# Patient Record
Sex: MALE | Race: BLACK OR AFRICAN AMERICAN | Employment: OTHER | URBAN - METROPOLITAN AREA
[De-identification: names, ages, dates, MRNs, and addresses within clinical notes are randomized per-mention and may not be internally consistent; named-entity substitution may affect disease eponyms.]

---

## 2018-04-26 ENCOUNTER — HOSPITAL ENCOUNTER (EMERGENCY)
Facility: CLINIC | Age: 63
Discharge: HOME | End: 2018-04-27
Attending: EMERGENCY MEDICINE | Admitting: EMERGENCY MEDICINE

## 2018-04-26 VITALS
DIASTOLIC BLOOD PRESSURE: 87 MMHG | RESPIRATION RATE: 18 BRPM | TEMPERATURE: 98.9 F | OXYGEN SATURATION: 96 % | SYSTOLIC BLOOD PRESSURE: 126 MMHG

## 2018-04-26 DIAGNOSIS — S61.009A: ICD-10-CM

## 2018-04-26 PROCEDURE — 25000128 H RX IP 250 OP 636: Performed by: EMERGENCY MEDICINE

## 2018-04-26 PROCEDURE — 99283 EMERGENCY DEPT VISIT LOW MDM: CPT

## 2018-04-26 PROCEDURE — 90471 IMMUNIZATION ADMIN: CPT

## 2018-04-26 PROCEDURE — 90715 TDAP VACCINE 7 YRS/> IM: CPT | Performed by: EMERGENCY MEDICINE

## 2018-04-26 RX ADMIN — CLOSTRIDIUM TETANI TOXOID ANTIGEN (FORMALDEHYDE INACTIVATED), CORYNEBACTERIUM DIPHTHERIAE TOXOID ANTIGEN (FORMALDEHYDE INACTIVATED), BORDETELLA PERTUSSIS TOXOID ANTIGEN (GLUTARALDEHYDE INACTIVATED), BORDETELLA PERTUSSIS FILAMENTOUS HEMAGGLUTININ ANTIGEN (FORMALDEHYDE INACTIVATED), BORDETELLA PERTUSSIS PERTACTIN ANTIGEN, AND BORDETELLA PERTUSSIS FIMBRIAE 2/3 ANTIGEN 0.5 ML: 5; 2; 2.5; 5; 3; 5 INJECTION, SUSPENSION INTRAMUSCULAR at 23:55

## 2018-04-26 NOTE — ED AVS SNAPSHOT
[facility] Emergency Department    [address]    [facility] [address]    Phone:  [phone]    Fax:  [phone]                                       Alexandra Alfaro   MRN: [id number]    Department:  [facility] Emergency Department   Date of Visit:  4/26/2018           After Visit Summary Signature Page     I have received my discharge instructions, and my questions have been answered. I have discussed any challenges I see with this plan with the nurse or doctor.    ..........................................................................................................................................  Patient/Patient Representative Signature      ..........................................................................................................................................  Patient Representative Print Name and Relationship to Patient    ..................................................               ................................................  Date                                            Time    ..........................................................................................................................................  Reviewed by Signature/Title    ...................................................              ..............................................  Date                                                            Time

## 2018-04-26 NOTE — ED AVS SNAPSHOT
[facility] Emergency Department    [address]    [facility] [address]    Phone:  [phone]    Fax:  [phone]                                       Alexandra Alfaro   MRN: [id number]    Department:  [facility] Emergency Department   Date of Visit:  4/26/2018           Patient Information     Date Of Birth          1955        Your diagnoses for this visit were:     Avulsion of skin of thumb, initial encounter        You were seen by Erica Lopez MD.      Follow-up Information     Follow up with [facility] Emergency Department.    Specialty:  EMERGENCY MEDICINE    Why:  If symptoms worsen    Contact information:    [address]  [facility] [address] [phone]        Discharge Instructions         Skin Tear (Skin Avulsion)  A skin avulsion is a tearing of the top layer of skin. This commonly happens after a fall or other injury. It also tends to be more common in older people, or those taking blood thinners or steroids for long periods of time.  Home care  These guidelines will help you care for your wound at home:    Keep the wound clean and dry for the first 24 to 48 hours, or as your healthcare provider advises.    If there is a dressing or bandage, change it when it gets wet or dirty. Otherwise, leave it on for the first 24 hours, then change it once a day or as often as the doctor says.    If stitches or staples were used, check the wound every day.    After taking off the dressing, wash the area gently with soap and water. Clean as close to the stitches as you can. Avoid washing or rubbing the stitches directly.    After 3 days you can keep the bandages off the wound, unless told otherwise, or there is continued drainage.  Allow the wound to be open to the air.    Keep a thin layer of antibiotic ointment on the cut. This will keep the wound clean, make it easier to remove the stitches, and reduce scarring.    If your wound  is oozing, you can put a nonstick dressing over it. Then, reapply the bandage or dressing as you were told.    You can shower as usual after the first 24 hours, but don't soak the area in water (no baths or swimming) until the stitches or staples are taken out.    If surgical tape was used, keep the area clean and dry. If it becomes wet, blot it dry with a clean towel.    If skin glue was used, don't put any creams, lotions, or antibiotic ointments on it. These can dissolve the glue. Usually the glue will flake off in about 5 to 10 days by itself. Try to resist picking it off before that so the wound doesn't open up. When it gets wet, pat it dry.  Here is some information about medicine:    You may use over-the-counter medicine such as acetaminophen or ibuprofen to control pain, unless another pain medicine was given. If you have chronic liver or kidney disease or ever had a stomach ulcer or gastrointestinal bleeding, talk with your doctor before using these medicines.    If you were given antibiotics, take them until they are all used up. It is important to finish the antibiotics even if the wound looks better. This will ensure that the infection has cleared.  Follow-up care  Follow up with your healthcare provider, or as advised.    Watch for any signs of infection, such as increasing redness, swelling, or pus coming out. If this happens, don't wait for your scheduled visit. Instead, see a doctor sooner.    Stitches or staples are usually taken out within 5 to 14 days. This varies depending on what part of your body they are on, and the type of wound. The doctor will tell you how long stitches should be left in.     If surgical tape was used, it is usually left on for 7 to 10 days. You can remove surgical tape after that unless you were told otherwise. If you try to remove it, and it is too hard, soaking can help. Surgical tape strips will eventually fall off on their own. If the edges of the cut pull apart, stop  removing the tape or strips and follow up with your doctor    As mentioned above, skin glue will flake off by itself in 5 to 10 days, so you don't need to pull it off.  If any X-rays were done, you will be notified of any changes that may affect your care.  When to seek medical advice  Call your healthcare provider right away if any of these occur:    Increasing pain in the wound    Redness, swelling, or pus coming from the wound    Fever of 100.4 F (38 C) or higher, or as directed by your healthcare provider    Sutures or staples come apart or fall out before your next appointment and the wound edges look as if they will re-open    Surgical tape closures fall off before 7 days, and the wound edges look as if they will re-open    Bleeding not controlled by direct pressure  Date Last Reviewed: 9/1/2016 2000-2017 [employer]. [address] [place] [address]. All rights reserved. This information is not intended as a substitute for professional medical care. Always follow your healthcare professional's instructions.          24 Hour Appointment Hotline       To make an appointment at any [facility], call [phone] ([phone]). If you don't have a family doctor or clinic, we will help you find one. [facility] clinics are conveniently located to serve the needs of you and your family.             Review of your medicines      Notice     You have not been prescribed any medications.            Orders Needing Specimen Collection     None      Pending Results     No orders found for last 3 day(s).            Pending Culture Results     No orders found for last 3 day(s).            Pending Results Instructions     If you had any lab results that were not finalized at the time of your Discharge, you can call the ED Lab Result RN at [phone]. You will be contacted by this team for any positive Lab results or changes in treatment. The nurses are available 7 days a week from 10A to  6:30P.  You can leave a message 24 hours per day and they will return your call.        Test Results From Your Hospital Stay               Clinical Quality Measure: Blood Pressure Screening     Your blood pressure was checked while you were in the emergency department today. The last reading we obtained was  BP: 126/87 . Please read the guidelines below about what these numbers mean and what you should do about them.  If your systolic blood pressure (the top number) is less than 120 and your diastolic blood pressure (the bottom number) is less than 80, then your blood pressure is normal. There is nothing more that you need to do about it.  If your systolic blood pressure (the top number) is 120-139 or your diastolic blood pressure (the bottom number) is 80-89, your blood pressure may be higher than it should be. You should have your blood pressure rechecked within a year by a primary care provider.  If your systolic blood pressure (the top number) is 140 or greater or your diastolic blood pressure (the bottom number) is 90 or greater, you may have high blood pressure. High blood pressure is treatable, but if left untreated over time it can put you at risk for heart attack, stroke, or kidney failure. You should have your blood pressure rechecked by a primary care provider within the next 4 weeks.  If your provider in the emergency department today gave you specific instructions to follow-up with your doctor or provider even sooner than that, you should follow that instruction and not wait for up to 4 weeks for your follow-up visit.        Thank you for choosing [facility]       Thank you for choosing [facility] for your care. Our goal is always to provide you with excellent care. Hearing back from our patients is one way we can continue to improve our services. Please take a few minutes to complete the written survey that you may receive in the mail after you visit with us. Thank you!        MyChart Information      "[employer] lets you send messages to your doctor, view your test results, renew your prescriptions, schedule appointments and more. To sign up, go to [email or web address] . Click on \"Log in\" on the left side of the screen, which will take you to the Welcome page. Then click on \"Sign up Now\" on the right side of the page.     You will be asked to enter the access code listed below, as well as some personal information. Please follow the directions to create your username and password.     Your access code is: [email or web address]  Expires: 2018 12:16 AM     Your access code will  in 90 days. If you need help or a new code, please call your [facility] clinic or [phone].        Care EveryWhere ID     This is your Care EveryWhere ID. This could be used by other organizations to access your [facility] medical records  [id number]        Equal Access to Services     DOROTHEA BAUER AH: Hadii josephine Watkins, wazack rogers, qadulce kaalmamitch chatman, kindra jeter . So [facility] [phone].    ATENCIÓN: Si habla español, tiene a parry disposición servicios gratuitos de asistencia lingüística. Llame al [phone].    We comply with applicable federal civil rights laws and [place] laws. We do not discriminate on the basis of race, color, national origin, age, disability, sex, sexual orientation, or gender identity.            After Visit Summary       This is your record. Keep this with you and show to your community pharmacist(s) and doctor(s) at your next visit.                  "

## 2018-04-27 NOTE — DISCHARGE INSTRUCTIONS
Skin Tear (Skin Avulsion)  A skin avulsion is a tearing of the top layer of skin. This commonly happens after a fall or other injury. It also tends to be more common in older people, or those taking blood thinners or steroids for long periods of time.  Home care  These guidelines will help you care for your wound at home:    Keep the wound clean and dry for the first 24 to 48 hours, or as your healthcare provider advises.    If there is a dressing or bandage, change it when it gets wet or dirty. Otherwise, leave it on for the first 24 hours, then change it once a day or as often as the doctor says.    If stitches or staples were used, check the wound every day.    After taking off the dressing, wash the area gently with soap and water. Clean as close to the stitches as you can. Avoid washing or rubbing the stitches directly.    After 3 days you can keep the bandages off the wound, unless told otherwise, or there is continued drainage.  Allow the wound to be open to the air.    Keep a thin layer of antibiotic ointment on the cut. This will keep the wound clean, make it easier to remove the stitches, and reduce scarring.    If your wound is oozing, you can put a nonstick dressing over it. Then, reapply the bandage or dressing as you were told.    You can shower as usual after the first 24 hours, but don't soak the area in water (no baths or swimming) until the stitches or staples are taken out.    If surgical tape was used, keep the area clean and dry. If it becomes wet, blot it dry with a clean towel.    If skin glue was used, don't put any creams, lotions, or antibiotic ointments on it. These can dissolve the glue. Usually the glue will flake off in about 5 to 10 days by itself. Try to resist picking it off before that so the wound doesn't open up. When it gets wet, pat it dry.  Here is some information about medicine:    You may use over-the-counter medicine such as acetaminophen or ibuprofen to control pain,  unless another pain medicine was given. If you have chronic liver or kidney disease or ever had a stomach ulcer or gastrointestinal bleeding, talk with your doctor before using these medicines.    If you were given antibiotics, take them until they are all used up. It is important to finish the antibiotics even if the wound looks better. This will ensure that the infection has cleared.  Follow-up care  Follow up with your healthcare provider, or as advised.    Watch for any signs of infection, such as increasing redness, swelling, or pus coming out. If this happens, don't wait for your scheduled visit. Instead, see a doctor sooner.    Stitches or staples are usually taken out within 5 to 14 days. This varies depending on what part of your body they are on, and the type of wound. The doctor will tell you how long stitches should be left in.     If surgical tape was used, it is usually left on for 7 to 10 days. You can remove surgical tape after that unless you were told otherwise. If you try to remove it, and it is too hard, soaking can help. Surgical tape strips will eventually fall off on their own. If the edges of the cut pull apart, stop removing the tape or strips and follow up with your doctor    As mentioned above, skin glue will flake off by itself in 5 to 10 days, so you don't need to pull it off.  If any X-rays were done, you will be notified of any changes that may affect your care.  When to seek medical advice  Call your healthcare provider right away if any of these occur:    Increasing pain in the wound    Redness, swelling, or pus coming from the wound    Fever of 100.4 F (38 C) or higher, or as directed by your healthcare provider    Sutures or staples come apart or fall out before your next appointment and the wound edges look as if they will re-open    Surgical tape closures fall off before 7 days, and the wound edges look as if they will re-open    Bleeding not controlled by direct pressure  Date  Last Reviewed: 9/1/2016 2000-2017 [employer]. [address] [place] [address], [place] [address]. All rights reserved. This information is not intended as a substitute for professional medical care. Always follow your healthcare professional's instructions.

## 2018-04-27 NOTE — ED PROVIDER NOTES
History     Chief Complaint:  Thumb avulsion    HPI   Alexandra Alfaro is a 63 year old male who presents to the emergency department today for evaluation of a thumb avulsion.  Vision was cooking and cut his thumb today.  He cut his right thumb and it continued to bleed.  He knows he needs a tetanus shot.  He reports numbness around the cut.  He has no issue with moving the thumb.  He is otherwise healthy and not on blood thinners.  No other complaints and no other injuries occurred.    Allergies:  No Known Drug Allergies     Medications:    The patient is currently on no regular medications.     Past Medical History:    History reviewed. No pertinent past medical history.    Past Surgical History:    History reviewed. No pertinent surgical history.    Family History:    History reviewed. No pertinent family history.     Social History:  The patient was accompanied to the ED by family   Smoking Status: Never  Smokeless Tobacco: Never  Alcohol Use: No    Review of Systems   Skin: Positive for wound.   Neurological: Positive for numbness.       Physical Exam   First Vitals:  BP: 126/87  Heart Rate: 104  Temp: 98.9  F (37.2  C)  Resp: 18  SpO2: 96 %    Physical Exam  General: Patient is alert and interactive when I enter the room  Head:  The scalp, face, and head appear normal  Eyes:  Conjunctivae are normal  ENT:    The nose is normal    Pinnae are normal    External acoustic canals are normal  Neck:  Trachea midline  CV:  Pulses are normal    Resp:  No respiratory distress   Abdomen:      Soft, non-tender, non-distended  Musc:  Normal muscular tone    No major joint effusions    Good ROM of right thumb, flexion and extension intact, cap refill < 2 sec distal to injury, sensation intact distal to injury    Skin:  Skin avulsion to right thumb on radial aspect, mild active bleeding, no pulsatile bleeding  Neuro:  Speech is normal and fluent. Face is symmetric.     Moving all extremities well.   Psych: Awake. Alert.   Normal affect.  Appropriate interactions.    Emergency Department Course     Interventions:  2355 Tdap 0.5mL IM     Emergency Department Course:  Nursing notes and vitals reviewed.  I performed an exam of the patient as documented above.   Findings and plan explained to the Patient. Patient discharged home with instructions regarding supportive care, medications, and reasons to return. The importance of close follow-up was reviewed.     Impression & Plan      Medical Decision Making:  Alexandra Alfaro is a 62 yo who presents with thumb laceration. Earlier today, patient experienced trauma to his right thumb by a night. He sustained a thumb avulsion. There was mild venous oozing from avulsion. No signs of arterial bleeding and no signs of tendon injury. No nail involvement. Given that it is an avulsion, no indication for repair. A finger guaze dressing with surgifoam over wound applied. Bleeding was controlled. Instructed to keep dressing on for 24 hours and then daily wound cares. Patient's tetanus updated. Patient discharged.     Diagnosis:    ICD-10-CM    1. Avulsion of skin of thumb, initial encounter S61.009A        Disposition:  Discharged to home      4/26/2018   [facility] EMERGENCY DEPARTMENT       Erica Lopez MD  04/28/18 6196

## 2018-04-27 NOTE — ED TRIAGE NOTES
Patient was cutting food and has avulsion to right thumb which continues to ooze blood. Needs tetanus immunization.

## 2018-04-28 ASSESSMENT — ENCOUNTER SYMPTOMS
WOUND: 1
NUMBNESS: 1

## 2018-08-02 PROCEDURE — 99282 EMERGENCY DEPT VISIT SF MDM: CPT

## 2018-08-03 ENCOUNTER — HOSPITAL ENCOUNTER (EMERGENCY)
Facility: CLINIC | Age: 63
Discharge: HOME | End: 2018-08-03
Attending: EMERGENCY MEDICINE | Admitting: EMERGENCY MEDICINE
Payer: COMMERCIAL

## 2018-08-03 VITALS
TEMPERATURE: 98.4 F | OXYGEN SATURATION: 100 % | RESPIRATION RATE: 16 BRPM | DIASTOLIC BLOOD PRESSURE: 91 MMHG | SYSTOLIC BLOOD PRESSURE: 123 MMHG | HEART RATE: 75 BPM

## 2018-08-03 DIAGNOSIS — K40.90 RIGHT INGUINAL HERNIA: ICD-10-CM

## 2018-08-03 RX ORDER — NORTRIPTYLINE HCL 25 MG
25 CAPSULE ORAL AT BEDTIME
Qty: 10 CAPSULE | Refills: 0 | Status: SHIPPED | OUTPATIENT
Start: 2018-08-03 | End: 2020-01-01

## 2018-08-03 ASSESSMENT — ENCOUNTER SYMPTOMS
ABDOMINAL PAIN: 1
VOMITING: 0
FEVER: 0
NAUSEA: 0
CONSTIPATION: 0
DIZZINESS: 0
DIARRHEA: 0

## 2018-08-03 NOTE — ED AVS SNAPSHOT
[facility] Emergency Department    [address]    [facility] [address]    Phone:  [phone]    Fax:  [phone]                                       Alexandra Alfaro   MRN: [id number]    Department:  [facility] Emergency Department   Date of Visit:  8/2/2018           After Visit Summary Signature Page     I have received my discharge instructions, and my questions have been answered. I have discussed any challenges I see with this plan with the nurse or doctor.    ..........................................................................................................................................  Patient/Patient Representative Signature      ..........................................................................................................................................  Patient Representative Print Name and Relationship to Patient    ..................................................               ................................................  Date                                            Time    ..........................................................................................................................................  Reviewed by Signature/Title    ...................................................              ..............................................  Date                                                            Time

## 2018-08-03 NOTE — ED TRIAGE NOTES
Patient states he has a bulge in his right groin that has been there for about four months.  He is able to reduce it himself but it keeps popping back out and it is slightly painful when he walks or lifts things.  PMD wouldn't make an appt for him as they wanted to rule out an emergency.    ABCs intact.  Alert and oriented x 3.

## 2018-08-03 NOTE — ED PROVIDER NOTES
History     Chief Complaint:  Abdominal Pain    The history is provided by the patient. The history is limited by a language barrier. A  was used (son acting as ).      Alexandra Alfaro is a 63 year old male who presents with abdominal pain. The patient has a known right sided inguinal hernia that was diagnosed over 4 months ago. He states that this area is reducible, but patient states that once he walks it pops out again. His pain has increased over the past 4 months and is here for evaluation and treatment. He denies any fevers, testicular pain, trauma, or any other symptoms.    Allergies:  No known drug allergies.    Medications:    The patient is not currently taking any prescribed medications.    Past Medical History:    No significant past medical history.     Past Surgical History:    No pertinent past surgical history.    Family History:    History reviewed. No pertinent family history.    Social History:  Smoking status: Never smoker  Alcohol use: No  Marital Status:        Review of Systems   Constitutional: Negative for fever.   Gastrointestinal: Positive for abdominal pain. Negative for constipation, diarrhea, nausea and vomiting.   Genitourinary: Negative for penile pain, penile swelling and testicular pain.   Neurological: Negative for dizziness.   All other systems reviewed and are negative.    Physical Exam     Patient Vitals for the past 24 hrs:   BP Temp Temp src Pulse Resp SpO2   08/02/18 2324 126/78 98.4  F (36.9  C) Temporal 75 20 98 %       Physical Exam  Nursing note and vitals reviewed.  Constitutional: Cooperative.   Cardiovascular: Normal rate, regular rhythm and normal heart sounds.  No murmur.  Pulmonary/Chest: Effort normal and breath sounds normal. No respiratory distress. No wheezes. No rales.   Abdominal: Soft. Normal appearance and bowel sounds are normal. No distension. There is no tenderness. There is no rigidity and no guarding. Reducible right  inguinal hernia.  Neurological: Alert.   Skin: Skin is warm and dry.  Psychiatric: Normal mood and affect.     Emergency Department Course   Emergency Department Course:  Nursing notes and vitals reviewed.  (0145) I performed an exam of the patient as documented above.    Findings and plan explained to the patient. Patient discharged home with instructions regarding supportive care, medications, and reasons to return. The importance of close follow-up was reviewed.   Impression & Plan    Medical Decision Making:  Alexandra Alfaro is a 63 year old male who presents with a bulge in his right groin. Clinical exam consistent with reducible inguinal hernia. No concern for incarceration or strangulation or other intraabdominal pathology. He has been counseled appropriately and will be referred to general surgery as an outpatient.    Diagnosis:    ICD-10-CM   1. Right inguinal hernia K40.90       Disposition:  Patient is discharged to home.    Discharge Medications:   Details   nortriptyline (PAMELOR) 25 MG capsule Take 1 capsule (25 mg) by mouth At Bedtime, Disp-10 capsule, R-0, Local Print          IErasto, am serving as a scribe on 8/3/2018 at 1:49 AM to personally document services performed by Dr. Roper based on my observations and the provider's statements to me.        Erasto Beard  8/2/2018   [facility] EMERGENCY DEPARTMENT       Dariusz Roper MD  08/03/18 0248

## 2018-08-03 NOTE — ED AVS SNAPSHOT
[facility] Emergency Department    [address] [place] [address]    Phone:  [phone]    Fax:  [phone]                                       Alexandra Alfaro   MRN: [id number]    Department:  [facility] Emergency Department   Date of Visit:  8/2/2018           Patient Information     Date Of Birth          1955        Your diagnoses for this visit were:     Right inguinal hernia        You were seen by Dariusz Roper MD.      Follow-up Information     Follow up with General surgery this next week.      Discharge References/Attachments     HERNIA, WHAT IS A (ENGLISH)      24 Hour Appointment Hotline       To make an appointment at any [facility] clinic, call [phone] ([phone]). If you don't have a family doctor or clinic, we will help you find one. [place] clinics are conveniently located to serve the needs of you and your family.             Review of your medicines      START taking        Dose / Directions Last dose taken    nortriptyline 25 MG capsule   Commonly known as:  PAMELOR   Dose:  25 mg   Quantity:  10 capsule        Take 1 capsule (25 mg) by mouth At Bedtime   Refills:  0                Prescriptions were sent or printed at these locations (1 Prescription)                   Other Prescriptions                Printed at Department/Unit printer (1 of 1)         nortriptyline (PAMELOR) 25 MG capsule                Orders Needing Specimen Collection     None      Pending Results     No orders found from 8/1/2018 to 8/4/2018.            Pending Culture Results     No orders found from 8/1/2018 to 8/4/2018.            Pending Results Instructions     If you had any lab results that were not finalized at the time of your Discharge, you can call the ED Lab Result RN at [phone]. You will be contacted by this team for any positive Lab results or changes in treatment. The nurses are available 7 days a week from 10A to 6:30P.  You can leave a  message 24 hours per day and they will return your call.        Test Results From Your Hospital Stay               Clinical Quality Measure: Blood Pressure Screening     Your blood pressure was checked while you were in the emergency department today. The last reading we obtained was  BP: 126/78 . Please read the guidelines below about what these numbers mean and what you should do about them.  If your systolic blood pressure (the top number) is less than 120 and your diastolic blood pressure (the bottom number) is less than 80, then your blood pressure is normal. There is nothing more that you need to do about it.  If your systolic blood pressure (the top number) is 120-139 or your diastolic blood pressure (the bottom number) is 80-89, your blood pressure may be higher than it should be. You should have your blood pressure rechecked within a year by a primary care provider.  If your systolic blood pressure (the top number) is 140 or greater or your diastolic blood pressure (the bottom number) is 90 or greater, you may have high blood pressure. High blood pressure is treatable, but if left untreated over time it can put you at risk for heart attack, stroke, or kidney failure. You should have your blood pressure rechecked by a primary care provider within the next 4 weeks.  If your provider in the emergency department today gave you specific instructions to follow-up with your doctor or provider even sooner than that, you should follow that instruction and not wait for up to 4 weeks for your follow-up visit.        Thank you for choosing [facility]       Thank you for choosing [facility] for your care. Our goal is always to provide you with excellent care. Hearing back from our patients is one way we can continue to improve our services. Please take a few minutes to complete the written survey that you may receive in the mail after you visit with us. Thank you!        [employer] Information     [employer] lets you send  "messages to your doctor, view your test results, renew your prescriptions, schedule appointments and more. To sign up, go to [email or web address] . Click on \"Log in\" on the left side of the screen, which will take you to the Welcome page. Then click on \"Sign up Now\" on the right side of the page.     You will be asked to enter the access code listed below, as well as some personal information. Please follow the directions to create your username and password.     Your access code is: [email or web address]  Expires: 2018  2:09 AM     Your access code will  in 90 days. If you need help or a new code, please call your [facility] clinic or [phone].        Care EveryWhere ID     This is your Care EveryWhere ID. This could be used by other organizations to access your [facility] medical records  [id number]        Equal Access to Services     DOROTHEA BAUER : Hadii josephine Watkins, waaxda luclaudia, qaybta kaalmada lurdes, kindra jeter . So [facility] [place] [phone].    ATENCIÓN: Si habla español, tiene a parry disposición servicios gratuitos de asistencia lingüística. Llreymundo al [phone].    We comply with applicable federal civil rights laws and [place] laws. We do not discriminate on the basis of race, color, national origin, age, disability, sex, sexual orientation, or gender identity.            After Visit Summary       This is your record. Keep this with you and show to your community pharmacist(s) and doctor(s) at your next visit.                  "

## 2018-08-06 ENCOUNTER — OFFICE VISIT (OUTPATIENT)
Dept: SURGERY | Facility: CLINIC | Age: 63
End: 2018-08-06
Payer: COMMERCIAL

## 2018-08-06 ENCOUNTER — TELEPHONE (OUTPATIENT)
Dept: SURGERY | Facility: CLINIC | Age: 63
End: 2018-08-06

## 2018-08-06 VITALS
WEIGHT: 189.6 LBS | RESPIRATION RATE: 16 BRPM | SYSTOLIC BLOOD PRESSURE: 120 MMHG | HEIGHT: 69 IN | OXYGEN SATURATION: 97 % | BODY MASS INDEX: 28.08 KG/M2 | DIASTOLIC BLOOD PRESSURE: 82 MMHG | HEART RATE: 75 BPM

## 2018-08-06 DIAGNOSIS — K40.90 RIGHT INGUINAL HERNIA: Primary | ICD-10-CM

## 2018-08-06 PROCEDURE — 99204 OFFICE O/P NEW MOD 45 MIN: CPT | Mod: 57 | Performed by: SURGERY

## 2018-08-06 RX ORDER — TAMSULOSIN HYDROCHLORIDE 0.4 MG/1
0.4 CAPSULE ORAL DAILY
Qty: 7 CAPSULE | Refills: 0 | Status: SHIPPED | OUTPATIENT
Start: 2018-08-06 | End: 2020-01-01

## 2018-08-06 ASSESSMENT — ENCOUNTER SYMPTOMS: WEIGHT LOSS: 1

## 2018-08-06 NOTE — PROGRESS NOTES
HPI      ROS (Review of Systems):      Positive for weight loss.   MUSCULOSKELETAL: Positive for back pain.          Physical Exam

## 2018-08-06 NOTE — PROGRESS NOTES
HPI:  Alexandra is a 63 year old male who presents for evaluation of right groin bulging and discomfort.  This has been present for some months, but has been increasing in symptoms.  The patient was recently seen in the emergency room, where he was found to have a reducible right inguinal hernia.  He states that the lump has never gotten stuck out.  He never has any associated nausea and vomiting.  He has not noticed any symptoms on the left side.  The patient is seen today with a family member acting as .    Past Medical History:  No significant past medical history    Past Surgical History:  History reviewed. No pertinent surgical history.     Social History:  Social History     Social History     Marital status:      Spouse name: N/A     Number of children: N/A     Years of education: N/A     Occupational History     Not on file.     Social History Main Topics     Smoking status: Former Smoker     Smokeless tobacco: Never Used     Alcohol use No     Drug use: No     Sexual activity: Not on file     Other Topics Concern     Not on file     Social History Narrative        Family History:  Family History   Problem Relation Age of Onset     Thyroid Disease Brother          ROS:  The 10 point review of systems is negative other than noted in the HPI and above.    PE:    General- Well-developed, well-nourished, patient able to get up on table without difficulty.  HEENT- Normocephalic and atraumatic. Pupils equal and round.  Mucous membranes moist.  Sclera are nonicteric.  Neck- No lymphadenopathy or masses   Respirations- are regular and non labored  Abdomen is abdomen is soft without significant tenderness, masses, organomegaly or guarding  Hernia-there is a question of some slight bulging high in the left inguinal canal.  This could represent an early hernia.              Right inguinal hernia {is obviously present.  This is fairly large in size.  It is easily reducible.   Umbilical hernia is not  present.              External genitalia are normal               Assessment: right inguinal hernia with possible very early left inguinal hernia    Plan: I have recommended robotic assisted repair of the patient's right inguinal hernia and assessment of the left side with possible repair.  We have discussed observation, reduction techniques and importance, incarceration and strangulation signs, symptoms and importance as well as need to seek emergency treatment.    We have discussed surgery in detail, including risk, benefits, complications, mesh, infection, nerve and cord damage and their sequelae including chronic pain and testicular loss, lifting and activity limits after surgery. He has been given literature to review. We will schedule surgery at patient's convenience.  We will plan on a general anesthetic.  It appears we will be able to get the patient on tomorrow's operating room schedule.  I have prescribed Flomax for the patient, and he should begin that this afternoon.      Prabhu Allred MD    Please route or send letter to:  *None*

## 2018-08-06 NOTE — TELEPHONE ENCOUNTER
Type of surgery: ROBOTIC ASSISTED RIGHT INGUINAL HERNIA REPAIR, POSSIBLE LEFT INGUINAL HENRIA REPAIR WITH MESH     Location of surgery: [facility]  Date and time of surgery: 8/7/2018 @ 12:45 PM   Surgeon: NAT JOLLY MD   Pre-Op Appt Date: DONE 8/3/2018 ED   Post-Op Appt Date: PATIENT TO SCHEDULE     Packet sent out: PACKET AND SOAP GIVEN TO PATIENT   Pre-cert/Authorization completed:  Not Applicable  Date: 8/6/2018       ROBOTIC ASSISTED RIGHT INGUINAL HERNIA REPAIR, POSSIBLE LEFT INGUINAL HENRIA REPAIR WITH MESH    GENERAL H&P DONE ER 8/3/2018  120 MIN REQ PA ASSIST DFB NMS

## 2018-08-06 NOTE — MR AVS SNAPSHOT
"              After Visit Summary   8/6/2018    Alexandra Alfaro    MRN: [id number]           Patient Information     Date Of Birth          1955        Visit Information        Provider Department      8/6/2018 11:00 AM Prabhu Allred MD; PHONE,  [facility]      Today's Diagnoses     Right inguinal hernia    -  1       Follow-ups after your visit        Your next 10 appointments already scheduled     Aug 07, 2018   Procedure with Prabhu Allred MD   [facility] (--)    [address]  [facility] [address]   [phone]            Aug 07, 2018 12:45 PM CDT   [facility] Same Day Surgery with Prabhu Allred MD, Sarika Henderson PA-C   [facility] ([facility])    [facility])   [phone]              Who to contact     If you have questions or need follow up information about today's clinic visit or your schedule please contact [facility] directly at [phone].  Normal or non-critical lab and imaging results will be communicated to you by [employer], letter or phone within 4 business days after the clinic has received the results. If you do not hear from us within 7 days, please contact the clinic through [employer] or phone. If you have a critical or abnormal lab result, we will notify you by phone as soon as possible.  Submit refill requests through [employer] or call your pharmacy and they will forward the refill request to us. Please allow 3 business days for your refill to be completed.          Additional Information About Your Visit        [employer] Information     [employer] lets you send messages to your doctor, view your test results, renew your prescriptions, schedule appointments and more. To sign up, go to [email or web address] . Click on \"Log in\" on the left side of the " "screen, which will take you to the Welcome page. Then click on \"Sign up Now\" on the right side of the page.     You will be asked to enter the access code listed below, as well as some personal information. Please follow the directions to create your username and password.     Your access code is: [email or web address]  Expires: 2018  2:09 AM     Your access code will  in 90 days. If you need help or a new code, please call your [facility] or [phone].        Care EveryWhere ID     This is your Care EveryWhere ID. This could be used by other organizations to access your [facility] medical records  [id number]        Your Vitals Were     Pulse Respirations Height Pulse Oximetry BMI (Body Mass Index)       75 16 5' 8.9\" (1.75 m) 97% 28.08 kg/m2        Blood Pressure from Last 3 Encounters:   18 120/82   18 (!) 123/91   18 126/87    Weight from Last 3 Encounters:   18 189 lb 9.5 oz (86 kg)              Today, you had the following     No orders found for display         Today's Medication Changes          These changes are accurate as of 18  2:45 PM.  If you have any questions, ask your nurse or doctor.               Start taking these medicines.        Dose/Directions    tamsulosin 0.4 MG capsule   Commonly known as:  FLOMAX   Used for:  Right inguinal hernia   Started by:  Prabhu Allred MD        Dose:  0.4 mg   Take 1 capsule (0.4 mg) by mouth daily   Quantity:  7 capsule   Refills:  0            Where to get your medicines      These medications were sent to [facility] [address] [facility] [address] AT [facility] [address], [facility] [address]    Hours:  24-hours Phone:  [phone]     tamsulosin 0.4 MG capsule                Primary Care Provider Fax #    Physician No Ref-Primary [phone]       No address on file        Equal Access to Services     DOROTHEA BAUER AH: ajnene Womack, " angie newclaudiamontserrat flanaganyannick shannonin hayaan adeeg kharash la'aan ah. So [facility] [phone].    ATENCIÓN: Si habla meli, tiene a parry disposición servicios gratuitos de asistencia lingüística. Francisco al [phone].    We comply with applicable federal civil rights laws and [place] laws. We do not discriminate on the basis of race, color, national origin, age, disability, sex, sexual orientation, or gender identity.            Thank you!     Thank you for choosing [facility]  for your care. Our goal is always to provide you with excellent care. Hearing back from our patients is one way we can continue to improve our services. Please take a few minutes to complete the written survey that you may receive in the mail after your visit with us. Thank you!             Your Updated Medication List - Protect others around you: Learn how to safely use, store and throw away your medicines at www.disposemymeds.org.          This list is accurate as of 8/6/18  2:45 PM.  Always use your most recent med list.                   Brand Name Dispense Instructions for use Diagnosis    nortriptyline 25 MG capsule    PAMELOR    10 capsule    Take 1 capsule (25 mg) by mouth At Bedtime        tamsulosin 0.4 MG capsule    FLOMAX    7 capsule    Take 1 capsule (0.4 mg) by mouth daily    Right inguinal hernia

## 2018-08-07 ENCOUNTER — APPOINTMENT (OUTPATIENT)
Dept: SURGERY | Facility: PHYSICIAN GROUP | Age: 63
End: 2018-08-07
Payer: COMMERCIAL

## 2018-08-07 ENCOUNTER — ANESTHESIA (OUTPATIENT)
Dept: SURGERY | Facility: CLINIC | Age: 63
End: 2018-08-07
Payer: COMMERCIAL

## 2018-08-07 ENCOUNTER — ANESTHESIA EVENT (OUTPATIENT)
Dept: SURGERY | Facility: CLINIC | Age: 63
End: 2018-08-07
Payer: COMMERCIAL

## 2018-08-07 ENCOUNTER — HOSPITAL ENCOUNTER (OUTPATIENT)
Facility: CLINIC | Age: 63
Discharge: HOME | End: 2018-08-07
Attending: SURGERY | Admitting: SURGERY
Payer: COMMERCIAL

## 2018-08-07 ENCOUNTER — SURGERY (OUTPATIENT)
Age: 63
End: 2018-08-07

## 2018-08-07 VITALS
TEMPERATURE: 97.6 F | RESPIRATION RATE: 14 BRPM | WEIGHT: 187 LBS | SYSTOLIC BLOOD PRESSURE: 117 MMHG | OXYGEN SATURATION: 96 % | HEIGHT: 68 IN | BODY MASS INDEX: 28.34 KG/M2 | DIASTOLIC BLOOD PRESSURE: 76 MMHG

## 2018-08-07 DIAGNOSIS — K40.90 RIGHT INGUINAL HERNIA: Primary | ICD-10-CM

## 2018-08-07 PROCEDURE — S2900 ROBOTIC SURGICAL SYSTEM: HCPCS | Performed by: SURGERY

## 2018-08-07 PROCEDURE — 25000132 ZZH RX MED GY IP 250 OP 250 PS 637: Performed by: SURGERY

## 2018-08-07 PROCEDURE — S2900 ROBOTIC SURGICAL SYSTEM: HCPCS | Mod: AS | Performed by: PHYSICIAN ASSISTANT

## 2018-08-07 PROCEDURE — 71000027 ZZH RECOVERY PHASE 2 EACH 15 MINS: Performed by: SURGERY

## 2018-08-07 PROCEDURE — C1781 MESH (IMPLANTABLE): HCPCS | Performed by: SURGERY

## 2018-08-07 PROCEDURE — 25000125 ZZHC RX 250: Performed by: NURSE ANESTHETIST, CERTIFIED REGISTERED

## 2018-08-07 PROCEDURE — 37000008 ZZH ANESTHESIA TECHNICAL FEE, 1ST 30 MIN: Performed by: SURGERY

## 2018-08-07 PROCEDURE — 49650 LAP ING HERNIA REPAIR INIT: CPT | Mod: AS | Performed by: PHYSICIAN ASSISTANT

## 2018-08-07 PROCEDURE — 49650 LAP ING HERNIA REPAIR INIT: CPT | Mod: RT | Performed by: SURGERY

## 2018-08-07 PROCEDURE — 25000125 ZZHC RX 250: Performed by: SURGERY

## 2018-08-07 PROCEDURE — 25000128 H RX IP 250 OP 636: Performed by: NURSE ANESTHETIST, CERTIFIED REGISTERED

## 2018-08-07 PROCEDURE — 27210794 ZZH OR GENERAL SUPPLY STERILE: Performed by: SURGERY

## 2018-08-07 PROCEDURE — 36000085 ZZH SURGERY LEVEL 8 1ST 30 MIN: Performed by: SURGERY

## 2018-08-07 PROCEDURE — 37000009 ZZH ANESTHESIA TECHNICAL FEE, EACH ADDTL 15 MIN: Performed by: SURGERY

## 2018-08-07 PROCEDURE — 36000087 ZZH SURGERY LEVEL 8 EA 15 ADDTL MIN: Performed by: SURGERY

## 2018-08-07 PROCEDURE — 25000128 H RX IP 250 OP 636: Performed by: ANESTHESIOLOGY

## 2018-08-07 PROCEDURE — 71000013 ZZH RECOVERY PHASE 1 LEVEL 1 EA ADDTL HR: Performed by: SURGERY

## 2018-08-07 PROCEDURE — 25000566 ZZH SEVOFLURANE, EA 15 MIN: Performed by: SURGERY

## 2018-08-07 PROCEDURE — 71000012 ZZH RECOVERY PHASE 1 LEVEL 1 FIRST HR: Performed by: SURGERY

## 2018-08-07 PROCEDURE — 93010 ELECTROCARDIOGRAM REPORT: CPT | Performed by: INTERNAL MEDICINE

## 2018-08-07 PROCEDURE — 25000128 H RX IP 250 OP 636: Performed by: SURGERY

## 2018-08-07 PROCEDURE — 40000306 ZZH STATISTIC PRE PROC ASSESS II: Performed by: SURGERY

## 2018-08-07 DEVICE — MESH PROGRIP LAPAROSCOPIC 5.9X3.9" PARIETEX SELF-FIX LPG1510: Type: IMPLANTABLE DEVICE | Site: ABDOMEN | Status: FUNCTIONAL

## 2018-08-07 RX ORDER — LABETALOL HYDROCHLORIDE 5 MG/ML
10 INJECTION, SOLUTION INTRAVENOUS
Status: DISCONTINUED | OUTPATIENT
Start: 2018-08-07 | End: 2018-08-07 | Stop reason: HOSPADM

## 2018-08-07 RX ORDER — ONDANSETRON 2 MG/ML
4 INJECTION INTRAMUSCULAR; INTRAVENOUS EVERY 30 MIN PRN
Status: DISCONTINUED | OUTPATIENT
Start: 2018-08-07 | End: 2018-08-07 | Stop reason: HOSPADM

## 2018-08-07 RX ORDER — BUPIVACAINE HYDROCHLORIDE AND EPINEPHRINE 5; 5 MG/ML; UG/ML
INJECTION, SOLUTION EPIDURAL; INTRACAUDAL; PERINEURAL PRN
Status: DISCONTINUED | OUTPATIENT
Start: 2018-08-07 | End: 2018-08-07 | Stop reason: HOSPADM

## 2018-08-07 RX ORDER — SODIUM CHLORIDE, SODIUM LACTATE, POTASSIUM CHLORIDE, CALCIUM CHLORIDE 600; 310; 30; 20 MG/100ML; MG/100ML; MG/100ML; MG/100ML
INJECTION, SOLUTION INTRAVENOUS CONTINUOUS
Status: DISCONTINUED | OUTPATIENT
Start: 2018-08-07 | End: 2018-08-07 | Stop reason: HOSPADM

## 2018-08-07 RX ORDER — HYDRALAZINE HYDROCHLORIDE 20 MG/ML
2.5-5 INJECTION INTRAMUSCULAR; INTRAVENOUS EVERY 10 MIN PRN
Status: DISCONTINUED | OUTPATIENT
Start: 2018-08-07 | End: 2018-08-07 | Stop reason: HOSPADM

## 2018-08-07 RX ORDER — GLYCOPYRROLATE 0.2 MG/ML
INJECTION, SOLUTION INTRAMUSCULAR; INTRAVENOUS PRN
Status: DISCONTINUED | OUTPATIENT
Start: 2018-08-07 | End: 2018-08-07

## 2018-08-07 RX ORDER — ONDANSETRON 4 MG/1
4 TABLET, ORALLY DISINTEGRATING ORAL EVERY 30 MIN PRN
Status: DISCONTINUED | OUTPATIENT
Start: 2018-08-07 | End: 2018-08-07 | Stop reason: HOSPADM

## 2018-08-07 RX ORDER — OXYCODONE HYDROCHLORIDE 5 MG/1
5 TABLET ORAL
Status: COMPLETED | OUTPATIENT
Start: 2018-08-07 | End: 2018-08-07

## 2018-08-07 RX ORDER — FENTANYL CITRATE 50 UG/ML
INJECTION, SOLUTION INTRAMUSCULAR; INTRAVENOUS PRN
Status: DISCONTINUED | OUTPATIENT
Start: 2018-08-07 | End: 2018-08-07

## 2018-08-07 RX ORDER — HYDROMORPHONE HYDROCHLORIDE 1 MG/ML
.3-.5 INJECTION, SOLUTION INTRAMUSCULAR; INTRAVENOUS; SUBCUTANEOUS EVERY 10 MIN PRN
Status: DISCONTINUED | OUTPATIENT
Start: 2018-08-07 | End: 2018-08-07 | Stop reason: HOSPADM

## 2018-08-07 RX ORDER — NALOXONE HYDROCHLORIDE 0.4 MG/ML
.1-.4 INJECTION, SOLUTION INTRAMUSCULAR; INTRAVENOUS; SUBCUTANEOUS
Status: DISCONTINUED | OUTPATIENT
Start: 2018-08-07 | End: 2018-08-07 | Stop reason: HOSPADM

## 2018-08-07 RX ORDER — CEFAZOLIN SODIUM 1 G/3ML
1 INJECTION, POWDER, FOR SOLUTION INTRAMUSCULAR; INTRAVENOUS SEE ADMIN INSTRUCTIONS
Status: DISCONTINUED | OUTPATIENT
Start: 2018-08-07 | End: 2018-08-07 | Stop reason: HOSPADM

## 2018-08-07 RX ORDER — LIDOCAINE 40 MG/G
CREAM TOPICAL
Status: DISCONTINUED | OUTPATIENT
Start: 2018-08-07 | End: 2018-08-07 | Stop reason: HOSPADM

## 2018-08-07 RX ORDER — OXYCODONE HYDROCHLORIDE 5 MG/1
5-10 TABLET ORAL
Qty: 20 TABLET | Refills: 0 | Status: SHIPPED | OUTPATIENT
Start: 2018-08-07 | End: 2020-01-01

## 2018-08-07 RX ORDER — PROPOFOL 10 MG/ML
INJECTION, EMULSION INTRAVENOUS PRN
Status: DISCONTINUED | OUTPATIENT
Start: 2018-08-07 | End: 2018-08-07

## 2018-08-07 RX ORDER — ONDANSETRON 2 MG/ML
INJECTION INTRAMUSCULAR; INTRAVENOUS PRN
Status: DISCONTINUED | OUTPATIENT
Start: 2018-08-07 | End: 2018-08-07

## 2018-08-07 RX ORDER — FENTANYL CITRATE 50 UG/ML
25-50 INJECTION, SOLUTION INTRAMUSCULAR; INTRAVENOUS
Status: DISCONTINUED | OUTPATIENT
Start: 2018-08-07 | End: 2018-08-07 | Stop reason: HOSPADM

## 2018-08-07 RX ORDER — OXYCODONE HYDROCHLORIDE 5 MG/1
5 TABLET ORAL ONCE
Status: COMPLETED | OUTPATIENT
Start: 2018-08-07 | End: 2018-08-07

## 2018-08-07 RX ORDER — MEPERIDINE HYDROCHLORIDE 50 MG/ML
12.5 INJECTION INTRAMUSCULAR; INTRAVENOUS; SUBCUTANEOUS
Status: DISCONTINUED | OUTPATIENT
Start: 2018-08-07 | End: 2018-08-07 | Stop reason: HOSPADM

## 2018-08-07 RX ORDER — LIDOCAINE HYDROCHLORIDE 10 MG/ML
INJECTION, SOLUTION INFILTRATION; PERINEURAL PRN
Status: DISCONTINUED | OUTPATIENT
Start: 2018-08-07 | End: 2018-08-07

## 2018-08-07 RX ORDER — NEOSTIGMINE METHYLSULFATE 1 MG/ML
VIAL (ML) INJECTION PRN
Status: DISCONTINUED | OUTPATIENT
Start: 2018-08-07 | End: 2018-08-07

## 2018-08-07 RX ORDER — DEXAMETHASONE SODIUM PHOSPHATE 4 MG/ML
INJECTION, SOLUTION INTRA-ARTICULAR; INTRALESIONAL; INTRAMUSCULAR; INTRAVENOUS; SOFT TISSUE PRN
Status: DISCONTINUED | OUTPATIENT
Start: 2018-08-07 | End: 2018-08-07

## 2018-08-07 RX ORDER — CEFAZOLIN SODIUM 2 G/100ML
2 INJECTION, SOLUTION INTRAVENOUS
Status: DISCONTINUED | OUTPATIENT
Start: 2018-08-07 | End: 2018-08-07 | Stop reason: HOSPADM

## 2018-08-07 RX ORDER — TAMSULOSIN HYDROCHLORIDE 0.4 MG/1
0.4 CAPSULE ORAL
Status: DISCONTINUED | OUTPATIENT
Start: 2018-08-07 | End: 2018-08-07 | Stop reason: HOSPADM

## 2018-08-07 RX ADMIN — ROCURONIUM BROMIDE 10 MG: 10 INJECTION INTRAVENOUS at 14:13

## 2018-08-07 RX ADMIN — GLYCOPYRROLATE 0.2 MG: 0.2 INJECTION, SOLUTION INTRAMUSCULAR; INTRAVENOUS at 13:32

## 2018-08-07 RX ADMIN — ONDANSETRON 4 MG: 2 INJECTION INTRAMUSCULAR; INTRAVENOUS at 15:12

## 2018-08-07 RX ADMIN — PROPOFOL 170 MG: 10 INJECTION, EMULSION INTRAVENOUS at 13:32

## 2018-08-07 RX ADMIN — HYDROMORPHONE HYDROCHLORIDE 0.5 MG: 1 INJECTION, SOLUTION INTRAMUSCULAR; INTRAVENOUS; SUBCUTANEOUS at 14:01

## 2018-08-07 RX ADMIN — LIDOCAINE HYDROCHLORIDE 40 MG: 10 INJECTION, SOLUTION INFILTRATION; PERINEURAL at 13:32

## 2018-08-07 RX ADMIN — OXYCODONE HYDROCHLORIDE 5 MG: 5 TABLET ORAL at 19:01

## 2018-08-07 RX ADMIN — OXYCODONE HYDROCHLORIDE 5 MG: 5 TABLET ORAL at 16:29

## 2018-08-07 RX ADMIN — Medication 3 MG: at 15:15

## 2018-08-07 RX ADMIN — SODIUM CHLORIDE, POTASSIUM CHLORIDE, SODIUM LACTATE AND CALCIUM CHLORIDE: 600; 310; 30; 20 INJECTION, SOLUTION INTRAVENOUS at 12:24

## 2018-08-07 RX ADMIN — SODIUM CHLORIDE, POTASSIUM CHLORIDE, SODIUM LACTATE AND CALCIUM CHLORIDE 1000 ML: 600; 310; 30; 20 INJECTION, SOLUTION INTRAVENOUS at 16:00

## 2018-08-07 RX ADMIN — ROCURONIUM BROMIDE 50 MG: 10 INJECTION INTRAVENOUS at 13:32

## 2018-08-07 RX ADMIN — FENTANYL CITRATE 125 MCG: 50 INJECTION, SOLUTION INTRAMUSCULAR; INTRAVENOUS at 13:56

## 2018-08-07 RX ADMIN — MIDAZOLAM 1 MG: 1 INJECTION INTRAMUSCULAR; INTRAVENOUS at 13:21

## 2018-08-07 RX ADMIN — FENTANYL CITRATE 50 MCG: 50 INJECTION INTRAMUSCULAR; INTRAVENOUS at 15:58

## 2018-08-07 RX ADMIN — BUPIVACAINE HYDROCHLORIDE AND EPINEPHRINE BITARTRATE 16 ML: 5; .005 INJECTION, SOLUTION EPIDURAL; INTRACAUDAL; PERINEURAL at 15:15

## 2018-08-07 RX ADMIN — FENTANYL CITRATE 50 MCG: 50 INJECTION INTRAMUSCULAR; INTRAVENOUS at 15:49

## 2018-08-07 RX ADMIN — DEXAMETHASONE SODIUM PHOSPHATE 4 MG: 4 INJECTION, SOLUTION INTRA-ARTICULAR; INTRALESIONAL; INTRAMUSCULAR; INTRAVENOUS; SOFT TISSUE at 13:32

## 2018-08-07 RX ADMIN — FENTANYL CITRATE 50 MCG: 50 INJECTION, SOLUTION INTRAMUSCULAR; INTRAVENOUS at 15:29

## 2018-08-07 RX ADMIN — MIDAZOLAM 1 MG: 1 INJECTION INTRAMUSCULAR; INTRAVENOUS at 13:31

## 2018-08-07 RX ADMIN — FENTANYL CITRATE 75 MCG: 50 INJECTION, SOLUTION INTRAMUSCULAR; INTRAVENOUS at 13:32

## 2018-08-07 RX ADMIN — FENTANYL CITRATE 50 MCG: 50 INJECTION, SOLUTION INTRAMUSCULAR; INTRAVENOUS at 15:35

## 2018-08-07 RX ADMIN — CEFAZOLIN 2 G: 1 INJECTION, POWDER, FOR SOLUTION INTRAMUSCULAR; INTRAVENOUS at 13:38

## 2018-08-07 RX ADMIN — GLYCOPYRROLATE 0.6 MG: 0.2 INJECTION, SOLUTION INTRAMUSCULAR; INTRAVENOUS at 15:15

## 2018-08-07 NOTE — ANESTHESIA PREPROCEDURE EVALUATION
Anesthesia Evaluation     . Pt has had prior anesthetic.            ROS/MED HX    ENT/Pulmonary:       Neurologic:       Cardiovascular:         METS/Exercise Tolerance:     Hematologic:         Musculoskeletal:         GI/Hepatic:     (+) Other GI/Hepatic hernia      Renal/Genitourinary:         Endo:         Psychiatric:         Infectious Disease:         Malignancy:         Other:                     Physical Exam  Normal systems: cardiovascular and pulmonary    Airway   Mallampati: II  TM distance: >3 FB  Neck ROM: full    Dental     Cardiovascular       Pulmonary                     Anesthesia Plan      History & Physical Review  History and physical reviewed and following examination; no interval change.    ASA Status:  2 .    NPO Status:  > 8 hours    Plan for General and ETT with Intravenous and Propofol induction. Maintenance will be Balanced.    PONV prophylaxis:  Ondansetron (or other 5HT-3) and Dexamethasone or Solumedrol       Postoperative Care  Postoperative pain management:  IV analgesics.      Consents  Anesthetic plan, risks, benefits and alternatives discussed with:  Patient.  Use of blood products discussed: Yes.   Use of blood products discussed with Patient.  Consented to blood products.  .                          .

## 2018-08-07 NOTE — IP AVS SNAPSHOT
[facility] PreOP/PostOP    [address]    [facility] [address]    Phone:  [phone]    Fax:  [phone]                                       After Visit Summary   8/7/2018    Alexandra Alfaro    MRN: [id number]           After Visit Summary Signature Page     I have received my discharge instructions, and my questions have been answered. I have discussed any challenges I see with this plan with the nurse or doctor.    ..........................................................................................................................................  Patient/Patient Representative Signature      ..........................................................................................................................................  Patient Representative Print Name and Relationship to Patient    ..................................................               ................................................  Date                                            Time    ..........................................................................................................................................  Reviewed by Signature/Title    ...................................................              ..............................................  Date                                                            Time

## 2018-08-07 NOTE — OP NOTE
General Surgery Operative Note    Pre-operative diagnosis: Right Inguinal hernia    Post-operative diagnosis: same   Procedure:  robotic-assisted right inguinal hernia repair with mesh.     Surgeon: Prabhu Allred MD   Assistant(s): Ty Santos PA-C the physician assistant was medically necessary to assist in prepping, positioning, camera operation, retraction/exposure and instrument exchange.     Anesthesia: General    Estimated blood loss: 5 cc's   Drains placed: None   Complications:  None   Findings:   large indirect right inguinal hernia.  No evidence of significant left-sided hernia.       Indications for operation: This is a 63-year-old gentleman who presents with a right inguinal bulge.  Exam revealed a prominent right inguinal hernia.  There was a question of some vague bulging on the left as well.  Robotic-assisted repair of the patient's right inguinal hernia was recommended.  The procedure, along with its risks and complications, was discussed in detail with the patient through an .  He agreed to proceed.    Details of the operation: After informed consent, the patient was taken to the operating room where he underwent satisfactory induction of general anesthesia.  The patient was sterilely prepped and draped and a supraumbilical skin incision was made.  Dissection was carried bluntly down to the fascia, which was opened very slightly using electrocautery.  The robotic camera was inserted without difficulty.  Pneumoperitoneum was achieved using CO2 insufflation, and an 8 mm port was now placed under direct visualization on each side.  The patient was placed in slight Trendelenburg position and the robot was brought in and docked without difficulty.  I now proceeded to the robotic console.     The left side was visualized and there was no evidence of hernia here.  The right side revealed an obvious indirect hernia.   The peritoneum was scored above low level of the ASIS on the right and  the peritoneal flap was developed.  This was carried down medial and lateral to the internal ring.  A prominent indirect sac was now carefully dissected away from the cord structures.  An opening was made fairly posteriorly in the peritoneum during this dissection.   A piece of Progrip mesh was now inserted in the abdomen and deployed, centered over the internal ring.  The mesh was deployed so that it lay smoothly.  The peritoneum was now closed anteriorly using a running 3-0 V lock suture.  The more posterior opening in the peritoneum was closed using another 3-0 V lock suture.  This resulted in excellent coverage of the mesh.  The robot was now undocked and the trochars were removed.  The supraumbilical fascia was closed using interrupted 0 Vicryl sutures.  Skin incisions were closed using 4-0 subcuticular Vicryl followed by Steri-Strips.     The patient tolerated the procedure well and was transferred to the recovery room in satisfactory condition.  Sponge and needle counts were correct at the close of the case.    Specimens: * No specimens in log *        Prabhu Allred MD

## 2018-08-07 NOTE — ANESTHESIA CARE TRANSFER NOTE
Patient: Alexandra Alfaro    Procedure(s):  Robotic Assisted Right Inguinal Hernia Repair with Mesh  - Wound Class: II-Clean Contaminated    Diagnosis: Inguinal hernia   Diagnosis Additional Information: No value filed.    Anesthesia Type:   General, ETT     Note:  Airway :Face Mask  Patient transferred to:PACU  Comments:   4/4, moving all extremities, pt follows commands, suctioned orally, extubated without complications.Pt tx to PACU, VSS, pt comfortable. Report given to  PACU RN.Handoff Report: Identifed the Patient, Identified the Reponsible Provider, Reviewed the pertinent medical history, Discussed the surgical course, Reviewed Intra-OP anesthesia mangement and issues during anesthesia, Set expectations for post-procedure period and Allowed opportunity for questions and acknowledgement of understanding      Vitals: (Last set prior to Anesthesia Care Transfer)    CRNA VITALS  8/7/2018 1455 - 8/7/2018 1540      8/7/2018             Pulse: 90    SpO2: 100 %    Resp Rate (observed): 19                Electronically Signed By: JEANINE Gamez CRNA  August 7, 2018  3:40 PM

## 2018-08-07 NOTE — DISCHARGE INSTRUCTIONS
You had 5mg Oxycodone at 4:30pm    GENERAL ANESTHESIA OR SEDATION ADULT DISCHARGE INSTRUCTIONS   SPECIAL PRECAUTIONS FOR 24 HOURS AFTER SURGERY    IT IS NOT UNUSUAL TO FEEL LIGHT-HEADED OR FAINT, UP TO 24 HOURS AFTER SURGERY OR WHILE TAKING PAIN MEDICATION.  IF YOU HAVE THESE SYMPTOMS; SIT FOR A FEW MINUTES BEFORE STANDING AND HAVE SOMEONE ASSIST YOU WHEN YOU GET UP TO WALK OR USE THE BATHROOM.    YOU SHOULD REST AND RELAX FOR THE NEXT 24 HOURS AND YOU MUST MAKE ARRANGEMENTS TO HAVE SOMEONE STAY WITH YOU FOR AT LEAST 24 HOURS AFTER YOUR DISCHARGE.  AVOID HAZARDOUS AND STRENUOUS ACTIVITIES.  DO NOT MAKE IMPORTANT DECISIONS FOR 24 HOURS.    DO NOT DRIVE ANY VEHICLE OR OPERATE MECHANICAL EQUIPMENT FOR 24 HOURS FOLLOWING THE END OF YOUR SURGERY.  EVEN THOUGH YOU MAY FEEL NORMAL, YOUR REACTIONS MAY BE AFFECTED BY THE MEDICATION YOU HAVE RECEIVED.    DO NOT DRINK ALCOHOLIC BEVERAGES FOR 24 HOURS FOLLOWING YOUR SURGERY.    DRINK CLEAR LIQUIDS (APPLE JUICE, GINGER ALE, 7-UP, BROTH, ETC.).  PROGRESS TO YOUR REGULAR DIET AS YOU FEEL ABLE.    YOU MAY HAVE A DRY MOUTH, A SORE THROAT, MUSCLES ACHES OR TROUBLE SLEEPING.  THESE SHOULD GO AWAY AFTER 24 HOURS.    CALL YOUR DOCTOR FOR ANY OF THE FOLLOWING:  SIGNS OF INFECTION (FEVER, GROWING TENDERNESS AT THE SURGERY SITE, A LARGE AMOUNT OF DRAINAGE OR BLEEDING, SEVERE PAIN, FOUL-SMELLING DRAINAGE, REDNESS OR SWELLING.    IT HAS BEEN OVER 8 TO 10 HOURS SINCE SURGERY AND YOU ARE STILL NOT ABLE TO URINATE (PASS WATER).   HOME CARE FOLLOWING HERNIA REPAIR  SYLWIA Mccain E. Gavin, CLIVE Hardy, SYLWIA Delgadillo, [employer]    DIET:  No restrictions. Increased fluid intake is recommended. While taking pain medications, increase dietary fiber or add a fiber supplementation like Metamucil or Citrucel to help prevent constipation - a possible side effect of pain medications.  If taking Metamucil or Citrucel, take with plenty of fluids as instructed.    NAUSEA:  If  nauseated from the anesthetic/pain meds; rest in bed, get up cautiously with assistance, and drink clear liquids (juice, tea, broth).    ACTIVITY:  Light Activity -- you may immediately be up and about as tolerated.  Driving -- you may drive when comfortable and off narcotic pain medications.  Light Work -- resume when comfortable off pain medications.  (If you can drive, you probably can work.)  Strenuous Work/Activity -- limit lifting to 20 pounds for 3 weeks.  Active Sports (running, biking, etc.) -- cautiously resume after 3 weeks.    INCISIONAL CARE:    If you have a dressing in place, keep clean and dry for 48 hours; you may replace the gauze if it becomes soiled.    After 48 hours you may remove the dressing and shower.  Do not submerse incision in water for 1 week.    If you have a Dermabond dressing (a type of skin glue), you may shower immediately.    Sutures will absorb and need not be removed.    If present, leave the steri-strips (white paper tapes) in place until they fall off.    If present, leave Dermabond glue in place until it wears/flakes off.    Expect a variable amount of swelling/black and blue discoloration that may involve the penis/scrotum or labia.    Some numbness around the incision is common.    A lump/ridge under the incision is normal and will gradually resolve.    DISCOMFORT:  Local anesthetic placed at surgery should provide relief for 4-8 hours.  Begin taking pain pills before discomfort is severe.  Take the pain medication with some food, when possible, to minimize side effects.  Intermittent use of ice packs to the hernia repair site may help during the first 48 hours.  Expect gradual improvement.    RETURN APPOINTMENT:  Schedule a follow-up visit 1-3 weeks post-op (you may do this any time after surgery is scheduled).  Office Phone:  [phone]    CONTACT US IF THE FOLLOWING DEVELOPS:  1.  A fever that is above 101    2.  If there is a large amount of drainage, bleeding, or  swelling.  3.  Severe pain that is not relieved by your prescription.  4.  Drainage that is thick, cloudy, yellow, green or white.  5.  Any other questions not answered by  Frequently Asked Questions  sheet.    FREQUENTLY ASKED QUESTIONS AFTER SURGERY  Stuart Villarreal, SYLWIA Allred, MANGO Shankar, CLIVE Hardy, SYLWIA Delgadillo, JD [employer]  &  MATT Mattson      Q:  How should my incision look?    A:  Normally your incision will appear slightly swollen with light redness directly along the incision itself as it heals.  It may feel like a bump or ridge as the healing/scarring happens, and over time (3-4 months) this bump or ridge feeling should slowly go away.  In general, clear or pink watery drainage can be normal at first as your incision heals, but should decrease over time.    Q:  How do I know if my incision is infected?  A:  Look at your incision for signs of infection, like redness around the incision spreading to surrounding skin, or drainage of cloudy or foul-smelling drainage.  If you feel warm, check your temperature to see if you are running a fever.    **If any of these things occur, please notify the nurse at our office.  We may need you to come into the office for an incision check.      Q:  How do I take care of my incision?  A:  If you have a dressing in place - Starting the day after surgery, replace the dressing 1-2 times a day until there is no further drainage from the incision.  At that time, a dressing is no longer needed.  Try to minimize tape on the skin if irritation is occurring at the tape sites.  If you have significant irritation from tape on the skin, please call the office to discuss other method of dressing your incision.    Small pieces of tape called  steri-strips  may be present directly overlying your incision; these may be removed 10 days after surgery unless otherwise specified by your surgeon.  If these tapes start to loosen at the ends, you may trim them back until they fall off or are  removed.    A:  If you had  Dermabond  tissue glue used as a dressing (this causes your incision to look shiny with a clear covering over it) - This type of dressing wears off with time and does not require more dressings over the top unless it is draining around the glue as it wears off.  Do not apply ointments or lotions over the incisions until the glue has completely worn off.    Q:  There is a piece of tape or a sticky  lead  still on my skin.  Can I remove this?  A:  Sometimes the sticky  leads  used for monitoring during surgery or for evaluation in the emergency department are not all removed while you are in the hospital.  These sometimes have a tab or metal dot on them.  You can easily remove these on your own, like taking off a band-aid.  If there is a gel substance under the  lead , simply wipe/clean it off with a washcloth or paper towel.      Q:  What can I do to minimize constipation (very hard stools, or lack of stools)?  A:  Stay well hydrated.  Increase your dietary fiber intake or take a fiber supplement -with plenty of water.  Walk around frequently.  You may consider an over-the-counter stool-softener.  Your Pharmacist can assist you with choosing one that is stocked at your pharmacy.  Constipation is also one of the most common side effects of pain medication.  If you are using pain medication, be pro-active and try to PREVENT problems with constipation by taking the steps above BEFORE constipation becomes a problem.    Q:  What do I do if I need more pain medications?  A:  Call the office to receive refills.  Be aware that certain pain meds cannot be called into a pharmacy and actually require a paper prescription.  A change may be made in your pain med as you progress thru your recovery period or if you have side effects to certain meds.    --Pain meds are NOT refilled after 5pm on weekdays, and NOT AT ALL on the weekends, so please look ahead to prevent problems.                  Q:  Why am  I having a hard time sleeping now that I am at home?  A:  Many medications you receive while you are in the hospital can impact your sleep for a number of days after your surgery/hospitalization.  Decreased level of activity and naps during the day may also make sleeping at night difficult.  Try to minimize day-time naps, and get up frequently during the day to walk around your home during your recovery time.  Sleep aides may be of some help, but are not recommended for long-term use.      Q:  I am having some back discomfort.  What should I do?  A:  This may be related to certain positioning that was required for your surgery, extended periods of time in bed, or other changes in your overall activity level.  You may try ice, heat, acetaminophen, or ibuprofen to treat this temporarily.  Note that many pain medications have acetaminophen in them and would state this on the prescription bottle.  Be sure not to exceed the maximum of 4000mg per day of acetaminophen.     **If the pain you are having does not resolve, is severe, or is a flare of back pain you have had on other occasions prior to surgery, please contact your primary physician for further recommendations or for an appointment to be examined at their office.    Q:  Why am I having headaches?  A:  Headaches can be caused by many things:  caffeine withdrawal, use of pain meds, dehydration, high blood pressure, lack of sleep, over-activity/exhaustion, flare-up of usual migraine headaches.  If you feel this is related to muscle tension (a band-like feeling around the head, or a pressure at the low-back of the head) you may try ice or heat to this area.  You may need to drink more fluids (try electrolyte drink like Gatorade), rest, or take your usual migraine medications.   **If your headaches do not resolve, worsen, are accompanied by other symptoms, or if your blood pressure is high, please call your primary physician for recommendation and/or  examination.    Q:  I am unable to urinate.  What do I do?  A:  A small percentage of people can have difficulty urinating initially after surgery.  This includes being able to urinate only a very small amount at a time and feeling discomfort or pressure in the very low abdomen.  This is called  urinary retention , and is actually an urgent situation.  Proceed to your nearest Emergency department for evaluation (not an Urgent Care Center).  Sometimes the bladder does not work correctly after certain medications you receive during surgery, or related to certain procedures.  You may need to have a catheter placed until your bladder recovers.  When planning to go to an Emergency department, it may help to call the ER to let them know you are coming in for this problem after a surgery.  This may help you get in quicker to be evaluated.  **If you have symptoms of a urinary tract infection, please contact your primary physician for the proper evaluation and treatment.              If you have other questions, please call the office Monday thru Friday between 8am and 5pm to discuss with the nurse or physician assistant.  [phone]    There is a surgeon ON CALL on weekday evenings and over the weekend in case of urgent need only, and may be contacted at the same number.    If you are having an emergency, call 911 or proceed to your nearest emergency department.

## 2018-08-07 NOTE — IP AVS SNAPSHOT
MRN:[id number]                      After Visit Summary   8/7/2018    Alexandra Alfaro    MRN: [id number]           Thank you!     Thank you for choosing [facility] for your care. Our goal is always to provide you with excellent care. Hearing back from our patients is one way we can continue to improve our services. Please take a few minutes to complete the written survey that you may receive in the mail after you visit. If you would like to speak to someone directly about your visit please contact Patient Relations at [phone]. Thank you!          Patient Information     Date Of Birth          1955        About your hospital stay     You were admitted on:  August 7, 2018 You last received care in the:  [facility] PreOP/PostOP    You were discharged on:  August 7, 2018       Who to Call     For medical emergencies, please call 911.  For non-urgent questions about your medical care, please call your primary care provider or clinic, None  For questions related to your surgery, please call your surgery clinic        Attending Provider     Provider Specialty    Prabhu Allred MD General Surgery       Primary Care Provider Fax #    Physician No Ref-Primary [phone]      Further instructions from your care team         You had 5mg Oxycodone at 4:30pm    GENERAL ANESTHESIA OR SEDATION ADULT DISCHARGE INSTRUCTIONS   SPECIAL PRECAUTIONS FOR 24 HOURS AFTER SURGERY    IT IS NOT UNUSUAL TO FEEL LIGHT-HEADED OR FAINT, UP TO 24 HOURS AFTER SURGERY OR WHILE TAKING PAIN MEDICATION.  IF YOU HAVE THESE SYMPTOMS; SIT FOR A FEW MINUTES BEFORE STANDING AND HAVE SOMEONE ASSIST YOU WHEN YOU GET UP TO WALK OR USE THE BATHROOM.    YOU SHOULD REST AND RELAX FOR THE NEXT 24 HOURS AND YOU MUST MAKE ARRANGEMENTS TO HAVE SOMEONE STAY WITH YOU FOR AT LEAST 24 HOURS AFTER YOUR DISCHARGE.  AVOID HAZARDOUS AND STRENUOUS ACTIVITIES.  DO NOT MAKE IMPORTANT DECISIONS FOR 24 HOURS.    DO NOT DRIVE ANY  VEHICLE OR OPERATE MECHANICAL EQUIPMENT FOR 24 HOURS FOLLOWING THE END OF YOUR SURGERY.  EVEN THOUGH YOU MAY FEEL NORMAL, YOUR REACTIONS MAY BE AFFECTED BY THE MEDICATION YOU HAVE RECEIVED.    DO NOT DRINK ALCOHOLIC BEVERAGES FOR 24 HOURS FOLLOWING YOUR SURGERY.    DRINK CLEAR LIQUIDS (APPLE JUICE, GINGER ALE, 7-UP, BROTH, ETC.).  PROGRESS TO YOUR REGULAR DIET AS YOU FEEL ABLE.    YOU MAY HAVE A DRY MOUTH, A SORE THROAT, MUSCLES ACHES OR TROUBLE SLEEPING.  THESE SHOULD GO AWAY AFTER 24 HOURS.    CALL YOUR DOCTOR FOR ANY OF THE FOLLOWING:  SIGNS OF INFECTION (FEVER, GROWING TENDERNESS AT THE SURGERY SITE, A LARGE AMOUNT OF DRAINAGE OR BLEEDING, SEVERE PAIN, FOUL-SMELLING DRAINAGE, REDNESS OR SWELLING.    IT HAS BEEN OVER 8 TO 10 HOURS SINCE SURGERY AND YOU ARE STILL NOT ABLE TO URINATE (PASS WATER).   HOME CARE FOLLOWING HERNIA REPAIR  Stuart Villarreal, SYLWIA Allred, MANGO Shankar, CLIVE Hardy, SYLWIA Delgadillo, [employer]    DIET:  No restrictions. Increased fluid intake is recommended. While taking pain medications, increase dietary fiber or add a fiber supplementation like Metamucil or Citrucel to help prevent constipation - a possible side effect of pain medications.  If taking Metamucil or Citrucel, take with plenty of fluids as instructed.    NAUSEA:  If nauseated from the anesthetic/pain meds; rest in bed, get up cautiously with assistance, and drink clear liquids (juice, tea, broth).    ACTIVITY:  Light Activity -- you may immediately be up and about as tolerated.  Driving -- you may drive when comfortable and off narcotic pain medications.  Light Work -- resume when comfortable off pain medications.  (If you can drive, you probably can work.)  Strenuous Work/Activity -- limit lifting to 20 pounds for 3 weeks.  Active Sports (running, biking, etc.) -- cautiously resume after 3 weeks.    INCISIONAL CARE:    If you have a dressing in place, keep clean and dry for 48 hours; you may replace the gauze if it  becomes soiled.    After 48 hours you may remove the dressing and shower.  Do not submerse incision in water for 1 week.    If you have a Dermabond dressing (a type of skin glue), you may shower immediately.    Sutures will absorb and need not be removed.    If present, leave the steri-strips (white paper tapes) in place until they fall off.    If present, leave Dermabond glue in place until it wears/flakes off.    Expect a variable amount of swelling/black and blue discoloration that may involve the penis/scrotum or labia.    Some numbness around the incision is common.    A lump/ridge under the incision is normal and will gradually resolve.    DISCOMFORT:  Local anesthetic placed at surgery should provide relief for 4-8 hours.  Begin taking pain pills before discomfort is severe.  Take the pain medication with some food, when possible, to minimize side effects.  Intermittent use of ice packs to the hernia repair site may help during the first 48 hours.  Expect gradual improvement.    RETURN APPOINTMENT:  Schedule a follow-up visit 1-3 weeks post-op (you may do this any time after surgery is scheduled).  Office Phone:  [phone]    CONTACT US IF THE FOLLOWING DEVELOPS:  1.  A fever that is above 101    2.  If there is a large amount of drainage, bleeding, or swelling.  3.  Severe pain that is not relieved by your prescription.  4.  Drainage that is thick, cloudy, yellow, green or white.  5.  Any other questions not answered by  Frequently Asked Questions  sheet.    FREQUENTLY ASKED QUESTIONS AFTER SURGERY  Stuart Villarreal, SYLWIA Allred, MANGO Shankar, CLIVE Hardy, SYLWIA Delgadillo, JD Sparks  &  MATT Mattson      Q:  How should my incision look?    A:  Normally your incision will appear slightly swollen with light redness directly along the incision itself as it heals.  It may feel like a bump or ridge as the healing/scarring happens, and over time (3-4 months) this bump or ridge feeling should slowly go away.  In general,  clear or pink watery drainage can be normal at first as your incision heals, but should decrease over time.    Q:  How do I know if my incision is infected?  A:  Look at your incision for signs of infection, like redness around the incision spreading to surrounding skin, or drainage of cloudy or foul-smelling drainage.  If you feel warm, check your temperature to see if you are running a fever.    **If any of these things occur, please notify the nurse at our office.  We may need you to come into the office for an incision check.      Q:  How do I take care of my incision?  A:  If you have a dressing in place - Starting the day after surgery, replace the dressing 1-2 times a day until there is no further drainage from the incision.  At that time, a dressing is no longer needed.  Try to minimize tape on the skin if irritation is occurring at the tape sites.  If you have significant irritation from tape on the skin, please call the office to discuss other method of dressing your incision.    Small pieces of tape called  steri-strips  may be present directly overlying your incision; these may be removed 10 days after surgery unless otherwise specified by your surgeon.  If these tapes start to loosen at the ends, you may trim them back until they fall off or are removed.    A:  If you had  Dermabond  tissue glue used as a dressing (this causes your incision to look shiny with a clear covering over it) - This type of dressing wears off with time and does not require more dressings over the top unless it is draining around the glue as it wears off.  Do not apply ointments or lotions over the incisions until the glue has completely worn off.    Q:  There is a piece of tape or a sticky  lead  still on my skin.  Can I remove this?  A:  Sometimes the sticky  leads  used for monitoring during surgery or for evaluation in the emergency department are not all removed while you are in the hospital.  These sometimes have a tab or  metal dot on them.  You can easily remove these on your own, like taking off a band-aid.  If there is a gel substance under the  lead , simply wipe/clean it off with a washcloth or paper towel.      Q:  What can I do to minimize constipation (very hard stools, or lack of stools)?  A:  Stay well hydrated.  Increase your dietary fiber intake or take a fiber supplement -with plenty of water.  Walk around frequently.  You may consider an over-the-counter stool-softener.  Your Pharmacist can assist you with choosing one that is stocked at your pharmacy.  Constipation is also one of the most common side effects of pain medication.  If you are using pain medication, be pro-active and try to PREVENT problems with constipation by taking the steps above BEFORE constipation becomes a problem.    Q:  What do I do if I need more pain medications?  A:  Call the office to receive refills.  Be aware that certain pain meds cannot be called into a pharmacy and actually require a paper prescription.  A change may be made in your pain med as you progress thru your recovery period or if you have side effects to certain meds.    --Pain meds are NOT refilled after 5pm on weekdays, and NOT AT ALL on the weekends, so please look ahead to prevent problems.                  Q:  Why am I having a hard time sleeping now that I am at home?  A:  Many medications you receive while you are in the hospital can impact your sleep for a number of days after your surgery/hospitalization.  Decreased level of activity and naps during the day may also make sleeping at night difficult.  Try to minimize day-time naps, and get up frequently during the day to walk around your home during your recovery time.  Sleep aides may be of some help, but are not recommended for long-term use.      Q:  I am having some back discomfort.  What should I do?  A:  This may be related to certain positioning that was required for your surgery, extended periods of time in bed,  or other changes in your overall activity level.  You may try ice, heat, acetaminophen, or ibuprofen to treat this temporarily.  Note that many pain medications have acetaminophen in them and would state this on the prescription bottle.  Be sure not to exceed the maximum of 4000mg per day of acetaminophen.     **If the pain you are having does not resolve, is severe, or is a flare of back pain you have had on other occasions prior to surgery, please contact your primary physician for further recommendations or for an appointment to be examined at their office.    Q:  Why am I having headaches?  A:  Headaches can be caused by many things:  caffeine withdrawal, use of pain meds, dehydration, high blood pressure, lack of sleep, over-activity/exhaustion, flare-up of usual migraine headaches.  If you feel this is related to muscle tension (a band-like feeling around the head, or a pressure at the low-back of the head) you may try ice or heat to this area.  You may need to drink more fluids (try electrolyte drink like Gatorade), rest, or take your usual migraine medications.   **If your headaches do not resolve, worsen, are accompanied by other symptoms, or if your blood pressure is high, please call your primary physician for recommendation and/or examination.    Q:  I am unable to urinate.  What do I do?  A:  A small percentage of people can have difficulty urinating initially after surgery.  This includes being able to urinate only a very small amount at a time and feeling discomfort or pressure in the very low abdomen.  This is called  urinary retention , and is actually an urgent situation.  Proceed to your nearest Emergency department for evaluation (not an Urgent Care Center).  Sometimes the bladder does not work correctly after certain medications you receive during surgery, or related to certain procedures.  You may need to have a catheter placed until your bladder recovers.  When planning to go to an Emergency  "department, it may help to call the ER to let them know you are coming in for this problem after a surgery.  This may help you get in quicker to be evaluated.  **If you have symptoms of a urinary tract infection, please contact your primary physician for the proper evaluation and treatment.              If you have other questions, please call the office Monday thru Friday between 8am and 5pm to discuss with the nurse or physician assistant.  [phone]    There is a surgeon ON CALL on weekday evenings and over the weekend in case of urgent need only, and may be contacted at the same number.    If you are having an emergency, call 911 or proceed to your nearest emergency department.      Pending Results     No orders found from 2018 to 2018.            Admission Information     Date & Time Provider Department Dept. Phone    2018 Prabhu Allred MD [facility] PreOP/PostOP [phone]      Your Vitals Were     Blood Pressure Temperature Respirations Height Weight Pulse Oximetry    117/74 97.6  F (36.4  C) (Temporal) 14 1.727 m (5' 8\") 84.8 kg (187 lb) 98%    BMI (Body Mass Index)                   28.43 kg/m2           MyChart Information     [employer] lets you send messages to your doctor, view your test results, renew your prescriptions, schedule appointments and more. To sign up, go to [email or web address] . Click on \"Log in\" on the left side of the screen, which will take you to the Welcome page. Then click on \"Sign up Now\" on the right side of the page.     You will be asked to enter the access code listed below, as well as some personal information. Please follow the directions to create your username and password.     Your access code is: [email or web address]  Expires: 2018  2:09 AM     Your access code will  in 90 days. If you need help or a new code, please call your [facility] or [phone].        Care EveryWhere ID     This is your Care EveryWhere ID. This could be used " by other organizations to access your [place] medical records  [id number]        Equal Access to Services     DOROTHEA BAUER : Hadii josephine Watkins, gemda ken, angie newclaudiamitch chatman, kindra andersonninoskaareli crum. So [facility] [phone].    ATENCIÓN: Si habla español, tiene a parry disposición servicios gratuitos de asistencia lingüística. Llame al [phone].    We comply with applicable federal civil rights laws and [place] laws. We do not discriminate on the basis of race, color, national origin, age, disability, sex, sexual orientation, or gender identity.               Review of your medicines      START taking        Dose / Directions    oxyCODONE IR 5 MG tablet   Commonly known as:  ROXICODONE   Used for:  Right inguinal hernia        Dose:  5-10 mg   Take 1-2 tablets (5-10 mg) by mouth every 3 hours as needed for pain or other (Moderate to Severe)   Quantity:  20 tablet   Refills:  0         CONTINUE these medicines which have NOT CHANGED        Dose / Directions    nortriptyline 25 MG capsule   Commonly known as:  PAMELOR        Dose:  25 mg   Take 1 capsule (25 mg) by mouth At Bedtime   Quantity:  10 capsule   Refills:  0       tamsulosin 0.4 MG capsule   Commonly known as:  FLOMAX   Used for:  Right inguinal hernia        Dose:  0.4 mg   Take 1 capsule (0.4 mg) by mouth daily   Quantity:  7 capsule   Refills:  0            Where to get your medicines      Some of these will need a paper prescription and others can be bought over the counter. Ask your nurse if you have questions.     Bring a paper prescription for each of these medications     oxyCODONE IR 5 MG tablet                Protect others around you: Learn how to safely use, store and throw away your medicines at www.disposemymeds.org.        Information about OPIOIDS     PRESCRIPTION OPIOIDS: WHAT YOU NEED TO KNOW   We gave you an opioid (narcotic) pain medicine. It is important to manage your pain, but opioids are  not always the best choice. You should first try all the other options your care team gave you. Take this medicine for as short a time (and as few doses) as possible.    Some activities can increase your pain, such as bandage changes or therapy sessions. It may help to take your pain medicine 30 to 60 minutes before these activities. Reduce your stress by getting enough sleep, working on hobbies you enjoy and practicing relaxation or meditation. Talk to your care team about ways to manage your pain beyond prescription opioids.    These medicines have risks:    DO NOT drive when on new or higher doses of pain medicine. These medicines can affect your alertness and reaction times, and you could be arrested for driving under the influence (DUI). If you need to use opioids long-term, talk to your care team about driving.    DO NOT operate heavy machinery    DO NOT do any other dangerous activities while taking these medicines.    DO NOT drink any alcohol while taking these medicines.     If the opioid prescribed includes acetaminophen, DO NOT take with any other medicines that contain acetaminophen. Read all labels carefully. Look for the word  acetaminophen  or  Tylenol.  Ask your pharmacist if you have questions or are unsure.    You can get addicted to pain medicines, especially if you have a history of addiction (chemical, alcohol or substance dependence). Talk to your care team about ways to reduce this risk.    All opioids tend to cause constipation. Drink plenty of water and eat foods that have a lot of fiber, such as fruits, vegetables, prune juice, apple juice and high-fiber cereal. Take a laxative (Miralax, milk of magnesia, Colace, Senna) if you don t move your bowels at least every other day. Other side effects include upset stomach, sleepiness, dizziness, throwing up, tolerance (needing more of the medicine to have the same effect), physical dependence and slowed breathing.    Store your pills in a secure  place, locked if possible. We will not replace any lost or stolen medicine. If you don t finish your medicine, please throw away (dispose) as directed by your pharmacist. The [place] Pollution Control Agency has more information about safe disposal: https://www.pca.[facility].mn.us/living-green/managing-unwanted-medications             Medication List: This is a list of all your medications and when to take them. Check marks below indicate your daily home schedule. Keep this list as a reference.      Medications           Morning Afternoon Evening Bedtime As Needed    nortriptyline 25 MG capsule   Commonly known as:  PAMELOR   Take 1 capsule (25 mg) by mouth At Bedtime                                oxyCODONE IR 5 MG tablet   Commonly known as:  ROXICODONE   Take 1-2 tablets (5-10 mg) by mouth every 3 hours as needed for pain or other (Moderate to Severe)   Last time this was given:  5 mg on 8/7/2018  4:29 PM                                tamsulosin 0.4 MG capsule   Commonly known as:  FLOMAX   Take 1 capsule (0.4 mg) by mouth daily

## 2018-08-08 NOTE — ANESTHESIA POSTPROCEDURE EVALUATION
Patient: Alexandra Forda    Procedure(s):  Robotic Assisted Right Inguinal Hernia Repair with Mesh  - Wound Class: II-Clean Contaminated    Diagnosis:Inguinal hernia   Diagnosis Additional Information: rright inguinal hernia    Anesthesia Type:  General, ETT    Note:  Anesthesia Post Evaluation    Patient location during evaluation: PACU  Patient participation: Able to fully participate in evaluation  Level of consciousness: awake and alert  Pain management: adequate  Airway patency: patent  Cardiovascular status: acceptable  Respiratory status: acceptable  Hydration status: acceptable  PONV: none     Anesthetic complications: None          Last vitals:  Vitals:    08/07/18 1700 08/07/18 1717 08/07/18 1831   BP: 104/70 117/74 108/75   Resp: 10 14 14   Temp:  97.6  F (36.4  C)    SpO2: 97% 98% 97%         Electronically Signed By: Og Medeiros MD  August 7, 2018  7:17 PM

## 2018-08-09 LAB — INTERPRETATION ECG - MUSE: NORMAL

## 2018-08-10 ENCOUNTER — TELEPHONE (OUTPATIENT)
Dept: SURGERY | Facility: CLINIC | Age: 63
End: 2018-08-10

## 2018-08-10 NOTE — TELEPHONE ENCOUNTER
Post Surgical Follow Up Call -     Left VM message on mobile # for Ari panda.  Clinic ph# was given if any questions or concerns re: Alexandra's recent surgery with Dr. Allred.    Both home phone and daughters ph# are not in service.

## 2018-08-13 ENCOUNTER — TELEPHONE (OUTPATIENT)
Dept: SURGERY | Facility: CLINIC | Age: 63
End: 2018-08-13

## 2018-08-13 NOTE — TELEPHONE ENCOUNTER
"Name of caller: Patient and other COUSIN LEXIE     Reason for Call:  Blue spot  around abd.     Surgeon:  Dr. Allred    Recent Surgery:  Yes.    If yes, when & what type:  ROBOTIC ASSISTED RIGHT INGUINAL HERNIA REPAIR, POSSIBLE LEFT INGUINAL HENRIA REPAIR WITH MESH  8/3/18      Best phone number to reach pt at is: [phone]  Ok to leave a message with medical info? Yes.    Pharmacy preferred (if calling for a refill): N/A       GENERAL SURGERY NURSE PHONE TRIAGE   Alexandra Alfaro    MRN# [id number]  AGE:  63 year old  YOB: 1955  [phone] (home) none (work) Mobile [phone]  Surgeon: Dr. Allred  Surgical Assist:  Ty Santos PA-C     Surgery type:  robotic-assisted right inguinal hernia repair with mesh.       Surgery Date: August / 07 / 2018     POD: 6     CHIEF CONCERN:  \"Blue spot around abdomen\"     HISTORY OF PRESENT ILLNESS:   Patient left message for nursing- \"Blue spot around abdomen\"  Called patient back, no answer- left VM to call nursing back.  (anticipate \"blue latia\" is anticipated routine po bruising.    PLAN:   Awaiting patient call back  Paula Hawkins RN on 8/13/2018 at 11:28 AM          "

## 2020-01-01 ENCOUNTER — TELEPHONE (OUTPATIENT)
Dept: INTERNAL MEDICINE | Facility: CLINIC | Age: 65
End: 2020-01-01

## 2020-01-01 ENCOUNTER — APPOINTMENT (OUTPATIENT)
Dept: GENERAL RADIOLOGY | Facility: CLINIC | Age: 65
End: 2020-01-01
Attending: EMERGENCY MEDICINE
Payer: COMMERCIAL

## 2020-01-01 ENCOUNTER — HOSPITAL ENCOUNTER (INPATIENT)
Facility: CLINIC | Age: 65
LOS: 4 days | Discharge: HOME | End: 2020-12-29
Attending: EMERGENCY MEDICINE | Admitting: HOSPITALIST
Payer: COMMERCIAL

## 2020-01-01 ENCOUNTER — APPOINTMENT (OUTPATIENT)
Dept: CT IMAGING | Facility: CLINIC | Age: 65
End: 2020-01-01
Attending: EMERGENCY MEDICINE
Payer: COMMERCIAL

## 2020-01-01 ENCOUNTER — HOSPITAL ENCOUNTER (EMERGENCY)
Facility: CLINIC | Age: 65
Discharge: HOME | End: 2020-12-14
Attending: EMERGENCY MEDICINE | Admitting: EMERGENCY MEDICINE
Payer: COMMERCIAL

## 2020-01-01 ENCOUNTER — THERAPY VISIT (OUTPATIENT)
Dept: PHYSICAL THERAPY | Facility: CLINIC | Age: 65
End: 2020-01-01
Attending: EMERGENCY MEDICINE
Payer: COMMERCIAL

## 2020-01-01 ENCOUNTER — PATIENT OUTREACH (OUTPATIENT)
Dept: NURSING | Facility: CLINIC | Age: 65
End: 2020-01-01
Payer: COMMERCIAL

## 2020-01-01 ENCOUNTER — OFFICE VISIT (OUTPATIENT)
Dept: INTERNAL MEDICINE | Facility: CLINIC | Age: 65
End: 2020-01-01
Payer: COMMERCIAL

## 2020-01-01 ENCOUNTER — PATIENT OUTREACH (OUTPATIENT)
Dept: CARE COORDINATION | Facility: CLINIC | Age: 65
End: 2020-01-01

## 2020-01-01 ENCOUNTER — THERAPY VISIT (OUTPATIENT)
Dept: PHYSICAL THERAPY | Facility: CLINIC | Age: 65
End: 2020-01-01
Payer: COMMERCIAL

## 2020-01-01 ENCOUNTER — APPOINTMENT (OUTPATIENT)
Dept: MRI IMAGING | Facility: CLINIC | Age: 65
End: 2020-01-01
Attending: INTERNAL MEDICINE
Payer: COMMERCIAL

## 2020-01-01 ENCOUNTER — APPOINTMENT (OUTPATIENT)
Dept: ULTRASOUND IMAGING | Facility: CLINIC | Age: 65
End: 2020-01-01
Attending: INTERNAL MEDICINE
Payer: COMMERCIAL

## 2020-01-01 ENCOUNTER — PATIENT OUTREACH (OUTPATIENT)
Dept: NURSING | Facility: CLINIC | Age: 65
End: 2020-01-01
Attending: EMERGENCY MEDICINE
Payer: COMMERCIAL

## 2020-01-01 ENCOUNTER — HOSPITAL ENCOUNTER (EMERGENCY)
Facility: CLINIC | Age: 65
Discharge: HOME | End: 2020-09-26
Attending: EMERGENCY MEDICINE | Admitting: EMERGENCY MEDICINE
Payer: COMMERCIAL

## 2020-01-01 ENCOUNTER — APPOINTMENT (OUTPATIENT)
Dept: CARDIOLOGY | Facility: CLINIC | Age: 65
End: 2020-01-01
Attending: HOSPITALIST
Payer: COMMERCIAL

## 2020-01-01 VITALS
OXYGEN SATURATION: 97 % | RESPIRATION RATE: 18 BRPM | DIASTOLIC BLOOD PRESSURE: 91 MMHG | HEART RATE: 91 BPM | SYSTOLIC BLOOD PRESSURE: 134 MMHG | TEMPERATURE: 99.5 F

## 2020-01-01 VITALS
OXYGEN SATURATION: 96 % | RESPIRATION RATE: 20 BRPM | HEART RATE: 85 BPM | SYSTOLIC BLOOD PRESSURE: 130 MMHG | TEMPERATURE: 98.2 F | DIASTOLIC BLOOD PRESSURE: 79 MMHG

## 2020-01-01 VITALS
DIASTOLIC BLOOD PRESSURE: 70 MMHG | OXYGEN SATURATION: 95 % | RESPIRATION RATE: 12 BRPM | TEMPERATURE: 99.3 F | BODY MASS INDEX: 28.66 KG/M2 | HEIGHT: 65 IN | SYSTOLIC BLOOD PRESSURE: 110 MMHG | WEIGHT: 172 LBS | HEART RATE: 94 BPM

## 2020-01-01 VITALS
OXYGEN SATURATION: 95 % | RESPIRATION RATE: 20 BRPM | SYSTOLIC BLOOD PRESSURE: 118 MMHG | HEART RATE: 75 BPM | TEMPERATURE: 97.6 F | DIASTOLIC BLOOD PRESSURE: 74 MMHG

## 2020-01-01 DIAGNOSIS — F32.0 MILD MAJOR DEPRESSION (H): Primary | ICD-10-CM

## 2020-01-01 DIAGNOSIS — Z20.822 PERSON UNDER INVESTIGATION FOR COVID-19: ICD-10-CM

## 2020-01-01 DIAGNOSIS — M25.512 CHRONIC PAIN OF BOTH SHOULDERS: ICD-10-CM

## 2020-01-01 DIAGNOSIS — D64.9 LOW HEMOGLOBIN: ICD-10-CM

## 2020-01-01 DIAGNOSIS — R53.83 FATIGUE, UNSPECIFIED TYPE: ICD-10-CM

## 2020-01-01 DIAGNOSIS — G89.29 CHRONIC PAIN OF BOTH SHOULDERS: ICD-10-CM

## 2020-01-01 DIAGNOSIS — Z12.5 SCREENING FOR PROSTATE CANCER: ICD-10-CM

## 2020-01-01 DIAGNOSIS — Z12.11 COLON CANCER SCREENING: ICD-10-CM

## 2020-01-01 DIAGNOSIS — U07.1 2019 NOVEL CORONAVIRUS DISEASE (COVID-19): ICD-10-CM

## 2020-01-01 DIAGNOSIS — G47.00 INSOMNIA, UNSPECIFIED TYPE: ICD-10-CM

## 2020-01-01 DIAGNOSIS — M54.2 CHRONIC NECK PAIN: ICD-10-CM

## 2020-01-01 DIAGNOSIS — M25.511 CHRONIC PAIN OF BOTH SHOULDERS: ICD-10-CM

## 2020-01-01 DIAGNOSIS — G89.29 CHRONIC NECK PAIN: ICD-10-CM

## 2020-01-01 DIAGNOSIS — R53.83 OTHER FATIGUE: ICD-10-CM

## 2020-01-01 DIAGNOSIS — R53.83 FATIGUE, UNSPECIFIED TYPE: Primary | ICD-10-CM

## 2020-01-01 DIAGNOSIS — R51.9 INTERMITTENT HEADACHE: ICD-10-CM

## 2020-01-01 DIAGNOSIS — R79.89 TROPONIN LEVEL ELEVATED: ICD-10-CM

## 2020-01-01 LAB
ABO + RH BLD: NORMAL
ABO + RH BLD: NORMAL
AFP SERPL-MCNC: 26 UG/L (ref 0–8)
ALBUMIN SERPL-MCNC: 2.1 G/DL (ref 3.4–5)
ALBUMIN SERPL-MCNC: 2.1 G/DL (ref 3.4–5)
ALBUMIN SERPL-MCNC: 2.2 G/DL (ref 3.4–5)
ALBUMIN SERPL-MCNC: 2.6 G/DL (ref 3.4–5)
ALBUMIN UR-MCNC: NEGATIVE MG/DL
ALP SERPL-CCNC: 118 U/L (ref 40–150)
ALP SERPL-CCNC: 120 U/L (ref 40–150)
ALP SERPL-CCNC: 123 U/L (ref 40–150)
ALP SERPL-CCNC: 153 U/L (ref 40–150)
ALT SERPL W P-5'-P-CCNC: 54 U/L (ref 0–70)
ALT SERPL W P-5'-P-CCNC: 63 U/L (ref 0–70)
ALT SERPL W P-5'-P-CCNC: 64 U/L (ref 0–70)
ALT SERPL W P-5'-P-CCNC: 75 U/L (ref 0–70)
ANION GAP SERPL CALCULATED.3IONS-SCNC: 2 MMOL/L (ref 3–14)
ANION GAP SERPL CALCULATED.3IONS-SCNC: 3 MMOL/L (ref 3–14)
ANION GAP SERPL CALCULATED.3IONS-SCNC: <1 MMOL/L (ref 3–14)
APPEARANCE UR: CLEAR
AST SERPL W P-5'-P-CCNC: 125 U/L (ref 0–45)
AST SERPL W P-5'-P-CCNC: 140 U/L (ref 0–45)
AST SERPL W P-5'-P-CCNC: 165 U/L (ref 0–45)
AST SERPL W P-5'-P-CCNC: 97 U/L (ref 0–45)
AT III ACT/NOR PPP CHRO: 67 % (ref 85–135)
BASOPHILS # BLD AUTO: 0 10E9/L (ref 0–0.2)
BASOPHILS NFR BLD AUTO: 0 %
BASOPHILS NFR BLD AUTO: 0.2 %
BASOPHILS NFR BLD AUTO: 0.5 %
BASOPHILS NFR BLD AUTO: 0.6 %
BILIRUB DIRECT SERPL-MCNC: 0.3 MG/DL (ref 0–0.2)
BILIRUB DIRECT SERPL-MCNC: 0.4 MG/DL (ref 0–0.2)
BILIRUB DIRECT SERPL-MCNC: 0.5 MG/DL (ref 0–0.2)
BILIRUB SERPL-MCNC: 0.6 MG/DL (ref 0.2–1.3)
BILIRUB SERPL-MCNC: 0.8 MG/DL (ref 0.2–1.3)
BILIRUB SERPL-MCNC: 0.8 MG/DL (ref 0.2–1.3)
BILIRUB SERPL-MCNC: 0.9 MG/DL (ref 0.2–1.3)
BILIRUB UR QL STRIP: NEGATIVE
BUN SERPL-MCNC: 10 MG/DL (ref 7–30)
BUN SERPL-MCNC: 14 MG/DL (ref 7–30)
BUN SERPL-MCNC: 18 MG/DL (ref 7–30)
CALCIUM SERPL-MCNC: 7.9 MG/DL (ref 8.5–10.1)
CALCIUM SERPL-MCNC: 8 MG/DL (ref 8.5–10.1)
CALCIUM SERPL-MCNC: 8.7 MG/DL (ref 8.5–10.1)
CANCER AG19-9 SERPL-ACNC: 51 U/ML (ref 0–37)
CEA SERPL-MCNC: 1.4 UG/L (ref 0–2.5)
CHLORIDE SERPL-SCNC: 103 MMOL/L (ref 94–109)
CHLORIDE SERPL-SCNC: 105 MMOL/L (ref 94–109)
CHLORIDE SERPL-SCNC: 107 MMOL/L (ref 94–109)
CK SERPL-CCNC: 75 U/L (ref 30–300)
CO2 SERPL-SCNC: 29 MMOL/L (ref 20–32)
COLOR UR AUTO: ABNORMAL
CREAT SERPL-MCNC: 0.76 MG/DL (ref 0.66–1.25)
CREAT SERPL-MCNC: 0.92 MG/DL (ref 0.66–1.25)
CREAT SERPL-MCNC: 0.93 MG/DL (ref 0.66–1.25)
CRP SERPL-MCNC: 31.1 MG/L (ref 0–8)
D DIMER PPP FEU-MCNC: 2.7 UG/ML FEU (ref 0–0.5)
DEPRECATED CALCIDIOL+CALCIFEROL SERPL-MC: 17 UG/L (ref 20–75)
DIFFERENTIAL METHOD BLD: ABNORMAL
EOSINOPHIL # BLD AUTO: 0 10E9/L (ref 0–0.7)
EOSINOPHIL # BLD AUTO: 0.1 10E9/L (ref 0–0.7)
EOSINOPHIL NFR BLD AUTO: 0 %
EOSINOPHIL NFR BLD AUTO: 0 %
EOSINOPHIL NFR BLD AUTO: 0.5 %
EOSINOPHIL NFR BLD AUTO: 0.9 %
ERYTHROCYTE [DISTWIDTH] IN BLOOD BY AUTOMATED COUNT: 13.5 % (ref 10–15)
ERYTHROCYTE [DISTWIDTH] IN BLOOD BY AUTOMATED COUNT: 14.2 % (ref 10–15)
ERYTHROCYTE [DISTWIDTH] IN BLOOD BY AUTOMATED COUNT: 14.2 % (ref 10–15)
ERYTHROCYTE [DISTWIDTH] IN BLOOD BY AUTOMATED COUNT: 14.4 % (ref 10–15)
FERRITIN SERPL-MCNC: 1241 NG/ML (ref 26–388)
FERRITIN SERPL-MCNC: 733 NG/ML (ref 26–388)
FIBRINOGEN PPP-MCNC: 257 MG/DL (ref 200–420)
FIBRINOGEN PPP-MCNC: 285 MG/DL (ref 200–420)
FIBRINOGEN PPP-MCNC: 287 MG/DL (ref 200–420)
FIBRINOGEN PPP-MCNC: 293 MG/DL (ref 200–420)
FIBRINOGEN PPP-MCNC: 297 MG/DL (ref 200–420)
FLUABV+SARS-COV-2+RSV PNL RESP NAA+PROBE: NEGATIVE
FLUABV+SARS-COV-2+RSV PNL RESP NAA+PROBE: NEGATIVE
GFR SERPL CREATININE-BSD FRML MDRD: 86 ML/MIN/{1.73_M2}
GFR SERPL CREATININE-BSD FRML MDRD: 86 ML/MIN/{1.73_M2}
GFR SERPL CREATININE-BSD FRML MDRD: >90 ML/MIN/{1.73_M2}
GLUCOSE BLDC GLUCOMTR-MCNC: 120 MG/DL (ref 70–99)
GLUCOSE BLDC GLUCOMTR-MCNC: 140 MG/DL (ref 70–99)
GLUCOSE BLDC GLUCOMTR-MCNC: 80 MG/DL (ref 70–99)
GLUCOSE BLDC GLUCOMTR-MCNC: 98 MG/DL (ref 70–99)
GLUCOSE SERPL-MCNC: 123 MG/DL (ref 70–99)
GLUCOSE SERPL-MCNC: 185 MG/DL (ref 70–99)
GLUCOSE SERPL-MCNC: 88 MG/DL (ref 70–99)
GLUCOSE UR STRIP-MCNC: NEGATIVE MG/DL
HAV IGG SER QL IA: REACTIVE
HBV CORE AB SERPL QL IA: NONREACTIVE
HBV CORE IGM SERPL QL IA: NONREACTIVE
HBV SURFACE AB SERPL IA-ACNC: 13.36 M[IU]/ML
HBV SURFACE AG SERPL QL IA: NONREACTIVE
HBV SURFACE AG SERPL QL IA: NONREACTIVE
HCT VFR BLD AUTO: 34.7 % (ref 40–53)
HCT VFR BLD AUTO: 36.7 % (ref 40–53)
HCT VFR BLD AUTO: 38.5 % (ref 40–53)
HCT VFR BLD AUTO: 38.8 % (ref 40–53)
HCT VFR BLD AUTO: 39.3 % (ref 40–53)
HCT VFR BLD AUTO: 40 % (ref 40–53)
HCT VFR BLD AUTO: 40.2 % (ref 40–53)
HCV AB SERPL QL IA: REACTIVE
HCV AB SERPL QL IA: REACTIVE
HCV RNA SERPL NAA+PROBE-ACNC: 123 [IU]/ML
HCV RNA SERPL NAA+PROBE-LOG IU: 2.1 LOG IU/ML
HGB BLD-MCNC: 11.5 G/DL (ref 13.3–17.7)
HGB BLD-MCNC: 12.5 G/DL (ref 13.3–17.7)
HGB BLD-MCNC: 13 G/DL (ref 13.3–17.7)
HGB BLD-MCNC: 13.2 G/DL (ref 13.3–17.7)
HGB BLD-MCNC: 13.3 G/DL (ref 13.3–17.7)
HGB BLD-MCNC: 13.4 G/DL (ref 13.3–17.7)
HGB BLD-MCNC: 13.6 G/DL (ref 13.3–17.7)
HGB UR QL STRIP: NEGATIVE
IL6 SERPL-MCNC: 100.65 PG/ML
IMM GRANULOCYTES # BLD: 0 10E9/L (ref 0–0.4)
IMM GRANULOCYTES # BLD: 0 10E9/L (ref 0–0.4)
IMM GRANULOCYTES NFR BLD: 0.2 %
IMM GRANULOCYTES NFR BLD: 0.2 %
IMM PLASMA CELLS NFR BLD: 2 %
INR PPP: 1.07 (ref 0.86–1.14)
INR PPP: 1.07 (ref 0.86–1.14)
INR PPP: 1.12 (ref 0.86–1.14)
INR PPP: 1.12 (ref 0.86–1.14)
INR PPP: 1.14 (ref 0.86–1.14)
INTERPRETATION ECG - MUSE: NORMAL
INTERPRETATION ECG - MUSE: NORMAL
IRON SATN MFR SERPL: 10 % (ref 15–46)
IRON SERPL-MCNC: 25 UG/DL (ref 35–180)
KETONES UR STRIP-MCNC: NEGATIVE MG/DL
LABORATORY COMMENT REPORT: ABNORMAL
LABORATORY COMMENT REPORT: NORMAL
LDH SERPL L TO P-CCNC: 284 U/L (ref 85–227)
LDH SERPL L TO P-CCNC: 291 U/L (ref 85–227)
LDH SERPL L TO P-CCNC: 319 U/L (ref 85–227)
LDH SERPL L TO P-CCNC: 348 U/L (ref 85–227)
LDH SERPL L TO P-CCNC: 380 U/L (ref 85–227)
LEUKOCYTE ESTERASE UR QL STRIP: NEGATIVE
LYMPHOCYTES # BLD AUTO: 0.6 10E9/L (ref 0.8–5.3)
LYMPHOCYTES # BLD AUTO: 1.4 10E9/L (ref 0.8–5.3)
LYMPHOCYTES # BLD AUTO: 1.5 10E9/L (ref 0.8–5.3)
LYMPHOCYTES # BLD AUTO: 2 10E9/L (ref 0.8–5.3)
LYMPHOCYTES NFR BLD AUTO: 17 %
LYMPHOCYTES NFR BLD AUTO: 23.5 %
LYMPHOCYTES NFR BLD AUTO: 32.3 %
LYMPHOCYTES NFR BLD AUTO: 35.2 %
MCH RBC QN AUTO: 32 PG (ref 26.5–33)
MCH RBC QN AUTO: 32.2 PG (ref 26.5–33)
MCH RBC QN AUTO: 32.3 PG (ref 26.5–33)
MCH RBC QN AUTO: 32.4 PG (ref 26.5–33)
MCH RBC QN AUTO: 32.7 PG (ref 26.5–33)
MCH RBC QN AUTO: 32.8 PG (ref 26.5–33)
MCH RBC QN AUTO: 33.2 PG (ref 26.5–33)
MCHC RBC AUTO-ENTMCNC: 33 G/DL (ref 31.5–36.5)
MCHC RBC AUTO-ENTMCNC: 33.1 G/DL (ref 31.5–36.5)
MCHC RBC AUTO-ENTMCNC: 33.8 G/DL (ref 31.5–36.5)
MCHC RBC AUTO-ENTMCNC: 33.8 G/DL (ref 31.5–36.5)
MCHC RBC AUTO-ENTMCNC: 34.1 G/DL (ref 31.5–36.5)
MCHC RBC AUTO-ENTMCNC: 34.1 G/DL (ref 31.5–36.5)
MCHC RBC AUTO-ENTMCNC: 34.3 G/DL (ref 31.5–36.5)
MCV RBC AUTO: 95 FL (ref 78–100)
MCV RBC AUTO: 96 FL (ref 78–100)
MCV RBC AUTO: 97 FL (ref 78–100)
MCV RBC AUTO: 98 FL (ref 78–100)
MCV RBC AUTO: 99 FL (ref 78–100)
MONOCYTES # BLD AUTO: 0.2 10E9/L (ref 0–1.3)
MONOCYTES # BLD AUTO: 0.3 10E9/L (ref 0–1.3)
MONOCYTES # BLD AUTO: 0.9 10E9/L (ref 0–1.3)
MONOCYTES # BLD AUTO: 1.1 10E9/L (ref 0–1.3)
MONOCYTES NFR BLD AUTO: 16.1 %
MONOCYTES NFR BLD AUTO: 17.4 %
MONOCYTES NFR BLD AUTO: 6 %
MONOCYTES NFR BLD AUTO: 6.9 %
MUCOUS THREADS #/AREA URNS LPF: PRESENT /LPF
NEUTROPHILS # BLD AUTO: 2.6 10E9/L (ref 1.6–8.3)
NEUTROPHILS # BLD AUTO: 2.7 10E9/L (ref 1.6–8.3)
NEUTROPHILS # BLD AUTO: 2.9 10E9/L (ref 1.6–8.3)
NEUTROPHILS # BLD AUTO: 3.6 10E9/L (ref 1.6–8.3)
NEUTROPHILS NFR BLD AUTO: 47.3 %
NEUTROPHILS NFR BLD AUTO: 57.8 %
NEUTROPHILS NFR BLD AUTO: 60.4 %
NEUTROPHILS NFR BLD AUTO: 75 %
NITRATE UR QL: NEGATIVE
NRBC # BLD AUTO: 0 10*3/UL
NRBC # BLD AUTO: 0 10*3/UL
NRBC BLD AUTO-RTO: 0 /100
NRBC BLD AUTO-RTO: 0 /100
PH UR STRIP: 5 PH (ref 5–7)
PLASMA CELLS # BLD MANUAL: 0.1 10E9/L
PLATELET # BLD AUTO: 124 10E9/L (ref 150–450)
PLATELET # BLD AUTO: 128 10E9/L (ref 150–450)
PLATELET # BLD AUTO: 145 10E9/L (ref 150–450)
PLATELET # BLD AUTO: 166 10E9/L (ref 150–450)
PLATELET # BLD AUTO: 174 10E9/L (ref 150–450)
PLATELET # BLD AUTO: 182 10E9/L (ref 150–450)
PLATELET # BLD AUTO: 192 10E9/L (ref 150–450)
PLATELET # BLD EST: ABNORMAL 10*3/UL
PLATELET # BLD EST: ABNORMAL 10*3/UL
POTASSIUM SERPL-SCNC: 4.2 MMOL/L (ref 3.4–5.3)
POTASSIUM SERPL-SCNC: 4.7 MMOL/L (ref 3.4–5.3)
POTASSIUM SERPL-SCNC: 4.8 MMOL/L (ref 3.4–5.3)
PROT SERPL-MCNC: 7.2 G/DL (ref 6.8–8.8)
PROT SERPL-MCNC: 7.4 G/DL (ref 6.8–8.8)
PROT SERPL-MCNC: 7.6 G/DL (ref 6.8–8.8)
PROT SERPL-MCNC: 8.4 G/DL (ref 6.8–8.8)
PSA SERPL-ACNC: 0.57 UG/L (ref 0–4)
RBC # BLD AUTO: 3.52 10E12/L (ref 4.4–5.9)
RBC # BLD AUTO: 3.77 10E12/L (ref 4.4–5.9)
RBC # BLD AUTO: 4.06 10E12/L (ref 4.4–5.9)
RBC # BLD AUTO: 4.06 10E12/L (ref 4.4–5.9)
RBC # BLD AUTO: 4.09 10E12/L (ref 4.4–5.9)
RBC # BLD AUTO: 4.13 10E12/L (ref 4.4–5.9)
RBC # BLD AUTO: 4.22 10E12/L (ref 4.4–5.9)
RBC #/AREA URNS AUTO: <1 /HPF (ref 0–2)
RBC MORPH BLD: ABNORMAL
RBC MORPH BLD: ABNORMAL
RETICS # AUTO: 37.9 10E9/L (ref 25–95)
RETICS # AUTO: 43 10E9/L (ref 25–95)
RETICS # AUTO: 46.7 10E9/L (ref 25–95)
RETICS # AUTO: 46.7 10E9/L (ref 25–95)
RETICS # AUTO: 56.8 10E9/L (ref 25–95)
RETICS/RBC NFR AUTO: 0.9 % (ref 0.5–2)
RETICS/RBC NFR AUTO: 1.1 % (ref 0.5–2)
RETICS/RBC NFR AUTO: 1.1 % (ref 0.5–2)
RETICS/RBC NFR AUTO: 1.2 % (ref 0.5–2)
RETICS/RBC NFR AUTO: 1.4 % (ref 0.5–2)
RSV RNA SPEC QL NAA+PROBE: ABNORMAL
SARS-COV-2 RNA SPEC QL NAA+PROBE: NEGATIVE
SARS-COV-2 RNA SPEC QL NAA+PROBE: NORMAL
SARS-COV-2 RNA SPEC QL NAA+PROBE: POSITIVE
SODIUM SERPL-SCNC: 135 MMOL/L (ref 133–144)
SODIUM SERPL-SCNC: 136 MMOL/L (ref 133–144)
SODIUM SERPL-SCNC: 136 MMOL/L (ref 133–144)
SOURCE: ABNORMAL
SP GR UR STRIP: 1.02 (ref 1–1.03)
SPECIMEN EXP DATE BLD: NORMAL
SPECIMEN SOURCE: ABNORMAL
SPECIMEN SOURCE: NORMAL
SPECIMEN SOURCE: NORMAL
TIBC SERPL-MCNC: 254 UG/DL (ref 240–430)
TROPONIN I SERPL-MCNC: 0.12 UG/L (ref 0–0.04)
TROPONIN I SERPL-MCNC: 0.15 UG/L (ref 0–0.04)
TROPONIN I SERPL-MCNC: 0.19 UG/L (ref 0–0.04)
TROPONIN I SERPL-MCNC: 0.2 UG/L (ref 0–0.04)
TROPONIN I SERPL-MCNC: 0.2 UG/L (ref 0–0.04)
TSH SERPL DL<=0.005 MIU/L-ACNC: 1.33 MU/L (ref 0.4–4)
UROBILINOGEN UR STRIP-MCNC: NORMAL MG/DL (ref 0–2)
VIT B12 SERPL-MCNC: 594 PG/ML (ref 193–986)
WBC # BLD AUTO: 3.8 10E9/L (ref 4–11)
WBC # BLD AUTO: 4.4 10E9/L (ref 4–11)
WBC # BLD AUTO: 5.3 10E9/L (ref 4–11)
WBC # BLD AUTO: 5.7 10E9/L (ref 4–11)
WBC # BLD AUTO: 6.2 10E9/L (ref 4–11)
WBC # BLD AUTO: 9.1 10E9/L (ref 4–11)
WBC # BLD AUTO: 9.3 10E9/L (ref 4–11)
WBC #/AREA URNS AUTO: <1 /HPF (ref 0–5)

## 2020-01-01 PROCEDURE — 99226 PR SUBSEQUENT OBSERVATION CARE,LEVEL III: CPT | Performed by: INTERNAL MEDICINE

## 2020-01-01 PROCEDURE — 87522 HEPATITIS C REVRS TRNSCRPJ: CPT | Performed by: HOSPITALIST

## 2020-01-01 PROCEDURE — 86900 BLOOD TYPING SEROLOGIC ABO: CPT | Performed by: HOSPITALIST

## 2020-01-01 PROCEDURE — 83615 LACTATE (LD) (LDH) ENZYME: CPT | Performed by: HOSPITALIST

## 2020-01-01 PROCEDURE — 250N000011 HC RX IP 250 OP 636: Performed by: HOSPITALIST

## 2020-01-01 PROCEDURE — 85025 COMPLETE CBC W/AUTO DIFF WBC: CPT | Performed by: INTERNAL MEDICINE

## 2020-01-01 PROCEDURE — 86803 HEPATITIS C AB TEST: CPT | Performed by: HOSPITALIST

## 2020-01-01 PROCEDURE — 85045 AUTOMATED RETICULOCYTE COUNT: CPT | Performed by: HOSPITALIST

## 2020-01-01 PROCEDURE — 74183 MRI ABD W/O CNTR FLWD CNTR: CPT

## 2020-01-01 PROCEDURE — 85384 FIBRINOGEN ACTIVITY: CPT | Performed by: HOSPITALIST

## 2020-01-01 PROCEDURE — 250N000013 HC RX MED GY IP 250 OP 250 PS 637: Performed by: HOSPITALIST

## 2020-01-01 PROCEDURE — 85027 COMPLETE CBC AUTOMATED: CPT | Performed by: HOSPITALIST

## 2020-01-01 PROCEDURE — 120N000001 HC R&B MED SURG/OB

## 2020-01-01 PROCEDURE — 87340 HEPATITIS B SURFACE AG IA: CPT | Performed by: HOSPITALIST

## 2020-01-01 PROCEDURE — U0003 INFECTIOUS AGENT DETECTION BY NUCLEIC ACID (DNA OR RNA); SEVERE ACUTE RESPIRATORY SYNDROME CORONAVIRUS 2 (SARS-COV-2) (CORONAVIRUS DISEASE [COVID-19]), AMPLIFIED PROBE TECHNIQUE, MAKING USE OF HIGH THROUGHPUT TECHNOLOGIES AS DESCRIBED BY CMS-2020-01-R: HCPCS | Performed by: EMERGENCY MEDICINE

## 2020-01-01 PROCEDURE — 250N000012 HC RX MED GY IP 250 OP 636 PS 637: Performed by: INTERNAL MEDICINE

## 2020-01-01 PROCEDURE — 99214 OFFICE O/P EST MOD 30 MIN: CPT | Performed by: INTERNAL MEDICINE

## 2020-01-01 PROCEDURE — 83615 LACTATE (LD) (LDH) ENZYME: CPT | Performed by: INTERNAL MEDICINE

## 2020-01-01 PROCEDURE — 250N000013 HC RX MED GY IP 250 OP 250 PS 637: Performed by: EMERGENCY MEDICINE

## 2020-01-01 PROCEDURE — 87636 SARSCOV2 & INF A&B AMP PRB: CPT | Performed by: EMERGENCY MEDICINE

## 2020-01-01 PROCEDURE — 93306 TTE W/DOPPLER COMPLETE: CPT

## 2020-01-01 PROCEDURE — 36415 COLL VENOUS BLD VENIPUNCTURE: CPT | Performed by: HOSPITALIST

## 2020-01-01 PROCEDURE — 99225 PR SUBSEQUENT OBSERVATION CARE,LEVEL II: CPT | Performed by: INTERNAL MEDICINE

## 2020-01-01 PROCEDURE — 99285 EMERGENCY DEPT VISIT HI MDM: CPT | Mod: 25

## 2020-01-01 PROCEDURE — 36415 COLL VENOUS BLD VENIPUNCTURE: CPT | Performed by: INTERNAL MEDICINE

## 2020-01-01 PROCEDURE — 85610 PROTHROMBIN TIME: CPT | Performed by: HOSPITALIST

## 2020-01-01 PROCEDURE — 86704 HEP B CORE ANTIBODY TOTAL: CPT | Performed by: HOSPITALIST

## 2020-01-01 PROCEDURE — 999N001017 HC STATISTIC GLUCOSE BY METER IP

## 2020-01-01 PROCEDURE — 96360 HYDRATION IV INFUSION INIT: CPT

## 2020-01-01 PROCEDURE — 99220 PR INITIAL OBSERVATION CARE,LEVEL III: CPT | Performed by: HOSPITALIST

## 2020-01-01 PROCEDURE — G0378 HOSPITAL OBSERVATION PER HR: HCPCS

## 2020-01-01 PROCEDURE — G0103 PSA SCREENING: HCPCS | Performed by: INTERNAL MEDICINE

## 2020-01-01 PROCEDURE — 99207 PR CDG-CODE CATEGORY CHANGED: CPT | Performed by: INTERNAL MEDICINE

## 2020-01-01 PROCEDURE — C9803 HOPD COVID-19 SPEC COLLECT: HCPCS

## 2020-01-01 PROCEDURE — 82550 ASSAY OF CK (CPK): CPT | Performed by: HOSPITALIST

## 2020-01-01 PROCEDURE — A9585 GADOBUTROL INJECTION: HCPCS | Performed by: HOSPITALIST

## 2020-01-01 PROCEDURE — 72040 X-RAY EXAM NECK SPINE 2-3 VW: CPT

## 2020-01-01 PROCEDURE — 84484 ASSAY OF TROPONIN QUANT: CPT | Performed by: INTERNAL MEDICINE

## 2020-01-01 PROCEDURE — 76700 US EXAM ABDOM COMPLETE: CPT

## 2020-01-01 PROCEDURE — 85384 FIBRINOGEN ACTIVITY: CPT | Performed by: INTERNAL MEDICINE

## 2020-01-01 PROCEDURE — 87040 BLOOD CULTURE FOR BACTERIA: CPT | Performed by: EMERGENCY MEDICINE

## 2020-01-01 PROCEDURE — 93306 TTE W/DOPPLER COMPLETE: CPT | Mod: 26 | Performed by: INTERNAL MEDICINE

## 2020-01-01 PROCEDURE — 86901 BLOOD TYPING SEROLOGIC RH(D): CPT | Performed by: HOSPITALIST

## 2020-01-01 PROCEDURE — 86140 C-REACTIVE PROTEIN: CPT | Performed by: INTERNAL MEDICINE

## 2020-01-01 PROCEDURE — 96372 THER/PROPH/DIAG INJ SC/IM: CPT | Performed by: HOSPITALIST

## 2020-01-01 PROCEDURE — 83540 ASSAY OF IRON: CPT | Performed by: INTERNAL MEDICINE

## 2020-01-01 PROCEDURE — 250N000013 HC RX MED GY IP 250 OP 250 PS 637: Performed by: INTERNAL MEDICINE

## 2020-01-01 PROCEDURE — 97110 THERAPEUTIC EXERCISES: CPT | Mod: GP | Performed by: PHYSICAL THERAPIST

## 2020-01-01 PROCEDURE — 83550 IRON BINDING TEST: CPT | Performed by: INTERNAL MEDICINE

## 2020-01-01 PROCEDURE — 255N000002 HC RX 255 OP 636: Performed by: HOSPITALIST

## 2020-01-01 PROCEDURE — 96361 HYDRATE IV INFUSION ADD-ON: CPT

## 2020-01-01 PROCEDURE — 86708 HEPATITIS A ANTIBODY: CPT | Performed by: HOSPITALIST

## 2020-01-01 PROCEDURE — 99217 PR OBSERVATION CARE DISCHARGE: CPT | Performed by: INTERNAL MEDICINE

## 2020-01-01 PROCEDURE — 82378 CARCINOEMBRYONIC ANTIGEN: CPT | Performed by: HOSPITALIST

## 2020-01-01 PROCEDURE — 85025 COMPLETE CBC W/AUTO DIFF WBC: CPT | Performed by: EMERGENCY MEDICINE

## 2020-01-01 PROCEDURE — 82728 ASSAY OF FERRITIN: CPT | Performed by: INTERNAL MEDICINE

## 2020-01-01 PROCEDURE — 85610 PROTHROMBIN TIME: CPT | Performed by: INTERNAL MEDICINE

## 2020-01-01 PROCEDURE — 82105 ALPHA-FETOPROTEIN SERUM: CPT | Performed by: HOSPITALIST

## 2020-01-01 PROCEDURE — 93010 ELECTROCARDIOGRAM REPORT: CPT | Performed by: INTERNAL MEDICINE

## 2020-01-01 PROCEDURE — 93005 ELECTROCARDIOGRAM TRACING: CPT

## 2020-01-01 PROCEDURE — 84484 ASSAY OF TROPONIN QUANT: CPT | Performed by: HOSPITALIST

## 2020-01-01 PROCEDURE — 85045 AUTOMATED RETICULOCYTE COUNT: CPT | Performed by: INTERNAL MEDICINE

## 2020-01-01 PROCEDURE — 82306 VITAMIN D 25 HYDROXY: CPT | Performed by: INTERNAL MEDICINE

## 2020-01-01 PROCEDURE — 83520 IMMUNOASSAY QUANT NOS NONAB: CPT | Performed by: HOSPITALIST

## 2020-01-01 PROCEDURE — 84443 ASSAY THYROID STIM HORMONE: CPT | Performed by: INTERNAL MEDICINE

## 2020-01-01 PROCEDURE — 250N000009 HC RX 250: Performed by: EMERGENCY MEDICINE

## 2020-01-01 PROCEDURE — 80076 HEPATIC FUNCTION PANEL: CPT | Performed by: HOSPITALIST

## 2020-01-01 PROCEDURE — 84484 ASSAY OF TROPONIN QUANT: CPT | Performed by: EMERGENCY MEDICINE

## 2020-01-01 PROCEDURE — 97161 PT EVAL LOW COMPLEX 20 MIN: CPT | Mod: GP | Performed by: PHYSICAL THERAPIST

## 2020-01-01 PROCEDURE — 97112 NEUROMUSCULAR REEDUCATION: CPT | Mod: GP | Performed by: PHYSICAL THERAPIST

## 2020-01-01 PROCEDURE — 86706 HEP B SURFACE ANTIBODY: CPT | Performed by: HOSPITALIST

## 2020-01-01 PROCEDURE — 82607 VITAMIN B-12: CPT | Performed by: INTERNAL MEDICINE

## 2020-01-01 PROCEDURE — 80048 BASIC METABOLIC PNL TOTAL CA: CPT | Performed by: EMERGENCY MEDICINE

## 2020-01-01 PROCEDURE — 99284 EMERGENCY DEPT VISIT MOD MDM: CPT | Mod: 25

## 2020-01-01 PROCEDURE — 85300 ANTITHROMBIN III ACTIVITY: CPT | Performed by: HOSPITALIST

## 2020-01-01 PROCEDURE — 250N000011 HC RX IP 250 OP 636: Performed by: EMERGENCY MEDICINE

## 2020-01-01 PROCEDURE — 71275 CT ANGIOGRAPHY CHEST: CPT

## 2020-01-01 PROCEDURE — 81001 URINALYSIS AUTO W/SCOPE: CPT | Performed by: EMERGENCY MEDICINE

## 2020-01-01 PROCEDURE — 80076 HEPATIC FUNCTION PANEL: CPT | Performed by: INTERNAL MEDICINE

## 2020-01-01 PROCEDURE — 82728 ASSAY OF FERRITIN: CPT | Performed by: HOSPITALIST

## 2020-01-01 PROCEDURE — 86705 HEP B CORE ANTIBODY IGM: CPT | Performed by: HOSPITALIST

## 2020-01-01 PROCEDURE — 85379 FIBRIN DEGRADATION QUANT: CPT | Performed by: EMERGENCY MEDICINE

## 2020-01-01 PROCEDURE — 999N000157 HC STATISTIC RCP TIME EA 10 MIN

## 2020-01-01 PROCEDURE — 80053 COMPREHEN METABOLIC PANEL: CPT | Performed by: HOSPITALIST

## 2020-01-01 PROCEDURE — 258N000003 HC RX IP 258 OP 636: Performed by: EMERGENCY MEDICINE

## 2020-01-01 PROCEDURE — 71045 X-RAY EXAM CHEST 1 VIEW: CPT

## 2020-01-01 PROCEDURE — 86301 IMMUNOASSAY TUMOR CA 19-9: CPT | Performed by: HOSPITALIST

## 2020-01-01 RX ORDER — BISACODYL 10 MG
10 SUPPOSITORY, RECTAL RECTAL DAILY PRN
Status: DISCONTINUED | OUTPATIENT
Start: 2020-01-01 | End: 2020-01-01 | Stop reason: HOSPADM

## 2020-01-01 RX ORDER — ACETAMINOPHEN 325 MG/1
650 TABLET ORAL EVERY 4 HOURS PRN
Status: DISCONTINUED | OUTPATIENT
Start: 2020-01-01 | End: 2020-01-01 | Stop reason: HOSPADM

## 2020-01-01 RX ORDER — AMOXICILLIN 500 MG
1200 CAPSULE ORAL DAILY
Status: ON HOLD | COMMUNITY
End: 2021-01-01

## 2020-01-01 RX ORDER — ONDANSETRON 4 MG/1
4 TABLET, ORALLY DISINTEGRATING ORAL EVERY 6 HOURS PRN
Status: DISCONTINUED | OUTPATIENT
Start: 2020-01-01 | End: 2020-01-01 | Stop reason: HOSPADM

## 2020-01-01 RX ORDER — IBUPROFEN 200 MG
400 TABLET ORAL EVERY 8 HOURS PRN
Qty: 60 TABLET | Refills: 0 | Status: ON HOLD | OUTPATIENT
Start: 2020-01-01 | End: 2020-01-01

## 2020-01-01 RX ORDER — ACETAMINOPHEN 325 MG/1
325-650 TABLET ORAL EVERY 6 HOURS PRN
Qty: 30 TABLET | Refills: 0 | Status: SHIPPED | OUTPATIENT
Start: 2020-01-01 | End: 2021-01-01

## 2020-01-01 RX ORDER — POLYETHYLENE GLYCOL 3350 17 G/17G
17 POWDER, FOR SOLUTION ORAL DAILY
Status: DISCONTINUED | OUTPATIENT
Start: 2020-01-01 | End: 2020-01-01

## 2020-01-01 RX ORDER — IOPAMIDOL 755 MG/ML
500 INJECTION, SOLUTION INTRAVASCULAR ONCE
Status: COMPLETED | OUTPATIENT
Start: 2020-01-01 | End: 2020-01-01

## 2020-01-01 RX ORDER — ACETAMINOPHEN 500 MG
500 TABLET ORAL EVERY 4 HOURS PRN
Status: DISCONTINUED | OUTPATIENT
Start: 2020-01-01 | End: 2020-01-01

## 2020-01-01 RX ORDER — AMOXICILLIN 250 MG
2 CAPSULE ORAL 2 TIMES DAILY
Status: DISCONTINUED | OUTPATIENT
Start: 2020-01-01 | End: 2020-01-01 | Stop reason: HOSPADM

## 2020-01-01 RX ORDER — ACETAMINOPHEN 325 MG/1
325-650 TABLET ORAL EVERY 6 HOURS PRN
Qty: 1 BOTTLE | Refills: 0 | Status: SHIPPED | OUTPATIENT
Start: 2020-01-01 | End: 2020-01-01

## 2020-01-01 RX ORDER — AMOXICILLIN 250 MG
1 CAPSULE ORAL 2 TIMES DAILY
Status: DISCONTINUED | OUTPATIENT
Start: 2020-01-01 | End: 2020-01-01

## 2020-01-01 RX ORDER — DEXAMETHASONE 6 MG/1
6 TABLET ORAL DAILY
Qty: 6 TABLET | Refills: 0 | Status: SHIPPED | OUTPATIENT
Start: 2020-01-01 | End: 2020-01-01

## 2020-01-01 RX ORDER — DIPHENHYDRAMINE HCL 25 MG
25 TABLET ORAL
Qty: 30 TABLET | Refills: 0 | Status: SHIPPED | OUTPATIENT
Start: 2020-01-01 | End: 2021-01-01

## 2020-01-01 RX ORDER — GUAIFENESIN 200 MG/1
200 TABLET ORAL EVERY 4 HOURS PRN
Status: DISCONTINUED | OUTPATIENT
Start: 2020-01-01 | End: 2020-01-01 | Stop reason: HOSPADM

## 2020-01-01 RX ORDER — ACETAMINOPHEN 650 MG/1
650 SUPPOSITORY RECTAL EVERY 4 HOURS PRN
Status: DISCONTINUED | OUTPATIENT
Start: 2020-01-01 | End: 2020-01-01

## 2020-01-01 RX ORDER — ASPIRIN 325 MG
325 TABLET ORAL ONCE
Status: COMPLETED | OUTPATIENT
Start: 2020-01-01 | End: 2020-01-01

## 2020-01-01 RX ORDER — ONDANSETRON 2 MG/ML
4 INJECTION INTRAMUSCULAR; INTRAVENOUS EVERY 6 HOURS PRN
Status: DISCONTINUED | OUTPATIENT
Start: 2020-01-01 | End: 2020-01-01 | Stop reason: HOSPADM

## 2020-01-01 RX ORDER — LIDOCAINE 40 MG/G
CREAM TOPICAL
Status: DISCONTINUED | OUTPATIENT
Start: 2020-01-01 | End: 2020-01-01 | Stop reason: HOSPADM

## 2020-01-01 RX ORDER — DEXAMETHASONE 6 MG/1
6 TABLET ORAL DAILY
Qty: 6 TABLET | Refills: 0 | Status: SHIPPED | OUTPATIENT
Start: 2020-01-01 | End: 2021-01-01

## 2020-01-01 RX ORDER — MULTIVIT WITH MINERALS/LUTEIN
1 TABLET ORAL DAILY
Status: ON HOLD | COMMUNITY
End: 2021-01-01

## 2020-01-01 RX ORDER — GADOBUTROL 604.72 MG/ML
7.5 INJECTION INTRAVENOUS ONCE
Status: COMPLETED | OUTPATIENT
Start: 2020-01-01 | End: 2020-01-01

## 2020-01-01 RX ADMIN — ASPIRIN 325 MG ORAL TABLET 325 MG: 325 PILL ORAL at 18:41

## 2020-01-01 RX ADMIN — DEXAMETHASONE 6 MG: 2 TABLET ORAL at 09:45

## 2020-01-01 RX ADMIN — SERTRALINE HYDROCHLORIDE 50 MG: 50 TABLET, FILM COATED ORAL at 09:03

## 2020-01-01 RX ADMIN — DOCUSATE SODIUM AND SENNOSIDES 2 TABLET: 8.6; 5 TABLET ORAL at 09:45

## 2020-01-01 RX ADMIN — DOCUSATE SODIUM AND SENNOSIDES 1 TABLET: 8.6; 5 TABLET ORAL at 20:48

## 2020-01-01 RX ADMIN — Medication 1 LOZENGE: at 16:23

## 2020-01-01 RX ADMIN — DEXAMETHASONE 6 MG: 2 TABLET ORAL at 09:04

## 2020-01-01 RX ADMIN — ENOXAPARIN SODIUM 40 MG: 40 INJECTION SUBCUTANEOUS at 22:34

## 2020-01-01 RX ADMIN — GADOBUTROL 7.5 ML: 604.72 INJECTION INTRAVENOUS at 22:24

## 2020-01-01 RX ADMIN — GUAIFENESIN 200 MG: 200 TABLET ORAL at 22:34

## 2020-01-01 RX ADMIN — ENOXAPARIN SODIUM 40 MG: 40 INJECTION SUBCUTANEOUS at 21:09

## 2020-01-01 RX ADMIN — IOPAMIDOL 60 ML: 755 INJECTION, SOLUTION INTRAVENOUS at 17:53

## 2020-01-01 RX ADMIN — DOCUSATE SODIUM AND SENNOSIDES 1 TABLET: 8.6; 5 TABLET ORAL at 09:03

## 2020-01-01 RX ADMIN — SODIUM CHLORIDE 500 ML: 9 INJECTION, SOLUTION INTRAVENOUS at 16:55

## 2020-01-01 RX ADMIN — ACETAMINOPHEN 500 MG: 500 TABLET ORAL at 15:38

## 2020-01-01 RX ADMIN — SERTRALINE HYDROCHLORIDE 50 MG: 50 TABLET, FILM COATED ORAL at 09:45

## 2020-01-01 RX ADMIN — ACETAMINOPHEN 650 MG: 325 TABLET, FILM COATED ORAL at 01:07

## 2020-01-01 RX ADMIN — ENOXAPARIN SODIUM 40 MG: 40 INJECTION SUBCUTANEOUS at 08:05

## 2020-01-01 RX ADMIN — ACETAMINOPHEN 650 MG: 325 TABLET, FILM COATED ORAL at 17:28

## 2020-01-01 RX ADMIN — ENOXAPARIN SODIUM 40 MG: 40 INJECTION SUBCUTANEOUS at 21:28

## 2020-01-01 RX ADMIN — SODIUM CHLORIDE 80 ML: 9 INJECTION, SOLUTION INTRAVENOUS at 17:53

## 2020-01-01 RX ADMIN — ENOXAPARIN SODIUM 40 MG: 40 INJECTION SUBCUTANEOUS at 09:46

## 2020-01-01 RX ADMIN — ACETAMINOPHEN 650 MG: 325 TABLET, FILM COATED ORAL at 08:05

## 2020-01-01 RX ADMIN — ENOXAPARIN SODIUM 40 MG: 40 INJECTION SUBCUTANEOUS at 09:45

## 2020-01-01 RX ADMIN — DEXAMETHASONE 6 MG: 2 TABLET ORAL at 14:27

## 2020-01-01 RX ADMIN — ENOXAPARIN SODIUM 40 MG: 40 INJECTION SUBCUTANEOUS at 09:04

## 2020-01-01 RX ADMIN — ENOXAPARIN SODIUM 40 MG: 40 INJECTION SUBCUTANEOUS at 20:48

## 2020-01-01 ASSESSMENT — ACTIVITIES OF DAILY LIVING (ADL)
VISION_MANAGEMENT: GLASSES
WEAR_GLASSES_OR_BLIND: YES
ADLS_ACUITY_SCORE: 17
DEPENDENT_IADLS:: INDEPENDENT
DOING_ERRANDS_INDEPENDENTLY_DIFFICULTY: NO
DIFFICULTY_COMMUNICATING: NO
ADLS_ACUITY_SCORE: 17
DEPENDENT_IADLS:: INDEPENDENT
DIFFICULTY_EATING/SWALLOWING: NO
FALL_HISTORY_WITHIN_LAST_SIX_MONTHS: NO
DEPENDENT_IADLS:: INDEPENDENT
INTERPRETER_SERVICES_OFFERED_TO_THE_PATIENT: YES
HEARING_DIFFICULTY_OR_DEAF: NO
PATIENT_/_FAMILY_COMMUNICATION_STYLE: SPOKEN LANGUAGE (NON-ENGLISH)
WALKING_OR_CLIMBING_STAIRS_DIFFICULTY: NO
DRESSING/BATHING_DIFFICULTY: NO
ADLS_ACUITY_SCORE: 17
CONCENTRATING,_REMEMBERING_OR_MAKING_DECISIONS_DIFFICULTY: NO
TOILETING_ISSUES: NO

## 2020-01-01 ASSESSMENT — ENCOUNTER SYMPTOMS
HEADACHES: 1
DIZZINESS: 1
NECK PAIN: 1
COUGH: 0
FEVER: 1
HEADACHES: 1
NECK STIFFNESS: 0
DIARRHEA: 0
FATIGUE: 1
ABDOMINAL PAIN: 0
FEVER: 1
ACTIVITY CHANGE: 1
SHORTNESS OF BREATH: 0
COUGH: 0
HEADACHES: 1
APPETITE CHANGE: 1
NECK PAIN: 1
FATIGUE: 1
CHILLS: 0

## 2020-01-01 ASSESSMENT — ANXIETY QUESTIONNAIRES
1. FEELING NERVOUS, ANXIOUS, OR ON EDGE: MORE THAN HALF THE DAYS
2. NOT BEING ABLE TO STOP OR CONTROL WORRYING: SEVERAL DAYS
GAD7 TOTAL SCORE: 3
3. WORRYING TOO MUCH ABOUT DIFFERENT THINGS: NOT AT ALL
6. BECOMING EASILY ANNOYED OR IRRITABLE: NOT AT ALL
5. BEING SO RESTLESS THAT IT IS HARD TO SIT STILL: NOT AT ALL
7. FEELING AFRAID AS IF SOMETHING AWFUL MIGHT HAPPEN: NOT AT ALL
GAD7 TOTAL SCORE: 3
IF YOU CHECKED OFF ANY PROBLEMS ON THIS QUESTIONNAIRE, HOW DIFFICULT HAVE THESE PROBLEMS MADE IT FOR YOU TO DO YOUR WORK, TAKE CARE OF THINGS AT HOME, OR GET ALONG WITH OTHER PEOPLE: NOT DIFFICULT AT ALL

## 2020-01-01 ASSESSMENT — PATIENT HEALTH QUESTIONNAIRE - PHQ9
5. POOR APPETITE OR OVEREATING: NOT AT ALL
SUM OF ALL RESPONSES TO PHQ QUESTIONS 1-9: 14

## 2020-01-01 ASSESSMENT — MIFFLIN-ST. JEOR: SCORE: 1491.45

## 2020-09-26 NOTE — ED NOTES
RN went in to start orders for pt. Pt stated that the main reason for his visit to the ED was to be swabbed for covid. RN held off on starting IV and labs until MD seen pt.

## 2020-09-26 NOTE — ED AVS SNAPSHOT
[facility]  [address]  [facility] [address]  Phone:  [phone]  Fax:  [phone]                                    Alexandra Alfaro   MRN: [id number]    Department:  [facility]   Date of Visit:  9/26/2020           After Visit Summary Signature Page    I have received my discharge instructions, and my questions have been answered. I have discussed any challenges I see with this plan with the nurse or doctor.    ..........................................................................................................................................  Patient/Patient Representative Signature      ..........................................................................................................................................  Patient Representative Print Name and Relationship to Patient    ..................................................               ................................................  Date                                   Time    ..........................................................................................................................................  Reviewed by Signature/Title    ...................................................              ..............................................  Date                                               Time          22EPIC Rev 08/18

## 2020-09-26 NOTE — ED TRIAGE NOTES
Patient presents with chills/shivering for the past 3 days. He is also complaining of a headache. Tajik  needed. No cough or shortness of breath.

## 2020-09-26 NOTE — ED PROVIDER NOTES
History     Chief Complaint:  Fever and Headache    HPI   Alexandra Alfaro is a 65 year old male who presents with fatigue for the last week.  He also developed subjective fevers and intermittent headache over the last 4 days.  Headache does improve with OTC meds.  States he has had headaches like this in the past.  He denies any chills, neck stiffness, coughing, shortness of breath, diarrhea, loss of taste/smell.  He denies any chest pain, abdominal pain, urinary symptoms, rash.  Denies any close contacts with COVID.    Allergies:  No known drug allergies      Medications:    Pamelor  Flomax    Past Medical History:    The patient does not have any past pertinent medical history.     Past Surgical History:    Davinci Herniorrhaphy inguinal     Family History:    Thyroid disease    Social History:  Smoking status- former smoker  Alcohol use- no  Drug use- no   Marital Status:        Review of Systems   Constitutional: Positive for fatigue and fever. Negative for chills.   Respiratory: Negative for cough and shortness of breath.    Cardiovascular: Negative for chest pain.   Gastrointestinal: Negative for abdominal pain and diarrhea.   Genitourinary: Negative.    Musculoskeletal: Negative for neck stiffness.   Skin: Negative for rash.   Neurological: Positive for headaches.   All other systems reviewed and are negative.      Physical Exam     Patient Vitals for the past 24 hrs:   BP Temp Temp src Pulse Resp SpO2   09/26/20 1442 -- -- -- -- -- 97 %   09/26/20 1435 (!) 134/91 -- -- 91 -- 96 %   09/26/20 1313 121/78 99.5  F (37.5  C) Oral 94 18 98 %     Physical Exam  General: Alert, no acute distress; nontoxic appearing  Neuro:  PERRL.  EOMI.  Gait stable, no focal deficits  HEENT:  Moist mucous membranes.  Posterior oropharynx clear, no exudates.  Conjunctiva normal. TMs clear bilaterally; no meningismus  CV:  RRR, no m/r/g, skin warm and well perfused  Pulm:  CTAB, no wheezes/ronchi/rales.  No acute distress,  breathing comfortably  GI:  Soft, nontender, nondistended.  No rebound or guarding.  Normal bowel sounds  MSK:  Moving all extremities.  No focal areas of edema, erythema, or tenderness  Skin:  WWP, no rashes, no lower extremity edema, skin color normal, no diaphoresis  Psych:  Well-appearing, normal affect, regular speech    Emergency Department Course     Laboratory:  Laboratory findings were communicated with the patient who voiced understanding of the findings.    Symptomatic COVID-19 Virus by PCR: pending    Emergency Department Course:  Past medical records, nursing notes, and vitals reviewed.    1419 I performed an exam of the patient as documented above.     1511 I rechecked the patient and discussed the results of their workup thus far.     Findings and plan explained to the Patient. Patient discharged home with instructions regarding supportive care, medications, and reasons to return. The importance of close follow-up was reviewed.     Impression & Plan     Covid-19  Alexandra Alfaro was evaluated during a global COVID-19 pandemic, which necessitated consideration that the patient might be at risk for infection with the SARS-CoV-2 virus that causes COVID-19.   Applicable protocols for evaluation were followed during the patient's care.   COVID-19 was considered as part of the patient's evaluation. The plan for testing is:  a test was obtained during this visit     Medical Decision Making:  Alexandra Alfaro is a 65 year old male who presents to the ER for subjective fevers and intermittent headache over the last 4 days.  Headaches does improve with OTC meds.  He is afebrile and vitally stable on arrival.  He is nontoxic-appearing.  He is concern for COVID although no close contacts.  He denies any coughing, body aches, abdominal pain or other symptoms.  There is no meningismus to suggest meningitis.  Head to toe exam was unremarkable.  COVID swab was sent and pending.  Symptoms are likely viral given ongoing  pandemic we will investigate for COVID-19.  He is otherwise not hypoxic and given his well appearance, I feel he safe to discharge home.  Doubt serious bacterial infection require further labs or imaging.  He is safe to discharge with PCP follow-up.  Reasons to return to the ER discussed and all questions were answered.    Diagnosis:    ICD-10-CM   1. Person under investigation for COVID-19  Z20.828   2. Intermittent headache  R51   3. Fatigue, unspecified type  R53.83     Disposition:  Discharged to home.    Scribe Disclosure:  I, Sharona Johnson, am serving as a scribe at 3:18 PM on 9/26/2020 to document services personally performed by Burke Alvarez MD based on my observations and the provider's statements to me.       Burke Alvarez MD  09/26/20 8123

## 2020-12-14 NOTE — ED PROVIDER NOTES
History     Chief Complaint:  Other    A  was used.      Alexandra Alfaro is a 65 year old male who presents for evaluation of low appetite and fatigue. The patient reports that he has had a month of a decreased appetite with associated low motivation and fatigue. He has had sleeplessness for the last 5 months. He also endorses that he has ongoing neck pain that started one year ago. The neck pain is located to the C6-7 region, constant, non-radiating and aggravated by touch. 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 He denies taking any medications for his symptoms. The patient also states that he has a headache for the last three years and 2-3 months ago and was prescribed medication for his pain, which did not resolve the headache. He denies having any primary care provider, but endorses going to an emergency doctor 2-3 months ago.      Allergies:  No Known Allergies    Medications:   Pamelor   Oxycodone   Flomax    Past Medical History:    Past medical history reviewed. No pertinent medical history.     Past Surgical History:    DaVinci Herniorrhaphy      Family History:    Brother: thyroid disease    Social History:  The patient presented alone.  Smoking Status: Former Smoker  Smokeless Tobacco: Never Used  Alcohol Use: Negative  Drug Use: Negative    Review of Systems   Constitutional: Positive for activity change, appetite change and fatigue.   Musculoskeletal: Positive for neck pain.   Neurological: Positive for headaches.   All other systems reviewed and are negative.    Physical Exam     Patient Vitals for the past 24 hrs:   BP Temp Pulse Resp SpO2   12/14/20 1745 127/78 -- 86 -- 96 %   12/14/20 1730 123/81 -- 87 -- 96 %   12/14/20 1700 122/80 -- 92 -- 98 %   12/14/20 1645 123/77 -- 91 -- 94 %   12/14/20 1630 127/80 -- 92 -- 97 %   12/14/20 1615 124/79 -- 92 -- 97 %   12/14/20 1548 (!) 130/90 98.2  F (36.8  C) 100 20 95 %      Physical Exam    HEENT:    Oropharynx is moist  Eyes:    Conjunctiva normal     EOMs intact  Neck:    Supple, no meningismus.     CV:     Regular rate and rhythm.      No murmurs, rubs or gallops.       No unilateral leg swelling.       2+ radial pulses bilateral.    PULM:    Clear to auscultation bilateral.       No respiratory distress.      Good air exchange.     No rales or wheezing.     No stridor.  ABD:    Soft, non-tender, non-distended.       No pulsatile masses.       No rebound, guarding or rigidity.  MSK:     No gross deformity to all four extremities.   LYMPH:   No cervical lymphadenopathy.  NEURO:   Alert & O x 3.      CN II-XII intact, speech is clear with no aphasia.       Strength is 5/5 in all 4 extremities.  Sensation is intact.       Normal muscular tone, no tremor.  Skin:    Warm, dry and intact.    Psych:    Mood is good and affect is appropriate.  Emergency Department Course   Imaging:  Radiology findings were communicated with the patient who voiced understanding of the findings.    Cervical spine XR, 2-3 views  Normal vertebral body heights. Straightening and mild reversal of the normal cervical lordosis centered at the C5 level. Lateral alignment shows slight degenerative retrolisthesis C5 on C6. Slight degenerative anterolisthesis C7 on T1.   Moderate disc space narrowing at the C5-C6 level with hypertrophic spurring. Mild disc space narrowing at the C7-T1 level. Mild to moderate facet arthropathy lower cervical facets. Normal odontoid view. Normal prevertebral tissues. Lung apices grossly clear.  Reading per radiology      Laboratory:  Laboratory findings were communicated with the patient who voiced understanding of the findings.    CBC: WBC 6.2, HGB 11.5 (L),   BMP: WNL (Creatinine 0.92)  UA with micro: mucous present (!) o/w negative  Glucose by meter: 80    Interventions:  1655 0.9%  mL IV    Emergency Department Course:    1611 Nursing notes and vitals reviewed. I  performed an exam of the patient as documented above.     1643 IV was inserted and blood was drawn for laboratory testing, results above.     1643 The patient provided a urine sample here in the emergency department. This was sent for laboratory testing, findings above.    The patient was sent for a XR while in the emergency department, results above.     Prior to discharge, I personally reviewed the results with the patient and all related questions were answered. The patient verbalized understanding and is amenable to plan.     Impression & Plan    Medical Decision Making:  Alexandra Alfaro is a 65 year old male who presents to the emergency department today for evaluation of a number of problems over the last 3 to 12 months including low appetite, fatigue, insomnia and chronic neck pain.  In regards to his neck pain, he has no features concerning for epidural abscess, epidural hematoma, discitis.  Given the chronic nature of pain, x-ray undertaken and unremarkable.  Pain is likely musculoskeletal in nature.  Tylenol as needed for pain.    He also a number of ongoing symptoms including polyuria, fatigue and insomnia.  Basic laboratory studies are unrevealing.  No acute medical emergency noted.  No concern for sinister pathology that requires further ED evaluation.  Patient clearly needs outpatient follow-up with primary care physician.  Care coordinator was involved in care and was able to set up an outpatient primary care visit.    Diagnosis:    ICD-10-CM    1. Chronic neck pain  M54.2 Basic metabolic panel (BMP)    G89.29 Care Coordination Referral   2. Other fatigue  R53.83 Care Coordination Referral   3. Insomnia, unspecified type  G47.00 Care Coordination Referral     Disposition:   The patient is discharged to home.    Discharge Medications:  New Prescriptions    ACETAMINOPHEN (TYLENOL) 325 MG TABLET    Take 1-2 tablets (325-650 mg) by mouth every 6 hours as needed for pain    DIPHENHYDRAMINE (BENADRYL) 25 MG  TABLET    Take 1 tablet (25 mg) by mouth nightly as needed for allergies or sleep       Scribe Disclosure:  I, Orla Severson, am serving as a scribe at 4:03 PM on 12/14/2020 to document services personally performed by Rudi Smith MD based on my observations and the provider's statements to me.     Scribe Disclosure:  I, Angy Torrez, am serving as a scribe at 4:18 PM on 12/14/2020 to document services personally performed by Rudi Smith MD based on my observations and the provider's statements to me.              Rudi Smith MD  12/14/20 1928

## 2020-12-14 NOTE — ED TRIAGE NOTES
"Patient presents to the ED requesting a \"general check up.\" Information obtained via Tajik . Patient states wants kidneys and diabetes checked. Reports polyuria. Additionally reports a poor appetite, fatigue and neck pain \"for years.\"   "

## 2020-12-14 NOTE — ED AVS SNAPSHOT
[facility] Emergency Dept  [address]  [facility] [address]  Phone: [phone]  Fax: [phone]                                    Alexandra Alfaro   MRN: [id number]    Department: [facility] Emergency Dept   Date of Visit: 12/14/2020           After Visit Summary Signature Page    I have received my discharge instructions, and my questions have been answered. I have discussed any challenges I see with this plan with the nurse or doctor.    ..........................................................................................................................................  Patient/Patient Representative Signature      ..........................................................................................................................................  Patient Representative Print Name and Relationship to Patient    ..................................................               ................................................  Date                                   Time    ..........................................................................................................................................  Reviewed by Signature/Title    ...................................................              ..............................................  Date                                               Time          22EPIC Rev 08/18

## 2020-12-14 NOTE — ED NOTES
Care Management Discharge Note    Discharge Date: 12/14/20       Discharge Disposition: Home    Discharge Services:  [place].  Pt needs an Hebrew  at every appointment. Pt does not have a PCP, and has been using the ER for chronic disease management.  Pt is agreeable to an afternoon appointment at the [facility].  I made a follow up appointment for Pt for 12/16/2020.  Pt lives with his wife and 3 of his 4 children, one of which is still in high school.  His children can read and write English.  PT reports that no one in his family has a PCP.    Discharge DME: None    Discharge Transportation: car, drives self    Private pay costs discussed: Not applicable    PAS Confirmation Code:   Not applicable  Patient/family educated on Medicare website which has current facility and service quality ratings: no    Education Provided on the Discharge Plan:  The follow up appointment information was put on the discharge instructions on the AVS, in English and in Hebrew.  Persons Notified of Discharge Plans: Pt  Patient/Family in Agreement with the Plan: yes    Handoff Referral Completed: Yes          Yanci Tao RN Care Coordinator,  BSN, PHN, CCM, FANNY  Inpatient Care Coordination - Emergency Department  [facility]   [phone]

## 2020-12-15 NOTE — LETTER
[facility]  Complex Care Plan  About Me:    Patient Name:  Alexandra Alfaro    YOB: 1955  Age:         65 year old   [place] MRN:    [id number] Telephone Information:  Home Phone [phone]   Mobile [phone]       Address:  [address]  [facility] [address] Email address:  [email or web address]      Emergency Contact(s)    Name Relationship Lgl Grd Work Phone Home Phone Mobile Phone   1DESTINY ARNETT Daughter   [phone]           Primary language:  Croatian     needed? Yes   [facility]:  [phone] op. 1  Other communication barriers: Language barrier  Preferred Method of Communication:  Mail  Current living arrangement: I live in a private home with family  Mobility Status/ Medical Equipment: Independent    Health Maintenance  Health Maintenance Reviewed:      My Access Plan  Medical Emergency 911   Primary Clinic Line [facility] [phone]   24 Hour Appointment Line [phone] or  [phone]) (toll-free)   24 Hour Nurse Line [phone] (toll-free)   Preferred Urgent Care     Preferred Hospital [facility]  [phone]   Preferred Pharmacy [id number] [address] AT [facility] & [facility]     Behavioral Health Crisis Line The National Suicide Prevention Lifeline at 1-484.987.1838 or 911             My Care Team Members  Patient Care Team       Relationship Specialty Notifications Start End    No Ref-Primary, Physician PCP - General   4/27/18     Fax: [phone]         Bela Sethi, RN Lead Care Coordinator   12/15/20     Wilfredo Haley Community Health Worker   12/15/20             My Care Plans  Self Management and Treatment Plan  Goals and (Comments)  Goals        General    1. Pain Management (pt-stated)     Notes - Note created  12/15/2020  3:42 PM by Bela Sethi, RN    Goal Statement: I  will verbalized a decrease in my pain level to a 3/10.  Date Goal set: 12/15/2020  Barriers: Language barrier  Strengths: Patient is motivated to better manage his health and pain.   Date to Achieve By: 6/15/2021  Patient expressed understanding of goal: Yes.   Action steps to achieve this goal:  1. I will meet with PCP 12/16/20 to address my chronic pain, and follow their recommendations.   2. I will meet with PT 12/16/20 to address my chronic pain, and follow their recommendations.   3. I will continue to work with care coordination for any additional concerns               Action Plans on File:                       Advance Care Plans/Directives Type:        My Medical and Care Information  Problem List   There is no problem list on file for this patient.     Current Medications and Allergies:  See printed Medication Report.    Care Coordination Start Date: 12/15/2020   Frequency of Care Coordination: 2 weeks   Form Last Updated: 12/15/2020

## 2020-12-15 NOTE — LETTER
[facility] CARE COORDINATION  Dr. Dodge    December 15, 2020    Alexandra Alfaro  [address]   [facility] [address]      Dear Alexandra,    I am a clinic care coordinator who works with Dr. Dodge at [facility]. I wanted to thank you for spending the time to talk with me.  Below is a description of clinic care coordination and how I can further assist you.      The clinic care coordination team is made up of a registered nurse,  and community health worker who understand the health care system. The goal of clinic care coordination is to help you manage your health and improve access to the health care system in the most efficient manner. The team can assist you in meeting your health care goals by providing education, coordinating services, strengthening the communication among your providers and supporting you with any resource needs.    Please feel free to contact me with any questions or concerns. We are focused on providing you with the highest-quality healthcare experience possible and that all starts with you.     Sincerely,     Elmira Sethi RN Care Coordinator  [facility], [place], [place]  Email: [email or web address]  Phone: [phone]        Enclosed: I have enclosed a copy of the Complex Care Plan. This has helpful information and goals that we have talked about. Please keep this in an easy to access place to use as needed.

## 2020-12-15 NOTE — DISCHARGE INSTRUCTIONS
An Emergency Dept follow up appointment has been made for you on :    Dec 16, 2020  1:00 PM   Office Visit with Lindy Dodge MD   [facility] ([facility]) [address] [place] [id number] [phone]   Bring a current list of meds and any records pertaining to this visit.  For Physicals, please bring immunization records and any forms needing to be filled out. Please arrive 10 minutes early to complete paperwork.      if you would like to make any changes, please phone them at: [phone].     áß Ýí:  stella tahdid maweid mutabaeat fi qism altawari lak fy:    Dec 16, 2020  1:00 PM   Office Visit with Lindy Dodge MD   [facility] ([facility]) [address] [place], [place] [address]et [facility] [address] [phone]   ÃÍÖÑ ÞÇÆãÉ ÇáÃÏæíÉ ÇáÍÇáíÉ æÃí ÓÌáÇÊ ÊÊÚáÞ ÈåÐå ÇáÒíÇÑÉ.  ÈÇáäÓÈÉ Åáì ÇáÝíÒíÇÆííä   íÑÌì ÅÍÖÇÑ ÓÌáÇÊ ÇáÊØÚíã æÃí ÇÓÊãÇÑÇÊ ÊÍÊÇÌ Åáì ãáÄåÇ. íÑÌì ÇáæÕæá ÞÈá 10 ÏÞÇÆÞ áÇÓÊßãÇá ÇáÃÚãÇá ÇáæÑÞíÉ.  'ahdar qayimat al'adwiat alhaliat wa'aya sijillat tataealaq bihadhih alziyarati.  balnsbt 'iilaa alfiziayiyiyn , yrja 'iihdar sijillat altateim wa'aya aistimarat tahtaj 'iilaa mulawha. yrja alwusul qabl 10 daqayiq liaistikmal al'aemal alwarqiati.       ÅÐÇ ßäÊ ÊÑÛÈ Ýí ÅÌÑÇÁ Ãí ÊÛííÑÇÊ   ÝíÑÌì ÇáÇÊÕÇá Èåã Úáì: [phone].  'iidha kunt targhab fi 'iijra' 'ayi taghyirat , fiurjaa alaitisal bihim ealaa: [phone].

## 2020-12-15 NOTE — PROGRESS NOTES
Clinic Care Coordination Contact    Clinic Care Coordination Contact  OUTREACH    Referral Information:  Referral Source: ED Follow-Up    Primary Diagnosis: Chronic Pain    Chief Complaint   Patient presents with     Clinic Care Coordination - Initial        Universal Utilization: ED visit 12/14/20 for fatigue, poor appetite and chronic pain.   Clinic Utilization  Difficulty keeping appointments:: Yes  Compliance Concerns: Yes  No-Show Concerns: Yes  No PCP office visit in Past Year: Yes  Utilization    Last refreshed: 12/14/2020  7:19 PM: Hospital Admissions 0           Last refreshed: 12/14/2020  7:19 PM: ED Visits 2           Last refreshed: 12/14/2020  7:19 PM: No Show Count (past year) 0              Current as of: 12/14/2020  7:19 PM            Clinical Concerns:  Current Medical Concerns:  There is no problem list on file for this patient.    Current Behavioral Concerns: None noted.     Education Provided to patient: RN CC role and reason for call.    Pain  Pain (GOAL):: Yes  Type: Chronic (>3mo)  Location of chronic pain:: Neck and back  Radiating: No  Progression: Intermittent  Description of pain: Nagging, Sharp, Stabbing  Chronic pain severity:: 7  Limitation of routine activities due to chronic pain:: Yes  Description: Able to do moderate activities  Alleviating Factors: Rest, Ice  Aggravating Factors: Activity  Health Maintenance Reviewed:    Clinical Pathway: None    Medication Management:  Current Outpatient Medications   Medication Instructions     acetaminophen (TYLENOL) 325-650 mg, Oral, EVERY 6 HOURS PRN     acetaminophen (TYLENOL) 325-650 mg, Oral, EVERY 6 HOURS PRN     diphenhydrAMINE (BENADRYL) 25 mg, Oral, AT BEDTIME PRN     ibuprofen (ADVIL/MOTRIN) 400 mg, Oral, EVERY 8 HOURS PRN     nortriptyline (PAMELOR) 25 mg, Oral, AT BEDTIME     oxyCODONE (ROXICODONE) 5-10 mg, Oral, EVERY 3 HOURS PRN     tamsulosin (FLOMAX) 0.4 mg, Oral, DAILY     Functional Status:  Dependent ADLs::  Independent  Dependent IADLs:: Independent  Bed or wheelchair confined:: No  Mobility Status: Independent  Fallen 2 or more times in the past year?: No  Any fall with injury in the past year?: No    Living Situation:  Current living arrangement:: I live in a private home with family  Type of residence:: Apartment    Lifestyle & Psychosocial Needs:        Diet:: Regular  Tube Feeding: No  Inadequate activity/exercise (GOAL):: No  Significant changes in sleep pattern (GOAL): Yes  Transportation means:: Regular car     Mental health DX:: No  Mental health management concern (GOAL):: No  Chemical Dependency Status: No Current Concerns  Informal Support system:: Family   Socioeconomic History     Marital status:      Spouse name: Not on file     Number of children: Not on file     Years of education: Not on file     Highest education level: Not on file     Tobacco Use     Smoking status: Former Smoker     Quit date: 2018     Years since quittin.6     Smokeless tobacco: Never Used   Substance and Sexual Activity     Alcohol use: No     Drug use: No     Spoke with patient via Language line . Patient states that he is feeling better than yesterday. His appetite has improved some what. Patient complains of 7/10 neck and back pain that is not being managed. Patient states he will take OTC NSAIDS and/or tylenol. Reviewed medication list with patient.     Reviewed upcoming appointments with patient. Patient will meet with Dr. Dodge, as well as PT tomorrow  to further address patient's health concerns. Patient then stated that he needed to go, and handed phone to a family member.      Resources and Interventions:  Current Resources:   Community Resources: None  Supplies used at home:: None  Equipment Currently Used at Home: none  Employment Status: employed part-time)     Advance Care Plan/Directive  Advanced Care Plans/Directives on file:: No  Advanced Care Plan/Directive Status: Considering  Options    Referrals Placed: None     Goals:   Goals        General    1. Pain Management (pt-stated)     Notes - Note created  12/15/2020  3:42 PM by Bela Sethi RN    Goal Statement: I will verbalized a decrease in my pain level to a 3/10.  Date Goal set: 12/15/2020  Barriers: Language barrier  Strengths: Patient is motivated to better manage his health and pain.   Date to Achieve By: 6/15/2021  Patient expressed understanding of goal: Yes.   Action steps to achieve this goal:  1. I will meet with PCP 12/16/20 to address my chronic pain, and follow their recommendations.   2. I will meet with PT 12/16/20 to address my chronic pain, and follow their recommendations.   3. I will continue to work with care coordination for any additional concerns              Patient/Caregiver understanding: Patient reports understanding and denies any additional questions or concerns at this times. RN CC engaged in AIDET communication during encounter.    Outreach Frequency: 2 weeks  Future Appointments              Tomorrow Lindy Dodge MD; VIDEO,  [place], [place]    Tomorrow Srinivasan Gaviria PT; PHONE,  [facility], [place]          Plan: Patient will meet with Dr. Dodge to establish care, and PT to address chronic neck/back pain. RN CC will send care coordination introduction letter and complex care plan. CHW will follow up in two weeks, RN CC will review chart in 4 weeks. CHW will:  CHW Delegation:   1)  Due Date:  12/29/20       Delegation: Please reach out to patient and assess care plan goal.     Elmira Sethi RN Care Coordinator  [facility] [place], [place], [place]  Email: [email or web address]  Phone: [phone]

## 2020-12-16 PROBLEM — M54.2 CHRONIC NECK PAIN: Status: ACTIVE | Noted: 2020-01-01

## 2020-12-16 PROBLEM — M25.512 CHRONIC PAIN OF BOTH SHOULDERS: Status: ACTIVE | Noted: 2020-01-01

## 2020-12-16 PROBLEM — M25.511 CHRONIC PAIN OF BOTH SHOULDERS: Status: ACTIVE | Noted: 2020-01-01

## 2020-12-16 PROBLEM — G89.29 CHRONIC NECK PAIN: Status: ACTIVE | Noted: 2020-01-01

## 2020-12-16 PROBLEM — G89.29 CHRONIC PAIN OF BOTH SHOULDERS: Status: ACTIVE | Noted: 2020-01-01

## 2020-12-16 NOTE — PROGRESS NOTES
[facility] Initial Evaluation  12/16/2020     Precautions/Restrictions/MD instructions: Evaluate and treat for chronic neck pain    Therapist Assessment: Alexandra Alfaro is a 65 year old male patient presenting to Physical Therapy with chronic neck pain. Patient demonstrates notably rounded shoulders and forward head posture. Signs and symptoms are consistent with postural deficits likely causing neck and anterior shoulder pain. These impairments limit their ability to lift heavier items and perform necessary work duties. Skilled PT services are necessary in order to reduce impairments and improve independent function.    Yoruba  present over video chat to assist with translation.    Subjective:     Injury/Condition Details:  Presenting Complaint Neck pain   Onset Timing/Date Has been hurting for quite some time, but worsened ~2 months ago; MD referral from 12/14/2020   Mechanism No specific mechanism noted, but pt reports that his neck began to hurt after lifting heavier bags of beans at work     Symptom Behavior Details    Primary Symptoms Sporadic symptoms; Activity/position dependent, pain (Location: B shoulders, back of neck Quality: Aching/Throbbing and Tender), stiffness, consistent headaches   Worst Pain 6/10 (with lifting heavier weights)   Symptom Provocators Lifting and carrying heavier items   Best Pain 0/10    Symptom Relievers None    Time of day dependent? No   Recent symptom change? symptoms worsening     Prior Testing/Intervention for current condition:  Prior Tests  x-ray from 12/14/2020:    IMPRESSION: Normal vertebral body heights. Straightening and mild reversal of the normal cervical lordosis centered at the C5 level. Lateral alignment shows slight degenerative retrolisthesis C5 on C6. Slight degenerative anterolisthesis C7 on T1.   Moderate disc space narrowing at the C5-C6 level with hypertrophic spurring. Mild disc space narrowing at the C7-T1  level. Mild to moderate facet arthropathy lower cervical facets. Normal odontoid view. Normal prevertebral tissues. Lung apices grossly clear.   Prior Treatment none     Lifestyle & General Medical History:  Employment Works in Restaurant   Usual physical activities  (within past year) Heavy lifting and carrying at the restaurant   Orthopaedic History  None   Medication  Fish Oil, occasional pain relief medication    Notable medical history See Epic Chart; hernia repair ~2 years ago   Patient goals Decrease pain    Patient Reported Health good       Red Flags: (Bold when present) - reviewed the following and denies  Vertebrobasilar Artery   Symptom   Dysphagia Drop Attack   Dysarthria Dizziness   Diplopia Paresthesia of lips     Spinal Cord  Symptom   Bi/Quadriparesthesia Ataxia   Hemiparesthesia Urinary incontinence   Bi/Quadriparesis Fecal incontinence     Cranial Nerves - bold when abnormality is present  Cranial nerve   II-[place] VIII-Loss of Balance   III-Diplopia VIII-Hypoacousia   V-Facial paresthesia IX-Dysarthria   VII-Altered Taste IX-Dysphagia    X-Nausea        Objective    Posture/Observation: Rounded shoulders and forward head posture     Palpation: TTP of B anterior shoulders and spinous processes near C6-7     AROM: (Major, Moderate, Minimal or Nil loss)  Movement Loss Khanh Mod Min Nil Pain   Flexion    x    Extension   x  2-3/10 pain   Left Rotation    x    Right Rotation    x    Left Side Bending   x  Stretch    Right Side bending   x  Stretch      Shoulder screen: No notable deviations in B AROM        ASSESSMENT/PLAN  Patient is a 65 year old male with cervical complaints.    Patient has the following significant findings with corresponding treatment plan.                Diagnosis 1:  Chronic neck pain; signs and symptoms consistent with postural deficits likely leading to neck discomfort      Pain -  hot/cold therapy, US, electric stimulation, mechanical traction, manual therapy,  splint/taping/bracing/orthotics, self management, education, directional preference exercise and home program  Decreased ROM/flexibility - manual therapy, therapeutic exercise and home program  Decreased function - therapeutic activities and home program  Impaired posture - neuro re-education and home program    Therapy Evaluation Codes:   1) History comprised of:   Personal factors that impact the plan of care:      Language.    Comorbidity factors that impact the plan of care are:      None.     Medications impacting care: None.  2) Examination of Body Systems comprised of:   Body structures and functions that impact the plan of care:      Cervical spine.   Activity limitations that impact the plan of care are:      Lifting and Working.  3) Clinical presentation characteristics are:   Stable/Uncomplicated.  4) Decision-Making    Low complexity using standardized patient assessment instrument and/or measureable assessment of functional outcome.  Cumulative Therapy Evaluation is: Low complexity.    Previous and current functional limitations:  (See Goal Flow Sheet for this information)    Short term and Long term goals: (See Goal Flow Sheet for this information)     Communication ability:  Patient has an  for communication clarity.  Treatment Explanation - The following has been discussed with the patient:   RX ordered/plan of care  Anticipated outcomes  Possible risks and side effects  This patient would benefit from PT intervention to resume normal activities.   Rehab potential is good.    Frequency:  1 X week, once daily  Duration:  for 4 weeks; tapering to 1x/month for 2 months  Discharge Plan:  Achieve all LTG.  Independent in home treatment program.  Reach maximal therapeutic benefit.    Please refer to the daily flowsheet for treatment today, total treatment time and time spent performing 1:1 timed codes.

## 2020-12-16 NOTE — NURSING NOTE
/70   Pulse 94   Temp 99.3  F (37.4  C) (Oral)   Resp 12   Wt 78 kg (172 lb)   SpO2 95%   BMI 26.15 kg/m

## 2020-12-16 NOTE — PROGRESS NOTES
Subjective     Alexandra Alfaro is a 65 year old male who presents to clinic today for the following health issues:    HPI         ED/UC Followup:    Facility:  [facility]  Date of visit: 12/14/20  Reason for visit: fatigue and appetite loss  Current Status: better, just HA currently     Yoruba  used.      The patient had low appetite and fatigue for the past 3 months.   The patient reports that he has not been able to sleep secondary to his neck pain and his anxiety. He has difficulty staying asleep. He does not snore.         He reports some depression. No thoughts of hurting himself.   He also reports that he has had neck pain in C6-C7 for 1 year   He also had a headache for 2-3 months.  Pain to both shoulders. No weakness of arms bilaterally.  He plans on seeing physical therapy this afternoon for his neck pain and headaches.     Labs in the ER revealed low hemoglobin. Denies any blood in his stool.   He has not had any colon cancer screening.     Cervical spine Xray in the ER revealed:  IMPRESSION: Normal vertebral body heights. Straightening and mild reversal of the normal cervical lordosis centered at the C5 level. Lateral alignment shows slight degenerative retrolisthesis C5 on C6. Slight degenerative anterolisthesis C7 on T1.   Moderate disc space narrowing at the C5-C6 level with hypertrophic spurring. Mild disc space narrowing at the C7-T1 level. Mild to moderate facet arthropathy lower cervical facets. Normal odontoid view. Normal prevertebral tissues. Lung apices grossly   Clear.    He received benadryl in the ER, but has not taken this medication yet.     Depression/anxiety.  Has felt depressed over the past month or so given all of the cumalitve worries. .  Goes to work 4 times per week, but does not exercise regularly.  He is willing to start a medication for anxiety/depression.    PHQ 12/16/2020   PHQ-9 Total Score 14   Q9: Thoughts of better off dead/self-harm past 2 weeks Not at all     TIAN-7  "SCORE 12/16/2020   Total Score 3         Review of Systems   CONSTITUTIONAL: NEGATIVE for fever, chills, change in weight  RESP: NEGATIVE for significant cough or SOB  CV: NEGATIVE for chest pain, palpitations or peripheral edema  Psych: POS depression and anxiety      Objective    /70   Pulse 94   Temp 99.3  F (37.4  C) (Oral)   Resp 12   Ht 1.65 m (5' 4.96\")   Wt 78 kg (172 lb)   SpO2 95%   BMI 28.66 kg/m    Body mass index is 28.66 kg/m .  Physical Exam   GENERAL: healthy, alert and no distress  RESP: lungs clear to auscultation - no rales, rhonchi or wheezes  CV: regular rate and rhythm, normal S1 S2, no S3 or S4, no murmur, click or rub        Assessment & Plan       (F32.0) Mild major depression (H)  (primary encounter diagnosis)  Comment: new diagnosis  Plan: sertraline (ZOLOFT) 50 MG tablet        -start zoloft and follow up in one month    (D64.9) Low hemoglobin  Comment: assess  Plan: CBC with platelets and differential, Ferritin,         Iron and iron binding capacity, Vitamin B12            (R53.83) Fatigue, unspecified type  Comment: assess  Plan: Hepatic panel (Albumin, ALT, AST, Bili, Alk         Phos, TP), Vitamin D Deficiency            (Z12.11) Colon cancer screening  Comment:   Plan: GASTROENTEROLOGY ADULT REF PROCEDURE ONLY            (Z12.5) Screening for prostate cancer  Comment: assess  Plan: PSA, screen          Cervicalgia. Consider a spine specialist if no improvement with physical therapy.            BMI:   Estimated body mass index is 28.66 kg/m  as calculated from the following:    Height as of this encounter: 1.65 m (5' 4.96\").    Weight as of this encounter: 78 kg (172 lb).       Return in about 4 weeks (around 1/13/2021) for depression.    Lindy Dodge MD  [facility]    "

## 2020-12-25 PROBLEM — U07.1 2019 NOVEL CORONAVIRUS DISEASE (COVID-19): Status: ACTIVE | Noted: 2020-01-01

## 2020-12-25 PROBLEM — R79.89 TROPONIN LEVEL ELEVATED: Status: ACTIVE | Noted: 2020-01-01

## 2020-12-25 PROBLEM — U07.1 COVID-19: Status: ACTIVE | Noted: 2020-01-01

## 2020-12-25 NOTE — ED NOTES
DATE:  12/25/2020   TIME OF RECEIPT FROM LAB:  4894  LAB TEST:  SARS-CoV-2  LAB VALUE:  Positive  RESULTS GIVEN WITH READ-BACK TO (PROVIDER):  Dr. Lopez  TIME LAB VALUE REPORTED TO PROVIDER:   4041

## 2020-12-25 NOTE — ED TRIAGE NOTES
Pt presents to ED wanting a covid test.  Pt states he has been feeling dizzy upon standing and fever.  Also states loss of taste/smell, and fatigue.  ABCs intact

## 2020-12-25 NOTE — ED PROVIDER NOTES
History   Chief Complaint:  No chief complaint on file.       The history is provided by the patient. A  was used (Yakut).      Alexandra Alfaro is an otherwise healthy 65 year old male who presents with a week of high fevers, shaking, headaches, and chest pain. He also becomes dizzy when he stands. 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 He also has chronic neck pain. . He denies any cough.  Reports he has had chest pain that is only exertional.  He denies any chest pain at rest.  Only has no chest pain with lying down.  Chest pain feels like something is squeezing him and is not sharp.  Nuys any radiation.  No history of heart conditions.    Review of Systems   Constitutional: Positive for fever.   Respiratory: Negative for cough.    Cardiovascular: Positive for chest pain.   Musculoskeletal: Positive for neck pain.   Neurological: Positive for dizziness and headaches.   All other systems reviewed and are negative.    Allergies:  The patient has no known allergies.     Medications:  Zoloft    Past Medical History:    The patient denies past medical history     Past Surgical History:    Herniorrhaphy inguinal     Social History:  Presents to the ED alone.  Speaks Yakut  Denies smoking    Physical Exam     Patient Vitals for the past 24 hrs:   BP Temp Temp src Pulse Resp SpO2   12/25/20 1910 -- -- -- 84 15 98 %   12/25/20 1900 116/76 -- -- 85 -- 97 %   12/25/20 1830 117/74 -- -- 87 18 97 %   12/25/20 1815 115/70 -- -- -- -- --   12/25/20 1745 105/76 -- -- 93 22 95 %   12/25/20 1730 109/82 99.1  F (37.3  C) Oral 94 22 95 %   12/25/20 1715 104/74 -- -- 98 25 93 %   12/25/20 1700 115/73 -- -- 99 26 94 %   12/25/20 1645 116/74 -- -- 99 20 93 %   12/25/20 1630 121/78 -- -- 99 22 93 %   12/25/20 1615 116/74 -- -- 97 -- 92 %   12/25/20 1600 121/76 -- -- 98 -- 94 %   12/25/20 1545 118/81 -- -- 98 -- 95 %    12/25/20 1515 117/76 -- -- 95 -- 97 %   12/25/20 1508 114/89 100.2  F (37.9  C) Oral 98 20 97 %       Physical Exam  General: Patient is alert and interactive when I enter the room  Head:  The scalp, face, and head appear normal  Eyes:  Conjunctivae are normal  ENT:    The nose is normal    Pinnae are normal    External acoustic canals are normal  Neck:  Trachea midline  CV:  Pulses are normal, RRR  Resp:  No respiratory distress, CTAB   Abdomen:      Soft, non-tender, non-distended  Musc:  Normal muscular tone    No major joint effusions    No asymmetric leg swelling  Skin:  No rash or lesions noted  Neuro:  Speech is normal and fluent. Face is symmetric.     Moving all extremities well.   Psych: Awake. Alert.  Normal affect.  Appropriate interactions.    Emergency Department Course     ECG:  Indication: Dizziness  Time: 1529  Vent. Rate 93 bpm. TN interval 202. QRS duration 74. QT/QTc 328/407. P-R-T axis 42 -5 27. Normal sinus rhythm. Normal ECG. Read time: 1536     Imaging:  XR Chest, Portable, G/E 1 view:   Question some lateral right base infiltrate vs. overlying  soft tissue. Similar finding on the left. As per radiology.    CT Chest w/ IV contrast - PE protocol:   1.  No pulmonary embolism.     2.  Regions of peripheral geographic ground-glass infiltrates with interlobular thickening in both lungs compatible with COVID-19 pneumonia.     3.  Cirrhosis with splenomegaly. As per radiology.     Laboratory:  CBC: WBC: 4.4, HGB: 13.2 (L), PLT: 124 (L)    BMP: Glucose 185 (H), Anion Gap: 2 (L), Calcium: 7.9 (L), o/w WNL (Creatinine: 0.93)    1618 Troponin: 0.198 (H)    D dimer Quantitative: 2.7 (H)    Symptomatic Influenza A/B & COVID-19 PCR: Covid Positive (A), Influenza A/B: Negative    Emergency Department Course:    Reviewed:  1511  I reviewed the patient's nursing notes, vitals, and past medical records.    Assessments:  1515 Initial examination of the patient.     1941 Rechecked the patient     Consults:    1938 Spoke with Dr. Perez, Hospitalist, who accepts admission.     Interventions:  1538 Tylenol 500 mg PO  1841 Aspirin 325 mg PO    Disposition:  The patient was admitted to the hospital under the care of Dr. Perez.     Impression & Plan     Covid-19  Alexandra Alfaro was evaluated during a global COVID-19 pandemic, which necessitated consideration that the patient might be at risk for infection with the SARS-CoV-2 virus that causes COVID-19.   Applicable protocols for evaluation were followed during the patient's care.   COVID-19 was considered as part of the patient's evaluation. The plan for testing is:  a test was obtained during this visit.    Medical Decision Making:  Alexandra Alfaro is a 65 year old male who presents with chest pain in a week of viral-like syndrome symptoms.  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  He is not hypoxic.  EKG revealed no ischemic changes.  Patient endorses exertional chest pain so troponin was obtained which was elevated at 0.198.  D-dimer was also elevated 2.7.  Covid testing was positive here.  Patient was given Tylenol as he had a low-grade temp as well as aspirin given his elevation of troponin.  Given elevated D-dimer we did do a CT PE study.  This revealed no PE but changes consistent with Covid pneumonia.  It is possible to Covid pneumonia is causing demand ischemia however with his exertional chest pain I think he needs a further cardiac work-up.  Patient admitted to medicine.  No indication for cardiology consultation as he is having not having chest pain actively and no EKG changes.      Diagnosis:    ICD-10-CM    1. 2019 novel coronavirus disease (COVID-19)  U07.1    2. Troponin level elevated  R77.8      Scribe Disclosure:  I, Viktor [place], am serving as a scribe at 3:34 PM on 12/25/2020 to document services personally performed by Erica Lopez MD based on my observations and the provider's statements to me.          John  MD Erica  12/25/20 2031

## 2020-12-25 NOTE — ED NOTES
DATE:  12/25/2020   TIME OF RECEIPT FROM LAB:  9318  LAB TEST:  Troponin  LAB VALUE:  0.198  RESULTS GIVEN WITH READ-BACK TO (PROVIDER):  Dr. Lopez  TIME LAB VALUE REPORTED TO PROVIDER:   5017

## 2020-12-26 NOTE — PLAN OF CARE
Pt Occitan speaking-  used for assessment.  Tmax 100.7, tylenol given. Sats stable on room air. Tele SR in 80s. Does have chest pain w/ exertion, the same as has been for the past week or so. Dry nonproductive cough.  Up SBA/ind. Plan for echo today.

## 2020-12-26 NOTE — PLAN OF CARE
See Flowsheets for more specific documentation, lab and imaging results, and vitals.    Shift Summary    Ambulation: SBA w/ No Assistive Equipment  Neuro: No Neuro deficits  Respiratory: Dyspnea on exertion, dry nonproductive cough, LS diminished, on RA  Cardiac: telemetry SR  GI: regular diet. Is NPO now before abdominal US  : urine dark - encourage more fluids  Musculoskeletal: Generalized weakness    LDAs: 2 PIVs left arm    Vital signs:  Temp: 100  F (37.8  C) Temp src: Oral BP: 122/68 Pulse: 79   Resp: 20 SpO2: 98 % O2 Device: None (Room air)       Significant Labs: Trop 0.198, Covid positive, Platelets 128  Significant Events: Pt had temperature of 100.0 at end of shift. Reported this to oncoming RN and suggested tylenol  Treatment/Care Plan: Expected tomorrow pending results for imaging and clinical presentation tomorrow    Discharge Disposition: tomorrow to home possible

## 2020-12-26 NOTE — ED NOTES
[facility]  ED Nurse Handoff Report    Alexandra Alfaro is a 65 year old male   ED Chief complaint: No chief complaint on file.  . ED Diagnosis:   Final diagnoses:   None     Allergies: No Known Allergies    Code Status: not on file  Activity level - Baseline/Home:  Independent. Activity Level - Current:   Assist X 1. Lift room needed: No. Bariatric: No   Needed: Yes - Maori  Isolation: Yes. Infection: Not Applicable  COVID r/o and special precautions.     Vital Signs:   Vitals:    12/25/20 1815 12/25/20 1830 12/25/20 1900 12/25/20 1910   BP: 115/70 117/74 116/76    Pulse:  87 85 84   Resp:  18  15   Temp:       TempSrc:       SpO2:  97% 97% 98%       Cardiac Rhythm:  ,      Pain level:    Patient confused: No. Patient Falls Risk: Yes.   Elimination Status: Has voided   Patient Report - Initial Complaint: Covid concerns. Focused Assessment:  The history is provided by the patient. A  was used (Maori).      Alexandra Alfaro is an otherwise healthy 65 year old male who presents with a week of high fevers, shaking, headaches, and chest pain. He also becomes dizzy when he stands. 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 He also has chronic neck pain that he was evaluated for on 12/14. He denies any cough.   Tests Performed: EKG, labs, CT. Abnormal Results:   Labs Ordered and Resulted from Time of ED Arrival Up to the Time of Departure from the ED   CBC WITH PLATELETS DIFFERENTIAL - Abnormal; Notable for the following components:       Result Value    RBC Count 4.09 (*)     Hemoglobin 13.2 (*)     Platelet Count 124 (*)     All other components within normal limits   BASIC METABOLIC PANEL - Abnormal; Notable for the following components:    Anion Gap 2 (*)     Glucose 185 (*)     Calcium 7.9 (*)     All other components within normal limits   INFLUENZA A/B & SARS-COV2 PCR MULTIPLEX -  Abnormal; Notable for the following components:    SARS-CoV-2 PCR Result POSITIVE (*)     All other components within normal limits   TROPONIN I - Abnormal; Notable for the following components:    Troponin I ES 0.198 (*)     All other components within normal limits   D DIMER QUANTITATIVE - Abnormal; Notable for the following components:    D Dimer 2.7 (*)     All other components within normal limits     CT Chest Pulmonary Embolism w Contrast   Final Result   IMPRESSION:   1.  No pulmonary embolism.      2.  Regions of peripheral geographic ground-glass infiltrates with interlobular thickening in both lungs compatible with COVID-19 pneumonia.      3.  Cirrhosis with splenomegaly.      XR Chest Port 1 View   Final Result   IMPRESSION: Question some lateral right base infiltrate vs. overlying   soft tissue. Similar finding on the left.      DAWNA LOMAX MD         Treatments provided: see MAR  Family Comments: Family has been contacted  OBS brochure/video discussed/provided to patient:  No  ED Medications:   Medications   acetaminophen (TYLENOL) tablet 500 mg (500 mg Oral Given 12/25/20 1538)   CT Saline Flush (80 mLs Intravenous Given 12/25/20 1753)   iopamidol (ISOVUE-370) solution 500 mL (60 mLs Intravenous Given 12/25/20 1753)   aspirin (ASA) tablet 325 mg (325 mg Oral Given 12/25/20 1841)     Drips infusing:  No  For the majority of the shift, the patient's behavior Green. Interventions performed were N/A.    Sepsis treatment initiated: No     Patient tested for COVID 19 prior to admission: YES    ED Nurse Name/Phone Number: Alejandra Grullon RN,   7:37 PM    RECEIVING UNIT ED HANDOFF REVIEW    Above ED Nurse Handoff Report was reviewed: Yes  Reviewed by: Aissatou Felder RN on December 25, 2020 at 8:19 PM

## 2020-12-26 NOTE — H&P
[facility]    History and Physical  Hospitalist       Date of Admission:  12/25/2020    Assessment and Plan:   Alexandra Alfaro is a 65 year old male no significant PMH presents with symptoms of cough fevers chills and chest pain for the past week.  He is found to have Covid test positive.  Labs notable for troponin of 0.91, ECG no ST or T wave changes noted. CT angio no pulmonary embolism, but does show bilateral infiltrates. He has been admitted for management of his Covid pneumonia.    Acute Covid pneumonitis.  Not currently hypoxic.  Will hold off dexamethasone and remdesivir.  Will order LFTs in the setting of his cirrhosis.    Chest pain elevated troponin.  Most likely related to his Covid pneumonia we will continue to trend his troponins and order an echocardiogram in the morning.    New diagnosis of cirrhosis.  Unclear etiology will need further work-up outpatient.  Will order hepatitis and C serology.       Thrombocytopenia.  Most likely related to his cirrhosis monitor     ---------     # Code status: Full  # Anticipated discharge date and Disposition: in 1-2 days  # DVT: Lovenox  # IVF:                        Annemarie Perez MD  Text Page (7am - 6pm, M-F)          Primary Care Physician   Physician No Ref-Primary    Chief Complaint   Fever    History is obtained from the patient    History of Present Illness   Alexandra Alfaro is a 65 year old male who presents with symptoms of cough fevers chills and chest pain for the past week.  Patient denies any sick contacts works at [employer].  Chest pain is exertional, been occurring since he has been having the above symptoms.  5 out of 10 dull aching improving with rest nonradiating no nausea vomiting or diaphoresis associated with it.  His symptoms have been going on for a month per ED he stated a week.  He has diffuse body aches fevers chills sore throat no coughing.  Otherwise no other complaints.  He has never seen a physician he  recently migrated to [place] from [place] about 3 years ago.    Past Medical History    I have reviewed this patient's medical history and updated it with pertinent information if needed.   No past medical history on file.    Past Surgical History   I have reviewed this patient's surgical history and updated it with pertinent information if needed.  Past Surgical History:   Procedure Laterality Date     DAVINCI HERNIORRHAPHY INGUINAL Right 8/7/2018    Procedure: DAVINCI HERNIORRHAPHY INGUINAL;  Robotic Assisted Right Inguinal Hernia Repair with Mesh ;  Surgeon: Prabhu Allred MD;  Location:  OR       Prior to Admission Medications   Prior to Admission Medications   Prescriptions Last Dose Informant Patient Reported? Taking?   acetaminophen (TYLENOL) 325 MG tablet   No No   Sig: Take 1-2 tablets (325-650 mg) by mouth every 6 hours as needed for pain   diphenhydrAMINE (BENADRYL) 25 MG tablet   No No   Sig: Take 1 tablet (25 mg) by mouth nightly as needed for allergies or sleep   ibuprofen (ADVIL/MOTRIN) 200 MG tablet   No No   Sig: Take 2 tablets (400 mg) by mouth every 8 hours as needed for mild pain, fever or pain   sertraline (ZOLOFT) 50 MG tablet   No No   Sig: Take 1 tablet (50 mg) by mouth daily      Facility-Administered Medications: None     Allergies   No Known Allergies    Social History   I have reviewed this patient's social history and updated it with pertinent information if needed. Alexandra Alfaro  reports that he quit smoking about 2 years ago. He has never used smokeless tobacco. He reports that he does not drink alcohol or use drugs.    Family History   Family history reviewed with patient and is noncontributory.    ROS   12 point systems discussed with patient negative as per HPI    Physical Exam   Temp: 99.1  F (37.3  C) Temp src: Oral BP: 116/76 Pulse: 84   Resp: 15 SpO2: 98 % O2 Device: None (Room air)    Vital Signs with Ranges  Temp:  [99.1  F (37.3  C)-100.2  F (37.9  C)] 99.1  F (37.3   C)  Pulse:  [84-99] 84  Resp:  [15-26] 15  BP: (104-121)/(70-89) 116/76  SpO2:  [92 %-98 %] 98 %  0 lbs 0 oz    General: Pt in NAD, normal appearance  HEENT: PERRLA, EOMI, normocephalic / atraumatic no cervical LAD, no bruit, no pallor, WNL oropharynx, neck supple  Cardiac: +S1, S2, RRR, no MRG, no edema  Lungs: Clear to Auscultation Bilateral, normal breathing without accessory muscle usage, no wheezing, rhonchi or crackles  GI: soft NT/ND +bowel sounds all quadrants  Psych: normal mood and affect, A&Ox3  Neurological: A&O x3, nonfocal  Skin: warm, dry, normal turgor, no rash    Data   Data reviewed today:  I personally reviewed the EKG tracing showing No ST or T wave changes noted.  Recent Labs   Lab 12/25/20  1537   WBC 4.4   HGB 13.2*   MCV 98   *      POTASSIUM 4.7   CHLORIDE 105   CO2 29   BUN 10   CR 0.93   ANIONGAP 2*   REBECCA 7.9*   *   TROPI 0.198*       Recent Results (from the past 24 hour(s))   XR Chest Port 1 View    Narrative    CHEST ONE VIEW  12/25/2020 4:32 PM     HISTORY: Chest pain.      COMPARISON: None.      Impression    IMPRESSION: Question some lateral right base infiltrate vs. overlying  soft tissue. Similar finding on the left.    DAWNA LOMAX MD   CT Chest Pulmonary Embolism w Contrast    Narrative    EXAM: CT CHEST PULMONARY EMBOLISM W CONTRAST  LOCATION: [facility]  DATE/TIME: 12/25/2020 5:52 PM    INDICATION: Chest pain and fevers.  COMPARISON: None.  TECHNIQUE: CT chest pulmonary angiogram during arterial phase injection of IV contrast. Multiplanar reformats and MIP reconstructions were performed. Dose reduction techniques were used.   CONTRAST: 60 mL Isovue-370    FINDINGS:  ANGIOGRAM CHEST: Pulmonary arteries are normal caliber and negative for pulmonary emboli. Thoracic aorta is negative for dissection. No CT evidence of right heart strain.    LUNGS AND PLEURA: Normal. Subpleural geographic regions of ground-glass infiltrates with interlobular  thickening within the dependent portion of both lower lobes, as well as scattered smaller regions within the upper lobes, right middle lobe and lingula   compatible with COVID-19 pneumonia.    MEDIASTINUM/AXILLAE: Calcified nodes.    UPPER ABDOMEN: Cirrhosis with splenomegaly.    MUSCULOSKELETAL: Normal.      Impression    IMPRESSION:  1.  No pulmonary embolism.    2.  Regions of peripheral geographic ground-glass infiltrates with interlobular thickening in both lungs compatible with COVID-19 pneumonia.    3.  Cirrhosis with splenomegaly.

## 2020-12-26 NOTE — ED NOTES
Pt rounding done,  used to introduce myself to pt, pt denies needing anything at this time. Instructed on covid care for family he lives with who may also be positive.

## 2020-12-26 NOTE — PHARMACY-ADMISSION MEDICATION HISTORY
Admission medication history interview status for this patient is complete. See [facility] admission navigator for allergy information, prior to admission medications and immunization status.     Medication history interview done via telephone during Covid-19 pandemic, indicate source(s): Family 9daughter)  Medication history resources (including written lists, pill bottles, clinic record):None  Pharmacy: [facility]    Changes made to PTA medication list:  Added:  Centrum & Fish oil  Deleted:  None  Changed:  None    Actions taken by pharmacist (provider contacted, etc):None     Additional medication history information: Daughter was not sure if patient is taking Zoloft, she was not able to find his bottle.    Medication reconciliation/reorder completed by provider prior to medication history?  No    Prior to Admission medications    Medication Sig Last Dose Taking? Auth Provider   acetaminophen (TYLENOL) 325 MG tablet Take 1-2 tablets (325-650 mg) by mouth every 6 hours as needed for pain prn Yes Rudi Smith MD   diphenhydrAMINE (BENADRYL) 25 MG tablet Take 1 tablet (25 mg) by mouth nightly as needed for allergies or sleep prn Yes Rudi Smith MD   ibuprofen (ADVIL/MOTRIN) 200 MG tablet Take 2 tablets (400 mg) by mouth every 8 hours as needed for mild pain, fever or pain prn Yes Burke Alvarez MD   multivitamin (CENTRUM SILVER) tablet Take 1 tablet by mouth daily 2 days ago Yes Unknown, Entered By History   Omega-3 Fatty Acids (FISH OIL) 1200 MG capsule Take 1,200 mg by mouth daily 2 days ago Yes Unknown, Entered By History   sertraline (ZOLOFT) 50 MG tablet Take 1 tablet (50 mg) by mouth daily Unknown at Unknown time  Lindy Dodge MD

## 2020-12-26 NOTE — ED NOTES
Pt rounding done, pt denies needing anything at this time, updated on plan of care and results, verbalized understanding. Pt denies needing me to call family w/ updates, reports he'd prefer to do so himself.  set up for hospitalist per MD request.

## 2020-12-26 NOTE — UTILIZATION REVIEW
"[facility] Utilization Review  Admission Status; Secondary Review Determination     Admission Date: 12/25/2020  3:00 PM      Under the authority of the Utilization Management Committee, the utilization review process indicated a secondary review on the above patient.  The review outcome is based on review of the medical records, discussions with staff, and applying clinical experience noted on the date of the review.        (X) Observation Status Appropriate - This patient does not meet hospital inpatient criteria and is placed in observation status. If this patient's primary payer is Medicare and was admitted as an inpatient, Condition Code 44 should be used and patient status changed to \"observation\".   () Observation Status concurrent Review           RATIONALE FOR DETERMINATION     65-year-old male with no significant past medical history, who presented with cough, fever and chills with some chest pain and is admitted with positive Covid-19 and suspected pneumonia based on CT chest imaging showing pulmonary infiltrates. Febrile with no leukocytosis or hypoxia on initial evaluation and is commenced on supportive cares, IV fluids while steroids and rate is remedisivir is deferred due to no significant respiratory compromise. In addition, he has troponin elevation which is likely related to COVID-19 and is being worked up with TTE.Based on severity of illness and intensity of service, patient does not meet criteria for inpatient admission. No significant hypoxia or requirement for supplemental oxygen, improving fever and no indication for aggressive intervention including steroids or antivirals. Anticipate discharge after work-up is completed while he remains on supportive cares only. Observation cares are appropriate for above-mentioned work-up, supportive cares and close monitoring. Recommendation is communicated to Dr. Wilkinson.        The information on this document is developed by the utilization " review team in order for the business office to ensure compliance.  This only denotes the appropriateness of proper admission status and does not reflect the quality of care rendered.              Sincerely,       Fern Caba MD, MS  Physician Advisor  Utilization Review-[place]    Phone: [phone]

## 2020-12-27 NOTE — PLAN OF CARE
"PRIMARY DIAGNOSIS: \"GENERIC\" NURSING  OUTPATIENT/OBSERVATION GOALS TO BE MET BEFORE DISCHARGE:  1. ADLs back to baseline: No    2. Activity and level of assistance: Up with standby assistance.    3. Pain status: Pain free.    4. Return to near baseline physical activity: Yes     Discharge Planner Nurse   Safe discharge environment identified: No  Barriers to discharge: Yes       Entered by: Rosemarie Marcial 12/27/2020 6:39 AM     Please review provider order for any additional goals.   Nurse to notify provider when observation goals have been met and patient is ready for discharge.    Used  through tablet for assessment. VSS on RA. Sats between 97-95%, did drop to 90% when ambulating to the bathroom and back to bed. Denied pain, N/V. Stated his SOB has improved, but continues to demonstrate dyspnea on exertion. Infrequent, dry cough. BLS: diminished. Possible discharge today.   "

## 2020-12-27 NOTE — PLAN OF CARE
Patient A/O, Turkmen speaking, denies pain. SR with 1st degree AV block on tele, trop 0.148, trending down, C/O CP with deep breaths, dizzy when up, ECHO completed on 12/26 normal. RA, sats adequate, SOB with activity, denies cough, clear/dim lungs. BS+, no N/V, tolerating diet, LBM yesterday. Voiding in BR, weak. SL IV. Brusies present. AM labs, Heme/onc consulted re: liver mass, possible biopsy. Family updated by MD.

## 2020-12-27 NOTE — PLAN OF CARE
.PRIMARY DIAGNOSIS: PNEUMONIA  OUTPATIENT/OBSERVATION GOALS TO BE MET BEFORE DISCHARGE:  1. Dyspnea improved and O2 sats >88% on RA or back to baseline O2 levels: Yes   SpO2: 96 %, O2 Device: None (Room air)    2. Tolerating oral abx or appropriate plans made outpatient infusion: Yes    3. Vitals signs normal or return to baseline: Yes    4. Short term supplemental O2 needed with activity at home: No    5. Tolerate oral intake to maintain hydration: Yes    6. Return to near baseline physical activity: Yes    Discharge Planner Nurse   Safe discharge environment identified: Yes  Barriers to discharge: No       Entered by: Leanna jolley 12/27/2020 12:20 AM     Please review provider order for any additional goals.   Nurse to notify provider when observation goals have been met and patient is ready for discharge.    Diagnosis. Troponin level elevated, COVID  Labs/Protocol. Troponin- 0.193, trending down  Vitals. VSS.   Tele. SR  Respiratory. Patient has diminished LS. On room air with O2 stats over 92%.   Neuro. A&O. Patient speak Hebrew, interpretor used.  GI/. WDL  Skin. Mild bruising, WDL.  LDA's. Left PIV saline locked, CDI  Diet. NPO until patient received ultrasound. Now on regular diet.   Activity. SBA, patient reports lightheadedness when standing up. Put alarm on bed and communicated to call if he needs to get up.   Plan. Gave tylenol x 1 for pain while breathing. Ultrasound completed on shift, See results. Potential discharge tomorrow.Continue to monitor patient.

## 2020-12-27 NOTE — PLAN OF CARE
"PRIMARY DIAGNOSIS: \"GENERIC\" NURSING  OUTPATIENT/OBSERVATION GOALS TO BE MET BEFORE DISCHARGE:  ADLs back to baseline: No Pt. Complained of feeling dizzy with standing at times, would recover fast.     Activity and level of assistance: Up with standby assistance.    Pain status: Pain free.    Return to near baseline physical activity: Yes     Discharge Planner Nurse   Safe discharge environment identified: No  Barriers to discharge: Yes       Entered by: Rosemarie Marcial 12/27/2020 2:38 AM     Please review provider order for any additional goals.   Nurse to notify provider when observation goals have been met and patient is ready for discharge.  "

## 2020-12-27 NOTE — PROGRESS NOTES
PRIMARY DIAGNOSIS: PNEUMONIA  OUTPATIENT/OBSERVATION GOALS TO BE MET BEFORE DISCHARGE:  1. Dyspnea improved and O2 sats >88% on RA or back to baseline O2 levels: Yes   SpO2: 96 %, O2 Device: None (Room air)    2. Tolerating oral abx or appropriate plans made outpatient infusion: Yes    3. Vitals signs normal or return to baseline: Yes    4. Short term supplemental O2 needed with activity at home: No    5. Tolerate oral intake to maintain hydration: Yes    6. Return to near baseline physical activity: Yes    Discharge Planner Nurse   Safe discharge environment identified: Yes  Barriers to discharge: No       Entered by: Leanna jolley 12/27/2020 12:21 AM     Please review provider order for any additional goals.   Nurse to notify provider when observation goals have been met and patient is ready for discharge.

## 2020-12-28 NOTE — PLAN OF CARE
PRIMARY DIAGNOSIS: PNEUMONIA  OUTPATIENT/OBSERVATION GOALS TO BE MET BEFORE DISCHARGE:  Dyspnea improved and O2 sats >88% on RA or back to baseline O2 levels: Yes   SpO2: 94 %, O2 Device: None (Room air)    Tolerating oral abx or appropriate plans made outpatient infusion: Yes    Vitals signs normal or return to baseline: Yes    Short term supplemental O2 needed with activity at home: No    Tolerate oral intake to maintain hydration: Yes    Return to near baseline physical activity: Yes    Discharge Planner Nurse   Safe discharge environment identified: Yes  Barriers to discharge: Yes       Entered by: Leanna jolley 12/27/2020 9:51 PM     Please review provider order for any additional goals.   Nurse to notify provider when observation goals have been met and patient is ready for discharge.    Diagnosis. Troponin level elevated, COVID  Labs/Protocol. Troponin- 0.148, trending down  Vitals. VSS.   Tele. SR  Respiratory. Patient has diminished LS. On room air with O2 stats over 92%.   Neuro. A&O. Patient speak Irish, interpretor used.  GI/. WDL  Skin. Mild bruising, WDL.  LDA's. Left PIV saline locked, CDI  Diet.  regular diet.   Activity. Patient no longer has lightheadedness when standing. Moving independent in room  Plan. Hematology and Oncology to see patient. Continue plan of care.

## 2020-12-28 NOTE — PROGRESS NOTES
[facility]  Hospitalist Progress Note  Anthony Wilkinson MD 12/27/2020    Reason for Stay/active problem list      Acute COVID-19 infection with pneumonitis    Liver cirrhosis    Liver mass    Thrombocytopenia    Splenomegaly    Elevated troponin         Assessment and Plan:        Summary of Stay: Alexandra Alfaro is a 65 year old male no significant PMH presents with symptoms of cough fevers chills and chest pain for the past week.  He is found to have Covid test positive.  Labs notable for troponin of 0.91, ECG no ST or T wave changes noted. CT angio no pulmonary embolism, but does show bilateral infiltrates. He has been admitted for management of his Covid pneumonia.    Patient progress during stay:    Patient was admitted and was closely monitored.  He remained stable.  He has been feeling pleuritic chest pain and elevated troponin but his oxygen saturation remained stable.  Echocardiogram showed EF of 60 to 65% with grade 1 or early diastolic dysfunction otherwise no indication of wall motion abnormalities.  Patient's COVID-19 infection is mild and he remained stable pulmonary wise.  However, ultrasound of the abdomen showed evidence of liver cirrhosis with a mass in the liver concerning for malignancy.  This was discussed with patient and his family and oncology team was consulted to assist with further evaluation and treatment as appropriate.          Problem List with Assessment and Plan      1. Acute COVID-19 infection with pneumonitis    Currently afebrile.  Fever resolved     Inflammatory markers are elevated but trending down.    Will continue  Decadron for now and may be discontinued on discharge.  No need for remdesivir    Patient's family are worried about COVID-19 infection and I spoke with patient daughter to get tested patient to get Covid discharge instructions when he is ready for discharge    2. Pleuritic chest pain      Doubt acute coronary syndrome also troponin is slightly  elevated.  Serum troponin was peaked at 0.198.  Repeat troponins trending down.  Echocardiogram is unremarkable for acute coronary syndrome    Continue telemetry monitoring    Treat pain with pain medications    3. Elevated troponin:      Serum troponin 0.198 on admission trending down.  Echo unremarkable    Continue to monitor on telemetry.     4.  Liver cirrhosis on CT scan    New diagnosis, etiology unclear.  Patient does not drink alcohol    Hepatitis panel pending    5.  Liver mass: Ultrasound of the abdomen showed 12.5 x 13 x 12 cm hepatic mass in the right lobe of the liver cirrhosis of the liver concerning for hepatocellular carcinoma.    Will consult oncology for further evaluation and recommendations.            Plan for today:    Continue telemetry monitoring, treat chest pain with oral oxycodone and Tylenol    Oncology consultation for further assessment and recommendation of liver mass    I discussed care plan with patient daughter Wolf MENDOZA     Access : Peripheral   Zimmerman Catheter: not present        FEN :    Orders Placed This Encounter      Combination Diet Regular Diet Adult           Intake/Output Summary (Last 24 hours) at 12/26/2020 1317  Last data filed at 12/26/2020 0740  Gross per 24 hour   Intake 150 ml   Output 600 ml   Net -450 ml          DVT Prophylaxis: Enoxaparin (Lovenox) SQ    Code Status:  Full Code    Estimated discharge  disposition and plan: May discharge in the next 1 or 2 days after consultation with oncologist.  Patient needs Covid discharge instructions on discharge due to concern of spreading the infection to his family.      Interval History (Subjective):        Patient is seen and examined by me today and medical record reviewed.Overnight events noted and care discussed with nursing staff.  Patient is feeling better.  He continues to have pleuritic chest pain and some sore throat.  No fever or chills.  Unfortunately, abdominal ultrasound came back positive for liver  mass concerning for hepatocellular carcinoma.  This finding was discussed with patient through  and also with patient's daughter over the phone.            Physical Exam:        Last Vital Signs:  /66 (BP Location: Left arm)   Pulse 81   Temp 97.6  F (36.4  C) (Oral)   Resp 16   SpO2 93%     Wt Readings from Last 1 Encounters:   12/16/20 78 kg (172 lb)       Wt Readings from Last 5 Encounters:   12/16/20 78 kg (172 lb)   08/07/18 84.8 kg (187 lb)   08/06/18 86 kg (189 lb 9.5 oz)        Constitutional: Awake, alert, cooperative, no apparent distress     Respiratory: Clear to auscultation bilaterally, no crackles or wheezing   Cardiovascular: Regular rate and rhythm, normal S1 and S2, and no murmur noted   Abdomen: Normal bowel sounds, soft, non-distended, non-tender   Skin: No rashes, no cyanosis, dry to touch   Neuro: Alert with  no weakness, numbness, memory loss   Extremities: No edema, normal range of motion   Other(s):        All other systems: Negative          Medications:        All current medications were reviewed with changes reflected in problem list.         Data:      All new lab and imaging data was reviewed.      Data reviewed today: I reviewed all new labs and imaging results over the last 24 hours. I personally reviewed no images or EKG's today      Recent Labs   Lab 12/27/20  0605 12/26/20  0639 12/25/20  1537   WBC 3.8* 5.3 4.4   HGB 13.6 13.3 13.2*   HCT 40.2 38.8* 40.0   MCV 95 96 98   * 128* 124*     No results for input(s): CULT in the last 168 hours.  Recent Labs   Lab 12/27/20  0605 12/25/20  2110 12/25/20  1537   NA  --   --  136   POTASSIUM  --   --  4.7   CHLORIDE  --   --  105   CO2  --   --  29   ANIONGAP  --   --  2*   GLC  --   --  185*   BUN  --   --  10   CR  --   --  0.93   GFRESTIMATED  --   --  86   GFRESTBLACK  --   --  >90   REBCECA  --   --  7.9*   PROTTOTAL 7.6 7.2  --    ALBUMIN 2.2* 2.1*  --    BILITOTAL 0.8 0.9  --    ALKPHOS 120 118  --    *  140*  --    ALT 63 64  --        Recent Labs   Lab 12/25/20  1537   *       Recent Labs   Lab 12/27/20  0605 12/26/20  0639 12/25/20  2110   INR 1.12 1.12 1.14         Recent Labs   Lab 12/27/20  1201 12/26/20  1752 12/26/20  1418   TROPI 0.148* 0.193* 0.198*       Recent Results (from the past 48 hour(s))   XR Chest Port 1 View    Narrative    CHEST ONE VIEW  12/25/2020 4:32 PM     HISTORY: Chest pain.      COMPARISON: None.      Impression    IMPRESSION: Question some lateral right base infiltrate vs. overlying  soft tissue. Similar finding on the left.    DAWNA LOMAX MD   CT Chest Pulmonary Embolism w Contrast    Narrative    EXAM: CT CHEST PULMONARY EMBOLISM W CONTRAST  LOCATION: [facility]  DATE/TIME: 12/25/2020 5:52 PM    INDICATION: Chest pain and fevers.  COMPARISON: None.  TECHNIQUE: CT chest pulmonary angiogram during arterial phase injection of IV contrast. Multiplanar reformats and MIP reconstructions were performed. Dose reduction techniques were used.   CONTRAST: 60 mL Isovue-370    FINDINGS:  ANGIOGRAM CHEST: Pulmonary arteries are normal caliber and negative for pulmonary emboli. Thoracic aorta is negative for dissection. No CT evidence of right heart strain.    LUNGS AND PLEURA: Normal. Subpleural geographic regions of ground-glass infiltrates with interlobular thickening within the dependent portion of both lower lobes, as well as scattered smaller regions within the upper lobes, right middle lobe and lingula   compatible with COVID-19 pneumonia.    MEDIASTINUM/AXILLAE: Calcified nodes.    UPPER ABDOMEN: Cirrhosis with splenomegaly.    MUSCULOSKELETAL: Normal.      Impression    IMPRESSION:  1.  No pulmonary embolism.    2.  Regions of peripheral geographic ground-glass infiltrates with interlobular thickening in both lungs compatible with COVID-19 pneumonia.    3.  Cirrhosis with splenomegaly.       COVID Status:  COVID-19 PCR Results    COVID-19 PCR Results 9/26/20 9/26/20  12/25/20    1509 1509    COVID-19 Virus PCR to [facility] - Result Test received-See reflex to IDDL test SARS CoV2 (COVID-19) Virus RT-PCR     COVID-19 Virus PCR to [facility] - Source Nasopharyngeal     SARS-CoV-2 Virus Specimen Source  Nasopharyngeal    Flu A/B & SARS-COV-2 PCR Source   Nasopharyngeal   SARS-CoV-2 PCR Result  NEGATIVE POSITIVE (A)   (A) Abnormal value       Comments are available for some flowsheets but are not being displayed.         COVID-19 Antibody Results, Testing for Immunity    COVID-19 Antibody Results, Testing for Immunity   No data to display.              Disclaimer: This note consists of symbols derived from keyboarding, dictation and/or voice recognition software. As a result, there may be errors in the script that have gone undetected. Please consider this when interpreting information found in this chart.

## 2020-12-28 NOTE — PLAN OF CARE
PRIMARY DIAGNOSIS: PNEUMONIA  OUTPATIENT/OBSERVATION GOALS TO BE MET BEFORE DISCHARGE:  1. Dyspnea improved and O2 sats >88% on RA or back to baseline O2 levels: Yes   SpO2: 94 %, O2 Device: None (Room air)    2. Tolerating oral abx or appropriate plans made outpatient infusion: Yes    3. Vitals signs normal or return to baseline: Yes    4. Short term supplemental O2 needed with activity at home: No    5. Tolerate oral intake to maintain hydration: Yes    6. Return to near baseline physical activity: Yes    Discharge Planner Nurse   Safe discharge environment identified: Yes  Barriers to discharge: Yes - Pt still needs to be seen by heme/onc team.       Entered by: HERMINIO CAMILO 12/28/2020 3:08 AM     Please review provider order for any additional goals.   Nurse to notify provider when observation goals have been met and patient is ready for discharge.

## 2020-12-28 NOTE — CONSULTS
"NUTRITION ASSESSMENT    REASON FOR ASSESSMENT:  Positive malnutrition risk screen - uncertain of recent weight loss  No significant past medical history. Admitted for Acute COVID-19 infection with pneumonitis, liver cirrhosis, new liver mass  CURRENT DIET AND NOURISHMENT ORDER:  Information obtained from chart review (COVID19 isolation, Ipad )  Food allergies/intolerances: NKFA    Diet: Regular   Current Intake/Tolerance: Per flow sheet review, 100% intake for majority of documented meals. Ordering 1-2 meals per day    ANTHROPOMETRICS  Height: 5'5\"  Weight: 0 kg - unable to determine updated weight  Weight Status:  Unable to determine   Weight History:  Unable to evaluate with no recent weight documentation in EMR  Wt Readings from Last 10 Encounters:   12/16/20 78 kg (172 lb)   08/07/18 84.8 kg (187 lb)   08/06/18 86 kg (189 lb 9.5 oz)       ASSESSED NUTRITION NEEDS (PER APPROVED PRACTICE GUIDELINES, Dosing weight: 78 kg)  Estimated Energy Needs: 6967-8991 kcals (25-30 Kcal/Kg)  Justification: maintenance  Estimated Protein Needs:78-94 grams protein (1-1.2 g pro/Kg)  Justification: maintenance  Estimated Fluid Needs: per MD    LABS/MEDS/PHYSICAL FINDINGS:  Meds reviewed  Labs reviewed  Full nutrition focused physical exam deferred     Malnutrition:  Unable to determine if patient meets two of the following criteria necessary for diagnosing malnutrition: significant weight loss, reduced intake, subcutaneous fat loss, muscle loss or fluid retention     INTERVENTION:  Nutrition Diagnosis:  Increased nutrient needs (protein energy) related to active COVID19 infection as evidenced by goal of >1-1.2 g per kg    Implementation:  Nutrition Education: Per MD order  Medical food supplement: Boost plus with meals  Collaboration and Referral of care: Discussed patient during interdisciplinary care rounds this morning    Follow Up/Monitoring:   Progress towards goals will be monitored and evaluated per protocol and " Practice Guidelines        Anita Cunningham MS, RDN-AP, LD, CNSC  Pager - 3rd floor/ICU: [phone]  Pager - All other floors: [phone]  Pager - Weekend/holiday: [phone]  Office: [phone]

## 2020-12-28 NOTE — PLAN OF CARE
/74 (BP Location: Left arm)   Pulse 77   Temp 98.2  F (36.8  C) (Axillary)   Resp 18   SpO2 97%     A&O. Bengali speaking,  Ipad used for assessment and cares. Up ad namita. Pt c/o OWUSU, 1.5L O2 via NC placed on pt. Tele is SR. Pt denies pain. Hem/Onc consulted, see notes. MRI ordered for liver. Plan to follow up outpt with oncology once recovered from COVID. Plan to discharge home tomorrow.

## 2020-12-28 NOTE — PROGRESS NOTES
PRIMARY DIAGNOSIS: PNEUMONIA  OUTPATIENT/OBSERVATION GOALS TO BE MET BEFORE DISCHARGE:  1. Dyspnea improved and O2 sats >88% on RA or back to baseline O2 levels: Yes   SpO2: 94 %, O2 Device: None (Room air)    2. Tolerating oral abx or appropriate plans made outpatient infusion: Yes    3. Vitals signs normal or return to baseline: Yes    4. Short term supplemental O2 needed with activity at home: No    5. Tolerate oral intake to maintain hydration: Yes    6. Return to near baseline physical activity: Yes    Discharge Planner Nurse   Safe discharge environment identified: Yes  Barriers to discharge: Yes       Entered by: Leanna jolley 12/27/2020 10:51 PM     Please review provider order for any additional goals.   Nurse to notify provider when observation goals have been met and patient is ready for discharge.

## 2020-12-28 NOTE — PROGRESS NOTES
Clinic Care Coordination Contact  Ambulatory Care Coordination to Inpatient Care Management   Hand-In Communication    Date:  December 28, 2020  Name: Alexandra Alfaro is enrolled in Ambulatory Care Coordination program and I am the Lead Care Coordinator.  CC Contact Information: Epic [employer] + phone  Payor Source: [facility] / Plan: UCARE MNCARE / Product Type: HMO /   Current services in place:     Please see the CC Snaphot and Care Management Flowsheets for specific  details of this Alexandra Alfaro care plan.   Additional details/specific concerns r/t this admission:    High Utilization .    I will follow this admission in Epic. Please feel free to contact me with questions or for further collaboration in discharge planning.    Elmira Sethi RN Care Coordinator  [facility]  Email: [email or web address]  Phone: [phone]

## 2020-12-28 NOTE — CONSULTS
Hematology / Oncology Consultation     Date of Admission:  12/25/2020    Assessment & Plan   Alexandra Alfaro is a 65 year old male who was admitted on 12/25/2020. I was asked to see the patient for new findings of a liver mass.    Assessment:  Plan:  1. 12.5 x 13 x 12 cm heterogeneous right hepatic lobe mass.  Ultrasound liver otherwise normal.  Patient denies known history of hepatitis, denies any abdominal symptoms.   -Findings concerning for hepatocellular carcinoma; differential diagnosis would include cholangiocarcinoma, liver mets.  Explained to patient with the help of .   -We will get alpha-fetoprotein, CA 19.9, CEA levels.  We will also get an MRI of the liver.   -If disease localized to right lobe of liver will get a consult with hepatobiliary surgeon as an outpatient.     2.  COVID-19 pneumonia.   -Treatment as per hospitalist team.   -Patient is currently in isolation was seen using an iPad and .     Vinnie Navarrete MD   [facility]  Office:  [phone]    Code Status    Full Code    Reason for Consult   Reason for consult: New diagnosis of liver mass.     Primary Care Physician   Lindy Dodge    Chief Complaint   Fever, cough.     History of Present Illness   Alexandra Alfaro is a 65 year old male who presents with fever and cough.  Patient is a pleasant 65-year-old gentleman from [place].  He works at a restaurant.  He presented with symptoms of cough fever chills and chest pain for 1 week.  He reported chest pain is exertional, complaint of diffuse body aches and fever chills had little in terms of cough.   Work-up in the ER showed he was positive for COVID-19 infection.  CT scan of chest did not show any pulmonary embolism there were regions of peripheral geographic groundglass infiltrates compatible with COVID-19 pneumonia, incidentally cirrhosis and splenomegaly was noted.  A liver ultrasound was done to evaluate for cirrhosis was remarkable for a 12.5 x 13 x 12 cm  heterogeneous right hepatic lobe mass.  There was enlarged spleen but no other abnormalities.   Patient denies any previous history of liver disease any history of hepatitis, he denies any abdominal symptoms.     Past Medical History   I have reviewed this patient's medical history and updated it with pertinent information if needed.   No past medical history on file.    Past Surgical History   I have reviewed this patient's surgical history and updated it with pertinent information if needed.  Past Surgical History:   Procedure Laterality Date     DAVINCI HERNIORRHAPHY INGUINAL Right 8/7/2018    Procedure: DAVINCI HERNIORRHAPHY INGUINAL;  Robotic Assisted Right Inguinal Hernia Repair with Mesh ;  Surgeon: Prabhu Allred MD;  Location:  OR       Prior to Admission Medications   Prior to Admission Medications   Prescriptions Last Dose Informant Patient Reported? Taking?   Omega-3 Fatty Acids (FISH OIL) 1200 MG capsule 2 days ago  Yes Yes   Sig: Take 1,200 mg by mouth daily   acetaminophen (TYLENOL) 325 MG tablet prn  No Yes   Sig: Take 1-2 tablets (325-650 mg) by mouth every 6 hours as needed for pain   diphenhydrAMINE (BENADRYL) 25 MG tablet prn  No Yes   Sig: Take 1 tablet (25 mg) by mouth nightly as needed for allergies or sleep   ibuprofen (ADVIL/MOTRIN) 200 MG tablet prn  No Yes   Sig: Take 2 tablets (400 mg) by mouth every 8 hours as needed for mild pain, fever or pain   multivitamin (CENTRUM SILVER) tablet 2 days ago  Yes Yes   Sig: Take 1 tablet by mouth daily   sertraline (ZOLOFT) 50 MG tablet Unknown at Unknown time  No No   Sig: Take 1 tablet (50 mg) by mouth daily      Facility-Administered Medications: None     Allergies   No Known Allergies    Social History   I have reviewed this patient's social history and updated it with pertinent information if needed. Alexandra Alfaro  reports that he quit smoking about 2 years ago. He has never used smokeless tobacco. He reports that he does not drink alcohol  or use drugs.    Family History   I have reviewed this patient's family history and updated it with pertinent information if needed.   Family History   Problem Relation Age of Onset     Thyroid Disease Brother        Review of Systems   Review of systems negative except for as documented in HPI.     Physical Exam   Temp: 98.2  F (36.8  C) Temp src: Axillary BP: 107/74 Pulse: 77   Resp: 18 SpO2: 97 % O2 Device: Nasal cannula Oxygen Delivery: 1.5 LPM  Vital Signs with Ranges  Temp:  [97.5  F (36.4  C)-98.2  F (36.8  C)] 98.2  F (36.8  C)  Pulse:  [65-83] 77  Resp:  [16-20] 18  BP: (103-120)/(65-75) 107/74  SpO2:  [93 %-97 %] 97 %  0 lbs 0 oz    Constitutional: Awake, alert, cooperative, no apparent distress.  Patient was seen through an iPad as he was in isolation due to COVID-19 infection no other examination was completed  Psychiatric: Alert, oriented to person, place and time, no obvious anxiety or depression.    Data     Laboratory studies CT scan of chest and ultrasound of abdomen was reviewed.

## 2020-12-28 NOTE — PLAN OF CARE
PRIMARY DIAGNOSIS: PNEUMONIA  OUTPATIENT/OBSERVATION GOALS TO BE MET BEFORE DISCHARGE:  Dyspnea improved and O2 sats >88% on RA or back to baseline O2 levels: Yes   SpO2: 95 %, O2 Device: None (Room air)    Tolerating oral abx or appropriate plans made outpatient infusion: Yes    Vitals signs normal or return to baseline: Yes    Short term supplemental O2 needed with activity at home: No    Tolerate oral intake to maintain hydration: Yes    Return to near baseline physical activity: Yes    Discharge Planner Nurse   Safe discharge environment identified: Yes  Barriers to discharge: Yes    Shift summary: VSS. Tele SR. Pt is oriented and able to make needs known. Spanish speaking - iPad  used. Pt denies pain overnight. Pt did report dyspnea early this AM. O2 sats were 92-94%. Placed pt on 2L O2 for comfort and dyspnea resolved. Pt up ad namita in room w/ steady gait. Special precautions maintained for COVID-19. Plan for heme/onc consult within 24hrs dt concern for liver carcinoma.          Entered by: HERMINIO CAMILO 12/28/2020 7:36 AM     Please review provider order for any additional goals.   Nurse to notify provider when observation goals have been met and patient is ready for discharge.

## 2020-12-28 NOTE — PROGRESS NOTES
[facility]    Medicine Progress Note - Hospitalist Service       Date of Admission:  12/25/2020  Length of stay: 1 days    Assessment & Plan   Alexandra Alfaro is a 65-year-old male who is admitted to the hospitalist service for pneumonia secondary to COVID-19.    COVID-19 pneumonia  - Admitted on 12/25 with approximately 1 week of symptoms.  Chest CT showed peripheral GGO but no PE.  - Do not see that he was initially hypoxic but was started on Decadron anyway.  Did not get remdesivir.  - 12/28, patient has developed an oxygen requirement, 1 to 2 L.  - Continue Decadron.    New diagnosis of cirrhosis  Liver mass  - His liver appeared to be cirrhotic on his chest CT.  This was a new finding.  He underwent an ultrasound of the abdomen on 12/26 which found a 12 x 13 x 12 cm mass in the right lobe of the liver, highly concerning for hepatocellular carcinoma.  - Oncology has been consulted and recommend MRI of the liver with outpatient follow up thereafter.  - HBV--appears to be immune  - HCV positive. Check viral load.     Continue Zoloft for depression or anxiety.     Diet: Regular  DVT Prophylaxis: Enoxaparin (Lovenox) SQ  Malnutrition: No  Zimmerman Catheter: No  Code Status: Full Code     Disposition Plan   Expected discharge:   1-2 more days.     Rashard Lynn MD  Hospitalist Service  [facility]  ______________________________________________________________________    Interval History   History obtained with Vietnamese  over the iPad.    Patient was placed on some supplemental oxygen this morning.  He says he overall feels little bit better.  He has some mild abdominal pain.    Data reviewed today: I reviewed all medications, new labs and imaging results over the last 24 hours.     Physical Exam   /65 (BP Location: Left arm)   Pulse 75   Temp 97.6  F (36.4  C) (Oral)   Resp 18   SpO2 95%   0 lbs 0 oz       General: Lying flat in the bed, no acute distress.    HEENT:  No scleral icterus. Oropharynx moist.     Neck: Supple. Normal range of motion.     Pulmonary: Normal work of breathing. Clear to auscultation bilaterally.    Cardiovascular: Regular rate and rhythm without murmur or extra heart sounds.    Abdomen: Soft with mild epigastric tenderness to palpation.    Extremities: No peripheral edema. No clubbing or cyanosis.     Neurologic: Awake, alert, appropriate.    Skin: Warm and dry.    Psychiatric: Normal affect and mood.     Data    Recent Labs   Lab 12/28/20  0617 12/27/20  1201 12/27/20  0605 12/26/20  1752 12/26/20  1418 12/26/20  0639 12/25/20  1537 12/25/20  1537   WBC 9.3  --  3.8*  --   --  5.3  --  4.4   HGB 13.4  --  13.6  --   --  13.3  --  13.2*   MCV 95  --  95  --   --  96  --  98     --  145*  --   --  128*  --  124*   INR 1.07  --  1.12  --   --  1.12   < >  --      --   --   --   --   --   --  136   POTASSIUM 4.8  --   --   --   --   --   --  4.7   CHLORIDE 107  --   --   --   --   --   --  105   CO2 29  --   --   --   --   --   --  29   BUN 14  --   --   --   --   --   --  10   CR 0.76  --   --   --   --   --   --  0.93   ANIONGAP <1*  --   --   --   --   --   --  2*   REEBCCA 8.0*  --   --   --   --   --   --  7.9*   *  --   --   --   --   --   --  185*   ALBUMIN 2.1*  --  2.2*  --   --   --    < >  --    PROTTOTAL 7.4  --  7.6  --   --   --    < >  --    BILITOTAL 0.8  --  0.8  --   --   --    < >  --    ALKPHOS 123  --  120  --   --   --    < >  --    ALT 75*  --  63  --   --   --    < >  --    *  --  125*  --   --   --    < >  --    TROPI  --  0.148*  --  0.193* 0.198*  --   --  0.198*    < > = values in this interval not displayed.     No results found for this or any previous visit (from the past 24 hour(s)).    Medications      Current Facility-Administered Medications   Medication Dose Route Frequency     dexamethasone  6 mg Oral Daily     enoxaparin ANTICOAGULANT  40 mg Subcutaneous BID     senna-docusate  2 tablet Oral BID      sertraline  50 mg Oral Daily     sodium chloride (PF)  3 mL Intracatheter Q8H

## 2020-12-29 NOTE — PLAN OF CARE
PRIMARY DIAGNOSIS: COVID 19  OUTPATIENT/OBSERVATION GOALS TO BE MET BEFORE DISCHARGE:  1. Dyspnea improved and O2 sats >88% on RA or back to baseline O2 levels: Yes   SpO2: 95 %, O2 Device: None (Room air)    2. Tolerating oral abx or appropriate plans made outpatient infusion: No abx at this time     3. Vitals signs normal or return to baseline: Yes    4. Short term supplemental O2 needed with activity at home: No    5. Tolerate oral intake to maintain hydration: Yes    6. Return to near baseline physical activity: Yes    Discharge Planner Nurse   Safe discharge environment identified: Yes  Barriers to discharge: No       Entered by: Sonya Gay 12/28/2020 9:34 PM     Please review provider order for any additional goals.   Nurse to notify provider when observation goals have been met and patient is ready for discharge.      Admission: Pt admitted on 12/25 for evaluation of headaches, shaking, dizzy when standing, fevers for a week. COVID positive   History: No medical history   Labs/Protocols: Na-136, K+-4.8, Trop-Peaked at 0.198 today 0.148  Vitals: Temp: 97.5  F (36.4  C) Temp src: Oral BP: 121/74 Pulse: 70   Resp: 18 SpO2: 95 %  Oxygen Delivery: 1.5 LPM nasal cannula   Pain: Denies pain at this time   Tele: SR/1st degree AV block   Cardiac: Regular rate and rhythm   Respiratory: Lungs are rbsxyeuosd-MDK-Tkbsfokfz decadron-Encourage IS use    Neuro: A&Ox4-Setswana speaking- IPad used  GI/: Continent of B/B-Liver mass found on CT-Hem/Onc consulted possible hepatocellular carcinoma-MRI done this shift pending results   Skin: Intact   LDA: PIV-SL  Diet: Reg   Activity: Ind   Pt educated on current POC: Monitor respiratory status,   Discharge Plan: Home possibly tomorrow

## 2020-12-29 NOTE — PROVIDER NOTIFICATION
"Pt c/o 6/10 pressure-like chest pain, worse with taking deep breath. Do you want to order 12 lead and trop? Thx.    Addendum: 12 lead ordered. MD notified of results. \"SR w/ 1st degree AV block. Otherwise normal ECG.\"    Discussed with MD. MD suspects pleuritic pain. Will cont to monitor. Trop added to AM labs.  "

## 2020-12-29 NOTE — PLAN OF CARE
PRIMARY DIAGNOSIS: COVID 19  OUTPATIENT/OBSERVATION GOALS TO BE MET BEFORE DISCHARGE:  1. Dyspnea improved and O2 sats >88% on RA or back to baseline O2 levels: Yes   SpO2: 95 %, O2 Device: None (Room air)    2. Tolerating oral abx or appropriate plans made outpatient infusion: No abx at this time     3. Vitals signs normal or return to baseline: Yes    4. Short term supplemental O2 needed with activity at home: No    5. Tolerate oral intake to maintain hydration: Yes    6. Return to near baseline physical activity: Yes    Discharge Planner Nurse   Safe discharge environment identified: Yes  Barriers to discharge: No       Entered by: Sonya Gay 12/28/2020 9:36 PM     Please review provider order for any additional goals.   Nurse to notify provider when observation goals have been met and patient is ready for discharge.

## 2020-12-29 NOTE — PLAN OF CARE
VSS. Pt tolerated room air this shift with sats >92%. No pain reported. Breathing improved pt per. AVS reviewed with pt, , and daughter via Ipad/phone. All questions answered. Pt and daughter deny any further questions or concerns. PIV removed, no complications. Telemetry monitor removed. All belongings returned. Pt escorted to front door by [place] staff.

## 2020-12-29 NOTE — DISCHARGE SUMMARY
"[facility]  Hospitalist Discharge Summary       Date of Admission:  12/25/2020  Date of Discharge:  12/29/2020  Discharging Provider: Rashard Lynn MD      Discharge Diagnoses   COVID-19 pneumonia  Liver cirrhosis  Right hepatic lobe mass consistent with HCC  Hepatitis C  Elevated troponin secondary to type II MI    Follow-ups Needed After Discharge   Follow-up Appointments     Follow-up and recommended labs and tests       Follow up with primary care provider, Lindy Dodge, within 7 days   for routine hospital follow up.  Follow up with oncology. They should contact you, but if they do not,   their number is [phone].             Unresulted Labs Ordered in the Past 30 Days of this Admission       Date and Time Order Name Status Description    12/29/2020 0000 Hepatits C antibody In process     12/29/2020 0000 Hepatitis B surface antigen In process     12/29/2020 0000 Cancer antigen 19-9 In process     12/28/2020 0617 Hepatitis C RNA quantitative In process     12/25/2020 2044 Hepatitis B core antibody In process         These results will be followed up by oncology    Discharge Disposition   Discharged to home  Condition at discharge: Stable    History of Present Illness   Per admission H&P:  \"Alexandra Alfaro is a 65 year old male who presents with symptoms of cough fevers chills and chest pain for the past week.  Patient denies any sick contacts works at [employer].  Chest pain is exertional, been occurring since he has been having the above symptoms.  5 out of 10 dull aching improving with rest nonradiating no nausea vomiting or diaphoresis associated with it.  His symptoms have been going on for a month per ED he stated a week.  He has diffuse body aches fevers chills sore throat no coughing.  Otherwise no other complaints.  He has never seen a physician he recently migrated to [place] from [place] about 3 years ago.\"    Hospital Course   COVID-19 pneumonia  - " Admitted on 12/25 with approximately 1 week of symptoms.  Chest CT showed peripheral GGO but no PE.  - Do not see that he was initially hypoxic but was started on Decadron anyway.  Did not get remdesivir.  - 12/28, patient has developed an oxygen requirement, 1 to 2 L.  - Weaned to room air on 12/29. Continue decadron to complete the course on discharge.  - Discharge on Apixaban to prevent VTE as he is at increased risk due to his malignancy.  Would be fine to stop the apixaban if any procedure is needed.     New diagnosis of cirrhosis  Liver mass  - His liver appeared to be cirrhotic on his chest CT.  This was a new finding.  He underwent an ultrasound of the abdomen on 12/26 which found a 12 x 13 x 12 cm mass in the right lobe of the liver, highly concerning for hepatocellular carcinoma.  - Oncology has been consulted and recommend MRI of the liver with outpatient follow up thereafter.  Liver MRI shows a large mass replacing much of the right hepatic lobe consistent with HCC.  - HBV--appears to be immune  - HCV Ab positive. Check viral load, pending.   - Will need outpatient follow-up with oncology and with possibly hepatology and hepatobiliary surgeon as well.    Elevated troponin  His troponin level was mildly elevated at 0.198, trended down to 0.148.  He did not have chest pain concerning for acute coronary syndrome.  Echocardiogram showed no wall motion abnormalities.  Probably type II process related to stress from COVID-19 infection.    Discussed all of the above at length with patient at the bedside using an Yi  over the iPad.     Consultations This Hospital Stay   HEMATOLOGY & ONCOLOGY IP CONSULT      Code Status   Full Code    Time Spent on this Encounter   I, Rashard Lynn MD, personally saw the patient today and spent greater than 30 minutes discharging this patient.       Rashard Lynn MD  [facility]  Hospital  ______________________________________________________________________    Physical Exam   Vital Signs: Temp: 97.6  F (36.4  C) Temp src: Oral BP: 118/74 Pulse: 75   Resp: 20 SpO2: 95 % O2 Device: None (Room air) Oxygen Delivery: 2 LPM  Weight: 0 lbs 0 oz    General: Sitting up in bed, appears comfortable.  HEENT: No scleral icterus. Oropharynx moist.   Neck: Supple. Normal range of motion.  Pulmonary: Normal work of breathing. Clear to auscultation bilaterally.  Cardiovascular: Regular rate and rhythm without murmur or extra heart sounds.  Abdomen: Soft and non-tender.  Extremities: No peripheral edema. No clubbing.  Neurologic: Awake, alert, appropriate.  Skin: Warm and dry.  Psychiatric: Normal affect and mood.       Primary Care Physician   Lindy Dodge    Discharge Orders      COVID-19 [facility] Loop Referral      Reason for your hospital stay    You were admitted initially because of COVID-19 infection. You had mild symptoms from that and only briefly needed oxygen. We will treat you with steroids for the COVID-19. We will also send you home on a blood thinner to prevent a blood clot. We did find that you had cirrhosis of the liver as well as a mass on the liver, which is very concerning for cancer. For that reason, you were seen by the oncologist here.     Follow-up and recommended labs and tests     Follow up with primary care provider, Lindy Dodge, within 7 days for routine hospital follow up.  Follow up with oncology. They should contact you, but if they do not, their number is [phone].     Contact provider    Contact your primary care provider if If increased trouble breathing, new arm/leg swelling, dizziness/passing out, falls, bleeding that doesn't stop, or uncontrolled pain.     Discharge - Quarantine/Isolation Instruction    Stay home and away from others (self-isolate) for at least 20 days from when your symptoms started And...   You've had no fevers and no medicine that  reduces fever for 3 full days (72 hours) And...   Your other symptoms have resolved (gotten better).     Activity    Your activity upon discharge: As tolerated     Diet    Follow this diet upon discharge: Regular       Significant Results and Procedures   Most Recent 3 CBC's:  Recent Labs   Lab Test 12/29/20  0635 12/28/20  0617 12/27/20  0605   WBC 9.1 9.3 3.8*   HGB 13.0* 13.4 13.6   MCV 95 95 95    182 145*     Most Recent 3 BMP's:  Recent Labs   Lab Test 12/28/20  0617 12/25/20  1537 12/14/20  1643    136 135   POTASSIUM 4.8 4.7 4.2   CHLORIDE 107 105 103   CO2 29 29 29   BUN 14 10 18   CR 0.76 0.93 0.92   ANIONGAP <1* 2* 3   REBECCA 8.0* 7.9* 8.7   * 185* 88     Most Recent 2 LFT's:  Recent Labs   Lab Test 12/28/20  0617 12/27/20  0605   * 125*   ALT 75* 63   ALKPHOS 123 120   BILITOTAL 0.8 0.8     Most Recent 3 INR's:  Recent Labs   Lab Test 12/29/20  0635 12/28/20  0617 12/27/20  0605   INR 1.07 1.07 1.12   ,   Results for orders placed or performed during the hospital encounter of 12/25/20   XR Chest Port 1 View    Narrative    CHEST ONE VIEW  12/25/2020 4:32 PM     HISTORY: Chest pain.      COMPARISON: None.      Impression    IMPRESSION: Question some lateral right base infiltrate vs. overlying  soft tissue. Similar finding on the left.    DAWNA LOMAX MD   CT Chest Pulmonary Embolism w Contrast    Narrative    EXAM: CT CHEST PULMONARY EMBOLISM W CONTRAST  LOCATION: [facility]  DATE/TIME: 12/25/2020 5:52 PM    INDICATION: Chest pain and fevers.  COMPARISON: None.  TECHNIQUE: CT chest pulmonary angiogram during arterial phase injection of IV contrast. Multiplanar reformats and MIP reconstructions were performed. Dose reduction techniques were used.   CONTRAST: 60 mL Isovue-370    FINDINGS:  ANGIOGRAM CHEST: Pulmonary arteries are normal caliber and negative for pulmonary emboli. Thoracic aorta is negative for dissection. No CT evidence of right heart strain.    LUNGS  AND PLEURA: Normal. Subpleural geographic regions of ground-glass infiltrates with interlobular thickening within the dependent portion of both lower lobes, as well as scattered smaller regions within the upper lobes, right middle lobe and lingula   compatible with COVID-19 pneumonia.    MEDIASTINUM/AXILLAE: Calcified nodes.    UPPER ABDOMEN: Cirrhosis with splenomegaly.    MUSCULOSKELETAL: Normal.      Impression    IMPRESSION:  1.  No pulmonary embolism.    2.  Regions of peripheral geographic ground-glass infiltrates with interlobular thickening in both lungs compatible with COVID-19 pneumonia.    3.  Cirrhosis with splenomegaly.   US Abdomen Complete    Narrative    EXAM: US ABDOMEN COMPLETE  LOCATION: [facility]  DATE/TIME: 12/26/2020 7:07 PM    INDICATION: Liver disease.  COMPARISON: None.  TECHNIQUE: Complete abdominal ultrasound.    FINDINGS:    GALLBLADDER: Gallbladder obscured by hepatic mass.    BILE DUCTS: Common bile duct obscured by hepatic mass.    LIVER: 12.5 x 13 x 12 cm heterogeneous right hepatic lobe mass. No focal mass.    RIGHT KIDNEY: Normal size. Normal echogenicity with no hydronephrosis or mass.     LEFT KIDNEY: Normal size. Normal echogenicity with no hydronephrosis or mass.     SPLEEN: Enlarged spleen.    PANCREAS: The visualized portions are normal.    AORTA: Normal in caliber.     IVC: Normal where visualized.    No ascites.      Impression    IMPRESSION:  1.  12.5 x 13 x 12 cm hepatic mass centered in the right lobe of the liver in a patient with known cirrhosis, finding is highly concerning for hepatocellular carcinoma. Recommend nonemergent evaluation with a multiphase contrast-enhanced CT or MRI.   MR Liver wo & w Contrast    Narrative    EXAM: MR LIVER WO and W CONTRAST  LOCATION: [facility]  DATE/TIME: 12/28/2020 9:45 PM    INDICATION: Liver lesion, >1cm, liver disease with risk of hcc  COMPARISON: CTA chest 12/25/2020. Abdominal ultrasound  2020.  TECHNIQUE: Routine MRI liver protocol including T1 in/out phase, diffusion, multiplane T2, and dynamic T1 with IV contrast.    CONTRAST: 7.5 mL Gadavist    FINDINGS:     Cirrhotic liver. Solitary 12.7 x 12.6 x 10.5 cm mass occupying much of the right hepatic lobe consistent with neoplasm correlates to the finding of interest on the prior ultrasound. The mass is heterogeneously hyperintense on T2-weighted images and   slightly hypointense on T1-weighted images. There is heterogeneous enhancement within the mass on the arterial and portal venous phases with some areas of washout on more delayed phases. There is some persistent peripheral pseudocapsular enhancement on   the delayed phase. The mass exerts mass effect upon the right kidney which is posteriorly displaced. Portal vein remains patent. There is no evidence for steffi invasion of the right kidney or other extrahepatic structures. There is no significant biliary   dilatation. Gallbladder is not reliably visualized.     There are tiny probable cysts of both kidneys. Spleen is at the upper limits of normal size. Pancreas and adrenal glands are unremarkable. Dependent atelectasis of the lung bases incompletely imaged. No ascites.      Impression    IMPRESSION:  1.  Large mass replacing much of the right hepatic lobe consistent with hepatocellular carcinoma.  2.  Hepatic cirrhosis.   Echocardiogram Complete    Narrative    [id number]  [id number]  [id number]  [id number]^GUS^GLORIA^[facility]  Echocardiography Laboratory  [address] [place] [address]        Name: RACHEL JIMÉNEZ  MRN: [id number]  : 1955  Study Date: 2020 11:55 AM  Age: 65 yrs  Gender: Male  Patient Location: [facility]  Reason For Study: Chest Pain  Ordering Physician: GLORIA WEBER  Performed By: Vilma Locke     BSA: 1.9 m2  Height: 65 in  Weight: 172 lb  HR: 95  BP: 116/75  mmHg  _____________________________________________________________________________  __        Procedure  Complete Portable Echo Adult.  _____________________________________________________________________________  __        Interpretation Summary     The left ventricle is normal in size.  The visual ejection fraction is estimated at 60-65%.  Grade I or early diastolic dysfunction.  No regional wall motion abnormalities noted.  Normal cardiac valves.  _____________________________________________________________________________  __        Left Ventricle  The left ventricle is normal in size. There is normal left ventricular wall  thickness. The visual ejection fraction is estimated at 60-65%. Grade I or  early diastolic dysfunction. No regional wall motion abnormalities noted.     Right Ventricle  The right ventricle is normal in size and function.     Atria  Normal left atrial size. Right atrial size is normal. There is no color  Doppler evidence of an atrial shunt.     Mitral Valve  The mitral valve leaflets are mildly thickened. There is no mitral  regurgitation noted.        Tricuspid Valve  There is trace tricuspid regurgitation.     Aortic Valve  There is trivial trileaflet aortic sclerosis. No aortic regurgitation is  present.     Pulmonic Valve  There is trace pulmonic valvular regurgitation.     Vessels  Normal size aorta. The aortic root is normal size.     Pericardium  There is no pericardial effusion.        Rhythm  Sinus rhythm was noted.  _____________________________________________________________________________  __  MMode/2D Measurements & Calculations     IVSd: 1.2 cm  LVIDd: 4.2 cm  LVIDs: 2.4 cm  LVPWd: 1.00 cm  FS: 43.0 %  LV mass(C)d: 153.5 grams  LV mass(C)dI: 82.7 grams/m2  Ao root diam: 3.4 cm  LA dimension: 3.7 cm  LA/Ao: 1.1  LVOT diam: 2.1 cm  LVOT area: 3.4 cm2  LA Volume (BP): 36.9 ml  LA Volume Index (BP): 19.8 ml/m2  RWT: 0.48           Doppler Measurements & Calculations  MV E max  emi: 43.2 cm/sec  MV A max emi: 52.1 cm/sec  MV E/A: 0.83  MV dec time: 0.19 sec  LV V1 max P.4 mmHg  LV V1 max: 78.2 cm/sec  LV V1 VTI: 15.5 cm  SV(LVOT): 53.0 ml  SI(LVOT): 28.5 ml/m2  PA acc time: 0.11 sec  E/E' av.3  Lateral E/e': 5.6  Medial E/e': 5.1           _____________________________________________________________________________  __           Report approved by: Juliette Claire 2020 01:48 PM            Discharge Medications   Current Discharge Medication List        START taking these medications    Details   apixaban ANTICOAGULANT (ELIQUIS) 2.5 MG tablet Take 1 tablet (2.5 mg) by mouth 2 times daily  Qty: 60 tablet, Refills: 0    Associated Diagnoses: 2019 novel coronavirus disease (COVID-19)      dexamethasone (DECADRON) 6 MG tablet Take 1 tablet (6 mg) by mouth daily  Qty: 6 tablet, Refills: 0    Associated Diagnoses: 2019 novel coronavirus disease (COVID-19)           CONTINUE these medications which have NOT CHANGED    Details   acetaminophen (TYLENOL) 325 MG tablet Take 1-2 tablets (325-650 mg) by mouth every 6 hours as needed for pain  Qty: 30 tablet, Refills: 0      diphenhydrAMINE (BENADRYL) 25 MG tablet Take 1 tablet (25 mg) by mouth nightly as needed for allergies or sleep  Qty: 30 tablet, Refills: 0      multivitamin (CENTRUM SILVER) tablet Take 1 tablet by mouth daily      Omega-3 Fatty Acids (FISH OIL) 1200 MG capsule Take 1,200 mg by mouth daily      sertraline (ZOLOFT) 50 MG tablet Take 1 tablet (50 mg) by mouth daily  Qty: 90 tablet, Refills: 0    Associated Diagnoses: Mild major depression (H)           STOP taking these medications       ibuprofen (ADVIL/MOTRIN) 200 MG tablet Comments:   Reason for Stopping:             Allergies   No Known Allergies

## 2020-12-29 NOTE — PROGRESS NOTES
Clinic Care Coordination Contact    Follow Up Progress Note      Assessment: Spoke with patient's daughter Wolf, patient gave verbal consent, Hungarian  via the language line.    There was confusion over patient's after visit summary and medication changes, as instructions were all in english and no one in the family is fluent in english. Reviewed in great detail AVS, hospital diagnosses and medication list. Reviewed medication purposes and side effects. Nada verbalized understanding. Family sets up all medications for patient to take while they are work during the day.     RN CC assisted connecting [place] and  to scheduling for follow up appointment with PCP. Patient is now scheduled for follow up with PCP on 1/8/2021.     Goals addressed this encounter:   Goals Addressed                 This Visit's Progress      1. Pain Management (pt-stated)   10%     Goal Statement: I will verbalized a decrease in my pain level to a 3/10.  Date Goal set: 12/15/2020  Barriers: Language barrier  Strengths: Patient is motivated to better manage his health and pain.   Date to Achieve By: 6/15/2021  Patient expressed understanding of goal: Yes.   Action steps to achieve this goal:  1. I will meet with PCP 12/16/20 to address my chronic pain, and follow their recommendations.   2. I will meet with PT 12/16/20 to address my chronic pain, and follow their recommendations.   3. I will continue to work with care coordination for any additional concerns               Intervention/Education provided during outreach: Chronic disease management, medication changes/purposes, AVS.     Outreach Frequency: 2 weeks    Plan: Patient will follow up with PCP 1/8/2021 and Oncology when they call the patient to schedule. RN CC will follow up with patient in 2 weeks.     Elmira Sethi RN Care Coordinator  [facility]  Email: [email or web address]  Phone: [phone]

## 2020-12-29 NOTE — PLAN OF CARE
PRIMARY DIAGNOSIS: PNEUMONIA  OUTPATIENT/OBSERVATION GOALS TO BE MET BEFORE DISCHARGE:  1. Dyspnea improved and O2 sats >88% on RA or back to baseline O2 levels: No   SpO2: 96 %, O2 Device: Nasal cannula    2. Tolerating oral abx or appropriate plans made outpatient infusion: Yes    3. Vitals signs normal or return to baseline: Yes    4. Short term supplemental O2 needed with activity at home: Yes    5. Tolerate oral intake to maintain hydration: Yes    6. Return to near baseline physical activity: Yes    Discharge Planner Nurse   Safe discharge environment identified: Yes  Barriers to discharge: Yes    Shift summary: VSS. Up ad namita in room. Sats WNL w/ 1.5L O2. O2 remains in place for dyspnea/comfort. OWUSU. Lung sounds diminished. Pt c/o 6/10 pressure-like chest pain overnight, worse when taking a deep breath. MD notified (see provider notification note for details). 12 lead ECG ordered w/o abnormality. Troponin added to AM labs. CP did resolve spontaneously per pt.  utilized via iPad for communication. Awaiting MRI results. Possible discharge home today.         Entered by: HERMINIO CAMILO 12/29/2020 4:22 AM     Please review provider order for any additional goals.   Nurse to notify provider when observation goals have been met and patient is ready for discharge.

## 2020-12-29 NOTE — PROGRESS NOTES
Hematology / Oncology chart check.      Date of Admission:  12/25/2020        Assessment & Plan     Alexandra Alfaro is a 65 year old male who was admitted on 12/25/2020. I was asked to see the patient for new findings of a liver mass.     Assessment:  Plan:  1. 12.5 x 13 x 12 cm heterogeneous right hepatic lobe mass.  Ultrasound liver otherwise normal.  Patient denies known history of hepatitis, denies any abdominal symptoms.   -Findings concerning for hepatocellular carcinoma; differential diagnosis would include cholangiocarcinoma, liver mets.  - MRI of liver showed large mass replacing much of right hepatic lobe consistent with hepatocellular carcinoma.  There is no evidence of invasion of extrahepatic structures.    -We will get alpha-fetoprotein, CA 19.9, CEA levels.   Hepatitis B and C serologies pending.    -No need for biopsy as inpatient, will get a consult with hepatobiliary surgeon as an outpatient to plan biopsy and liver directed therapy.      2.  COVID-19 pneumonia.   -Treatment as per hospitalist team.   -Patient is currently in isolation.      Vinnie Navarrete MD   [facility]  Office:  [phone]

## 2020-12-29 NOTE — PLAN OF CARE
PRIMARY DIAGNOSIS: PNEUMONIA  OUTPATIENT/OBSERVATION GOALS TO BE MET BEFORE DISCHARGE:  1. Dyspnea improved and O2 sats >88% on RA or back to baseline O2 levels: No  SpO2: 96 %, O2 Device: Nasal cannula    2. Tolerating oral abx or appropriate plans made outpatient infusion: Yes    3. Vitals signs normal or return to baseline: Yes    4. Short term supplemental O2 needed with activity at home: No    5. Tolerate oral intake to maintain hydration: Yes    6. Return to near baseline physical activity: Yes    Discharge Planner Nurse   Safe discharge environment identified: Yes  Barriers to discharge: Yes         Entered by: HERMINIO CAMILO 12/29/2020 4:20 AM     Please review provider order for any additional goals.   Nurse to notify provider when observation goals have been met and patient is ready for discharge.

## 2021-01-01 ENCOUNTER — APPOINTMENT (OUTPATIENT)
Dept: GENERAL RADIOLOGY | Facility: CLINIC | Age: 66
End: 2021-01-01
Attending: EMERGENCY MEDICINE
Payer: COMMERCIAL

## 2021-01-01 ENCOUNTER — PATIENT OUTREACH (OUTPATIENT)
Dept: CARE COORDINATION | Facility: CLINIC | Age: 66
End: 2021-01-01

## 2021-01-01 ENCOUNTER — APPOINTMENT (OUTPATIENT)
Dept: MRI IMAGING | Facility: CLINIC | Age: 66
End: 2021-01-01
Attending: INTERNAL MEDICINE
Payer: COMMERCIAL

## 2021-01-01 ENCOUNTER — MEDICAL CORRESPONDENCE (OUTPATIENT)
Dept: HEALTH INFORMATION MANAGEMENT | Facility: CLINIC | Age: 66
End: 2021-01-01

## 2021-01-01 ENCOUNTER — APPOINTMENT (OUTPATIENT)
Dept: CT IMAGING | Facility: CLINIC | Age: 66
End: 2021-01-01
Attending: EMERGENCY MEDICINE
Payer: COMMERCIAL

## 2021-01-01 ENCOUNTER — HOSPITAL ENCOUNTER (EMERGENCY)
Facility: CLINIC | Age: 66
Discharge: HOME | End: 2021-04-07
Attending: EMERGENCY MEDICINE | Admitting: EMERGENCY MEDICINE
Payer: COMMERCIAL

## 2021-01-01 ENCOUNTER — TRANSFERRED RECORDS (OUTPATIENT)
Dept: HEALTH INFORMATION MANAGEMENT | Facility: CLINIC | Age: 66
End: 2021-01-01

## 2021-01-01 ENCOUNTER — HOSPITAL ENCOUNTER (OUTPATIENT)
Dept: ULTRASOUND IMAGING | Facility: CLINIC | Age: 66
Discharge: HOME | End: 2021-08-09
Attending: INTERNAL MEDICINE | Admitting: INTERNAL MEDICINE
Payer: COMMERCIAL

## 2021-01-01 ENCOUNTER — HOSPITAL ENCOUNTER (INPATIENT)
Facility: CLINIC | Age: 66
LOS: 2 days | End: 2021-08-14
Attending: EMERGENCY MEDICINE | Admitting: HOSPITALIST
Payer: COMMERCIAL

## 2021-01-01 ENCOUNTER — TELEPHONE (OUTPATIENT)
Dept: INTERNAL MEDICINE | Facility: CLINIC | Age: 66
End: 2021-01-01

## 2021-01-01 ENCOUNTER — TELEPHONE (OUTPATIENT)
Dept: TRANSPLANT | Facility: CLINIC | Age: 66
End: 2021-01-01

## 2021-01-01 ENCOUNTER — PATIENT OUTREACH (OUTPATIENT)
Dept: NURSING | Facility: CLINIC | Age: 66
End: 2021-01-01
Payer: COMMERCIAL

## 2021-01-01 ENCOUNTER — HOSPITAL ENCOUNTER (INPATIENT)
Facility: CLINIC | Age: 66
LOS: 13 days | Discharge: HOME HEALTH | End: 2021-08-02
Attending: PHYSICIAN ASSISTANT | Admitting: INTERNAL MEDICINE
Payer: COMMERCIAL

## 2021-01-01 ENCOUNTER — REFERRAL (OUTPATIENT)
Dept: TRANSPLANT | Facility: CLINIC | Age: 66
End: 2021-01-01

## 2021-01-01 ENCOUNTER — APPOINTMENT (OUTPATIENT)
Dept: ULTRASOUND IMAGING | Facility: CLINIC | Age: 66
End: 2021-01-01
Attending: INTERNAL MEDICINE
Payer: COMMERCIAL

## 2021-01-01 ENCOUNTER — APPOINTMENT (OUTPATIENT)
Dept: PHYSICAL THERAPY | Facility: CLINIC | Age: 66
End: 2021-01-01
Attending: INTERNAL MEDICINE
Payer: COMMERCIAL

## 2021-01-01 ENCOUNTER — HEALTH MAINTENANCE LETTER (OUTPATIENT)
Age: 66
End: 2021-01-01

## 2021-01-01 ENCOUNTER — HOSPITAL ENCOUNTER (EMERGENCY)
Facility: CLINIC | Age: 66
Discharge: HOME | End: 2021-03-18
Attending: EMERGENCY MEDICINE | Admitting: EMERGENCY MEDICINE
Payer: COMMERCIAL

## 2021-01-01 ENCOUNTER — DOCUMENTATION ONLY (OUTPATIENT)
Dept: TRANSPLANT | Facility: CLINIC | Age: 66
End: 2021-01-01

## 2021-01-01 ENCOUNTER — VIRTUAL VISIT (OUTPATIENT)
Dept: NURSING | Facility: CLINIC | Age: 66
End: 2021-01-01
Payer: COMMERCIAL

## 2021-01-01 ENCOUNTER — HOSPITAL ENCOUNTER (OUTPATIENT)
Dept: LAB | Facility: CLINIC | Age: 66
Discharge: HOME | End: 2021-01-15
Attending: INTERNAL MEDICINE | Admitting: INTERNAL MEDICINE
Payer: COMMERCIAL

## 2021-01-01 ENCOUNTER — OFFICE VISIT (OUTPATIENT)
Dept: INTERNAL MEDICINE | Facility: CLINIC | Age: 66
End: 2021-01-01
Payer: COMMERCIAL

## 2021-01-01 ENCOUNTER — VIRTUAL VISIT (OUTPATIENT)
Dept: INTERNAL MEDICINE | Facility: CLINIC | Age: 66
End: 2021-01-01
Payer: COMMERCIAL

## 2021-01-01 ENCOUNTER — APPOINTMENT (OUTPATIENT)
Dept: CT IMAGING | Facility: CLINIC | Age: 66
End: 2021-01-01
Attending: PHYSICIAN ASSISTANT
Payer: COMMERCIAL

## 2021-01-01 ENCOUNTER — IMMUNIZATION (OUTPATIENT)
Dept: NURSING | Facility: CLINIC | Age: 66
End: 2021-01-01
Payer: COMMERCIAL

## 2021-01-01 ENCOUNTER — IMMUNIZATION (OUTPATIENT)
Dept: NURSING | Facility: CLINIC | Age: 66
End: 2021-01-01
Attending: INTERNAL MEDICINE
Payer: COMMERCIAL

## 2021-01-01 VITALS
DIASTOLIC BLOOD PRESSURE: 74 MMHG | HEART RATE: 89 BPM | TEMPERATURE: 97.9 F | RESPIRATION RATE: 18 BRPM | OXYGEN SATURATION: 98 % | SYSTOLIC BLOOD PRESSURE: 119 MMHG

## 2021-01-01 VITALS
OXYGEN SATURATION: 94 % | DIASTOLIC BLOOD PRESSURE: 23 MMHG | RESPIRATION RATE: 24 BRPM | SYSTOLIC BLOOD PRESSURE: 46 MMHG | HEART RATE: 52 BPM | TEMPERATURE: 97 F

## 2021-01-01 VITALS
HEIGHT: 69 IN | DIASTOLIC BLOOD PRESSURE: 59 MMHG | HEART RATE: 97 BPM | OXYGEN SATURATION: 97 % | WEIGHT: 174.5 LBS | SYSTOLIC BLOOD PRESSURE: 99 MMHG | RESPIRATION RATE: 18 BRPM | TEMPERATURE: 98.2 F | BODY MASS INDEX: 25.84 KG/M2

## 2021-01-01 VITALS
TEMPERATURE: 98.5 F | RESPIRATION RATE: 18 BRPM | WEIGHT: 165.8 LBS | SYSTOLIC BLOOD PRESSURE: 121 MMHG | OXYGEN SATURATION: 94 % | DIASTOLIC BLOOD PRESSURE: 81 MMHG | BODY MASS INDEX: 27.62 KG/M2 | HEART RATE: 113 BPM

## 2021-01-01 VITALS
RESPIRATION RATE: 18 BRPM | DIASTOLIC BLOOD PRESSURE: 67 MMHG | BODY MASS INDEX: 24.99 KG/M2 | SYSTOLIC BLOOD PRESSURE: 109 MMHG | OXYGEN SATURATION: 98 % | HEIGHT: 69 IN | HEART RATE: 112 BPM | WEIGHT: 168.7 LBS | TEMPERATURE: 98.5 F

## 2021-01-01 VITALS
OXYGEN SATURATION: 93 % | RESPIRATION RATE: 16 BRPM | WEIGHT: 172.8 LBS | DIASTOLIC BLOOD PRESSURE: 58 MMHG | SYSTOLIC BLOOD PRESSURE: 96 MMHG | HEART RATE: 107 BPM | BODY MASS INDEX: 22.9 KG/M2 | TEMPERATURE: 98 F | HEIGHT: 73 IN

## 2021-01-01 VITALS
BODY MASS INDEX: 28.32 KG/M2 | OXYGEN SATURATION: 93 % | TEMPERATURE: 98.3 F | DIASTOLIC BLOOD PRESSURE: 88 MMHG | HEART RATE: 83 BPM | RESPIRATION RATE: 18 BRPM | SYSTOLIC BLOOD PRESSURE: 129 MMHG | WEIGHT: 170 LBS

## 2021-01-01 VITALS
HEART RATE: 96 BPM | OXYGEN SATURATION: 98 % | DIASTOLIC BLOOD PRESSURE: 66 MMHG | SYSTOLIC BLOOD PRESSURE: 109 MMHG | RESPIRATION RATE: 20 BRPM

## 2021-01-01 VITALS — BODY MASS INDEX: 23.7 KG/M2 | HEIGHT: 69 IN | WEIGHT: 160 LBS

## 2021-01-01 DIAGNOSIS — R18.8 OTHER ASCITES: ICD-10-CM

## 2021-01-01 DIAGNOSIS — K74.69 OTHER CIRRHOSIS OF LIVER (H): ICD-10-CM

## 2021-01-01 DIAGNOSIS — R79.89 ELEVATED LFTS: ICD-10-CM

## 2021-01-01 DIAGNOSIS — C22.0 HEPATOCELLULAR CARCINOMA (H): ICD-10-CM

## 2021-01-01 DIAGNOSIS — C22.8 MALIGNANT NEOPLASM OF LIVER, PRIMARY (H): ICD-10-CM

## 2021-01-01 DIAGNOSIS — U07.1 2019 NOVEL CORONAVIRUS DISEASE (COVID-19): Primary | ICD-10-CM

## 2021-01-01 DIAGNOSIS — C22.0 HEPATOCELLULAR CARCINOMA (H): Primary | ICD-10-CM

## 2021-01-01 DIAGNOSIS — Z71.89 COMPLEX CARE COORDINATION: Primary | ICD-10-CM

## 2021-01-01 DIAGNOSIS — R76.8 HEPATITIS C ANTIBODY TEST POSITIVE: ICD-10-CM

## 2021-01-01 DIAGNOSIS — B19.20 HCV (HEPATITIS C VIRUS): ICD-10-CM

## 2021-01-01 DIAGNOSIS — Z98.890 S/P ENDOSCOPY: ICD-10-CM

## 2021-01-01 DIAGNOSIS — S92.351A CLOSED DISPLACED FRACTURE OF FIFTH METATARSAL BONE OF RIGHT FOOT, INITIAL ENCOUNTER: ICD-10-CM

## 2021-01-01 DIAGNOSIS — R10.11 RIGHT UPPER QUADRANT ABDOMINAL PAIN: ICD-10-CM

## 2021-01-01 DIAGNOSIS — B18.2 CHRONIC HEPATITIS C WITHOUT HEPATIC COMA (H): ICD-10-CM

## 2021-01-01 DIAGNOSIS — R10.9 ABDOMINAL PAIN OF UNKNOWN CAUSE: ICD-10-CM

## 2021-01-01 DIAGNOSIS — C22.0 HEPATIC CARCINOMA (H): ICD-10-CM

## 2021-01-01 DIAGNOSIS — U07.1 2019 NOVEL CORONAVIRUS DISEASE (COVID-19): ICD-10-CM

## 2021-01-01 DIAGNOSIS — I81 PORTAL VEIN THROMBOSIS: ICD-10-CM

## 2021-01-01 DIAGNOSIS — Z11.59 ENCOUNTER FOR SCREENING FOR OTHER VIRAL DISEASES: ICD-10-CM

## 2021-01-01 DIAGNOSIS — R07.89 ATYPICAL CHEST PAIN: ICD-10-CM

## 2021-01-01 DIAGNOSIS — R60.9 EDEMA, UNSPECIFIED TYPE: ICD-10-CM

## 2021-01-01 DIAGNOSIS — R10.13 EPIGASTRIC PAIN: ICD-10-CM

## 2021-01-01 DIAGNOSIS — K21.00 GASTROESOPHAGEAL REFLUX DISEASE WITH ESOPHAGITIS WITHOUT HEMORRHAGE: ICD-10-CM

## 2021-01-01 DIAGNOSIS — R10.13 ABDOMINAL PAIN, EPIGASTRIC: ICD-10-CM

## 2021-01-01 DIAGNOSIS — D64.9 ANEMIA, UNSPECIFIED TYPE: ICD-10-CM

## 2021-01-01 DIAGNOSIS — M62.81 GENERALIZED MUSCLE WEAKNESS: ICD-10-CM

## 2021-01-01 DIAGNOSIS — R16.0 LIVER MASS: ICD-10-CM

## 2021-01-01 DIAGNOSIS — C22.0 HCC (HEPATOCELLULAR CARCINOMA) (H): Primary | ICD-10-CM

## 2021-01-01 DIAGNOSIS — K65.2 SBP (SPONTANEOUS BACTERIAL PERITONITIS) (H): ICD-10-CM

## 2021-01-01 DIAGNOSIS — K92.0 HEMATEMESIS WITH NAUSEA: ICD-10-CM

## 2021-01-01 DIAGNOSIS — I81 PORTAL VEIN THROMBOSIS: Primary | ICD-10-CM

## 2021-01-01 DIAGNOSIS — B18.2 CHRONIC HEPATITIS C VIRUS INFECTION (H): ICD-10-CM

## 2021-01-01 DIAGNOSIS — E87.20 LACTIC ACIDEMIA: ICD-10-CM

## 2021-01-01 LAB
% LINING CELLS, BODY FLUID: 1 %
% LINING CELLS, BODY FLUID: 1 %
ABO/RH(D): NORMAL
ALBUMIN FLD-MCNC: 0.2 G/DL
ALBUMIN SERPL-MCNC: 1 G/DL (ref 3.4–5)
ALBUMIN SERPL-MCNC: 1.1 G/DL (ref 3.4–5)
ALBUMIN SERPL-MCNC: 1.2 G/DL (ref 3.4–5)
ALBUMIN SERPL-MCNC: 1.2 G/DL (ref 3.4–5)
ALBUMIN SERPL-MCNC: 1.3 G/DL (ref 3.4–5)
ALBUMIN SERPL-MCNC: 1.3 G/DL (ref 3.4–5)
ALBUMIN SERPL-MCNC: 1.4 G/DL (ref 3.4–5)
ALBUMIN SERPL-MCNC: 1.5 G/DL (ref 3.4–5)
ALBUMIN SERPL-MCNC: 1.6 G/DL (ref 3.4–5)
ALBUMIN SERPL-MCNC: 1.7 G/DL (ref 3.4–5)
ALBUMIN SERPL-MCNC: 1.8 G/DL (ref 3.4–5)
ALBUMIN SERPL-MCNC: 1.9 G/DL (ref 3.4–5)
ALBUMIN SERPL-MCNC: 2.1 G/DL (ref 3.4–5)
ALBUMIN SERPL-MCNC: 2.5 G/DL (ref 3.4–5)
ALBUMIN SERPL-MCNC: 3.4 G/DL (ref 3.4–5)
ALBUMIN UR-MCNC: 20 MG/DL
ALP SERPL-CCNC: 122 U/L (ref 40–150)
ALP SERPL-CCNC: 142 U/L (ref 40–150)
ALP SERPL-CCNC: 151 U/L (ref 40–150)
ALP SERPL-CCNC: 157 U/L (ref 40–150)
ALP SERPL-CCNC: 163 U/L (ref 40–150)
ALP SERPL-CCNC: 168 U/L (ref 40–150)
ALP SERPL-CCNC: 170 U/L (ref 40–150)
ALP SERPL-CCNC: 171 U/L (ref 40–150)
ALP SERPL-CCNC: 172 U/L (ref 40–150)
ALP SERPL-CCNC: 173 U/L (ref 40–150)
ALP SERPL-CCNC: 177 U/L (ref 40–150)
ALP SERPL-CCNC: 181 U/L (ref 40–150)
ALP SERPL-CCNC: 182 U/L (ref 40–150)
ALP SERPL-CCNC: 186 U/L (ref 40–150)
ALP SERPL-CCNC: 188 U/L (ref 40–150)
ALP SERPL-CCNC: 189 U/L (ref 40–150)
ALP SERPL-CCNC: 192 U/L (ref 40–150)
ALP SERPL-CCNC: 197 U/L (ref 40–150)
ALP SERPL-CCNC: 232 U/L (ref 40–150)
ALP SERPL-CCNC: 288 U/L (ref 40–150)
ALT SERPL W P-5'-P-CCNC: 108 U/L (ref 0–70)
ALT SERPL W P-5'-P-CCNC: 134 U/L (ref 0–70)
ALT SERPL W P-5'-P-CCNC: 161 U/L (ref 0–70)
ALT SERPL W P-5'-P-CCNC: 183 U/L (ref 0–70)
ALT SERPL W P-5'-P-CCNC: 2042 U/L (ref 0–70)
ALT SERPL W P-5'-P-CCNC: 237 U/L (ref 0–70)
ALT SERPL W P-5'-P-CCNC: 277 U/L (ref 0–70)
ALT SERPL W P-5'-P-CCNC: 281 U/L (ref 0–70)
ALT SERPL W P-5'-P-CCNC: 308 U/L (ref 0–70)
ALT SERPL W P-5'-P-CCNC: 331 U/L (ref 0–70)
ALT SERPL W P-5'-P-CCNC: 379 U/L (ref 0–70)
ALT SERPL W P-5'-P-CCNC: 398 U/L (ref 0–70)
ALT SERPL W P-5'-P-CCNC: 446 U/L (ref 0–70)
ALT SERPL W P-5'-P-CCNC: 491 U/L (ref 0–70)
ALT SERPL W P-5'-P-CCNC: 496 U/L (ref 0–70)
ALT SERPL W P-5'-P-CCNC: 58 U/L (ref 0–70)
ALT SERPL W P-5'-P-CCNC: 87 U/L (ref 0–70)
ALT SERPL W P-5'-P-CCNC: 897 U/L (ref 0–70)
ALT SERPL W P-5'-P-CCNC: 93 U/L (ref 0–70)
ALT SERPL W P-5'-P-CCNC: 99 U/L (ref 0–70)
ALT SERPL-CCNC: 73 IU/L (ref 8–45)
AMMONIA PLAS-SCNC: 12 UMOL/L (ref 10–50)
ANION GAP SERPL CALCULATED.3IONS-SCNC: 13 MMOL/L (ref 3–14)
ANION GAP SERPL CALCULATED.3IONS-SCNC: 18 MMOL/L (ref 3–14)
ANION GAP SERPL CALCULATED.3IONS-SCNC: 2 MMOL/L (ref 3–14)
ANION GAP SERPL CALCULATED.3IONS-SCNC: 23 MMOL/L (ref 3–14)
ANION GAP SERPL CALCULATED.3IONS-SCNC: 3 MMOL/L (ref 3–14)
ANION GAP SERPL CALCULATED.3IONS-SCNC: 3 MMOL/L (ref 3–14)
ANION GAP SERPL CALCULATED.3IONS-SCNC: 4 MMOL/L (ref 3–14)
ANION GAP SERPL CALCULATED.3IONS-SCNC: 5 MMOL/L (ref 3–14)
ANION GAP SERPL CALCULATED.3IONS-SCNC: 6 MMOL/L (ref 3–14)
ANION GAP SERPL CALCULATED.3IONS-SCNC: 7 MMOL/L (ref 3–14)
ANION GAP SERPL CALCULATED.3IONS-SCNC: 8 MMOL/L (ref 3–14)
ANTIBODY SCREEN: NEGATIVE
APAP SERPL-MCNC: <2 MG/L (ref 10–30)
APPEARANCE FLD: ABNORMAL
APPEARANCE FLD: CLEAR
APPEARANCE UR: CLEAR
APTT PPP: 42 SECONDS (ref 22–38)
AST SERPL W P-5'-P-CCNC: 1024 U/L (ref 0–45)
AST SERPL W P-5'-P-CCNC: 114 U/L (ref 0–45)
AST SERPL W P-5'-P-CCNC: 121 U/L (ref 0–45)
AST SERPL W P-5'-P-CCNC: 137 U/L (ref 0–45)
AST SERPL W P-5'-P-CCNC: 158 U/L (ref 0–45)
AST SERPL W P-5'-P-CCNC: 161 U/L (ref 0–45)
AST SERPL W P-5'-P-CCNC: 1755 U/L (ref 0–45)
AST SERPL W P-5'-P-CCNC: 217 U/L (ref 0–45)
AST SERPL W P-5'-P-CCNC: 361 U/L (ref 0–45)
AST SERPL W P-5'-P-CCNC: 3675 U/L (ref 0–45)
AST SERPL W P-5'-P-CCNC: 381 U/L (ref 0–45)
AST SERPL W P-5'-P-CCNC: 396 U/L (ref 0–45)
AST SERPL W P-5'-P-CCNC: 588 U/L (ref 0–45)
AST SERPL W P-5'-P-CCNC: 614 U/L (ref 0–45)
AST SERPL W P-5'-P-CCNC: 671 U/L (ref 0–45)
AST SERPL W P-5'-P-CCNC: 91 U/L (ref 0–45)
AST SERPL W P-5'-P-CCNC: 925 U/L (ref 0–45)
AST SERPL W P-5'-P-CCNC: 94 U/L (ref 0–45)
AST SERPL W P-5'-P-CCNC: 959 U/L (ref 0–45)
AST SERPL W P-5'-P-CCNC: 97 U/L (ref 0–45)
AST SERPL-CCNC: 104 IU/L (ref 2–40)
ATRIAL RATE - MUSE: 100 BPM
ATRIAL RATE - MUSE: 106 BPM
ATRIAL RATE - MUSE: 111 BPM
BACTERIA BLD CULT: NO GROWTH
BACTERIA BLD CULT: NO GROWTH
BACTERIA FLD CULT: NO GROWTH
BACTERIA FLD CULT: NO GROWTH
BASE EXCESS BLDV CALC-SCNC: -10.8 MMOL/L (ref -7.7–1.9)
BASE EXCESS BLDV CALC-SCNC: -8.1 MMOL/L (ref -7.7–1.9)
BASOPHILS # BLD AUTO: 0 10E3/UL (ref 0–0.2)
BASOPHILS # BLD AUTO: 0 10E9/L (ref 0–0.2)
BASOPHILS # BLD AUTO: 0.1 10E3/UL (ref 0–0.2)
BASOPHILS # BLD MANUAL: 0 10E3/UL (ref 0–0.2)
BASOPHILS # BLD MANUAL: 0 10E3/UL (ref 0–0.2)
BASOPHILS NFR BLD AUTO: 0 %
BASOPHILS NFR BLD AUTO: 0.5 %
BASOPHILS NFR BLD AUTO: 1 %
BASOPHILS NFR BLD AUTO: 1 %
BASOPHILS NFR BLD MANUAL: 0 %
BASOPHILS NFR BLD MANUAL: 0 %
BILIRUB DIRECT SERPL-MCNC: 3.8 MG/DL (ref 0–0.2)
BILIRUB DIRECT SERPL-MCNC: 5 MG/DL (ref 0–0.2)
BILIRUB DIRECT SERPL-MCNC: 5 MG/DL (ref 0–0.2)
BILIRUB DIRECT SERPL-MCNC: 5.7 MG/DL (ref 0–0.2)
BILIRUB DIRECT SERPL-MCNC: 5.8 MG/DL (ref 0–0.2)
BILIRUB DIRECT SERPL-MCNC: 5.8 MG/DL (ref 0–0.2)
BILIRUB DIRECT SERPL-MCNC: 7.6 MG/DL (ref 0–0.2)
BILIRUB DIRECT SERPL-MCNC: 8.2 MG/DL (ref 0–0.2)
BILIRUB SERPL-MCNC: 1.9 MG/DL (ref 0.2–1.3)
BILIRUB SERPL-MCNC: 10.4 MG/DL (ref 0.2–1.3)
BILIRUB SERPL-MCNC: 10.8 MG/DL (ref 0.2–1.3)
BILIRUB SERPL-MCNC: 10.9 MG/DL (ref 0.2–1.3)
BILIRUB SERPL-MCNC: 11.7 MG/DL (ref 0.2–1.3)
BILIRUB SERPL-MCNC: 12.4 MG/DL (ref 0.2–1.3)
BILIRUB SERPL-MCNC: 12.9 MG/DL (ref 0.2–1.3)
BILIRUB SERPL-MCNC: 3.1 MG/DL (ref 0.2–1.3)
BILIRUB SERPL-MCNC: 5.8 MG/DL (ref 0.2–1.3)
BILIRUB SERPL-MCNC: 6.3 MG/DL (ref 0.2–1.3)
BILIRUB SERPL-MCNC: 6.6 MG/DL (ref 0.2–1.3)
BILIRUB SERPL-MCNC: 7 MG/DL (ref 0.2–1.3)
BILIRUB SERPL-MCNC: 7.3 MG/DL (ref 0.2–1.3)
BILIRUB SERPL-MCNC: 7.6 MG/DL (ref 0.2–1.3)
BILIRUB SERPL-MCNC: 7.8 MG/DL (ref 0.2–1.3)
BILIRUB SERPL-MCNC: 7.8 MG/DL (ref 0.2–1.3)
BILIRUB SERPL-MCNC: 7.9 MG/DL (ref 0.2–1.3)
BILIRUB SERPL-MCNC: 7.9 MG/DL (ref 0.2–1.3)
BILIRUB SERPL-MCNC: 8 MG/DL (ref 0.2–1.3)
BILIRUB SERPL-MCNC: 8.1 MG/DL (ref 0.2–1.3)
BILIRUB UR QL STRIP: ABNORMAL
BLD PROD TYP BPU: NORMAL
BLOOD COMPONENT TYPE: NORMAL
BUN SERPL-MCNC: 14 MG/DL (ref 7–30)
BUN SERPL-MCNC: 14 MG/DL (ref 7–30)
BUN SERPL-MCNC: 15 MG/DL (ref 7–30)
BUN SERPL-MCNC: 17 MG/DL (ref 7–30)
BUN SERPL-MCNC: 17 MG/DL (ref 7–30)
BUN SERPL-MCNC: 19 MG/DL (ref 7–30)
BUN SERPL-MCNC: 19 MG/DL (ref 7–30)
BUN SERPL-MCNC: 20 MG/DL (ref 7–30)
BUN SERPL-MCNC: 23 MG/DL (ref 7–30)
BUN SERPL-MCNC: 25 MG/DL (ref 7–30)
BUN SERPL-MCNC: 26 MG/DL (ref 7–30)
BUN SERPL-MCNC: 33 MG/DL (ref 7–30)
BUN SERPL-MCNC: 34 MG/DL (ref 7–30)
BUN SERPL-MCNC: 38 MG/DL (ref 7–30)
BUN SERPL-MCNC: 39 MG/DL (ref 7–30)
BUN SERPL-MCNC: 40 MG/DL (ref 7–30)
BUN SERPL-MCNC: 44 MG/DL (ref 7–30)
BUN SERPL-MCNC: 49 MG/DL (ref 7–30)
BUN SERPL-MCNC: 51 MG/DL (ref 7–30)
BUN SERPL-MCNC: 55 MG/DL (ref 7–30)
BURR CELLS BLD QL SMEAR: SLIGHT
CALCIUM SERPL-MCNC: 6.9 MG/DL (ref 8.5–10.1)
CALCIUM SERPL-MCNC: 7.2 MG/DL (ref 8.5–10.1)
CALCIUM SERPL-MCNC: 7.6 MG/DL (ref 8.5–10.1)
CALCIUM SERPL-MCNC: 7.6 MG/DL (ref 8.5–10.1)
CALCIUM SERPL-MCNC: 7.8 MG/DL (ref 8.5–10.1)
CALCIUM SERPL-MCNC: 7.8 MG/DL (ref 8.5–10.1)
CALCIUM SERPL-MCNC: 7.9 MG/DL (ref 8.5–10.1)
CALCIUM SERPL-MCNC: 8.2 MG/DL (ref 8.5–10.1)
CALCIUM SERPL-MCNC: 8.3 MG/DL (ref 8.5–10.1)
CALCIUM SERPL-MCNC: 8.3 MG/DL (ref 8.5–10.1)
CALCIUM SERPL-MCNC: 8.4 MG/DL (ref 8.5–10.1)
CALCIUM SERPL-MCNC: 8.6 MG/DL (ref 8.5–10.1)
CALCIUM SERPL-MCNC: 9.1 MG/DL (ref 8.5–10.1)
CALCIUM SERPL-MCNC: 9.2 MG/DL (ref 8.5–10.1)
CALCIUM SERPL-MCNC: 9.2 MG/DL (ref 8.5–10.1)
CHLORIDE BLD-SCNC: 100 MMOL/L (ref 94–109)
CHLORIDE BLD-SCNC: 101 MMOL/L (ref 94–109)
CHLORIDE BLD-SCNC: 101 MMOL/L (ref 94–109)
CHLORIDE BLD-SCNC: 91 MMOL/L (ref 94–109)
CHLORIDE BLD-SCNC: 92 MMOL/L (ref 94–109)
CHLORIDE BLD-SCNC: 93 MMOL/L (ref 94–109)
CHLORIDE BLD-SCNC: 94 MMOL/L (ref 94–109)
CHLORIDE BLD-SCNC: 94 MMOL/L (ref 94–109)
CHLORIDE BLD-SCNC: 95 MMOL/L (ref 94–109)
CHLORIDE BLD-SCNC: 96 MMOL/L (ref 94–109)
CHLORIDE BLD-SCNC: 97 MMOL/L (ref 94–109)
CHLORIDE BLD-SCNC: 97 MMOL/L (ref 94–109)
CHLORIDE BLD-SCNC: 98 MMOL/L (ref 94–109)
CHLORIDE BLD-SCNC: 99 MMOL/L (ref 94–109)
CHLORIDE SERPL-SCNC: 103 MMOL/L (ref 94–109)
CHLORIDE SERPL-SCNC: 98 MMOL/L (ref 94–109)
CO2 SERPL-SCNC: 12 MMOL/L (ref 20–32)
CO2 SERPL-SCNC: 18 MMOL/L (ref 20–32)
CO2 SERPL-SCNC: 20 MMOL/L (ref 20–32)
CO2 SERPL-SCNC: 25 MMOL/L (ref 20–32)
CO2 SERPL-SCNC: 26 MMOL/L (ref 20–32)
CO2 SERPL-SCNC: 27 MMOL/L (ref 20–32)
CO2 SERPL-SCNC: 28 MMOL/L (ref 20–32)
CO2 SERPL-SCNC: 29 MMOL/L (ref 20–32)
CO2 SERPL-SCNC: 29 MMOL/L (ref 20–32)
CO2 SERPL-SCNC: 8 MMOL/L (ref 20–32)
CODING SYSTEM: NORMAL
COLOR FLD: ABNORMAL
COLOR FLD: ABNORMAL
COLOR FLD: YELLOW
COLOR FLD: YELLOW
COLOR UR AUTO: ABNORMAL
CREAT SERPL-MCNC: 0.81 MG/DL (ref 0.66–1.25)
CREAT SERPL-MCNC: 0.93 MG/DL (ref 0.66–1.25)
CREAT SERPL-MCNC: 0.95 MG/DL (ref 0.66–1.25)
CREAT SERPL-MCNC: 1 MG/DL (ref 0.66–1.25)
CREAT SERPL-MCNC: 1.01 MG/DL (ref 0.66–1.25)
CREAT SERPL-MCNC: 1.02 MG/DL (ref 0.66–1.25)
CREAT SERPL-MCNC: 1.21 MG/DL (ref 0.66–1.25)
CREAT SERPL-MCNC: 1.22 MG/DL (ref 0.66–1.25)
CREAT SERPL-MCNC: 1.25 MG/DL (ref 0.66–1.25)
CREAT SERPL-MCNC: 1.27 MG/DL (ref 0.66–1.25)
CREAT SERPL-MCNC: 1.28 MG/DL (ref 0.66–1.25)
CREAT SERPL-MCNC: 1.28 MG/DL (ref 0.66–1.25)
CREAT SERPL-MCNC: 1.4 MG/DL (ref 0.66–1.25)
CREAT SERPL-MCNC: 1.41 MG/DL (ref 0.66–1.25)
CREAT SERPL-MCNC: 1.45 MG/DL (ref 0.66–1.25)
CREAT SERPL-MCNC: 1.61 MG/DL (ref 0.66–1.25)
CREAT SERPL-MCNC: 1.69 MG/DL (ref 0.66–1.25)
CREAT SERPL-MCNC: 2.21 MG/DL (ref 0.66–1.25)
CREAT SERPL-MCNC: 3.33 MG/DL (ref 0.66–1.25)
CROSSMATCH: NORMAL
DIASTOLIC BLOOD PRESSURE - MUSE: NORMAL MMHG
DIFFERENTIAL METHOD BLD: ABNORMAL
EOSINOPHIL # BLD AUTO: 0 10E3/UL (ref 0–0.7)
EOSINOPHIL # BLD AUTO: 0.1 10E3/UL (ref 0–0.7)
EOSINOPHIL # BLD AUTO: 0.1 10E9/L (ref 0–0.7)
EOSINOPHIL # BLD AUTO: 0.2 10E3/UL (ref 0–0.7)
EOSINOPHIL # BLD AUTO: 0.2 10E3/UL (ref 0–0.7)
EOSINOPHIL # BLD MANUAL: 0 10E3/UL (ref 0–0.7)
EOSINOPHIL # BLD MANUAL: 0.1 10E3/UL (ref 0–0.7)
EOSINOPHIL NFR BLD AUTO: 0 %
EOSINOPHIL NFR BLD AUTO: 0.6 %
EOSINOPHIL NFR BLD AUTO: 1 %
EOSINOPHIL NFR BLD MANUAL: 0 %
EOSINOPHIL NFR BLD MANUAL: 1 %
ERYTHROCYTE [DISTWIDTH] IN BLOOD BY AUTOMATED COUNT: 14 % (ref 10–15)
ERYTHROCYTE [DISTWIDTH] IN BLOOD BY AUTOMATED COUNT: 15.7 % (ref 10–15)
ERYTHROCYTE [DISTWIDTH] IN BLOOD BY AUTOMATED COUNT: 15.9 % (ref 10–15)
ERYTHROCYTE [DISTWIDTH] IN BLOOD BY AUTOMATED COUNT: 16.2 % (ref 10–15)
ERYTHROCYTE [DISTWIDTH] IN BLOOD BY AUTOMATED COUNT: 16.5 % (ref 10–15)
ERYTHROCYTE [DISTWIDTH] IN BLOOD BY AUTOMATED COUNT: 16.6 % (ref 10–15)
ERYTHROCYTE [DISTWIDTH] IN BLOOD BY AUTOMATED COUNT: 16.8 % (ref 10–15)
ERYTHROCYTE [DISTWIDTH] IN BLOOD BY AUTOMATED COUNT: 16.9 % (ref 10–15)
ERYTHROCYTE [DISTWIDTH] IN BLOOD BY AUTOMATED COUNT: 17.3 % (ref 10–15)
ERYTHROCYTE [DISTWIDTH] IN BLOOD BY AUTOMATED COUNT: 17.4 % (ref 10–15)
ERYTHROCYTE [DISTWIDTH] IN BLOOD BY AUTOMATED COUNT: 17.9 % (ref 10–15)
ERYTHROCYTE [DISTWIDTH] IN BLOOD BY AUTOMATED COUNT: 17.9 % (ref 10–15)
ERYTHROCYTE [DISTWIDTH] IN BLOOD BY AUTOMATED COUNT: 18.5 % (ref 10–15)
ERYTHROCYTE [DISTWIDTH] IN BLOOD BY AUTOMATED COUNT: 18.6 % (ref 10–15)
ERYTHROCYTE [DISTWIDTH] IN BLOOD BY AUTOMATED COUNT: 18.7 % (ref 10–15)
ERYTHROCYTE [DISTWIDTH] IN BLOOD BY AUTOMATED COUNT: 19 % (ref 10–15)
ERYTHROCYTE [DISTWIDTH] IN BLOOD BY AUTOMATED COUNT: 19.1 % (ref 10–15)
ERYTHROCYTE [DISTWIDTH] IN BLOOD BY AUTOMATED COUNT: 19.3 % (ref 10–15)
ERYTHROCYTE [DISTWIDTH] IN BLOOD BY AUTOMATED COUNT: 21 % (ref 10–15)
ETHANOL SERPL-MCNC: <0.01 G/DL
GFR SERPL CREATININE-BSD FRML MDRD: 18 ML/MIN/1.73M2
GFR SERPL CREATININE-BSD FRML MDRD: 30 ML/MIN/1.73M2
GFR SERPL CREATININE-BSD FRML MDRD: 41 ML/MIN/1.73M2
GFR SERPL CREATININE-BSD FRML MDRD: 44 ML/MIN/1.73M2
GFR SERPL CREATININE-BSD FRML MDRD: 50 ML/MIN/1.73M2
GFR SERPL CREATININE-BSD FRML MDRD: 52 ML/MIN/1.73M2
GFR SERPL CREATININE-BSD FRML MDRD: 52 ML/MIN/1.73M2
GFR SERPL CREATININE-BSD FRML MDRD: 58 ML/MIN/1.73M2
GFR SERPL CREATININE-BSD FRML MDRD: 60 ML/MIN/1.73M2
GFR SERPL CREATININE-BSD FRML MDRD: 61 ML/MIN/1.73M2
GFR SERPL CREATININE-BSD FRML MDRD: 62 ML/MIN/1.73M2
GFR SERPL CREATININE-BSD FRML MDRD: 76 ML/MIN/1.73M2
GFR SERPL CREATININE-BSD FRML MDRD: 77 ML/MIN/{1.73_M2}
GFR SERPL CREATININE-BSD FRML MDRD: 78 ML/MIN/1.73M2
GFR SERPL CREATININE-BSD FRML MDRD: 83 ML/MIN/1.73M2
GFR SERPL CREATININE-BSD FRML MDRD: 85 ML/MIN/1.73M2
GFR SERPL CREATININE-BSD FRML MDRD: >90 ML/MIN/{1.73_M2}
GLUCOSE BLD-MCNC: 101 MG/DL (ref 70–99)
GLUCOSE BLD-MCNC: 108 MG/DL (ref 70–99)
GLUCOSE BLD-MCNC: 108 MG/DL (ref 70–99)
GLUCOSE BLD-MCNC: 121 MG/DL (ref 70–99)
GLUCOSE BLD-MCNC: 125 MG/DL (ref 70–99)
GLUCOSE BLD-MCNC: 133 MG/DL (ref 70–99)
GLUCOSE BLD-MCNC: 136 MG/DL (ref 70–99)
GLUCOSE BLD-MCNC: 137 MG/DL (ref 70–99)
GLUCOSE BLD-MCNC: 139 MG/DL (ref 70–99)
GLUCOSE BLD-MCNC: 139 MG/DL (ref 70–99)
GLUCOSE BLD-MCNC: 149 MG/DL (ref 70–99)
GLUCOSE BLD-MCNC: 150 MG/DL (ref 70–99)
GLUCOSE BLD-MCNC: 164 MG/DL (ref 70–99)
GLUCOSE BLD-MCNC: 174 MG/DL (ref 70–99)
GLUCOSE BLD-MCNC: 178 MG/DL (ref 70–99)
GLUCOSE BLD-MCNC: 184 MG/DL (ref 70–99)
GLUCOSE BLD-MCNC: 50 MG/DL (ref 70–99)
GLUCOSE BLD-MCNC: 86 MG/DL (ref 70–99)
GLUCOSE BLD-MCNC: 94 MG/DL (ref 70–99)
GLUCOSE BLD-MCNC: 95 MG/DL (ref 70–99)
GLUCOSE SERPL-MCNC: 109 MG/DL (ref 70–99)
GLUCOSE SERPL-MCNC: 111 MG/DL (ref 70–99)
GLUCOSE UR STRIP-MCNC: NEGATIVE MG/DL
GRAM STAIN RESULT: NORMAL
HCO3 BLDV-SCNC: 15 MMOL/L (ref 21–28)
HCO3 BLDV-SCNC: 17 MMOL/L (ref 21–28)
HCT VFR BLD AUTO: 18.6 % (ref 40–53)
HCT VFR BLD AUTO: 23.1 % (ref 40–53)
HCT VFR BLD AUTO: 24.5 % (ref 40–53)
HCT VFR BLD AUTO: 25.5 % (ref 40–53)
HCT VFR BLD AUTO: 25.7 % (ref 40–53)
HCT VFR BLD AUTO: 27.6 % (ref 40–53)
HCT VFR BLD AUTO: 27.7 % (ref 40–53)
HCT VFR BLD AUTO: 27.8 % (ref 40–53)
HCT VFR BLD AUTO: 27.8 % (ref 40–53)
HCT VFR BLD AUTO: 27.9 % (ref 40–53)
HCT VFR BLD AUTO: 28.1 % (ref 40–53)
HCT VFR BLD AUTO: 28.2 % (ref 40–53)
HCT VFR BLD AUTO: 28.7 % (ref 40–53)
HCT VFR BLD AUTO: 29 % (ref 40–53)
HCT VFR BLD AUTO: 29.1 % (ref 40–53)
HCT VFR BLD AUTO: 29.2 % (ref 40–53)
HCT VFR BLD AUTO: 30.7 % (ref 40–53)
HCT VFR BLD AUTO: 32.7 % (ref 40–53)
HCT VFR BLD AUTO: 40 % (ref 40–53)
HCV RNA SERPL NAA+PROBE-ACNC: NOT DETECTED IU/ML
HEMOCCULT STL QL: NEGATIVE
HGB BLD-MCNC: 10 G/DL (ref 13.3–17.7)
HGB BLD-MCNC: 10 G/DL (ref 13.3–17.7)
HGB BLD-MCNC: 10.6 G/DL (ref 13.3–17.7)
HGB BLD-MCNC: 11.3 G/DL (ref 13.3–17.7)
HGB BLD-MCNC: 11.6 G/DL (ref 13.3–17.7)
HGB BLD-MCNC: 13.2 G/DL (ref 13.3–17.7)
HGB BLD-MCNC: 6 G/DL (ref 13.3–17.7)
HGB BLD-MCNC: 6 G/DL (ref 13.3–17.7)
HGB BLD-MCNC: 6.8 G/DL (ref 13.3–17.7)
HGB BLD-MCNC: 7.2 G/DL (ref 13.3–17.7)
HGB BLD-MCNC: 7.6 G/DL (ref 13.3–17.7)
HGB BLD-MCNC: 7.7 G/DL (ref 13.3–17.7)
HGB BLD-MCNC: 7.9 G/DL (ref 13.3–17.7)
HGB BLD-MCNC: 8.4 G/DL (ref 13.3–17.7)
HGB BLD-MCNC: 8.4 G/DL (ref 13.3–17.7)
HGB BLD-MCNC: 8.9 G/DL (ref 13.3–17.7)
HGB BLD-MCNC: 8.9 G/DL (ref 13.3–17.7)
HGB BLD-MCNC: 9 G/DL (ref 13.3–17.7)
HGB BLD-MCNC: 9 G/DL (ref 13.3–17.7)
HGB BLD-MCNC: 9.3 G/DL (ref 13.3–17.7)
HGB BLD-MCNC: 9.5 G/DL (ref 13.3–17.7)
HGB BLD-MCNC: 9.5 G/DL (ref 13.3–17.7)
HGB BLD-MCNC: 9.6 G/DL (ref 13.3–17.7)
HGB BLD-MCNC: 9.6 G/DL (ref 13.3–17.7)
HGB BLD-MCNC: 9.7 G/DL (ref 13.3–17.7)
HGB BLD-MCNC: 9.8 G/DL (ref 13.3–17.7)
HGB BLD-MCNC: 9.8 G/DL (ref 13.3–17.7)
HGB UR QL STRIP: NEGATIVE
HOLD SPECIMEN: NORMAL
IMM GRANULOCYTES # BLD: 0 10E9/L (ref 0–0.4)
IMM GRANULOCYTES # BLD: 0.1 10E3/UL
IMM GRANULOCYTES # BLD: 0.1 10E3/UL
IMM GRANULOCYTES # BLD: 0.2 10E3/UL
IMM GRANULOCYTES # BLD: 0.4 10E3/UL
IMM GRANULOCYTES NFR BLD: 0.2 %
IMM GRANULOCYTES NFR BLD: 1 %
IMM GRANULOCYTES NFR BLD: 2 %
IMM GRANULOCYTES NFR BLD: 2 %
IMM GRANULOCYTES NFR BLD: 3 %
INR PPP: 1.32 (ref 0.85–1.15)
INR PPP: 1.32 (ref 0.85–1.15)
INR PPP: 1.34 (ref 0.85–1.15)
INR PPP: 1.49 (ref 0.85–1.15)
INR PPP: 1.54 (ref 0.85–1.15)
INR PPP: 1.79 (ref 0.85–1.15)
INTERPRETATION ECG - MUSE: NORMAL
ISSUE DATE AND TIME: NORMAL
KETONES UR STRIP-MCNC: NEGATIVE MG/DL
LACTATE BLD-SCNC: 2 MMOL/L (ref 0.7–2)
LACTATE SERPL-SCNC: 10.6 MMOL/L (ref 0.7–2)
LACTATE SERPL-SCNC: 12.6 MMOL/L (ref 0.7–2)
LACTATE SERPL-SCNC: 13.3 MMOL/L (ref 0.7–2)
LACTATE SERPL-SCNC: 3.4 MMOL/L (ref 0.7–2)
LACTATE SERPL-SCNC: 3.9 MMOL/L (ref 0.7–2)
LACTATE SERPL-SCNC: 7.4 MMOL/L (ref 0.7–2)
LACTATE SERPL-SCNC: 9 MMOL/L (ref 0.7–2)
LACTATE SERPL-SCNC: 9.3 MMOL/L (ref 0.7–2)
LEUKOCYTE ESTERASE UR QL STRIP: NEGATIVE
LIPASE SERPL-CCNC: 76 U/L (ref 73–393)
LIPASE SERPL-CCNC: 78 U/L (ref 73–393)
LYMPHOCYTES # BLD AUTO: 0.7 10E3/UL (ref 0.8–5.3)
LYMPHOCYTES # BLD AUTO: 0.8 10E3/UL (ref 0.8–5.3)
LYMPHOCYTES # BLD AUTO: 0.8 10E3/UL (ref 0.8–5.3)
LYMPHOCYTES # BLD AUTO: 0.9 10E3/UL (ref 0.8–5.3)
LYMPHOCYTES # BLD AUTO: 1.5 10E9/L (ref 0.8–5.3)
LYMPHOCYTES # BLD MANUAL: 0.4 10E3/UL (ref 0.8–5.3)
LYMPHOCYTES # BLD MANUAL: 1.8 10E3/UL (ref 0.8–5.3)
LYMPHOCYTES NFR BLD AUTO: 17.3 %
LYMPHOCYTES NFR BLD AUTO: 6 %
LYMPHOCYTES NFR BLD AUTO: 6 %
LYMPHOCYTES NFR BLD AUTO: 7 %
LYMPHOCYTES NFR BLD AUTO: 8 %
LYMPHOCYTES NFR BLD MANUAL: 12 %
LYMPHOCYTES NFR BLD MANUAL: 4 %
LYMPHOCYTES NFR FLD MANUAL: 12 %
LYMPHOCYTES NFR FLD MANUAL: 16 %
LYMPHOCYTES NFR FLD MANUAL: 22 %
LYMPHOCYTES NFR FLD MANUAL: 25 %
Lab: ABNORMAL
MAGNESIUM SERPL-MCNC: 2.1 MG/DL (ref 1.6–2.3)
MAGNESIUM SERPL-MCNC: 2.1 MG/DL (ref 1.6–2.3)
MAGNESIUM SERPL-MCNC: 2.3 MG/DL (ref 1.6–2.3)
MAGNESIUM SERPL-MCNC: 2.3 MG/DL (ref 1.6–2.3)
MAGNESIUM SERPL-MCNC: 2.5 MG/DL (ref 1.6–2.3)
MAGNESIUM SERPL-MCNC: 2.6 MG/DL (ref 1.6–2.3)
MCH RBC QN AUTO: 32.2 PG (ref 26.5–33)
MCH RBC QN AUTO: 32.4 PG (ref 26.5–33)
MCH RBC QN AUTO: 32.8 PG (ref 26.5–33)
MCH RBC QN AUTO: 32.8 PG (ref 26.5–33)
MCH RBC QN AUTO: 33 PG (ref 26.5–33)
MCH RBC QN AUTO: 33.1 PG (ref 26.5–33)
MCH RBC QN AUTO: 33.3 PG (ref 26.5–33)
MCH RBC QN AUTO: 33.3 PG (ref 26.5–33)
MCH RBC QN AUTO: 33.4 PG (ref 26.5–33)
MCH RBC QN AUTO: 33.5 PG (ref 26.5–33)
MCH RBC QN AUTO: 33.6 PG (ref 26.5–33)
MCH RBC QN AUTO: 33.8 PG (ref 26.5–33)
MCH RBC QN AUTO: 33.9 PG (ref 26.5–33)
MCH RBC QN AUTO: 33.9 PG (ref 26.5–33)
MCH RBC QN AUTO: 34.7 PG (ref 26.5–33)
MCHC RBC AUTO-ENTMCNC: 32.3 G/DL (ref 31.5–36.5)
MCHC RBC AUTO-ENTMCNC: 32.6 G/DL (ref 31.5–36.5)
MCHC RBC AUTO-ENTMCNC: 32.9 G/DL (ref 31.5–36.5)
MCHC RBC AUTO-ENTMCNC: 33 G/DL (ref 31.5–36.5)
MCHC RBC AUTO-ENTMCNC: 33.4 G/DL (ref 31.5–36.5)
MCHC RBC AUTO-ENTMCNC: 33.5 G/DL (ref 31.5–36.5)
MCHC RBC AUTO-ENTMCNC: 33.7 G/DL (ref 31.5–36.5)
MCHC RBC AUTO-ENTMCNC: 34.1 G/DL (ref 31.5–36.5)
MCHC RBC AUTO-ENTMCNC: 34.2 G/DL (ref 31.5–36.5)
MCHC RBC AUTO-ENTMCNC: 34.2 G/DL (ref 31.5–36.5)
MCHC RBC AUTO-ENTMCNC: 34.3 G/DL (ref 31.5–36.5)
MCHC RBC AUTO-ENTMCNC: 34.3 G/DL (ref 31.5–36.5)
MCHC RBC AUTO-ENTMCNC: 34.4 G/DL (ref 31.5–36.5)
MCHC RBC AUTO-ENTMCNC: 34.5 G/DL (ref 31.5–36.5)
MCHC RBC AUTO-ENTMCNC: 34.6 G/DL (ref 31.5–36.5)
MCHC RBC AUTO-ENTMCNC: 34.8 G/DL (ref 31.5–36.5)
MCHC RBC AUTO-ENTMCNC: 34.9 G/DL (ref 31.5–36.5)
MCHC RBC AUTO-ENTMCNC: 35.3 G/DL (ref 31.5–36.5)
MCHC RBC AUTO-ENTMCNC: 35.5 G/DL (ref 31.5–36.5)
MCV RBC AUTO: 100 FL (ref 78–100)
MCV RBC AUTO: 103 FL (ref 78–100)
MCV RBC AUTO: 108 FL (ref 78–100)
MCV RBC AUTO: 95 FL (ref 78–100)
MCV RBC AUTO: 96 FL (ref 78–100)
MCV RBC AUTO: 97 FL (ref 78–100)
MCV RBC AUTO: 98 FL (ref 78–100)
MCV RBC AUTO: 99 FL (ref 78–100)
METAMYELOCYTES # BLD MANUAL: 0.4 10E3/UL
METAMYELOCYTES NFR BLD MANUAL: 4 %
MONOCYTES # BLD AUTO: 0.8 10E9/L (ref 0–1.3)
MONOCYTES # BLD AUTO: 1.1 10E3/UL (ref 0–1.3)
MONOCYTES # BLD AUTO: 1.2 10E3/UL (ref 0–1.3)
MONOCYTES # BLD AUTO: 1.6 10E3/UL (ref 0–1.3)
MONOCYTES # BLD AUTO: 1.6 10E3/UL (ref 0–1.3)
MONOCYTES # BLD AUTO: 1.7 10E3/UL (ref 0–1.3)
MONOCYTES # BLD AUTO: 1.8 10E3/UL (ref 0–1.3)
MONOCYTES # BLD AUTO: 1.9 10E3/UL (ref 0–1.3)
MONOCYTES # BLD MANUAL: 0.8 10E3/UL (ref 0–1.3)
MONOCYTES # BLD MANUAL: 0.8 10E3/UL (ref 0–1.3)
MONOCYTES NFR BLD AUTO: 11 %
MONOCYTES NFR BLD AUTO: 12 %
MONOCYTES NFR BLD AUTO: 13 %
MONOCYTES NFR BLD AUTO: 13 %
MONOCYTES NFR BLD AUTO: 14 %
MONOCYTES NFR BLD AUTO: 14 %
MONOCYTES NFR BLD AUTO: 9 %
MONOCYTES NFR BLD AUTO: 9.7 %
MONOCYTES NFR BLD MANUAL: 5 %
MONOCYTES NFR BLD MANUAL: 7 %
MONOS+MACROS NFR FLD MANUAL: 1 %
MONOS+MACROS NFR FLD MANUAL: 10 %
MONOS+MACROS NFR FLD MANUAL: 28 %
MONOS+MACROS NFR FLD MANUAL: 47 %
MUCOUS THREADS #/AREA URNS LPF: PRESENT /LPF
MYELOCYTES # BLD MANUAL: 0.2 10E3/UL
MYELOCYTES NFR BLD MANUAL: 2 %
NEUTROPHILS # BLD AUTO: 10 10E3/UL (ref 1.6–8.3)
NEUTROPHILS # BLD AUTO: 10.4 10E3/UL (ref 1.6–8.3)
NEUTROPHILS # BLD AUTO: 10.5 10E3/UL (ref 1.6–8.3)
NEUTROPHILS # BLD AUTO: 12 10E3/UL (ref 1.6–8.3)
NEUTROPHILS # BLD AUTO: 6 10E9/L (ref 1.6–8.3)
NEUTROPHILS # BLD AUTO: 8.3 10E3/UL (ref 1.6–8.3)
NEUTROPHILS # BLD AUTO: 8.8 10E3/UL (ref 1.6–8.3)
NEUTROPHILS # BLD AUTO: 8.9 10E3/UL (ref 1.6–8.3)
NEUTROPHILS # BLD MANUAL: 12.7 10E3/UL (ref 1.6–8.3)
NEUTROPHILS # BLD MANUAL: 8.9 10E3/UL (ref 1.6–8.3)
NEUTROPHILS NFR BLD AUTO: 71.7 %
NEUTROPHILS NFR BLD AUTO: 77 %
NEUTROPHILS NFR BLD AUTO: 78 %
NEUTROPHILS NFR BLD AUTO: 79 %
NEUTROPHILS NFR BLD AUTO: 79 %
NEUTROPHILS NFR BLD AUTO: 81 %
NEUTROPHILS NFR BLD MANUAL: 82 %
NEUTROPHILS NFR BLD MANUAL: 83 %
NEUTS BAND NFR FLD MANUAL: 30 %
NEUTS BAND NFR FLD MANUAL: 55 %
NEUTS BAND NFR FLD MANUAL: 60 %
NEUTS BAND NFR FLD MANUAL: 87 %
NITRATE UR QL: NEGATIVE
NRBC # BLD AUTO: 0 10*3/UL
NRBC # BLD AUTO: 0 10E3/UL
NRBC # BLD AUTO: 0.2 10E3/UL
NRBC BLD AUTO-RTO: 0 /100
NRBC BLD MANUAL-RTO: 1 %
O2/TOTAL GAS SETTING VFR VENT: 0 %
O2/TOTAL GAS SETTING VFR VENT: 98 %
OTHER CELLS FLD MANUAL: 5 %
OXYHGB MFR BLDV: 65 % (ref 70–75)
P AXIS - MUSE: 15 DEGREES
P AXIS - MUSE: 35 DEGREES
P AXIS - MUSE: 38 DEGREES
PCO2 BLDV: 32 MM HG (ref 40–50)
PCO2 BLDV: 34 MM HG (ref 40–50)
PH BLDV: 7.28 [PH] (ref 7.32–7.43)
PH BLDV: 7.32 [PH] (ref 7.32–7.43)
PH UR STRIP: 6 [PH] (ref 5–7)
PHOSPHATE SERPL-MCNC: 2.3 MG/DL (ref 2.5–4.5)
PHOSPHATE SERPL-MCNC: 3 MG/DL (ref 2.5–4.5)
PLAT MORPH BLD: ABNORMAL
PLAT MORPH BLD: ABNORMAL
PLATELET # BLD AUTO: 147 10E3/UL (ref 150–450)
PLATELET # BLD AUTO: 167 10E3/UL (ref 150–450)
PLATELET # BLD AUTO: 183 10E3/UL (ref 150–450)
PLATELET # BLD AUTO: 191 10E3/UL (ref 150–450)
PLATELET # BLD AUTO: 193 10E3/UL (ref 150–450)
PLATELET # BLD AUTO: 199 10E3/UL (ref 150–450)
PLATELET # BLD AUTO: 199 10E3/UL (ref 150–450)
PLATELET # BLD AUTO: 200 10E3/UL (ref 150–450)
PLATELET # BLD AUTO: 204 10E3/UL (ref 150–450)
PLATELET # BLD AUTO: 210 10E3/UL (ref 150–450)
PLATELET # BLD AUTO: 213 10E3/UL (ref 150–450)
PLATELET # BLD AUTO: 215 10E9/L (ref 150–450)
PLATELET # BLD AUTO: 216 10E3/UL (ref 150–450)
PLATELET # BLD AUTO: 220 10E3/UL (ref 150–450)
PLATELET # BLD AUTO: 227 10E3/UL (ref 150–450)
PLATELET # BLD AUTO: 229 10E3/UL (ref 150–450)
PLATELET # BLD AUTO: 240 10E3/UL (ref 150–450)
PLATELET # BLD AUTO: 253 10E3/UL (ref 150–450)
PLATELET # BLD AUTO: 266 10E3/UL (ref 150–450)
PLATELET # BLD AUTO: 267 10E3/UL (ref 150–450)
PO2 BLDV: 43 MM HG (ref 25–47)
PO2 BLDV: 47 MM HG (ref 25–47)
POLYCHROMASIA BLD QL SMEAR: SLIGHT
POTASSIUM BLD-SCNC: 4.3 MMOL/L (ref 3.4–5.3)
POTASSIUM BLD-SCNC: 4.5 MMOL/L (ref 3.4–5.3)
POTASSIUM BLD-SCNC: 4.6 MMOL/L (ref 3.4–5.3)
POTASSIUM BLD-SCNC: 4.6 MMOL/L (ref 3.4–5.3)
POTASSIUM BLD-SCNC: 4.7 MMOL/L (ref 3.4–5.3)
POTASSIUM BLD-SCNC: 4.8 MMOL/L (ref 3.4–5.3)
POTASSIUM BLD-SCNC: 4.9 MMOL/L (ref 3.4–5.3)
POTASSIUM BLD-SCNC: 5 MMOL/L (ref 3.4–5.3)
POTASSIUM BLD-SCNC: 5 MMOL/L (ref 3.4–5.3)
POTASSIUM BLD-SCNC: 5.2 MMOL/L (ref 3.4–5.3)
POTASSIUM BLD-SCNC: 5.3 MMOL/L (ref 3.4–5.3)
POTASSIUM BLD-SCNC: 5.4 MMOL/L (ref 3.4–5.3)
POTASSIUM BLD-SCNC: 5.9 MMOL/L (ref 3.4–5.3)
POTASSIUM BLD-SCNC: 5.9 MMOL/L (ref 3.4–5.3)
POTASSIUM BLD-SCNC: 6.1 MMOL/L (ref 3.4–5.3)
POTASSIUM BLD-SCNC: 6.2 MMOL/L (ref 3.4–5.3)
POTASSIUM BLD-SCNC: 6.5 MMOL/L (ref 3.4–5.3)
POTASSIUM SERPL-SCNC: 4.9 MMOL/L (ref 3.4–5.3)
POTASSIUM SERPL-SCNC: 5 MMOL/L (ref 3.4–5.3)
PR INTERVAL - MUSE: 186 MS
PR INTERVAL - MUSE: 200 MS
PR INTERVAL - MUSE: 212 MS
PROCALCITONIN SERPL-MCNC: 1.31 NG/ML
PROT FLD-MCNC: 0.9 G/DL
PROT FLD-MCNC: 1 G/DL
PROT FLD-MCNC: 1.2 G/DL
PROT FLD-MCNC: 1.5 G/DL
PROT SERPL-MCNC: 5 G/DL (ref 6.8–8.8)
PROT SERPL-MCNC: 5.2 G/DL (ref 6.8–8.8)
PROT SERPL-MCNC: 5.2 G/DL (ref 6.8–8.8)
PROT SERPL-MCNC: 5.9 G/DL (ref 6.8–8.8)
PROT SERPL-MCNC: 6.2 G/DL (ref 6.8–8.8)
PROT SERPL-MCNC: 6.4 G/DL (ref 6.8–8.8)
PROT SERPL-MCNC: 6.5 G/DL (ref 6.8–8.8)
PROT SERPL-MCNC: 6.5 G/DL (ref 6.8–8.8)
PROT SERPL-MCNC: 6.6 G/DL (ref 6.8–8.8)
PROT SERPL-MCNC: 6.8 G/DL (ref 6.8–8.8)
PROT SERPL-MCNC: 6.9 G/DL (ref 6.8–8.8)
PROT SERPL-MCNC: 7.4 G/DL (ref 6.8–8.8)
PROT SERPL-MCNC: 7.7 G/DL (ref 6.8–8.8)
PROT SERPL-MCNC: 8.2 G/DL (ref 6.8–8.8)
PROT SERPL-MCNC: 8.5 G/DL (ref 6.8–8.8)
PROT SERPL-MCNC: 9.3 G/DL (ref 6.8–8.8)
QRS DURATION - MUSE: 84 MS
QRS DURATION - MUSE: 86 MS
QRS DURATION - MUSE: 90 MS
QT - MUSE: 330 MS
QT - MUSE: 336 MS
QT - MUSE: 372 MS
QTC - MUSE: 438 MS
QTC - MUSE: 456 MS
QTC - MUSE: 479 MS
R AXIS - MUSE: -17 DEGREES
R AXIS - MUSE: 2 DEGREES
R AXIS - MUSE: 2 DEGREES
RBC # BLD AUTO: 1.73 10E6/UL (ref 4.4–5.9)
RBC # BLD AUTO: 2.24 10E6/UL (ref 4.4–5.9)
RBC # BLD AUTO: 2.51 10E6/UL (ref 4.4–5.9)
RBC # BLD AUTO: 2.66 10E6/UL (ref 4.4–5.9)
RBC # BLD AUTO: 2.66 10E6/UL (ref 4.4–5.9)
RBC # BLD AUTO: 2.78 10E6/UL (ref 4.4–5.9)
RBC # BLD AUTO: 2.82 10E6/UL (ref 4.4–5.9)
RBC # BLD AUTO: 2.83 10E6/UL (ref 4.4–5.9)
RBC # BLD AUTO: 2.87 10E6/UL (ref 4.4–5.9)
RBC # BLD AUTO: 2.88 10E6/UL (ref 4.4–5.9)
RBC # BLD AUTO: 2.9 10E6/UL (ref 4.4–5.9)
RBC # BLD AUTO: 2.91 10E6/UL (ref 4.4–5.9)
RBC # BLD AUTO: 2.95 10E6/UL (ref 4.4–5.9)
RBC # BLD AUTO: 2.97 10E6/UL (ref 4.4–5.9)
RBC # BLD AUTO: 3.02 10E6/UL (ref 4.4–5.9)
RBC # BLD AUTO: 3.03 10E6/UL (ref 4.4–5.9)
RBC # BLD AUTO: 3.23 10E6/UL (ref 4.4–5.9)
RBC # BLD AUTO: 3.42 10E6/UL (ref 4.4–5.9)
RBC # BLD AUTO: 4.02 10E12/L (ref 4.4–5.9)
RBC # FLD: 0 /UL
RBC MORPH BLD: ABNORMAL
RBC MORPH BLD: ABNORMAL
RBC URINE: <1 /HPF
SARS-COV-2 RNA RESP QL NAA+PROBE: ABNORMAL
SARS-COV-2 RNA RESP QL NAA+PROBE: NEGATIVE
SODIUM SERPL-SCNC: 123 MMOL/L (ref 133–144)
SODIUM SERPL-SCNC: 124 MMOL/L (ref 133–144)
SODIUM SERPL-SCNC: 125 MMOL/L (ref 133–144)
SODIUM SERPL-SCNC: 126 MMOL/L (ref 133–144)
SODIUM SERPL-SCNC: 127 MMOL/L (ref 133–144)
SODIUM SERPL-SCNC: 128 MMOL/L (ref 133–144)
SODIUM SERPL-SCNC: 128 MMOL/L (ref 133–144)
SODIUM SERPL-SCNC: 129 MMOL/L (ref 133–144)
SODIUM SERPL-SCNC: 130 MMOL/L (ref 133–144)
SODIUM SERPL-SCNC: 130 MMOL/L (ref 133–144)
SODIUM SERPL-SCNC: 131 MMOL/L (ref 133–144)
SODIUM SERPL-SCNC: 132 MMOL/L (ref 133–144)
SODIUM SERPL-SCNC: 137 MMOL/L (ref 133–144)
SP GR UR STRIP: 1.03 (ref 1–1.03)
SPECIMEN EXPIRATION DATE: NORMAL
SPECIMEN SOURCE: ABNORMAL
SQUAMOUS EPITHELIAL: <1 /HPF
SYSTOLIC BLOOD PRESSURE - MUSE: NORMAL MMHG
T AXIS - MUSE: 20 DEGREES
T AXIS - MUSE: 28 DEGREES
T AXIS - MUSE: 30 DEGREES
TARGETS BLD QL SMEAR: SLIGHT
TROPONIN I SERPL-MCNC: 0.02 UG/L (ref 0–0.04)
TROPONIN I SERPL-MCNC: 0.02 UG/L (ref 0–0.04)
TROPONIN I SERPL-MCNC: 0.03 UG/L (ref 0–0.04)
TROPONIN I SERPL-MCNC: <0.015 UG/L (ref 0–0.04)
UNIT ABO/RH: NORMAL
UNIT NUMBER: NORMAL
UNIT STATUS: NORMAL
UNIT TYPE ISBT: 6200
UROBILINOGEN UR STRIP-MCNC: 2 MG/DL
VANCOMYCIN SERPL-MCNC: 17.2 MG/L
VARIANT LYMPHS BLD QL SMEAR: PRESENT
VENTRICULAR RATE- MUSE: 100 BPM
VENTRICULAR RATE- MUSE: 106 BPM
VENTRICULAR RATE- MUSE: 111 BPM
WBC # BLD AUTO: 10.3 10E3/UL (ref 4–11)
WBC # BLD AUTO: 10.8 10E3/UL (ref 4–11)
WBC # BLD AUTO: 11.2 10E3/UL (ref 4–11)
WBC # BLD AUTO: 11.5 10E3/UL (ref 4–11)
WBC # BLD AUTO: 12.7 10E3/UL (ref 4–11)
WBC # BLD AUTO: 13.2 10E3/UL (ref 4–11)
WBC # BLD AUTO: 13.3 10E3/UL (ref 4–11)
WBC # BLD AUTO: 13.4 10E3/UL (ref 4–11)
WBC # BLD AUTO: 13.6 10E3/UL (ref 4–11)
WBC # BLD AUTO: 13.7 10E3/UL (ref 4–11)
WBC # BLD AUTO: 14.3 10E3/UL (ref 4–11)
WBC # BLD AUTO: 15.3 10E3/UL (ref 4–11)
WBC # BLD AUTO: 15.5 10E3/UL (ref 4–11)
WBC # BLD AUTO: 7.2 10E3/UL (ref 4–11)
WBC # BLD AUTO: 7.5 10E3/UL (ref 4–11)
WBC # BLD AUTO: 8.2 10E3/UL (ref 4–11)
WBC # BLD AUTO: 8.3 10E3/UL (ref 4–11)
WBC # BLD AUTO: 8.4 10E9/L (ref 4–11)
WBC # BLD AUTO: 8.5 10E3/UL (ref 4–11)
WBC # FLD AUTO: 236 /UL
WBC # FLD AUTO: 356 /UL
WBC # FLD AUTO: 447 /UL
WBC # FLD AUTO: 790 /UL
WBC CELLS COUNTED: ABNORMAL
WBC DILUTION FACTOR: ABNORMAL
WBC URINE: 2 /HPF

## 2021-01-01 PROCEDURE — 99232 SBSQ HOSP IP/OBS MODERATE 35: CPT | Performed by: NURSE PRACTITIONER

## 2021-01-01 PROCEDURE — 89051 BODY FLUID CELL COUNT: CPT | Performed by: INTERNAL MEDICINE

## 2021-01-01 PROCEDURE — 82040 ASSAY OF SERUM ALBUMIN: CPT | Performed by: INTERNAL MEDICINE

## 2021-01-01 PROCEDURE — U0005 INFEC AGEN DETEC AMPLI PROBE: HCPCS | Performed by: INTERNAL MEDICINE

## 2021-01-01 PROCEDURE — 99239 HOSP IP/OBS DSCHRG MGMT >30: CPT | Performed by: INTERNAL MEDICINE

## 2021-01-01 PROCEDURE — 99207 PR NO BILLABLE SERVICE THIS VISIT: CPT | Performed by: INTERNAL MEDICINE

## 2021-01-01 PROCEDURE — 250N000013 HC RX MED GY IP 250 OP 250 PS 637: Performed by: NURSE PRACTITIONER

## 2021-01-01 PROCEDURE — 87205 SMEAR GRAM STAIN: CPT | Performed by: INTERNAL MEDICINE

## 2021-01-01 PROCEDURE — 250N000013 HC RX MED GY IP 250 OP 250 PS 637: Performed by: EMERGENCY MEDICINE

## 2021-01-01 PROCEDURE — 82040 ASSAY OF SERUM ALBUMIN: CPT | Performed by: HOSPITALIST

## 2021-01-01 PROCEDURE — 250N000013 HC RX MED GY IP 250 OP 250 PS 637: Performed by: INTERNAL MEDICINE

## 2021-01-01 PROCEDURE — 36415 COLL VENOUS BLD VENIPUNCTURE: CPT | Performed by: HOSPITALIST

## 2021-01-01 PROCEDURE — 250N000011 HC RX IP 250 OP 636: Performed by: HOSPITALIST

## 2021-01-01 PROCEDURE — 82803 BLOOD GASES ANY COMBINATION: CPT | Performed by: HOSPITALIST

## 2021-01-01 PROCEDURE — 85018 HEMOGLOBIN: CPT | Performed by: INTERNAL MEDICINE

## 2021-01-01 PROCEDURE — 87522 HEPATITIS C REVRS TRNSCRPJ: CPT | Performed by: INTERNAL MEDICINE

## 2021-01-01 PROCEDURE — 82248 BILIRUBIN DIRECT: CPT | Performed by: INTERNAL MEDICINE

## 2021-01-01 PROCEDURE — 96361 HYDRATE IV INFUSION ADD-ON: CPT

## 2021-01-01 PROCEDURE — 84157 ASSAY OF PROTEIN OTHER: CPT | Performed by: INTERNAL MEDICINE

## 2021-01-01 PROCEDURE — 85730 THROMBOPLASTIN TIME PARTIAL: CPT | Performed by: EMERGENCY MEDICINE

## 2021-01-01 PROCEDURE — C9803 HOPD COVID-19 SPEC COLLECT: HCPCS

## 2021-01-01 PROCEDURE — 80053 COMPREHEN METABOLIC PANEL: CPT | Performed by: INTERNAL MEDICINE

## 2021-01-01 PROCEDURE — 250N000009 HC RX 250: Performed by: RADIOLOGY

## 2021-01-01 PROCEDURE — 85014 HEMATOCRIT: CPT | Performed by: INTERNAL MEDICINE

## 2021-01-01 PROCEDURE — 99233 SBSQ HOSP IP/OBS HIGH 50: CPT | Performed by: INTERNAL MEDICINE

## 2021-01-01 PROCEDURE — 80048 BASIC METABOLIC PNL TOTAL CA: CPT | Performed by: PHYSICIAN ASSISTANT

## 2021-01-01 PROCEDURE — 80202 ASSAY OF VANCOMYCIN: CPT | Performed by: HOSPITALIST

## 2021-01-01 PROCEDURE — 80143 DRUG ASSAY ACETAMINOPHEN: CPT | Performed by: INTERNAL MEDICINE

## 2021-01-01 PROCEDURE — 82040 ASSAY OF SERUM ALBUMIN: CPT | Performed by: EMERGENCY MEDICINE

## 2021-01-01 PROCEDURE — 36415 COLL VENOUS BLD VENIPUNCTURE: CPT | Performed by: PHYSICIAN ASSISTANT

## 2021-01-01 PROCEDURE — 99285 EMERGENCY DEPT VISIT HI MDM: CPT | Mod: 25

## 2021-01-01 PROCEDURE — P9047 ALBUMIN (HUMAN), 25%, 50ML: HCPCS | Performed by: INTERNAL MEDICINE

## 2021-01-01 PROCEDURE — 83735 ASSAY OF MAGNESIUM: CPT | Performed by: INTERNAL MEDICINE

## 2021-01-01 PROCEDURE — 84450 TRANSFERASE (AST) (SGOT): CPT | Performed by: HOSPITALIST

## 2021-01-01 PROCEDURE — 49083 ABD PARACENTESIS W/IMAGING: CPT

## 2021-01-01 PROCEDURE — 85025 COMPLETE CBC W/AUTO DIFF WBC: CPT | Performed by: HOSPITALIST

## 2021-01-01 PROCEDURE — 49082 ABD PARACENTESIS: CPT

## 2021-01-01 PROCEDURE — 85027 COMPLETE CBC AUTOMATED: CPT | Performed by: INTERNAL MEDICINE

## 2021-01-01 PROCEDURE — 84157 ASSAY OF PROTEIN OTHER: CPT | Performed by: EMERGENCY MEDICINE

## 2021-01-01 PROCEDURE — 87040 BLOOD CULTURE FOR BACTERIA: CPT | Performed by: INTERNAL MEDICINE

## 2021-01-01 PROCEDURE — 91300 PR COVID VAC PFIZER DIL RECON 30 MCG/0.3 ML IM: CPT

## 2021-01-01 PROCEDURE — 85025 COMPLETE CBC W/AUTO DIFF WBC: CPT | Performed by: FAMILY MEDICINE

## 2021-01-01 PROCEDURE — 86923 COMPATIBILITY TEST ELECTRIC: CPT | Performed by: INTERNAL MEDICINE

## 2021-01-01 PROCEDURE — 85610 PROTHROMBIN TIME: CPT | Performed by: EMERGENCY MEDICINE

## 2021-01-01 PROCEDURE — 28470 CLTX METATARSAL FX WO MNP EA: CPT | Mod: T9

## 2021-01-01 PROCEDURE — 89051 BODY FLUID CELL COUNT: CPT | Performed by: EMERGENCY MEDICINE

## 2021-01-01 PROCEDURE — 82077 ASSAY SPEC XCP UR&BREATH IA: CPT | Performed by: INTERNAL MEDICINE

## 2021-01-01 PROCEDURE — 99291 CRITICAL CARE FIRST HOUR: CPT | Mod: 25

## 2021-01-01 PROCEDURE — 36415 COLL VENOUS BLD VENIPUNCTURE: CPT | Performed by: INTERNAL MEDICINE

## 2021-01-01 PROCEDURE — 83605 ASSAY OF LACTIC ACID: CPT | Performed by: EMERGENCY MEDICINE

## 2021-01-01 PROCEDURE — C9113 INJ PANTOPRAZOLE SODIUM, VIA: HCPCS | Performed by: HOSPITALIST

## 2021-01-01 PROCEDURE — 99223 1ST HOSP IP/OBS HIGH 75: CPT | Mod: AI | Performed by: INTERNAL MEDICINE

## 2021-01-01 PROCEDURE — 84132 ASSAY OF SERUM POTASSIUM: CPT | Performed by: HOSPITALIST

## 2021-01-01 PROCEDURE — 36415 COLL VENOUS BLD VENIPUNCTURE: CPT | Performed by: EMERGENCY MEDICINE

## 2021-01-01 PROCEDURE — 250N000009 HC RX 250: Performed by: PHYSICIAN ASSISTANT

## 2021-01-01 PROCEDURE — 250N000011 HC RX IP 250 OP 636: Performed by: PHYSICIAN ASSISTANT

## 2021-01-01 PROCEDURE — 82248 BILIRUBIN DIRECT: CPT | Performed by: PHYSICIAN ASSISTANT

## 2021-01-01 PROCEDURE — 258N000003 HC RX IP 258 OP 636: Performed by: HOSPITALIST

## 2021-01-01 PROCEDURE — 250N000011 HC RX IP 250 OP 636: Performed by: INTERNAL MEDICINE

## 2021-01-01 PROCEDURE — 258N000003 HC RX IP 258 OP 636: Performed by: INTERNAL MEDICINE

## 2021-01-01 PROCEDURE — 85025 COMPLETE CBC W/AUTO DIFF WBC: CPT | Performed by: EMERGENCY MEDICINE

## 2021-01-01 PROCEDURE — 82374 ASSAY BLOOD CARBON DIOXIDE: CPT | Performed by: HOSPITALIST

## 2021-01-01 PROCEDURE — 250N000011 HC RX IP 250 OP 636: Performed by: EMERGENCY MEDICINE

## 2021-01-01 PROCEDURE — 272N000706 US PARACENTESIS

## 2021-01-01 PROCEDURE — 83735 ASSAY OF MAGNESIUM: CPT | Performed by: HOSPITALIST

## 2021-01-01 PROCEDURE — 80053 COMPREHEN METABOLIC PANEL: CPT | Performed by: HOSPITALIST

## 2021-01-01 PROCEDURE — 250N000009 HC RX 250: Performed by: INTERNAL MEDICINE

## 2021-01-01 PROCEDURE — 85027 COMPLETE CBC AUTOMATED: CPT | Performed by: EMERGENCY MEDICINE

## 2021-01-01 PROCEDURE — 81001 URINALYSIS AUTO W/SCOPE: CPT | Performed by: PHYSICIAN ASSISTANT

## 2021-01-01 PROCEDURE — 120N000001 HC R&B MED SURG/OB

## 2021-01-01 PROCEDURE — 80048 BASIC METABOLIC PNL TOTAL CA: CPT | Performed by: INTERNAL MEDICINE

## 2021-01-01 PROCEDURE — 0W9G3ZX DRAINAGE OF PERITONEAL CAVITY, PERCUTANEOUS APPROACH, DIAGNOSTIC: ICD-10-PCS | Performed by: RADIOLOGY

## 2021-01-01 PROCEDURE — 71260 CT THORAX DX C+: CPT

## 2021-01-01 PROCEDURE — 99233 SBSQ HOSP IP/OBS HIGH 50: CPT | Performed by: HOSPITALIST

## 2021-01-01 PROCEDURE — 82248 BILIRUBIN DIRECT: CPT | Performed by: FAMILY MEDICINE

## 2021-01-01 PROCEDURE — 258N000003 HC RX IP 258 OP 636: Performed by: PHYSICIAN ASSISTANT

## 2021-01-01 PROCEDURE — 85610 PROTHROMBIN TIME: CPT | Performed by: INTERNAL MEDICINE

## 2021-01-01 PROCEDURE — 84100 ASSAY OF PHOSPHORUS: CPT | Performed by: INTERNAL MEDICINE

## 2021-01-01 PROCEDURE — 82042 OTHER SOURCE ALBUMIN QUAN EA: CPT | Performed by: INTERNAL MEDICINE

## 2021-01-01 PROCEDURE — C9113 INJ PANTOPRAZOLE SODIUM, VIA: HCPCS | Performed by: EMERGENCY MEDICINE

## 2021-01-01 PROCEDURE — 250N000011 HC RX IP 250 OP 636: Performed by: NURSE PRACTITIONER

## 2021-01-01 PROCEDURE — C9113 INJ PANTOPRAZOLE SODIUM, VIA: HCPCS | Performed by: PHYSICIAN ASSISTANT

## 2021-01-01 PROCEDURE — 97530 THERAPEUTIC ACTIVITIES: CPT | Mod: GP

## 2021-01-01 PROCEDURE — 93005 ELECTROCARDIOGRAM TRACING: CPT

## 2021-01-01 PROCEDURE — 85018 HEMOGLOBIN: CPT | Performed by: HOSPITALIST

## 2021-01-01 PROCEDURE — 82140 ASSAY OF AMMONIA: CPT | Performed by: EMERGENCY MEDICINE

## 2021-01-01 PROCEDURE — 36592 COLLECT BLOOD FROM PICC: CPT | Performed by: PHYSICIAN ASSISTANT

## 2021-01-01 PROCEDURE — 96375 TX/PRO/DX INJ NEW DRUG ADDON: CPT

## 2021-01-01 PROCEDURE — 84145 PROCALCITONIN (PCT): CPT | Performed by: INTERNAL MEDICINE

## 2021-01-01 PROCEDURE — 99214 OFFICE O/P EST MOD 30 MIN: CPT | Performed by: INTERNAL MEDICINE

## 2021-01-01 PROCEDURE — 250N000013 HC RX MED GY IP 250 OP 250 PS 637: Performed by: HOSPITALIST

## 2021-01-01 PROCEDURE — 96360 HYDRATION IV INFUSION INIT: CPT | Mod: 59

## 2021-01-01 PROCEDURE — 36592 COLLECT BLOOD FROM PICC: CPT | Performed by: INTERNAL MEDICINE

## 2021-01-01 PROCEDURE — 86923 COMPATIBILITY TEST ELECTRIC: CPT | Performed by: HOSPITALIST

## 2021-01-01 PROCEDURE — 96366 THER/PROPH/DIAG IV INF ADDON: CPT

## 2021-01-01 PROCEDURE — 0W9G3ZZ DRAINAGE OF PERITONEAL CAVITY, PERCUTANEOUS APPROACH: ICD-10-PCS | Performed by: RADIOLOGY

## 2021-01-01 PROCEDURE — P9016 RBC LEUKOCYTES REDUCED: HCPCS | Performed by: EMERGENCY MEDICINE

## 2021-01-01 PROCEDURE — 255N000002 HC RX 255 OP 636: Performed by: INTERNAL MEDICINE

## 2021-01-01 PROCEDURE — 96374 THER/PROPH/DIAG INJ IV PUSH: CPT | Mod: 59

## 2021-01-01 PROCEDURE — 82248 BILIRUBIN DIRECT: CPT | Performed by: HOSPITALIST

## 2021-01-01 PROCEDURE — 83690 ASSAY OF LIPASE: CPT | Performed by: EMERGENCY MEDICINE

## 2021-01-01 PROCEDURE — 96368 THER/DIAG CONCURRENT INF: CPT

## 2021-01-01 PROCEDURE — 99231 SBSQ HOSP IP/OBS SF/LOW 25: CPT | Performed by: CLINICAL NURSE SPECIALIST

## 2021-01-01 PROCEDURE — 85025 COMPLETE CBC W/AUTO DIFF WBC: CPT | Performed by: INTERNAL MEDICINE

## 2021-01-01 PROCEDURE — 99495 TRANSJ CARE MGMT MOD F2F 14D: CPT | Performed by: FAMILY MEDICINE

## 2021-01-01 PROCEDURE — 120N000004 HC R&B MS OVERFLOW

## 2021-01-01 PROCEDURE — 85027 COMPLETE CBC AUTOMATED: CPT | Performed by: HOSPITALIST

## 2021-01-01 PROCEDURE — 99223 1ST HOSP IP/OBS HIGH 75: CPT | Performed by: NURSE PRACTITIONER

## 2021-01-01 PROCEDURE — 84484 ASSAY OF TROPONIN QUANT: CPT | Performed by: PHYSICIAN ASSISTANT

## 2021-01-01 PROCEDURE — P9016 RBC LEUKOCYTES REDUCED: HCPCS | Performed by: HOSPITALIST

## 2021-01-01 PROCEDURE — 99284 EMERGENCY DEPT VISIT MOD MDM: CPT | Mod: 25

## 2021-01-01 PROCEDURE — 250N000013 HC RX MED GY IP 250 OP 250 PS 637: Performed by: CLINICAL NURSE SPECIALIST

## 2021-01-01 PROCEDURE — 99495 TRANSJ CARE MGMT MOD F2F 14D: CPT | Mod: 95 | Performed by: INTERNAL MEDICINE

## 2021-01-01 PROCEDURE — 74183 MRI ABD W/O CNTR FLWD CNTR: CPT

## 2021-01-01 PROCEDURE — 83605 ASSAY OF LACTIC ACID: CPT | Performed by: HOSPITALIST

## 2021-01-01 PROCEDURE — P9016 RBC LEUKOCYTES REDUCED: HCPCS | Performed by: INTERNAL MEDICINE

## 2021-01-01 PROCEDURE — 85025 COMPLETE CBC W/AUTO DIFF WBC: CPT | Performed by: PHYSICIAN ASSISTANT

## 2021-01-01 PROCEDURE — 84484 ASSAY OF TROPONIN QUANT: CPT | Performed by: EMERGENCY MEDICINE

## 2021-01-01 PROCEDURE — 0001A PR COVID VAC PFIZER DIL RECON 30 MCG/0.3 ML IM: CPT

## 2021-01-01 PROCEDURE — 87635 SARS-COV-2 COVID-19 AMP PRB: CPT | Performed by: EMERGENCY MEDICINE

## 2021-01-01 PROCEDURE — A9585 GADOBUTROL INJECTION: HCPCS | Performed by: INTERNAL MEDICINE

## 2021-01-01 PROCEDURE — 71275 CT ANGIOGRAPHY CHEST: CPT

## 2021-01-01 PROCEDURE — 82805 BLOOD GASES W/O2 SATURATION: CPT | Performed by: EMERGENCY MEDICINE

## 2021-01-01 PROCEDURE — 84155 ASSAY OF PROTEIN SERUM: CPT | Performed by: HOSPITALIST

## 2021-01-01 PROCEDURE — 96365 THER/PROPH/DIAG IV INF INIT: CPT | Mod: 59

## 2021-01-01 PROCEDURE — 87635 SARS-COV-2 COVID-19 AMP PRB: CPT | Performed by: INTERNAL MEDICINE

## 2021-01-01 PROCEDURE — 73610 X-RAY EXAM OF ANKLE: CPT | Mod: RT

## 2021-01-01 PROCEDURE — 83690 ASSAY OF LIPASE: CPT | Performed by: PHYSICIAN ASSISTANT

## 2021-01-01 PROCEDURE — 86923 COMPATIBILITY TEST ELECTRIC: CPT | Performed by: EMERGENCY MEDICINE

## 2021-01-01 PROCEDURE — 80053 COMPREHEN METABOLIC PANEL: CPT | Performed by: FAMILY MEDICINE

## 2021-01-01 PROCEDURE — 85610 PROTHROMBIN TIME: CPT | Performed by: FAMILY MEDICINE

## 2021-01-01 PROCEDURE — 36415 COLL VENOUS BLD VENIPUNCTURE: CPT | Performed by: FAMILY MEDICINE

## 2021-01-01 PROCEDURE — 99223 1ST HOSP IP/OBS HIGH 75: CPT | Mod: AI | Performed by: HOSPITALIST

## 2021-01-01 PROCEDURE — 76705 ECHO EXAM OF ABDOMEN: CPT

## 2021-01-01 PROCEDURE — 258N000003 HC RX IP 258 OP 636: Performed by: EMERGENCY MEDICINE

## 2021-01-01 PROCEDURE — 80048 BASIC METABOLIC PNL TOTAL CA: CPT | Performed by: HOSPITALIST

## 2021-01-01 PROCEDURE — 250N000009 HC RX 250

## 2021-01-01 PROCEDURE — 97161 PT EVAL LOW COMPLEX 20 MIN: CPT | Mod: GP

## 2021-01-01 PROCEDURE — 85018 HEMOGLOBIN: CPT | Performed by: EMERGENCY MEDICINE

## 2021-01-01 PROCEDURE — 82565 ASSAY OF CREATININE: CPT | Performed by: INTERNAL MEDICINE

## 2021-01-01 PROCEDURE — 0W9G3ZZ DRAINAGE OF PERITONEAL CAVITY, PERCUTANEOUS APPROACH: ICD-10-PCS | Performed by: EMERGENCY MEDICINE

## 2021-01-01 PROCEDURE — 0002A PR COVID VAC PFIZER DIL RECON 30 MCG/0.3 ML IM: CPT

## 2021-01-01 PROCEDURE — 80053 COMPREHEN METABOLIC PANEL: CPT | Performed by: EMERGENCY MEDICINE

## 2021-01-01 PROCEDURE — 87635 SARS-COV-2 COVID-19 AMP PRB: CPT | Performed by: PHYSICIAN ASSISTANT

## 2021-01-01 PROCEDURE — 82272 OCCULT BLD FECES 1-3 TESTS: CPT | Performed by: EMERGENCY MEDICINE

## 2021-01-01 PROCEDURE — 80076 HEPATIC FUNCTION PANEL: CPT | Performed by: INTERNAL MEDICINE

## 2021-01-01 PROCEDURE — 36430 TRANSFUSION BLD/BLD COMPNT: CPT

## 2021-01-01 PROCEDURE — 99231 SBSQ HOSP IP/OBS SF/LOW 25: CPT | Performed by: NURSE PRACTITIONER

## 2021-01-01 PROCEDURE — 82042 OTHER SOURCE ALBUMIN QUAN EA: CPT | Performed by: EMERGENCY MEDICINE

## 2021-01-01 PROCEDURE — U0003 INFECTIOUS AGENT DETECTION BY NUCLEIC ACID (DNA OR RNA); SEVERE ACUTE RESPIRATORY SYNDROME CORONAVIRUS 2 (SARS-COV-2) (CORONAVIRUS DISEASE [COVID-19]), AMPLIFIED PROBE TECHNIQUE, MAKING USE OF HIGH THROUGHPUT TECHNOLOGIES AS DESCRIBED BY CMS-2020-01-R: HCPCS | Performed by: INTERNAL MEDICINE

## 2021-01-01 PROCEDURE — 250N000013 HC RX MED GY IP 250 OP 250 PS 637: Performed by: PHYSICIAN ASSISTANT

## 2021-01-01 PROCEDURE — 84132 ASSAY OF SERUM POTASSIUM: CPT | Performed by: INTERNAL MEDICINE

## 2021-01-01 PROCEDURE — 86900 BLOOD TYPING SEROLOGIC ABO: CPT | Performed by: EMERGENCY MEDICINE

## 2021-01-01 PROCEDURE — 250N000009 HC RX 250: Performed by: EMERGENCY MEDICINE

## 2021-01-01 PROCEDURE — 85610 PROTHROMBIN TIME: CPT | Performed by: HOSPITALIST

## 2021-01-01 PROCEDURE — 250N000011 HC RX IP 250 OP 636

## 2021-01-01 PROCEDURE — 99214 OFFICE O/P EST MOD 30 MIN: CPT | Mod: 95 | Performed by: INTERNAL MEDICINE

## 2021-01-01 PROCEDURE — 87070 CULTURE OTHR SPECIMN AEROBIC: CPT | Performed by: EMERGENCY MEDICINE

## 2021-01-01 PROCEDURE — 99356 PR PROLONGED SERV,INPATIENT,1ST HR: CPT | Performed by: CLINICAL NURSE SPECIALIST

## 2021-01-01 PROCEDURE — 82247 BILIRUBIN TOTAL: CPT | Performed by: EMERGENCY MEDICINE

## 2021-01-01 PROCEDURE — 97116 GAIT TRAINING THERAPY: CPT | Mod: GP

## 2021-01-01 PROCEDURE — 87070 CULTURE OTHR SPECIMN AEROBIC: CPT | Performed by: INTERNAL MEDICINE

## 2021-01-01 PROCEDURE — 85004 AUTOMATED DIFF WBC COUNT: CPT | Performed by: PHYSICIAN ASSISTANT

## 2021-01-01 PROCEDURE — 84484 ASSAY OF TROPONIN QUANT: CPT | Performed by: INTERNAL MEDICINE

## 2021-01-01 PROCEDURE — 73630 X-RAY EXAM OF FOOT: CPT | Mod: RT

## 2021-01-01 PROCEDURE — 85049 AUTOMATED PLATELET COUNT: CPT | Performed by: HOSPITALIST

## 2021-01-01 RX ORDER — FAMOTIDINE 20 MG/1
20 TABLET, FILM COATED ORAL 2 TIMES DAILY
Status: DISCONTINUED | OUTPATIENT
Start: 2021-01-01 | End: 2021-01-01 | Stop reason: HOSPADM

## 2021-01-01 RX ORDER — ONDANSETRON 4 MG/1
4 TABLET, ORALLY DISINTEGRATING ORAL EVERY 6 HOURS PRN
Status: DISCONTINUED | OUTPATIENT
Start: 2021-01-01 | End: 2021-01-01

## 2021-01-01 RX ORDER — LIDOCAINE 40 MG/G
CREAM TOPICAL
Status: DISCONTINUED | OUTPATIENT
Start: 2021-01-01 | End: 2021-01-01 | Stop reason: HOSPADM

## 2021-01-01 RX ORDER — OXYCODONE HYDROCHLORIDE 5 MG/1
5 TABLET ORAL EVERY 8 HOURS PRN
Status: DISCONTINUED | OUTPATIENT
Start: 2021-01-01 | End: 2021-01-01

## 2021-01-01 RX ORDER — FUROSEMIDE 10 MG/ML
20 INJECTION INTRAMUSCULAR; INTRAVENOUS ONCE
Status: COMPLETED | OUTPATIENT
Start: 2021-01-01 | End: 2021-01-01

## 2021-01-01 RX ORDER — HYDROMORPHONE HCL IN WATER/PF 6 MG/30 ML
0.2 PATIENT CONTROLLED ANALGESIA SYRINGE INTRAVENOUS
Status: DISCONTINUED | OUTPATIENT
Start: 2021-01-01 | End: 2021-01-01 | Stop reason: HOSPADM

## 2021-01-01 RX ORDER — ONDANSETRON 2 MG/ML
4 INJECTION INTRAMUSCULAR; INTRAVENOUS EVERY 6 HOURS PRN
Status: DISCONTINUED | OUTPATIENT
Start: 2021-01-01 | End: 2021-01-01

## 2021-01-01 RX ORDER — ACETAMINOPHEN 650 MG/1
650 SUPPOSITORY RECTAL EVERY 4 HOURS PRN
Status: DISCONTINUED | OUTPATIENT
Start: 2021-01-01 | End: 2021-01-01 | Stop reason: HOSPADM

## 2021-01-01 RX ORDER — SODIUM CHLORIDE 9 MG/ML
INJECTION, SOLUTION INTRAVENOUS CONTINUOUS
Status: DISCONTINUED | OUTPATIENT
Start: 2021-01-01 | End: 2021-01-01

## 2021-01-01 RX ORDER — OXYCODONE HYDROCHLORIDE 5 MG/1
10 TABLET ORAL ONCE
Status: COMPLETED | OUTPATIENT
Start: 2021-01-01 | End: 2021-01-01

## 2021-01-01 RX ORDER — AMOXICILLIN 250 MG
2 CAPSULE ORAL 2 TIMES DAILY PRN
Status: DISCONTINUED | OUTPATIENT
Start: 2021-01-01 | End: 2021-01-01 | Stop reason: HOSPADM

## 2021-01-01 RX ORDER — SUCRALFATE 1 G/1
1 TABLET ORAL 4 TIMES DAILY
Qty: 28 TABLET | Refills: 0 | Status: ON HOLD | OUTPATIENT
Start: 2021-01-01 | End: 2021-01-01

## 2021-01-01 RX ORDER — HYDROXYZINE HYDROCHLORIDE 25 MG/1
25 TABLET, FILM COATED ORAL EVERY 6 HOURS PRN
Status: DISCONTINUED | OUTPATIENT
Start: 2021-01-01 | End: 2021-01-01

## 2021-01-01 RX ORDER — HYDROMORPHONE HYDROCHLORIDE 2 MG/1
2 TABLET ORAL
Status: DISCONTINUED | OUTPATIENT
Start: 2021-01-01 | End: 2021-01-01

## 2021-01-01 RX ORDER — ONDANSETRON 4 MG/1
4 TABLET, ORALLY DISINTEGRATING ORAL EVERY 8 HOURS PRN
Qty: 10 TABLET | Refills: 0 | Status: SHIPPED | OUTPATIENT
Start: 2021-01-01 | End: 2021-01-01

## 2021-01-01 RX ORDER — SUCRALFATE 1 G/1
1 TABLET ORAL 4 TIMES DAILY
Status: DISCONTINUED | OUTPATIENT
Start: 2021-01-01 | End: 2021-01-01

## 2021-01-01 RX ORDER — NALOXONE HYDROCHLORIDE 0.4 MG/ML
0.4 INJECTION, SOLUTION INTRAMUSCULAR; INTRAVENOUS; SUBCUTANEOUS
Status: DISCONTINUED | OUTPATIENT
Start: 2021-01-01 | End: 2021-01-01 | Stop reason: HOSPADM

## 2021-01-01 RX ORDER — PANTOPRAZOLE SODIUM 40 MG/1
40 TABLET, DELAYED RELEASE ORAL
Status: DISCONTINUED | OUTPATIENT
Start: 2021-01-01 | End: 2021-01-01

## 2021-01-01 RX ORDER — POLYETHYLENE GLYCOL 3350 17 G/17G
17 POWDER, FOR SOLUTION ORAL DAILY
Qty: 510 G | Refills: 0 | Status: SHIPPED | OUTPATIENT
Start: 2021-01-01

## 2021-01-01 RX ORDER — SODIUM POLYSTYRENE SULFONATE 15 G/60ML
30 SUSPENSION ORAL; RECTAL ONCE
Status: COMPLETED | OUTPATIENT
Start: 2021-01-01 | End: 2021-01-01

## 2021-01-01 RX ORDER — NALOXONE HYDROCHLORIDE 0.4 MG/ML
0.4 INJECTION, SOLUTION INTRAMUSCULAR; INTRAVENOUS; SUBCUTANEOUS
Status: DISCONTINUED | OUTPATIENT
Start: 2021-01-01 | End: 2021-01-01

## 2021-01-01 RX ORDER — SODIUM CHLORIDE 9 MG/ML
INJECTION, SOLUTION INTRAVENOUS CONTINUOUS
Status: DISCONTINUED | OUTPATIENT
Start: 2021-01-01 | End: 2021-01-01 | Stop reason: HOSPADM

## 2021-01-01 RX ORDER — HYDROMORPHONE HYDROCHLORIDE 2 MG/1
2 TABLET ORAL
Status: DISCONTINUED | OUTPATIENT
Start: 2021-01-01 | End: 2021-01-01 | Stop reason: HOSPADM

## 2021-01-01 RX ORDER — IOPAMIDOL 755 MG/ML
500 INJECTION, SOLUTION INTRAVASCULAR ONCE
Status: COMPLETED | OUTPATIENT
Start: 2021-01-01 | End: 2021-01-01

## 2021-01-01 RX ORDER — SODIUM POLYSTYRENE SULFONATE 15 G/60ML
15 SUSPENSION ORAL; RECTAL ONCE
Status: COMPLETED | OUTPATIENT
Start: 2021-01-01 | End: 2021-01-01

## 2021-01-01 RX ORDER — DRONABINOL 5 MG/1
5 CAPSULE ORAL DAILY
Qty: 30 CAPSULE | Refills: 1 | Status: SHIPPED | OUTPATIENT
Start: 2021-01-01

## 2021-01-01 RX ORDER — HYDROXYZINE HYDROCHLORIDE 10 MG/1
10 TABLET, FILM COATED ORAL EVERY 6 HOURS PRN
Status: DISCONTINUED | OUTPATIENT
Start: 2021-01-01 | End: 2021-01-01 | Stop reason: HOSPADM

## 2021-01-01 RX ORDER — CHLORPROMAZINE HYDROCHLORIDE 10 MG/1
10 TABLET, FILM COATED ORAL 3 TIMES DAILY PRN
Status: DISCONTINUED | OUTPATIENT
Start: 2021-01-01 | End: 2021-01-01

## 2021-01-01 RX ORDER — OXYCODONE HYDROCHLORIDE 5 MG/1
5 TABLET ORAL
Status: DISCONTINUED | OUTPATIENT
Start: 2021-01-01 | End: 2021-01-01 | Stop reason: HOSPADM

## 2021-01-01 RX ORDER — ALBUMIN (HUMAN) 12.5 G/50ML
12.5 SOLUTION INTRAVENOUS ONCE
Status: COMPLETED | OUTPATIENT
Start: 2021-01-01 | End: 2021-01-01

## 2021-01-01 RX ORDER — ACETAMINOPHEN 650 MG/1
325 SUPPOSITORY RECTAL EVERY 4 HOURS PRN
Status: DISCONTINUED | OUTPATIENT
Start: 2021-01-01 | End: 2021-01-01

## 2021-01-01 RX ORDER — LIDOCAINE 4 G/G
1 PATCH TOPICAL
Status: DISCONTINUED | OUTPATIENT
Start: 2021-01-01 | End: 2021-01-01

## 2021-01-01 RX ORDER — NALOXONE HYDROCHLORIDE 0.4 MG/ML
0.2 INJECTION, SOLUTION INTRAMUSCULAR; INTRAVENOUS; SUBCUTANEOUS
Status: DISCONTINUED | OUTPATIENT
Start: 2021-01-01 | End: 2021-01-01 | Stop reason: HOSPADM

## 2021-01-01 RX ORDER — OXYCODONE HYDROCHLORIDE 5 MG/1
5-10 TABLET ORAL EVERY 6 HOURS PRN
Qty: 12 TABLET | Refills: 0 | Status: SHIPPED | OUTPATIENT
Start: 2021-01-01 | End: 2021-01-01

## 2021-01-01 RX ORDER — BISACODYL 10 MG
10 SUPPOSITORY, RECTAL RECTAL DAILY PRN
Status: DISCONTINUED | OUTPATIENT
Start: 2021-01-01 | End: 2021-01-01 | Stop reason: HOSPADM

## 2021-01-01 RX ORDER — HYDROMORPHONE HYDROCHLORIDE 1 MG/ML
0.5 INJECTION, SOLUTION INTRAMUSCULAR; INTRAVENOUS; SUBCUTANEOUS ONCE
Status: DISCONTINUED | OUTPATIENT
Start: 2021-01-01 | End: 2021-01-01

## 2021-01-01 RX ORDER — SPIRONOLACTONE 100 MG/1
100 TABLET, FILM COATED ORAL DAILY
Status: ON HOLD | COMMUNITY
End: 2021-01-01

## 2021-01-01 RX ORDER — PANTOPRAZOLE SODIUM 40 MG/1
40 TABLET, DELAYED RELEASE ORAL
Status: DISCONTINUED | OUTPATIENT
Start: 2021-01-01 | End: 2021-01-01 | Stop reason: HOSPADM

## 2021-01-01 RX ORDER — DRONABINOL 5 MG/1
5 CAPSULE ORAL DAILY
Qty: 30 CAPSULE | Refills: 0 | Status: SHIPPED | OUTPATIENT
Start: 2021-01-01 | End: 2021-01-01

## 2021-01-01 RX ORDER — MORPHINE SULFATE 2 MG/ML
2 INJECTION, SOLUTION INTRAMUSCULAR; INTRAVENOUS ONCE
Status: COMPLETED | OUTPATIENT
Start: 2021-01-01 | End: 2021-01-01

## 2021-01-01 RX ORDER — GUAIFENESIN/DEXTROMETHORPHAN 100-10MG/5
10 SYRUP ORAL EVERY 4 HOURS PRN
Status: DISCONTINUED | OUTPATIENT
Start: 2021-01-01 | End: 2021-01-01

## 2021-01-01 RX ORDER — ONDANSETRON 4 MG/1
4-8 TABLET, ORALLY DISINTEGRATING ORAL EVERY 6 HOURS
Status: DISCONTINUED | OUTPATIENT
Start: 2021-01-01 | End: 2021-01-01 | Stop reason: HOSPADM

## 2021-01-01 RX ORDER — HYDROMORPHONE HYDROCHLORIDE 1 MG/ML
0.5 INJECTION, SOLUTION INTRAMUSCULAR; INTRAVENOUS; SUBCUTANEOUS
Status: DISCONTINUED | OUTPATIENT
Start: 2021-01-01 | End: 2021-01-01 | Stop reason: HOSPADM

## 2021-01-01 RX ORDER — FUROSEMIDE 20 MG
20 TABLET ORAL DAILY
Qty: 30 TABLET | Refills: 0 | Status: SHIPPED | OUTPATIENT
Start: 2021-01-01 | End: 2021-01-01

## 2021-01-01 RX ORDER — OXYCODONE HYDROCHLORIDE 5 MG/1
5-10 TABLET ORAL EVERY 4 HOURS PRN
Status: DISCONTINUED | OUTPATIENT
Start: 2021-01-01 | End: 2021-01-01

## 2021-01-01 RX ORDER — NALOXONE HYDROCHLORIDE 0.4 MG/ML
0.2 INJECTION, SOLUTION INTRAMUSCULAR; INTRAVENOUS; SUBCUTANEOUS
Status: DISCONTINUED | OUTPATIENT
Start: 2021-01-01 | End: 2021-01-01

## 2021-01-01 RX ORDER — OMEPRAZOLE 40 MG/1
40 CAPSULE, DELAYED RELEASE ORAL DAILY
COMMUNITY
End: 2021-01-01

## 2021-01-01 RX ORDER — FAMOTIDINE 20 MG/1
20 TABLET, FILM COATED ORAL 2 TIMES DAILY
Qty: 60 TABLET | Refills: 1 | Status: SHIPPED | OUTPATIENT
Start: 2021-01-01

## 2021-01-01 RX ORDER — LORAZEPAM 0.5 MG/1
0.5 TABLET ORAL EVERY 4 HOURS PRN
Status: DISCONTINUED | OUTPATIENT
Start: 2021-01-01 | End: 2021-01-01 | Stop reason: HOSPADM

## 2021-01-01 RX ORDER — OMEPRAZOLE 40 MG/1
40 CAPSULE, DELAYED RELEASE ORAL DAILY
Qty: 30 CAPSULE | Refills: 1 | Status: SHIPPED | OUTPATIENT
Start: 2021-01-01

## 2021-01-01 RX ORDER — VANCOMYCIN HYDROCHLORIDE 1 G/200ML
1000 INJECTION, SOLUTION INTRAVENOUS EVERY 24 HOURS
Status: DISCONTINUED | OUTPATIENT
Start: 2021-01-01 | End: 2021-01-01

## 2021-01-01 RX ORDER — LIDOCAINE HYDROCHLORIDE 10 MG/ML
10 INJECTION, SOLUTION EPIDURAL; INFILTRATION; INTRACAUDAL; PERINEURAL ONCE
Status: COMPLETED | OUTPATIENT
Start: 2021-01-01 | End: 2021-01-01

## 2021-01-01 RX ORDER — SODIUM CHLORIDE, SODIUM LACTATE, POTASSIUM CHLORIDE, CALCIUM CHLORIDE 600; 310; 30; 20 MG/100ML; MG/100ML; MG/100ML; MG/100ML
INJECTION, SOLUTION INTRAVENOUS CONTINUOUS
Status: DISCONTINUED | OUTPATIENT
Start: 2021-01-01 | End: 2021-01-01

## 2021-01-01 RX ORDER — OCTREOTIDE ACETATE 50 UG/ML
50 INJECTION, SOLUTION INTRAVENOUS; SUBCUTANEOUS ONCE
Status: COMPLETED | OUTPATIENT
Start: 2021-01-01 | End: 2021-01-01

## 2021-01-01 RX ORDER — POLYETHYLENE GLYCOL 3350 17 G/17G
1 POWDER, FOR SOLUTION ORAL DAILY
Qty: 527 G | Refills: 0 | Status: SHIPPED | OUTPATIENT
Start: 2021-01-01 | End: 2021-01-01

## 2021-01-01 RX ORDER — OXYCODONE HYDROCHLORIDE 5 MG/1
5 TABLET ORAL EVERY 8 HOURS PRN
Qty: 30 TABLET | Refills: 0 | Status: SHIPPED | OUTPATIENT
Start: 2021-01-01

## 2021-01-01 RX ORDER — HYDROMORPHONE HYDROCHLORIDE 1 MG/ML
0.5 INJECTION, SOLUTION INTRAMUSCULAR; INTRAVENOUS; SUBCUTANEOUS ONCE
Status: COMPLETED | OUTPATIENT
Start: 2021-01-01 | End: 2021-01-01

## 2021-01-01 RX ORDER — CEFTRIAXONE 1 G/1
1 INJECTION, POWDER, FOR SOLUTION INTRAMUSCULAR; INTRAVENOUS ONCE
Status: DISCONTINUED | OUTPATIENT
Start: 2021-01-01 | End: 2021-01-01

## 2021-01-01 RX ORDER — ONDANSETRON 2 MG/ML
4 INJECTION INTRAMUSCULAR; INTRAVENOUS ONCE
Status: COMPLETED | OUTPATIENT
Start: 2021-01-01 | End: 2021-01-01

## 2021-01-01 RX ORDER — OXYCODONE HYDROCHLORIDE 5 MG/1
5 TABLET ORAL EVERY 4 HOURS PRN
Status: DISCONTINUED | OUTPATIENT
Start: 2021-01-01 | End: 2021-01-01

## 2021-01-01 RX ORDER — LANOLIN ALCOHOL/MO/W.PET/CERES
3 CREAM (GRAM) TOPICAL
Status: DISCONTINUED | OUTPATIENT
Start: 2021-01-01 | End: 2021-01-01 | Stop reason: HOSPADM

## 2021-01-01 RX ORDER — FAMOTIDINE 20 MG/1
20 TABLET, FILM COATED ORAL 2 TIMES DAILY
Status: DISCONTINUED | OUTPATIENT
Start: 2021-01-01 | End: 2021-01-01

## 2021-01-01 RX ORDER — CHLORPROMAZINE HYDROCHLORIDE 25 MG/ML
25 INJECTION INTRAMUSCULAR 3 TIMES DAILY PRN
Status: DISCONTINUED | OUTPATIENT
Start: 2021-01-01 | End: 2021-01-01

## 2021-01-01 RX ORDER — MAGNESIUM HYDROXIDE/ALUMINUM HYDROXICE/SIMETHICONE 120; 1200; 1200 MG/30ML; MG/30ML; MG/30ML
30 SUSPENSION ORAL EVERY 4 HOURS PRN
Status: DISCONTINUED | OUTPATIENT
Start: 2021-01-01 | End: 2021-01-01

## 2021-01-01 RX ORDER — HYDROMORPHONE HYDROCHLORIDE 4 MG/1
4 TABLET ORAL
Status: DISCONTINUED | OUTPATIENT
Start: 2021-01-01 | End: 2021-01-01 | Stop reason: HOSPADM

## 2021-01-01 RX ORDER — DRONABINOL 2.5 MG/1
5 CAPSULE ORAL DAILY
Status: DISCONTINUED | OUTPATIENT
Start: 2021-01-01 | End: 2021-01-01 | Stop reason: HOSPADM

## 2021-01-01 RX ORDER — AMOXICILLIN 250 MG
2 CAPSULE ORAL 2 TIMES DAILY
Status: DISCONTINUED | OUTPATIENT
Start: 2021-01-01 | End: 2021-01-01 | Stop reason: HOSPADM

## 2021-01-01 RX ORDER — HYDROMORPHONE HCL IN WATER/PF 6 MG/30 ML
0.4 PATIENT CONTROLLED ANALGESIA SYRINGE INTRAVENOUS
Status: DISCONTINUED | OUTPATIENT
Start: 2021-01-01 | End: 2021-01-01 | Stop reason: HOSPADM

## 2021-01-01 RX ORDER — AMOXICILLIN 250 MG
2 CAPSULE ORAL 2 TIMES DAILY
Qty: 60 TABLET | Refills: 0 | Status: SHIPPED | OUTPATIENT
Start: 2021-01-01

## 2021-01-01 RX ORDER — GADOBUTROL 604.72 MG/ML
7.5 INJECTION INTRAVENOUS ONCE
Status: COMPLETED | OUTPATIENT
Start: 2021-01-01 | End: 2021-01-01

## 2021-01-01 RX ORDER — SODIUM CHLORIDE 9 MG/ML
INJECTION, SOLUTION INTRAVENOUS CONTINUOUS
Status: ACTIVE | OUTPATIENT
Start: 2021-01-01 | End: 2021-01-01

## 2021-01-01 RX ORDER — POLYETHYLENE GLYCOL 3350 17 G/17G
17 POWDER, FOR SOLUTION ORAL DAILY
Status: DISCONTINUED | OUTPATIENT
Start: 2021-01-01 | End: 2021-01-01 | Stop reason: HOSPADM

## 2021-01-01 RX ORDER — LIDOCAINE 40 MG/G
CREAM TOPICAL
Status: DISCONTINUED | OUTPATIENT
Start: 2021-01-01 | End: 2021-01-01

## 2021-01-01 RX ORDER — DRONABINOL 2.5 MG/1
2.5 CAPSULE ORAL DAILY
Status: DISCONTINUED | OUTPATIENT
Start: 2021-01-01 | End: 2021-01-01

## 2021-01-01 RX ORDER — ALBUMIN (HUMAN) 12.5 G/50ML
25 SOLUTION INTRAVENOUS ONCE
Status: COMPLETED | OUTPATIENT
Start: 2021-01-01 | End: 2021-01-01

## 2021-01-01 RX ORDER — HYDROMORPHONE HYDROCHLORIDE 1 MG/ML
0.5 INJECTION, SOLUTION INTRAMUSCULAR; INTRAVENOUS; SUBCUTANEOUS
Status: DISCONTINUED | OUTPATIENT
Start: 2021-01-01 | End: 2021-01-01

## 2021-01-01 RX ORDER — ACETAMINOPHEN 325 MG/1
325 TABLET ORAL EVERY 4 HOURS PRN
Status: DISCONTINUED | OUTPATIENT
Start: 2021-01-01 | End: 2021-01-01

## 2021-01-01 RX ORDER — POLYETHYLENE GLYCOL 3350 17 G/17G
17 POWDER, FOR SOLUTION ORAL DAILY PRN
Status: DISCONTINUED | OUTPATIENT
Start: 2021-01-01 | End: 2021-01-01 | Stop reason: HOSPADM

## 2021-01-01 RX ORDER — LIDOCAINE 4 G/G
1 PATCH TOPICAL
Status: DISCONTINUED | OUTPATIENT
Start: 2021-01-01 | End: 2021-01-01 | Stop reason: HOSPADM

## 2021-01-01 RX ORDER — ACETAMINOPHEN 500 MG
1000 TABLET ORAL ONCE
Status: DISCONTINUED | OUTPATIENT
Start: 2021-01-01 | End: 2021-01-01

## 2021-01-01 RX ORDER — MENTHOL
LOZENGE MUCOUS MEMBRANE DAILY PRN
Status: DISCONTINUED | OUTPATIENT
Start: 2021-01-01 | End: 2021-01-01 | Stop reason: HOSPADM

## 2021-01-01 RX ORDER — MORPHINE SULFATE 2 MG/ML
INJECTION, SOLUTION INTRAMUSCULAR; INTRAVENOUS
Status: COMPLETED
Start: 2021-01-01 | End: 2021-01-01

## 2021-01-01 RX ORDER — OXYCODONE HYDROCHLORIDE 5 MG/1
5 TABLET ORAL EVERY 6 HOURS PRN
Qty: 10 TABLET | Refills: 0 | Status: SHIPPED | OUTPATIENT
Start: 2021-01-01 | End: 2021-01-01

## 2021-01-01 RX ORDER — CIPROFLOXACIN 500 MG/1
1 TABLET, FILM COATED ORAL 2 TIMES DAILY
COMMUNITY
Start: 2021-01-01

## 2021-01-01 RX ORDER — ONDANSETRON 2 MG/ML
4-8 INJECTION INTRAMUSCULAR; INTRAVENOUS EVERY 6 HOURS
Status: DISCONTINUED | OUTPATIENT
Start: 2021-01-01 | End: 2021-01-01 | Stop reason: HOSPADM

## 2021-01-01 RX ORDER — HYDROMORPHONE HYDROCHLORIDE 1 MG/ML
0.3 INJECTION, SOLUTION INTRAMUSCULAR; INTRAVENOUS; SUBCUTANEOUS
Status: DISCONTINUED | OUTPATIENT
Start: 2021-01-01 | End: 2021-01-01

## 2021-01-01 RX ORDER — FUROSEMIDE 40 MG
40 TABLET ORAL DAILY
Status: ON HOLD | COMMUNITY
Start: 2021-01-01 | End: 2021-01-01

## 2021-01-01 RX ORDER — ERTAPENEM 1 G/1
1 INJECTION, POWDER, LYOPHILIZED, FOR SOLUTION INTRAMUSCULAR; INTRAVENOUS EVERY 24 HOURS
Status: COMPLETED | OUTPATIENT
Start: 2021-01-01 | End: 2021-01-01

## 2021-01-01 RX ORDER — FUROSEMIDE 20 MG
20 TABLET ORAL DAILY
Qty: 30 TABLET | Refills: 1 | Status: SHIPPED | OUTPATIENT
Start: 2021-01-01

## 2021-01-01 RX ORDER — ONDANSETRON 2 MG/ML
4 INJECTION INTRAMUSCULAR; INTRAVENOUS EVERY 6 HOURS PRN
Status: DISCONTINUED | OUTPATIENT
Start: 2021-01-01 | End: 2021-01-01 | Stop reason: HOSPADM

## 2021-01-01 RX ORDER — METOCLOPRAMIDE 5 MG/1
5 TABLET ORAL EVERY 8 HOURS PRN
Status: DISCONTINUED | OUTPATIENT
Start: 2021-01-01 | End: 2021-01-01 | Stop reason: HOSPADM

## 2021-01-01 RX ORDER — BENZONATATE 100 MG/1
100 CAPSULE ORAL 3 TIMES DAILY PRN
Status: DISCONTINUED | OUTPATIENT
Start: 2021-01-01 | End: 2021-01-01 | Stop reason: HOSPADM

## 2021-01-01 RX ORDER — LORAZEPAM 2 MG/ML
0.5 INJECTION INTRAMUSCULAR EVERY 4 HOURS PRN
Status: DISCONTINUED | OUTPATIENT
Start: 2021-01-01 | End: 2021-01-01 | Stop reason: HOSPADM

## 2021-01-01 RX ORDER — FLUORIDE TOOTHPASTE
5 TOOTHPASTE DENTAL 4 TIMES DAILY
Status: DISCONTINUED | OUTPATIENT
Start: 2021-01-01 | End: 2021-01-01 | Stop reason: HOSPADM

## 2021-01-01 RX ORDER — SENNOSIDES 8.6 MG
1-2 TABLET ORAL 2 TIMES DAILY PRN
Status: DISCONTINUED | OUTPATIENT
Start: 2021-01-01 | End: 2021-01-01 | Stop reason: HOSPADM

## 2021-01-01 RX ORDER — ACETAMINOPHEN 325 MG/1
650 TABLET ORAL EVERY 4 HOURS PRN
Status: DISCONTINUED | OUTPATIENT
Start: 2021-01-01 | End: 2021-01-01 | Stop reason: HOSPADM

## 2021-01-01 RX ORDER — LANOLIN ALCOHOL/MO/W.PET/CERES
3 CREAM (GRAM) TOPICAL
Status: DISCONTINUED | OUTPATIENT
Start: 2021-01-01 | End: 2021-01-01

## 2021-01-01 RX ORDER — FAMOTIDINE 20 MG/1
20 TABLET, FILM COATED ORAL 2 TIMES DAILY
Qty: 60 TABLET | Refills: 0 | Status: SHIPPED | OUTPATIENT
Start: 2021-01-01 | End: 2021-01-01

## 2021-01-01 RX ORDER — ONDANSETRON 4 MG/1
4 TABLET, ORALLY DISINTEGRATING ORAL EVERY 6 HOURS PRN
Status: DISCONTINUED | OUTPATIENT
Start: 2021-01-01 | End: 2021-01-01 | Stop reason: HOSPADM

## 2021-01-01 RX ADMIN — HYDROMORPHONE HYDROCHLORIDE 0.3 MG: 1 INJECTION, SOLUTION INTRAMUSCULAR; INTRAVENOUS; SUBCUTANEOUS at 11:44

## 2021-01-01 RX ADMIN — LIDOCAINE HYDROCHLORIDE 10 ML: 10 INJECTION, SOLUTION EPIDURAL; INFILTRATION; INTRACAUDAL; PERINEURAL at 16:20

## 2021-01-01 RX ADMIN — FAMOTIDINE 20 MG: 20 TABLET ORAL at 09:20

## 2021-01-01 RX ADMIN — TAZOBACTAM SODIUM AND PIPERACILLIN SODIUM 4.5 G: 500; 4 INJECTION, SOLUTION INTRAVENOUS at 06:02

## 2021-01-01 RX ADMIN — LIDOCAINE HYDROCHLORIDE 15 ML: 10 INJECTION, SOLUTION EPIDURAL; INFILTRATION; INTRACAUDAL; PERINEURAL at 10:15

## 2021-01-01 RX ADMIN — MORPHINE SULFATE 2 MG: 2 INJECTION, SOLUTION INTRAMUSCULAR; INTRAVENOUS at 08:11

## 2021-01-01 RX ADMIN — ENOXAPARIN SODIUM 70 MG: 80 INJECTION SUBCUTANEOUS at 10:15

## 2021-01-01 RX ADMIN — OMEPRAZOLE 40 MG: 20 CAPSULE, DELAYED RELEASE ORAL at 09:19

## 2021-01-01 RX ADMIN — LIDOCAINE 1 PATCH: 560 PATCH PERCUTANEOUS; TOPICAL; TRANSDERMAL at 08:08

## 2021-01-01 RX ADMIN — OCTREOTIDE ACETATE 50 MCG: 50 INJECTION, SOLUTION INTRAVENOUS; SUBCUTANEOUS at 06:48

## 2021-01-01 RX ADMIN — HYDROXYZINE HYDROCHLORIDE 25 MG: 25 TABLET, FILM COATED ORAL at 05:31

## 2021-01-01 RX ADMIN — PANTOPRAZOLE SODIUM 40 MG: 40 TABLET, DELAYED RELEASE ORAL at 16:44

## 2021-01-01 RX ADMIN — LIDOCAINE 1 PATCH: 560 PATCH PERCUTANEOUS; TOPICAL; TRANSDERMAL at 09:14

## 2021-01-01 RX ADMIN — DRONABINOL 2.5 MG: 2.5 CAPSULE ORAL at 10:45

## 2021-01-01 RX ADMIN — PANTOPRAZOLE SODIUM 80 MG: 40 INJECTION, POWDER, FOR SOLUTION INTRAVENOUS at 06:45

## 2021-01-01 RX ADMIN — OXYCODONE HYDROCHLORIDE 5 MG: 5 TABLET ORAL at 09:09

## 2021-01-01 RX ADMIN — ENOXAPARIN SODIUM 120 MG: 120 INJECTION SUBCUTANEOUS at 09:14

## 2021-01-01 RX ADMIN — ONDANSETRON 4 MG: 2 INJECTION INTRAMUSCULAR; INTRAVENOUS at 06:05

## 2021-01-01 RX ADMIN — SODIUM CHLORIDE 1000 ML: 9 INJECTION, SOLUTION INTRAVENOUS at 22:49

## 2021-01-01 RX ADMIN — IOPAMIDOL 85 ML: 755 INJECTION, SOLUTION INTRAVENOUS at 04:03

## 2021-01-01 RX ADMIN — LIDOCAINE HYDROCHLORIDE 30 ML: 20 SOLUTION ORAL; TOPICAL at 18:06

## 2021-01-01 RX ADMIN — SODIUM CHLORIDE 61 ML: 9 INJECTION, SOLUTION INTRAVENOUS at 04:03

## 2021-01-01 RX ADMIN — HYDROXYZINE HYDROCHLORIDE 25 MG: 25 TABLET, FILM COATED ORAL at 21:53

## 2021-01-01 RX ADMIN — TAZOBACTAM SODIUM AND PIPERACILLIN SODIUM 4.5 G: 500; 4 INJECTION, SOLUTION INTRAVENOUS at 00:03

## 2021-01-01 RX ADMIN — FAMOTIDINE 20 MG: 20 TABLET ORAL at 19:30

## 2021-01-01 RX ADMIN — SODIUM CHLORIDE 1000 ML: 9 INJECTION, SOLUTION INTRAVENOUS at 03:10

## 2021-01-01 RX ADMIN — ENOXAPARIN SODIUM 120 MG: 120 INJECTION SUBCUTANEOUS at 07:53

## 2021-01-01 RX ADMIN — ERTAPENEM SODIUM 1 G: 1 INJECTION, POWDER, LYOPHILIZED, FOR SOLUTION INTRAMUSCULAR; INTRAVENOUS at 11:19

## 2021-01-01 RX ADMIN — OXYCODONE HYDROCHLORIDE 5 MG: 5 TABLET ORAL at 00:19

## 2021-01-01 RX ADMIN — DICLOFENAC SODIUM 2 G: 10 GEL TOPICAL at 09:10

## 2021-01-01 RX ADMIN — FAMOTIDINE 20 MG: 10 INJECTION, SOLUTION INTRAVENOUS at 00:14

## 2021-01-01 RX ADMIN — HYDROMORPHONE HYDROCHLORIDE 2 MG: 2 TABLET ORAL at 22:52

## 2021-01-01 RX ADMIN — FAMOTIDINE 20 MG: 20 TABLET ORAL at 09:04

## 2021-01-01 RX ADMIN — FAMOTIDINE 20 MG: 20 TABLET ORAL at 08:25

## 2021-01-01 RX ADMIN — FAMOTIDINE 20 MG: 20 TABLET ORAL at 20:16

## 2021-01-01 RX ADMIN — VANCOMYCIN HYDROCHLORIDE 1000 MG: 1 INJECTION, SOLUTION INTRAVENOUS at 06:40

## 2021-01-01 RX ADMIN — ERTAPENEM SODIUM 1 G: 1 INJECTION, POWDER, LYOPHILIZED, FOR SOLUTION INTRAMUSCULAR; INTRAVENOUS at 11:16

## 2021-01-01 RX ADMIN — TAZOBACTAM SODIUM AND PIPERACILLIN SODIUM 3.38 G: 375; 3 INJECTION, SOLUTION INTRAVENOUS at 07:40

## 2021-01-01 RX ADMIN — FAMOTIDINE 20 MG: 10 INJECTION, SOLUTION INTRAVENOUS at 00:48

## 2021-01-01 RX ADMIN — SODIUM CHLORIDE: 9 INJECTION, SOLUTION INTRAVENOUS at 23:58

## 2021-01-01 RX ADMIN — DOCUSATE SODIUM AND SENNOSIDES 2 TABLET: 8.6; 5 TABLET, FILM COATED ORAL at 13:05

## 2021-01-01 RX ADMIN — FAMOTIDINE 20 MG: 20 TABLET ORAL at 11:31

## 2021-01-01 RX ADMIN — FAMOTIDINE 20 MG: 20 TABLET ORAL at 20:39

## 2021-01-01 RX ADMIN — HYDROMORPHONE HYDROCHLORIDE 0.3 MG: 1 INJECTION, SOLUTION INTRAMUSCULAR; INTRAVENOUS; SUBCUTANEOUS at 09:28

## 2021-01-01 RX ADMIN — SODIUM CHLORIDE 1000 ML: 9 INJECTION, SOLUTION INTRAVENOUS at 18:24

## 2021-01-01 RX ADMIN — ENOXAPARIN SODIUM 120 MG: 120 INJECTION SUBCUTANEOUS at 09:20

## 2021-01-01 RX ADMIN — TAZOBACTAM SODIUM AND PIPERACILLIN SODIUM 4.5 G: 500; 4 INJECTION, SOLUTION INTRAVENOUS at 23:53

## 2021-01-01 RX ADMIN — TAZOBACTAM SODIUM AND PIPERACILLIN SODIUM 3.38 G: 375; 3 INJECTION, SOLUTION INTRAVENOUS at 01:52

## 2021-01-01 RX ADMIN — DRONABINOL 2.5 MG: 2.5 CAPSULE ORAL at 08:24

## 2021-01-01 RX ADMIN — SODIUM CHLORIDE 8 MG/HR: 9 INJECTION, SOLUTION INTRAVENOUS at 01:32

## 2021-01-01 RX ADMIN — BENZONATATE 100 MG: 100 CAPSULE ORAL at 19:30

## 2021-01-01 RX ADMIN — DOCUSATE SODIUM AND SENNOSIDES 2 TABLET: 8.6; 5 TABLET, FILM COATED ORAL at 07:53

## 2021-01-01 RX ADMIN — SODIUM CHLORIDE 8 MG/HR: 9 INJECTION, SOLUTION INTRAVENOUS at 14:36

## 2021-01-01 RX ADMIN — LIDOCAINE 1 PATCH: 560 PATCH PERCUTANEOUS; TOPICAL; TRANSDERMAL at 09:04

## 2021-01-01 RX ADMIN — LIDOCAINE 1 PATCH: 560 PATCH PERCUTANEOUS; TOPICAL; TRANSDERMAL at 11:31

## 2021-01-01 RX ADMIN — OCTREOTIDE ACETATE 50 MCG/HR: 200 INJECTION, SOLUTION INTRAVENOUS; SUBCUTANEOUS at 08:22

## 2021-01-01 RX ADMIN — GUAIFENESIN AND DEXTROMETHORPHAN 10 ML: 100; 10 SYRUP ORAL at 19:38

## 2021-01-01 RX ADMIN — LIDOCAINE HYDROCHLORIDE 10 ML: 10 INJECTION, SOLUTION EPIDURAL; INFILTRATION; INTRACAUDAL; PERINEURAL at 09:17

## 2021-01-01 RX ADMIN — SODIUM CHLORIDE: 9 INJECTION, SOLUTION INTRAVENOUS at 02:26

## 2021-01-01 RX ADMIN — DICLOFENAC SODIUM 2 G: 10 GEL TOPICAL at 12:38

## 2021-01-01 RX ADMIN — HYDROMORPHONE HYDROCHLORIDE 0.4 MG: 0.2 INJECTION, SOLUTION INTRAMUSCULAR; INTRAVENOUS; SUBCUTANEOUS at 05:06

## 2021-01-01 RX ADMIN — FAMOTIDINE 20 MG: 10 INJECTION, SOLUTION INTRAVENOUS at 23:55

## 2021-01-01 RX ADMIN — SODIUM CHLORIDE: 9 INJECTION, SOLUTION INTRAVENOUS at 14:15

## 2021-01-01 RX ADMIN — TAZOBACTAM SODIUM AND PIPERACILLIN SODIUM 4.5 G: 500; 4 INJECTION, SOLUTION INTRAVENOUS at 12:11

## 2021-01-01 RX ADMIN — POLYETHYLENE GLYCOL 3350 17 G: 17 POWDER, FOR SOLUTION ORAL at 10:47

## 2021-01-01 RX ADMIN — SODIUM CHLORIDE 1000 ML: 9 INJECTION, SOLUTION INTRAVENOUS at 06:01

## 2021-01-01 RX ADMIN — FAMOTIDINE 20 MG: 20 TABLET ORAL at 20:42

## 2021-01-01 RX ADMIN — PHENOL 1 ML: 1.5 LIQUID ORAL at 21:02

## 2021-01-01 RX ADMIN — ERTAPENEM SODIUM 1 G: 1 INJECTION, POWDER, LYOPHILIZED, FOR SOLUTION INTRAMUSCULAR; INTRAVENOUS at 11:18

## 2021-01-01 RX ADMIN — DRONABINOL 5 MG: 2.5 CAPSULE ORAL at 08:09

## 2021-01-01 RX ADMIN — OXYCODONE HYDROCHLORIDE 5 MG: 5 TABLET ORAL at 21:07

## 2021-01-01 RX ADMIN — OXYCODONE HYDROCHLORIDE 5 MG: 5 TABLET ORAL at 17:38

## 2021-01-01 RX ADMIN — DICLOFENAC SODIUM 2 G: 10 GEL TOPICAL at 08:18

## 2021-01-01 RX ADMIN — DRONABINOL 5 MG: 2.5 CAPSULE ORAL at 10:42

## 2021-01-01 RX ADMIN — SODIUM POLYSTYRENE SULFONATE 15 G: 15 SUSPENSION ORAL; RECTAL at 09:11

## 2021-01-01 RX ADMIN — OXYCODONE HYDROCHLORIDE 5 MG: 5 TABLET ORAL at 05:42

## 2021-01-01 RX ADMIN — HYDROMORPHONE HYDROCHLORIDE 0.3 MG: 1 INJECTION, SOLUTION INTRAMUSCULAR; INTRAVENOUS; SUBCUTANEOUS at 09:36

## 2021-01-01 RX ADMIN — SODIUM CHLORIDE: 9 INJECTION, SOLUTION INTRAVENOUS at 15:35

## 2021-01-01 RX ADMIN — ONDANSETRON 4 MG: 2 INJECTION INTRAMUSCULAR; INTRAVENOUS at 16:43

## 2021-01-01 RX ADMIN — LIDOCAINE HYDROCHLORIDE 10 ML: 10 INJECTION, SOLUTION EPIDURAL; INFILTRATION; INTRACAUDAL; PERINEURAL at 10:10

## 2021-01-01 RX ADMIN — HYDROMORPHONE HYDROCHLORIDE 0.5 MG: 1 INJECTION, SOLUTION INTRAMUSCULAR; INTRAVENOUS; SUBCUTANEOUS at 20:59

## 2021-01-01 RX ADMIN — ALUMINUM HYDROXIDE, MAGNESIUM HYDROXIDE, AND SIMETHICONE 30 ML: 200; 200; 20 SUSPENSION ORAL at 06:04

## 2021-01-01 RX ADMIN — SODIUM CHLORIDE, POTASSIUM CHLORIDE, SODIUM LACTATE AND CALCIUM CHLORIDE 1000 ML: 600; 310; 30; 20 INJECTION, SOLUTION INTRAVENOUS at 06:58

## 2021-01-01 RX ADMIN — LIDOCAINE HYDROCHLORIDE 10 ML: 10 INJECTION, SOLUTION EPIDURAL; INFILTRATION; INTRACAUDAL; PERINEURAL at 12:09

## 2021-01-01 RX ADMIN — FAMOTIDINE 20 MG: 20 TABLET ORAL at 08:09

## 2021-01-01 RX ADMIN — OXYCODONE HYDROCHLORIDE 10 MG: 5 TABLET ORAL at 09:44

## 2021-01-01 RX ADMIN — POLYETHYLENE GLYCOL 3350 17 G: 17 POWDER, FOR SOLUTION ORAL at 13:05

## 2021-01-01 RX ADMIN — TAZOBACTAM SODIUM AND PIPERACILLIN SODIUM 4.5 G: 500; 4 INJECTION, SOLUTION INTRAVENOUS at 05:42

## 2021-01-01 RX ADMIN — OXYCODONE HYDROCHLORIDE 10 MG: 5 TABLET ORAL at 05:37

## 2021-01-01 RX ADMIN — DICLOFENAC SODIUM 2 G: 10 GEL TOPICAL at 20:39

## 2021-01-01 RX ADMIN — ENOXAPARIN SODIUM 120 MG: 120 INJECTION SUBCUTANEOUS at 08:04

## 2021-01-01 RX ADMIN — PHENOL 1 ML: 1.5 LIQUID ORAL at 17:46

## 2021-01-01 RX ADMIN — HYDROMORPHONE HYDROCHLORIDE 0.4 MG: 0.2 INJECTION, SOLUTION INTRAMUSCULAR; INTRAVENOUS; SUBCUTANEOUS at 03:53

## 2021-01-01 RX ADMIN — FAMOTIDINE 20 MG: 20 TABLET ORAL at 21:22

## 2021-01-01 RX ADMIN — HYDROMORPHONE HYDROCHLORIDE 0.4 MG: 0.2 INJECTION, SOLUTION INTRAMUSCULAR; INTRAVENOUS; SUBCUTANEOUS at 00:51

## 2021-01-01 RX ADMIN — DOCUSATE SODIUM AND SENNOSIDES 2 TABLET: 8.6; 5 TABLET, FILM COATED ORAL at 09:19

## 2021-01-01 RX ADMIN — DOCUSATE SODIUM AND SENNOSIDES 2 TABLET: 8.6; 5 TABLET, FILM COATED ORAL at 20:16

## 2021-01-01 RX ADMIN — ERTAPENEM SODIUM 1 G: 1 INJECTION, POWDER, LYOPHILIZED, FOR SOLUTION INTRAMUSCULAR; INTRAVENOUS at 11:41

## 2021-01-01 RX ADMIN — FAMOTIDINE 20 MG: 20 TABLET ORAL at 09:14

## 2021-01-01 RX ADMIN — ERTAPENEM SODIUM 1 G: 1 INJECTION, POWDER, LYOPHILIZED, FOR SOLUTION INTRAMUSCULAR; INTRAVENOUS at 11:25

## 2021-01-01 RX ADMIN — FAMOTIDINE 20 MG: 20 TABLET ORAL at 07:53

## 2021-01-01 RX ADMIN — TAZOBACTAM SODIUM AND PIPERACILLIN SODIUM 4.5 G: 500; 4 INJECTION, SOLUTION INTRAVENOUS at 00:16

## 2021-01-01 RX ADMIN — ALBUMIN HUMAN 25 G: 0.25 SOLUTION INTRAVENOUS at 23:58

## 2021-01-01 RX ADMIN — TAZOBACTAM SODIUM AND PIPERACILLIN SODIUM 4.5 G: 500; 4 INJECTION, SOLUTION INTRAVENOUS at 17:10

## 2021-01-01 RX ADMIN — SODIUM CHLORIDE: 9 INJECTION, SOLUTION INTRAVENOUS at 06:39

## 2021-01-01 RX ADMIN — OXYCODONE HYDROCHLORIDE 5 MG: 5 TABLET ORAL at 13:52

## 2021-01-01 RX ADMIN — SODIUM CHLORIDE: 9 INJECTION, SOLUTION INTRAVENOUS at 15:59

## 2021-01-01 RX ADMIN — SODIUM CHLORIDE 500 ML: 9 INJECTION, SOLUTION INTRAVENOUS at 16:30

## 2021-01-01 RX ADMIN — DOCUSATE SODIUM AND SENNOSIDES 2 TABLET: 8.6; 5 TABLET, FILM COATED ORAL at 17:17

## 2021-01-01 RX ADMIN — TAZOBACTAM SODIUM AND PIPERACILLIN SODIUM 4.5 G: 500; 4 INJECTION, SOLUTION INTRAVENOUS at 05:32

## 2021-01-01 RX ADMIN — PROCHLORPERAZINE EDISYLATE 10 MG: 5 INJECTION INTRAMUSCULAR; INTRAVENOUS at 06:56

## 2021-01-01 RX ADMIN — SODIUM CHLORIDE 1000 ML: 9 INJECTION, SOLUTION INTRAVENOUS at 18:02

## 2021-01-01 RX ADMIN — TAZOBACTAM SODIUM AND PIPERACILLIN SODIUM 4.5 G: 500; 4 INJECTION, SOLUTION INTRAVENOUS at 00:12

## 2021-01-01 RX ADMIN — FAMOTIDINE 20 MG: 10 INJECTION, SOLUTION INTRAVENOUS at 11:53

## 2021-01-01 RX ADMIN — TAZOBACTAM SODIUM AND PIPERACILLIN SODIUM 3.38 G: 375; 3 INJECTION, SOLUTION INTRAVENOUS at 02:49

## 2021-01-01 RX ADMIN — PHENOL 1 ML: 1.5 LIQUID ORAL at 17:14

## 2021-01-01 RX ADMIN — TAZOBACTAM SODIUM AND PIPERACILLIN SODIUM 4.5 G: 500; 4 INJECTION, SOLUTION INTRAVENOUS at 18:20

## 2021-01-01 RX ADMIN — SODIUM CHLORIDE: 9 INJECTION, SOLUTION INTRAVENOUS at 09:25

## 2021-01-01 RX ADMIN — METOCLOPRAMIDE 5 MG: 5 TABLET ORAL at 16:07

## 2021-01-01 RX ADMIN — LIDOCAINE 1 PATCH: 560 PATCH PERCUTANEOUS; TOPICAL; TRANSDERMAL at 07:57

## 2021-01-01 RX ADMIN — ENOXAPARIN SODIUM 105 MG: 120 INJECTION SUBCUTANEOUS at 09:04

## 2021-01-01 RX ADMIN — LIDOCAINE 1 PATCH: 560 PATCH PERCUTANEOUS; TOPICAL; TRANSDERMAL at 09:26

## 2021-01-01 RX ADMIN — DOCUSATE SODIUM AND SENNOSIDES 1 TABLET: 8.6; 5 TABLET, FILM COATED ORAL at 08:05

## 2021-01-01 RX ADMIN — OXYCODONE HYDROCHLORIDE 5 MG: 5 TABLET ORAL at 20:00

## 2021-01-01 RX ADMIN — ONDANSETRON 4 MG: 2 INJECTION INTRAMUSCULAR; INTRAVENOUS at 09:22

## 2021-01-01 RX ADMIN — PHENOL 1 ML: 1.5 LIQUID ORAL at 08:09

## 2021-01-01 RX ADMIN — FAMOTIDINE 20 MG: 10 INJECTION, SOLUTION INTRAVENOUS at 12:11

## 2021-01-01 RX ADMIN — TAZOBACTAM SODIUM AND PIPERACILLIN SODIUM 4.5 G: 500; 4 INJECTION, SOLUTION INTRAVENOUS at 17:32

## 2021-01-01 RX ADMIN — FAMOTIDINE 20 MG: 10 INJECTION, SOLUTION INTRAVENOUS at 12:41

## 2021-01-01 RX ADMIN — PHENOL 1 ML: 1.5 LIQUID ORAL at 01:04

## 2021-01-01 RX ADMIN — FAMOTIDINE 20 MG: 20 TABLET ORAL at 10:41

## 2021-01-01 RX ADMIN — ENOXAPARIN SODIUM 105 MG: 120 INJECTION SUBCUTANEOUS at 08:25

## 2021-01-01 RX ADMIN — FAMOTIDINE 20 MG: 20 TABLET ORAL at 21:01

## 2021-01-01 RX ADMIN — SODIUM CHLORIDE 88 ML: 9 INJECTION, SOLUTION INTRAVENOUS at 18:28

## 2021-01-01 RX ADMIN — DOCUSATE SODIUM AND SENNOSIDES 2 TABLET: 8.6; 5 TABLET, FILM COATED ORAL at 20:38

## 2021-01-01 RX ADMIN — TAZOBACTAM SODIUM AND PIPERACILLIN SODIUM 4.5 G: 500; 4 INJECTION, SOLUTION INTRAVENOUS at 17:07

## 2021-01-01 RX ADMIN — CHLORPROMAZINE HYDROCHLORIDE 25 MG: 25 INJECTION INTRAMUSCULAR at 21:12

## 2021-01-01 RX ADMIN — OXYCODONE HYDROCHLORIDE 5 MG: 5 TABLET ORAL at 06:54

## 2021-01-01 RX ADMIN — TAZOBACTAM SODIUM AND PIPERACILLIN SODIUM 4.5 G: 500; 4 INJECTION, SOLUTION INTRAVENOUS at 05:14

## 2021-01-01 RX ADMIN — POLYETHYLENE GLYCOL 3350 17 G: 17 POWDER, FOR SOLUTION ORAL at 08:06

## 2021-01-01 RX ADMIN — ALBUMIN HUMAN 12.5 G: 0.25 SOLUTION INTRAVENOUS at 13:33

## 2021-01-01 RX ADMIN — PANTOPRAZOLE SODIUM 40 MG: 40 INJECTION, POWDER, FOR SOLUTION INTRAVENOUS at 18:04

## 2021-01-01 RX ADMIN — POLYETHYLENE GLYCOL 3350 17 G: 17 POWDER, FOR SOLUTION ORAL at 09:20

## 2021-01-01 RX ADMIN — HYDROMORPHONE HYDROCHLORIDE 2 MG: 2 TABLET ORAL at 19:07

## 2021-01-01 RX ADMIN — LIDOCAINE 1 PATCH: 560 PATCH PERCUTANEOUS; TOPICAL; TRANSDERMAL at 08:25

## 2021-01-01 RX ADMIN — SODIUM CHLORIDE 8 MG/HR: 9 INJECTION, SOLUTION INTRAVENOUS at 07:01

## 2021-01-01 RX ADMIN — OCTREOTIDE ACETATE 50 MCG/HR: 200 INJECTION, SOLUTION INTRAVENOUS; SUBCUTANEOUS at 06:58

## 2021-01-01 RX ADMIN — DRONABINOL 2.5 MG: 2.5 CAPSULE ORAL at 12:37

## 2021-01-01 RX ADMIN — DOCUSATE SODIUM AND SENNOSIDES 2 TABLET: 8.6; 5 TABLET, FILM COATED ORAL at 09:14

## 2021-01-01 RX ADMIN — TAZOBACTAM SODIUM AND PIPERACILLIN SODIUM 3.38 G: 375; 3 INJECTION, SOLUTION INTRAVENOUS at 09:24

## 2021-01-01 RX ADMIN — SODIUM CHLORIDE, POTASSIUM CHLORIDE, SODIUM LACTATE AND CALCIUM CHLORIDE 1000 ML: 600; 310; 30; 20 INJECTION, SOLUTION INTRAVENOUS at 08:10

## 2021-01-01 RX ADMIN — ERTAPENEM SODIUM 1 G: 1 INJECTION, POWDER, LYOPHILIZED, FOR SOLUTION INTRAMUSCULAR; INTRAVENOUS at 10:44

## 2021-01-01 RX ADMIN — ENOXAPARIN SODIUM 70 MG: 80 INJECTION SUBCUTANEOUS at 20:53

## 2021-01-01 RX ADMIN — HYDROMORPHONE HYDROCHLORIDE 0.4 MG: 0.2 INJECTION, SOLUTION INTRAMUSCULAR; INTRAVENOUS; SUBCUTANEOUS at 02:06

## 2021-01-01 RX ADMIN — CHLORPROMAZINE HYDROCHLORIDE 25 MG: 25 INJECTION INTRAMUSCULAR at 13:30

## 2021-01-01 RX ADMIN — HYDROMORPHONE HYDROCHLORIDE 0.2 MG: 0.2 INJECTION, SOLUTION INTRAMUSCULAR; INTRAVENOUS; SUBCUTANEOUS at 01:07

## 2021-01-01 RX ADMIN — HYDROMORPHONE HYDROCHLORIDE 2 MG: 2 TABLET ORAL at 17:23

## 2021-01-01 RX ADMIN — SODIUM CHLORIDE 8 MG/HR: 9 INJECTION, SOLUTION INTRAVENOUS at 14:49

## 2021-01-01 RX ADMIN — DRONABINOL 5 MG: 2.5 CAPSULE ORAL at 07:53

## 2021-01-01 RX ADMIN — OXYCODONE HYDROCHLORIDE 5 MG: 5 TABLET ORAL at 21:53

## 2021-01-01 RX ADMIN — ENOXAPARIN SODIUM 70 MG: 80 INJECTION SUBCUTANEOUS at 21:07

## 2021-01-01 RX ADMIN — FAMOTIDINE 20 MG: 20 TABLET ORAL at 19:59

## 2021-01-01 RX ADMIN — ENOXAPARIN SODIUM 70 MG: 80 INJECTION SUBCUTANEOUS at 09:31

## 2021-01-01 RX ADMIN — IOPAMIDOL 67 ML: 755 INJECTION, SOLUTION INTRAVENOUS at 18:29

## 2021-01-01 RX ADMIN — OXYCODONE HYDROCHLORIDE 10 MG: 5 TABLET ORAL at 13:39

## 2021-01-01 RX ADMIN — ONDANSETRON 4 MG: 2 INJECTION INTRAMUSCULAR; INTRAVENOUS at 18:02

## 2021-01-01 RX ADMIN — GADOBUTROL 7 ML: 604.72 INJECTION INTRAVENOUS at 10:04

## 2021-01-01 RX ADMIN — DICLOFENAC SODIUM 2 G: 10 GEL TOPICAL at 16:52

## 2021-01-01 RX ADMIN — FAMOTIDINE 20 MG: 20 TABLET ORAL at 08:05

## 2021-01-01 RX ADMIN — SODIUM POLYSTYRENE SULFONATE 30 G: 15 SUSPENSION ORAL; RECTAL at 21:26

## 2021-01-01 RX ADMIN — OXYCODONE HYDROCHLORIDE 10 MG: 5 TABLET ORAL at 03:01

## 2021-01-01 RX ADMIN — FAMOTIDINE 20 MG: 20 TABLET ORAL at 10:45

## 2021-01-01 RX ADMIN — ENOXAPARIN SODIUM 120 MG: 120 INJECTION SUBCUTANEOUS at 08:26

## 2021-01-01 RX ADMIN — HYDROXYZINE HYDROCHLORIDE 25 MG: 25 TABLET, FILM COATED ORAL at 00:19

## 2021-01-01 RX ADMIN — SODIUM CHLORIDE: 9 INJECTION, SOLUTION INTRAVENOUS at 11:02

## 2021-01-01 RX ADMIN — FAMOTIDINE 20 MG: 20 TABLET ORAL at 20:33

## 2021-01-01 RX ADMIN — TAZOBACTAM SODIUM AND PIPERACILLIN SODIUM 4.5 G: 500; 4 INJECTION, SOLUTION INTRAVENOUS at 05:56

## 2021-01-01 RX ADMIN — TAZOBACTAM SODIUM AND PIPERACILLIN SODIUM 4.5 G: 500; 4 INJECTION, SOLUTION INTRAVENOUS at 11:46

## 2021-01-01 RX ADMIN — LIDOCAINE 1 PATCH: 560 PATCH PERCUTANEOUS; TOPICAL; TRANSDERMAL at 08:09

## 2021-01-01 RX ADMIN — SODIUM CHLORIDE, POTASSIUM CHLORIDE, SODIUM LACTATE AND CALCIUM CHLORIDE 1000 ML: 600; 310; 30; 20 INJECTION, SOLUTION INTRAVENOUS at 17:19

## 2021-01-01 RX ADMIN — TAZOBACTAM SODIUM AND PIPERACILLIN SODIUM 3.38 G: 375; 3 INJECTION, SOLUTION INTRAVENOUS at 20:19

## 2021-01-01 RX ADMIN — DRONABINOL 2.5 MG: 2.5 CAPSULE ORAL at 09:14

## 2021-01-01 RX ADMIN — FAMOTIDINE 20 MG: 20 TABLET ORAL at 21:56

## 2021-01-01 RX ADMIN — ENOXAPARIN SODIUM 70 MG: 80 INJECTION SUBCUTANEOUS at 08:52

## 2021-01-01 RX ADMIN — LIDOCAINE HYDROCHLORIDE 30 ML: 20 SOLUTION ORAL; TOPICAL at 03:07

## 2021-01-01 RX ADMIN — ACETAMINOPHEN 650 MG: 325 TABLET, FILM COATED ORAL at 11:24

## 2021-01-01 RX ADMIN — TAZOBACTAM SODIUM AND PIPERACILLIN SODIUM 4.5 G: 500; 4 INJECTION, SOLUTION INTRAVENOUS at 11:53

## 2021-01-01 RX ADMIN — ONDANSETRON 4 MG: 2 INJECTION INTRAMUSCULAR; INTRAVENOUS at 06:44

## 2021-01-01 RX ADMIN — ENOXAPARIN SODIUM 120 MG: 120 INJECTION SUBCUTANEOUS at 10:44

## 2021-01-01 RX ADMIN — FAMOTIDINE 20 MG: 10 INJECTION, SOLUTION INTRAVENOUS at 00:03

## 2021-01-01 RX ADMIN — SODIUM CHLORIDE: 9 INJECTION, SOLUTION INTRAVENOUS at 00:44

## 2021-01-01 RX ADMIN — VANCOMYCIN HYDROCHLORIDE 1000 MG: 1 INJECTION, SOLUTION INTRAVENOUS at 06:47

## 2021-01-01 RX ADMIN — SODIUM CHLORIDE: 9 INJECTION, SOLUTION INTRAVENOUS at 00:17

## 2021-01-01 RX ADMIN — HYDROMORPHONE HYDROCHLORIDE 2 MG: 2 TABLET ORAL at 16:57

## 2021-01-01 RX ADMIN — LIDOCAINE 1 PATCH: 560 PATCH PERCUTANEOUS; TOPICAL; TRANSDERMAL at 10:45

## 2021-01-01 RX ADMIN — HYDROMORPHONE HYDROCHLORIDE 0.3 MG: 1 INJECTION, SOLUTION INTRAMUSCULAR; INTRAVENOUS; SUBCUTANEOUS at 18:27

## 2021-01-01 RX ADMIN — MELATONIN TAB 3 MG 3 MG: 3 TAB at 20:42

## 2021-01-01 RX ADMIN — ENOXAPARIN SODIUM 70 MG: 80 INJECTION SUBCUTANEOUS at 21:52

## 2021-01-01 RX ADMIN — OXYCODONE HYDROCHLORIDE 5 MG: 5 TABLET ORAL at 05:31

## 2021-01-01 RX ADMIN — TAZOBACTAM SODIUM AND PIPERACILLIN SODIUM 3.38 G: 375; 3 INJECTION, SOLUTION INTRAVENOUS at 19:08

## 2021-01-01 RX ADMIN — PHENOL 1 ML: 1.5 LIQUID ORAL at 09:25

## 2021-01-01 RX ADMIN — ENOXAPARIN SODIUM 105 MG: 120 INJECTION SUBCUTANEOUS at 08:58

## 2021-01-01 RX ADMIN — VANCOMYCIN HYDROCHLORIDE 1500 MG: 10 INJECTION, POWDER, LYOPHILIZED, FOR SOLUTION INTRAVENOUS at 06:59

## 2021-01-01 RX ADMIN — FUROSEMIDE 20 MG: 10 INJECTION, SOLUTION INTRAMUSCULAR; INTRAVENOUS at 16:06

## 2021-01-01 RX ADMIN — ALBUMIN HUMAN 12.5 G: 0.25 SOLUTION INTRAVENOUS at 10:40

## 2021-01-01 RX ADMIN — TAZOBACTAM SODIUM AND PIPERACILLIN SODIUM 4.5 G: 500; 4 INJECTION, SOLUTION INTRAVENOUS at 11:54

## 2021-01-01 RX ADMIN — ACETAMINOPHEN 650 MG: 325 TABLET, FILM COATED ORAL at 12:38

## 2021-01-01 RX ADMIN — TAZOBACTAM SODIUM AND PIPERACILLIN SODIUM 3.38 G: 375; 3 INJECTION, SOLUTION INTRAVENOUS at 14:53

## 2021-01-01 RX ADMIN — DRONABINOL 5 MG: 2.5 CAPSULE ORAL at 09:11

## 2021-01-01 RX ADMIN — BISACODYL 10 MG: 10 SUPPOSITORY RECTAL at 20:34

## 2021-01-01 RX ADMIN — ENOXAPARIN SODIUM 120 MG: 120 INJECTION SUBCUTANEOUS at 08:09

## 2021-01-01 RX ADMIN — SODIUM CHLORIDE: 9 INJECTION, SOLUTION INTRAVENOUS at 20:20

## 2021-01-01 RX ADMIN — SODIUM CHLORIDE: 9 INJECTION, SOLUTION INTRAVENOUS at 03:45

## 2021-01-01 ASSESSMENT — ENCOUNTER SYMPTOMS
CHILLS: 0
DIARRHEA: 1
VOMITING: 1
NUMBNESS: 0
ABDOMINAL DISTENTION: 1
NAUSEA: 0
VOMITING: 1
FEVER: 1
ABDOMINAL PAIN: 1
VOMITING: 0
FEVER: 1
ARTHRALGIAS: 0
FEVER: 0
NAUSEA: 1
COUGH: 0
BLOOD IN STOOL: 0
SHORTNESS OF BREATH: 0
SHORTNESS OF BREATH: 1
DIARRHEA: 1
ABDOMINAL PAIN: 1
ABDOMINAL PAIN: 1

## 2021-01-01 ASSESSMENT — ACTIVITIES OF DAILY LIVING (ADL)
ADLS_ACUITY_SCORE: 20
ADLS_ACUITY_SCORE: 19
ADLS_ACUITY_SCORE: 20
ADLS_ACUITY_SCORE: 24
ADLS_ACUITY_SCORE: 20
ADLS_ACUITY_SCORE: 19
ADLS_ACUITY_SCORE: 20
ADLS_ACUITY_SCORE: 24
ADLS_ACUITY_SCORE: 20
ADLS_ACUITY_SCORE: 19
ADLS_ACUITY_SCORE: 20
ADLS_ACUITY_SCORE: 22
DEPENDENT_IADLS:: INDEPENDENT
ADLS_ACUITY_SCORE: 20
DEPENDENT_IADLS:: INDEPENDENT
ADLS_ACUITY_SCORE: 20
ADLS_ACUITY_SCORE: 24
ADLS_ACUITY_SCORE: 19
ADLS_ACUITY_SCORE: 20
ADLS_ACUITY_SCORE: 24
ADLS_ACUITY_SCORE: 20
ADLS_ACUITY_SCORE: 24
DEPENDENT_IADLS:: INDEPENDENT
ADLS_ACUITY_SCORE: 24
ADLS_ACUITY_SCORE: 19
ADLS_ACUITY_SCORE: 20
ADLS_ACUITY_SCORE: 24
ADLS_ACUITY_SCORE: 24
ADLS_ACUITY_SCORE: 20
ADLS_ACUITY_SCORE: 19
ADLS_ACUITY_SCORE: 19
ADLS_ACUITY_SCORE: 20
ADLS_ACUITY_SCORE: 19
ADLS_ACUITY_SCORE: 20
ADLS_ACUITY_SCORE: 19

## 2021-01-01 ASSESSMENT — MIFFLIN-ST. JEOR
SCORE: 1617.7
SCORE: 1496.14
SCORE: 1535.6
SCORE: 1526.53
SCORE: 1492.05
SCORE: 1561.91
SCORE: 1496.14

## 2021-01-04 NOTE — PROGRESS NOTES
Clinic Care Coordination Contact    Follow Up Progress Note      Assessment: Received phone call from patient's daughter Wolf, patient is out of Eliquis and Dexamethasone, there are no refills on these medications, prescription is from 12/29/20 hospital discharge. Wolf is wondering if these medications needs to be refilled or if they can be discontinued.     RN CC sent message to [facility] triage nurses.       Goals addressed this encounter:   Goals Addressed                 This Visit's Progress      1. Pain Management (pt-stated)   10%     Goal Statement: I will verbalized a decrease in my pain level to a 3/10.  Date Goal set: 12/15/2020  Barriers: Language barrier  Strengths: Patient is motivated to better manage his health and pain.   Date to Achieve By: 6/15/2021  Patient expressed understanding of goal: Yes.   Action steps to achieve this goal:  1. I will meet with PCP 12/16/20 to address my chronic pain, and follow their recommendations.   2. I will meet with PT 12/16/20 to address my chronic pain, and follow their recommendations.   3. I will continue to work with care coordination for any additional concerns               Intervention/Education provided during outreach: Medication compliance.      Outreach Frequency: 2 weeks    Plan: Patient will follow up with PCP 1/8/21.RN Care Coordinator will follow up in 2 weeks.    Elmira Sethi RN Care Coordinator  [facility]  Email: [email or web address]  Phone: [phone]

## 2021-01-04 NOTE — TELEPHONE ENCOUNTER
Per discharge summary:    apixaban ANTICOAGULANT (ELIQUIS) 2.5 MG tablet Take 1 tablet (2.5 mg) by mouth 2 times daily  Qty: 60 tablet, Refills: 0     Associated Diagnoses: 2019 novel coronavirus disease (COVID-19)       dexamethasone (DECADRON) 6 MG tablet Take 1 tablet (6 mg) by mouth daily  Qty: 6 tablet, Refills: 0     Associated Diagnoses: 2019 novel coronavirus disease (COVID-19)

## 2021-01-06 NOTE — TELEPHONE ENCOUNTER
He should have eliquis for 30 days total.  He should be done with decadron.    Recommend a hospital follow up appt within 14 days

## 2021-01-07 NOTE — PROGRESS NOTES
Clinic Care Coordination Contact    Follow Up Progress Note      Assessment: Received in coming call from patient's daughter Wolf, patient complains of generalized abdominal pain that started yesterday and continues today. According to Nadlinh, patient denies nausea, vomiting and diarrhea.  Patient has not eaten anything today or taken medications.     Nada questions if patient can take any over the counter pain medications. RN CC advised against taking any NSAIDS or acetaminophen due to patient's health condition and medication list. Nada verbalized understanding.     Reno advised to have patient attempt to eat something from the BRAT diet and take morning medications. If pain continues or worsens to call the [facility] triage nurse line. Phone number provided. Nadlinh verbalized understanding. Reviewed PCP appointment for tomorrow.     Goals addressed this encounter:   Goals Addressed                 This Visit's Progress      1. Pain Management (pt-stated)   10%     Goal Statement: I will verbalized a decrease in my pain level to a 3/10.  Date Goal set: 12/15/2020  Barriers: Language barrier  Strengths: Patient is motivated to better manage his health and pain.   Date to Achieve By: 6/15/2021  Patient expressed understanding of goal: Yes.   Action steps to achieve this goal:  1. I will meet with PCP 12/16/20 to address my chronic pain, and follow their recommendations.   2. I will meet with PT 12/16/20 to address my chronic pain, and follow their recommendations.   3. I will continue to work with care coordination for any additional concerns               Intervention/Education provided during outreach: Chronic disease and medication management      Outreach Frequency: 2 weeks    Plan: Patient will attempt to eat and take medications. Patient will follow up with PCP tomorrow 1/8/21. RNCC will remain available as needed. RNCC will follow up with patient again in 2 weeks.     Elmira Sethi RN Care Coordinator  [facility]  [facility]  Email: [email or web address]  Phone: [phone]

## 2021-01-08 PROBLEM — B18.2 HEPATITIS C, CHRONIC (H): Status: ACTIVE | Noted: 2021-01-01

## 2021-01-08 NOTE — PROGRESS NOTES
Alexandra is a 65 year old who is being evaluated via a billable telephone visit.      What phone number would you like to be contacted at? [phone]  How would you like to obtain your AVS? Mike    ASSESSMENT / PLAN:  (U07.1) 2019 novel coronavirus disease (COVID-19)  (primary encounter diagnosis)  Comment: symptoms resolved  Plan: monitor    (R16.0) Liver mass  Comment:    Plan: oncology number given to patient again    (R76.8) Hepaiitis C antibody test positive  Comment: new diagnosis from hospital  Plan: patient to see oncology first given liver mass and see if specific hepatology doctor recommended with coordination        Subjective     Alexandra is a 65 year old who presents to clinic today for the following health issues     HPI     Belarusian  phone used      Hospital Follow-up Visit:    Hospital/Nursing Home/IP Rehab Facility: [facility]  Date of Admission: 12/25/20  Date of Discharge: 12/29/20  Reason(s) for Admission: COVID    He was admitted for one week of symptoms.  He had chest pain and fever and chills.   EKG no consistent with ischemia.  Chest CT revealed no PE.   He received a steroid and oxygen in the hospital.   He completed steroid since hospital discharge.  He was discharged on apixaban secondary to increased risk of cancer.    He was found also to have an elevated liver function test.   He was found to have hepatitis C.  He had an abdominal ultrasound revealing a mass.    The patient was to follow up with oncology.  He was not aware of this.      Was your hospitalization related to COVID-19? YES   How are you feeling today? Much better  In the past 24 hours have you had shortness of breath when speaking, walking, or climbing stairs? My breathing issues have improved  Do you have a cough? I don't have a cough  When is the last time you had a fever greater than 100? No  Are you having any other symptoms? None   Do you have any other stressors you would like to discuss with  your provider? No     PHQ Assesment Total Score(s) 12/16/2020   PHQ-9 Score 14   Some recent data might be hidden       Was the patient in the ICU or did the patient experience delirium during hospitalization?  No          Problems taking medications regularly:  None  Medication changes since discharge: Eliquis, Decadron  Problems adhering to non-medication therapy:  None    Summary of hospitalization:  [facility] discharge summary reviewed  Diagnostic Tests/Treatments reviewed.  Follow up needed: oncology  Other Healthcare Providers Involved in Patient s Care:         None  Update since discharge: improved.   Post Discharge Medication Reconciliation: discharge medications reconciled, continue medications without change.  Plan of care communicated with patient           Review of Systems   CONSTITUTIONAL: NEGATIVE for fever, chills, change in weight  RESP: NEGATIVE for significant cough or SOB  CV: NEGATIVE for chest pain, palpitations or peripheral edema      Objective           Vitals:  No vitals were obtained today due to virtual visit.    Physical Exam   healthy, alert and no distress  PSYCH: Alert and oriented times 3; coherent speech, normal   rate and volume, able to articulate logical thoughts, able   to abstract reason, no tangential thoughts, no hallucinations   or delusions  His affect is normal  RESP: No cough, no audible wheezing, able to talk in full sentences  Remainder of exam unable to be completed due to telephone visits              Phone call duration: 21 minutes       Visit was 21 minutes including chart review, visit, and documentation        Lindy Dodge MD

## 2021-01-13 NOTE — PROGRESS NOTES
"Clinic Care Coordination Contact    Follow Up Progress Note      Assessment: Received phone call from patient's daughter Wolf, she requests this writer call back with Portuguese . RN CC called back with [facility] language line. Patient then took the phone and voiced concern that he never received a phone call from PCP as schedule 1/8/21. It is unclear in appointment note, why patient was not called. Wolf questions if she should bring her father to the ED.    Patient complains of diffuse abdominal pain 9/10 that is constant. Patient complains of painful bloating and gas whenever he eats \"anything\". Patient states he has a soft \"normal\" bowel movement this morning, and denies any s/s of constipation. Patient states that he is not eating much because of the painful gas.     RN CC connected patient and  to central scheduling, patient will have virtual visit with Dr. Ames today at 5:40pm to address concerns. Wolf states that she will not bring her father to the ED unless MD recommends.     Goals addressed this encounter:   Goals Addressed                 This Visit's Progress      1. Pain Management (pt-stated)   0%     Goal Statement: I will verbalized a decrease in my pain level to a 3/10.  Date Goal set: 12/15/2020  Barriers: Language barrier  Strengths: Patient is motivated to better manage his health and pain.   Date to Achieve By: 6/15/2021  Patient expressed understanding of goal: Yes.   Action steps to achieve this goal:  1. I will meet with PCP 12/16/20 to address my chronic pain, and follow their recommendations.   2. I will meet with PT 12/16/20 to address my chronic pain, and follow their recommendations.   3. I will continue to work with care coordination for any additional concerns               Intervention/Education provided during outreach: Chronic disease management. Follow up with MDs.      Outreach Frequency: 2 weeks    Plan: Patient will have virtual visit with PCP today/ RN Care " Coordinator will follow up in two weeks.     Elmira Sethi RN Care Coordinator  [facility]  Email: [email or web address]  Phone: [phone]

## 2021-01-13 NOTE — PROGRESS NOTES
"Alexandra is a 65 year old who is being evaluated via a billable telephone visit.    [phone]  What phone number would you like to be contacted at?  [phone]  How would you like to obtain your AVS? [facility]  Assessment & Plan     2019 novel coronavirus disease (COVID-19)  Hard to believe we cannot move forward on her liver cancer until she tests negative for covid but I guess. Will recheck her next week. And talked with PCP Dr Dodge to be sure she knows what is going on.   - Asymptomatic COVID-19 Virus (Coronavirus) by PCR; Future    Malignant neoplasm of liver, primary (H)  She will need to be set up with Oncology and probably GI soon  - Asymptomatic COVID-19 Virus (Coronavirus) by PCR; Future    Chronic hepatitis C without hepatic coma (H)       Gastroesophageal reflux disease with esophagitis without hemorrhage  Some of her pain sounds GERD related will try.   - omeprazole (PRILOSEC) 20 MG DR capsule; Take 1 capsule (20 mg) by mouth daily      40 minutes spent on the date of the encounter doing chart review, review of test results, patient visit, documentation and discussion with other provider(s)          BMI:   Estimated body mass index is 28.66 kg/m  as calculated from the following:    Height as of 12/16/20: 1.65 m (5' 4.96\").    Weight as of 12/16/20: 78 kg (172 lb).         No follow-ups on file.    Lorelei Ames MD  [facility]    Dennis Morris is a 65 year old who presents to clinic today for the following health issues talked to her through     HPI   She was hospitalized for severe covid infection and while in the hospital she was found to have a large mass in the right lobe of the liver roughly 12.5 by 10.5 cm. She has a history of hepatitis C with cirrhosis and recent AFP was elevated.     Biopsy was not done and she was told not much could happen until she tested negative for Covid. She was discharged 12/29 and Monday of this week " again tested positive for Covid. Her breathing is back to normal. Energy is good. She has a lot of pain on the right side as well as epigastric burning with eating. No nausea or vomiting so far. Denies any fever chills or night sweats. Denies bowel or bladder changes.         Review of Systems   Constitutional, HEENT, cardiovascular, pulmonary, GI, , musculoskeletal, neuro, skin, endocrine and psych systems are negative, except as otherwise noted.      Objective           Vitals:  No vitals were obtained today due to virtual visit.    Physical Exam   healthy, alert and no distress  PSYCH: Alert and oriented times 3; coherent speech, normal   rate and volume, able to articulate logical thoughts, able   to abstract reason, no tangential thoughts, no hallucinations   or delusions  His affect is normal  RESP: No cough, no audible wheezing, able to talk in full sentences  Remainder of exam unable to be completed due to telephone visits            Phone call duration: 30 minutes

## 2021-01-16 NOTE — TELEPHONE ENCOUNTER
I had virtual appointment with patient for hospital follow up and gave him the oncology phone number for discharge.    He later called stating he didn't have an appointment?    Please make sure he has followed up with oncology and has an appointment with them  [phone].    We might need to call oncology ourselves to help him schedule if he has not scheduled yet, as this should be done as soon as possible for follow up of the liver mass.

## 2021-01-18 NOTE — TELEPHONE ENCOUNTER
Spoke with [facility] ([facility]) .  Patient had scheduled an appt to be seen there 1/15/20 but  canceled that appt at patient request.   will call patient now with [facility]  to reschedule ASAP.  Will leave encounter open and check back to make sure he has rescheduled.  SID Espitia R.N.

## 2021-01-28 NOTE — TELEPHONE ENCOUNTER
Called [facility], patient has not yet been scheduled.   stated Avis there was going to use an Lao  to contact patient, no notation of whether or not attempts have been made.  She will leave another message for Avis to follow up with this patient. SID Espitia R.N.

## 2021-02-01 NOTE — PROGRESS NOTES
Clinic Care Coordination Contact    Follow Up Progress Note      Assessment: VM received from patient's daughter Wolf on Friday 1/29/21 after working hours. RN CC returned call with  language line D[place] , spoke with patient.     Patient states that he went to [facility] and received another COVID test and result is now negative. Patient is scheduled for labs and follow up with Dr. Franks at [facility] [place] tomorrow 2/2/2021. Patient is scheduled with Infectious Disease Dr. Chucky Marroquin at [facility] on Wednesday 2/3/21. Patient is requesting assistance with scheduling PCP recommended follow up with GI for symptoms of GERD.     Patient states that he is still experiencing abdominal discomfort and looking forward to seeing speciality providers to address health concerns.     RN CC assisted connecting patient to [facility] in [place], [place]'s preferred [place] clinic.     At 1pm Received phone call from patient's daughter Wolf, wanting to review appointments and is wondering why her dad is scheduled with two different providers this week. Reviewed the difference between Oncology and Infectious Disease and how these specialties can help address her dad's health concerns. Wolf verbalized understanding.     Goals addressed this encounter:   Goals Addressed    None          Intervention/Education provided during outreach: Chronic disease management.           Plan: Patient will follow up with [facility] 2/2/21, Infectious Disease on 2/3/21 and patient is scheduling follow up with [facility]. RN Care Coordinator will follow up in 3-4 weeks.     Elmira Sethi RN Care Coordinator  [facility]  Email: [email or web address]  Phone: [phone]

## 2021-02-05 PROBLEM — M25.512 CHRONIC PAIN OF BOTH SHOULDERS: Status: RESOLVED | Noted: 2020-01-01 | Resolved: 2021-01-01

## 2021-02-05 PROBLEM — M25.511 CHRONIC PAIN OF BOTH SHOULDERS: Status: RESOLVED | Noted: 2020-01-01 | Resolved: 2021-01-01

## 2021-02-05 PROBLEM — M54.2 CHRONIC NECK PAIN: Status: RESOLVED | Noted: 2020-01-01 | Resolved: 2021-01-01

## 2021-02-05 PROBLEM — G89.29 CHRONIC NECK PAIN: Status: RESOLVED | Noted: 2020-01-01 | Resolved: 2021-01-01

## 2021-02-05 PROBLEM — G89.29 CHRONIC PAIN OF BOTH SHOULDERS: Status: RESOLVED | Noted: 2020-01-01 | Resolved: 2021-01-01

## 2021-02-05 NOTE — PROGRESS NOTES
Discharge Note    Progress reporting period is from initial evaluation date (please see noted date below) to Dec 24, 2020.  Linked Episodes   Type: Episode: Status: Noted: Resolved: Last update: Updated by:   PHYSICAL THERAPY Neck pain - 12/16/2020 Active 12/16/2020 12/24/2020 10:52 AM Moe Gaviria, PT      Comments:       Alexandra failed to follow up and current status is unknown.  Please see information below for last relevant information on current status.  Patient seen for 2 visits.    SUBJECTIVE  Subjective changes noted by patient:  Pt reports that his exercises are going well at home. He feels like his symptoms are improving.   Previous pain level was  6/10.   Changes in function:  Yes (See Goal flowsheet attached for changes in current functional level)  Adverse reaction to treatment or activity: None    OBJECTIVE  Changes noted in objective findings:       ASSESSMENT/PLAN  Diagnosis: Chronic neck pain    Updated problem list and treatment plan:   Pain - HEP  Decreased ROM/flexibility - HEP  Decreased function - HEP  Impaired posture - HEP    STG/LTGs have been met or progress has been made towards goals:  Yes, please see goal flowsheet for most current information  Assessment of Progress: current status is unknown.    Self Management Plans:  HEP  I have re-evaluated this patient and find that the nature, scope, duration and intensity of the therapy is appropriate for the medical condition of the patient.  Alexandra continues to require the following intervention to meet STG and LTG's:  HEP.    Recommendations:  Discharge with current home program.  Patient to follow up with MD as needed.    Please refer to the daily flowsheet for treatment today, total treatment time and time spent performing 1:1 timed codes.

## 2021-02-08 NOTE — TELEPHONE ENCOUNTER
It looks like patient has been referred now to [facility]  Hepatobiliary and Pancreatic Program (Referral for: New diagnosis of HCC to Dr. Hansen. Dx: Hepatocellular carcinoma; chronic HepC, cirrhosis of liver.)   Referred by Dr. Marroquin with [facility], outpatient infectious disease.  SID Espitia R.N.

## 2021-02-19 NOTE — PROGRESS NOTES
Clinic Care Coordination Contact    Follow Up Progress Note      Assessment: Spoke with patient's daughter Wolf, patient  gives verbal consent. Patient has followed up with Oncology and Infectious Disease providers. Patient has been diagnosed with  Hepatocellular carcinoma and malignant neoplasm of liver. Patient will follow up with Oncology next week to review treatment plan and possibility of surgery.     Patient is still experiencing stomach pain after eating. Patient was started on Prilosec 1/13/21 for GERD and has not noted improvement of symptoms. RN CC advised Wolf to schedule patient for PCP follow up to address concerns further. Wolf verbalized understanding. RN CC offered to connect with scheduling, Wolf declined.     Goals addressed this encounter:   Goals Addressed                 This Visit's Progress      1. Pain Management (pt-stated)   10%     Goal Statement: I will verbalized a decrease in my pain level to a 3/10.  Date Goal set: 12/15/2020  Barriers: Language barrier  Strengths: Patient is motivated to better manage his health and pain.   Date to Achieve By: 6/15/2021  Patient expressed understanding of goal: Yes.   Action steps to achieve this goal:  1. I will meet with PCP 12/16/20 to address my chronic pain, and follow their recommendations.   2. I will meet with PT 12/16/20 to address my chronic pain, and follow their recommendations.   3. I will continue to work with care coordination for any additional concerns               Intervention/Education provided during outreach: Chronic disease management.      Outreach Frequency: 2 weeks    Plan: Patient will follow up with Oncology next week for further treatment plan of liver cancer. Patient will schedule follow up with PCP to address on going symptoms of GERD. RN Care Coordinator will follow up in two weeks.     Elmira Sethi RN Care Coordinator  [facility]  Email:  [email or web address]  Phone: [phone]

## 2021-03-18 NOTE — ED TRIAGE NOTES
Pt arrives with abd pain that started today.  Denies N/V/D  Pt is a cancer pt and had a biopsy in right flank with radiation therapy 3 days ago

## 2021-03-18 NOTE — ED PROVIDER NOTES
History     Chief Complaint:  Abdominal Pain      HPI   An Belarusian interpretor was used to provide history.    Alexandra Alfaro is a 66 year old male with a history of liver cancer, diagnosed approximately a month ago, currently undergoing radiation therapy who presents to the emergency department for evaluation of abdominal pain. Patient had endoscopy performed this morning, results described below, and following the procedure patient began having 10/10 mid-abdominal and esophageal pain described as a burning sensation. Patient also reports having diarrhea but no black or bloody stools. He has been able to eat some soup and juice today. He denies nausea, vomiting, fever, chills, and shortness of breath.    Endoscopy (performed 3/17/2021):  Impression:  Esophageal varices by endoscopy  Hiatal hernia  Portal hypertensive gastropathy    Review of Systems   Constitutional: Negative for chills and fever.   Respiratory: Negative for shortness of breath.    Gastrointestinal: Positive for abdominal pain and diarrhea. Negative for blood in stool, nausea and vomiting.   All other systems reviewed and are negative.    Allergies:  No known drug allergies    Medications:    Eliquis  Omeprazole    Past Medical History:    COVID-19  Chronic hepatitis C  Liver cancer  Depression  Hepatic cirrhosis    Past Surgical History:    Inguinal herniorrhaphy    Family History:    Thyroid cancer    Social History:  The patient presents to the emergency department with his daughter.  Patient is Belarusian speaking.    Physical Exam     Patient Vitals for the past 24 hrs:   BP Temp Temp src Pulse Resp SpO2 Weight   03/18/21 0515 -- -- -- -- -- 96 % --   03/18/21 0500 134/85 -- -- 81 -- 96 % --   03/18/21 0445 137/80 -- -- 81 -- 96 % --   03/18/21 0430 138/82 -- -- 80 -- -- --   03/18/21 0345 130/83 -- -- 82 -- 95 % --   03/18/21 0330 133/84 -- -- 80 -- 97 % --   03/18/21 0315 114/79 -- -- 85 -- 96 % --   03/18/21 0300 123/83 -- -- 85 -- 96 % --    03/18/21 0245 136/86 -- -- 83 -- 97 % --   03/18/21 0123 126/86 98.3  F (36.8  C) Temporal 101 18 96 % 77.1 kg (170 lb)         Physical Exam  General: Elderly male sitting upright  Eyes: PERRL, Conjunctive within normal limits. No scleral icterus.  ENT: Moist mucous membranes, oropharynx clear.   CV: Normal S1S2, no murmur, rub or gallop. Regular rate and rhythm  Resp: Clear to auscultation bilaterally, no wheezes, rales or rhonchi. Normal respiratory effort.  GI: Abdomen is soft and nondistended. Tender in the mid No palpable masses. No rebound or guarding.  MSK: No edema. Nontender. Normal active range of motion.  Skin: Warm and dry. No rashes or lesions or ecchymoses on visible skin.  Neuro: Alert and oriented. Responds appropriately to all questions and commands. No focal findings appreciated. Normal muscle tone.  Psych: Normal mood and affect. Pleasant.      Emergency Department Course   ECG  ECG taken at 533, ECG read at 536  Sinus rhythm with 1st degree AV block. Otherwise normal ECG.  No prior EKG.  Rate 80 bpm. MD interval 230 ms. QRS duration 78 ms. QT/QTc 364/419 ms. P-R-T axes 35 -10 16.     Imaging:  CT Chest/Abdomen/Pelvis with contrast:   1.  14 x 11 cm heterogeneous enhancing right hepatic mass consistent with   HCC. Small areas of air internally, may reflect recent biopsy and/or   necrosis. Recommend correlation to exclude superimposed infection.    2.  Multiple hepatic metastases.    3.  No aortic dissection, aneurysm, or pulmonary embolus.    4.  Cirrhosis with severe portal hypertension.    5.  3 mm nonobstructing right renal stone.   as per radiology.    Laboratory:  CBC: WBC: 8.4, HGB: 13.2 (L), PLT: 215  CMP: Glucose 109 (H), Bilirubin Total: 1.9 (H), Albumin: 2.5 (L), Alkaline Phosphatase: 157 (H), ALT: 87 (H), AST: 158 (H), o/w WNL (Creatinine: 1.01)  Lactic acid (Resulted 508): 2.0  Lipase: 76  Troponin (Collected 217): <0.015    Emergency Department Course:  Reviewed:  I reviewed the  patient's nursing notes, vitals, past medical records, Care Everywhere.     Assessments:  0247 I assessed the patient. Exam findings described above.  0446    reassessed the patient.  He is still having pain that comes in waves.  He has no localized tenderness in the right upper quadrant again on reassessment of the abdomen.  0558 I reassessed and updated the patient.  He is feeling more comfortable.  Discussed the findings in detail with the patient.  He feels comfortable to plan for discharge home.      Interventions:  307 GI cocktail 30 mL Oral  310 NS 1000 mL IV  345 NS infusion IV  537 Oxycodone 10 mg Oral    Disposition:  Discharged to home.    Impression & Plan    Medical Decision Making:   Alexandra Alfaro is a 66 year old male who presents with abdominal pain setting of known hepatocellular cancer status post endoscopy earlier today and liver biopsy earlier in the week..  He looks overall well.  A broad differential diagnosis was of course considered. Given his multiple procedures in the amount of pain he is in, CT scan was ordered.  Regards to the atypical chest pain, troponin and ECG were unremarkable for ischemia or injury.  I suspect the chest pain is esophageal irritation due to the recent endoscopy.  I cannot explain the abdominal pain as easily, however I suspect it may be due to the procedure as well.  He has no sign of perforation.  CT scan did not show acute cause for his pain.  There are air bubbles in the liver that are likely secondary to liver biopsy rather than infection or abscess.  He has no fever, no leukocytosis, and does not appear septic.     The workup in the ED is at this point negative for pathology that would require surgical care or admission and his pain is controlled..  No etiology for the patients pain is found at this point and my suspicion of an intraabdominal catastrophe or other worrisome etiology is very low.   I will not therefore admit him for serial exams and further  workup.  Patient is hemodynamically stable in ED. Plan is home with abdominal pain recheck by primary care physician or return to ED at anytime.  Return for fevers greater than 102, increasing pain, other new symptoms develop.  Abdominal pain handout given.  Questions were answered.       Diagnosis:    ICD-10-CM    1. Abdominal pain of unknown cause  R10.9    2. Atypical chest pain  R07.89    3. S/P endoscopy  Z98.890        Discharge Medications:  New Prescriptions    ONDANSETRON (ZOFRAN ODT) 4 MG ODT TAB    Take 1 tablet (4 mg) by mouth every 8 hours as needed for nausea or vomiting    OXYCODONE (ROXICODONE) 5 MG TABLET    Take 1-2 tablets (5-10 mg) by mouth every 6 hours as needed for moderate to severe pain    POLYETHYLENE GLYCOL (MIRALAX) 17 GM/DOSE POWDER    Take 17 g (1 capful) by mouth daily As needed for constipation (while on narcotics)    SUCRALFATE (CARAFATE) 1 GM TABLET    Take 1 tablet (1 g) by mouth 4 times daily         Bereket Davalos  3/18/2021   EMERGENCY DEPARTMENT  Scribe Disclosure:  I, Bereket Davalos, am serving as a scribe at 2:47 AM on 3/18/2021 to document services personally performed by Krista Ortzi MD based on my observations and the provider's statements to me.          Krista Ortiz MD  03/18/21 0701

## 2021-03-18 NOTE — PROGRESS NOTES
Clinic Care Coordination Contact  [facility]/Voicemail       Clinical Data: Care Coordinator Outreach  Outreach attempted x 1.  Left message on patient's voicemail with call back information and requested return call.  Plan: Care Coordinator will try to reach patient again in 10 business days.    Elmira Sethi RN Care Coordinator  [facility]  Email: [email or web address]  Phone: [phone]

## 2021-03-19 NOTE — LETTER
[place] CARE COORDINATION  [address]  [place] [address]    March 19, 2021        Alexandra Alfaro  [address]  [place] [address]          Dear Taylor Morris is an updated Complex Care Plan for your continued enrollment in Care Coordination. Please let us know if you have additional questions, concerns, or goals that we can assist with.    Sincerely,    Elmira Sethi RN Care Coordinator  [facility]  Email: [email or web address]  Phone: [phone]            [facility]  Complex Care Plan  About Me:    Patient Name:  Alexandra Alfaro    YOB: 1955  Age:         66 year old   [facility] MRN:    [id number] Telephone Information:  Home Phone [phone]   Mobile [phone]       Address:  [address]  [place] [address] Email address:  [email or web address]      Emergency Contact(s)    Name Relationship Lgl Grd Work Phone Home Phone Mobile Phone   DESTINY DAVENPORT Daughter   [phone]           Primary language:  Hebrew     needed? No   [facility] [employer]:  [phone] op. 1  Other communication barriers:    Preferred Method of Communication:  Mail  Current living arrangement:    Mobility Status/ Medical Equipment:      Health Maintenance  Health Maintenance Reviewed:      My Access Plan  Medical Emergency 911   Primary Clinic Line [facility] - [phone]   24 Hour Appointment Line [phone] or  [phone]) (toll-free)   24 Hour Nurse Line [phone] (toll-free)   Preferred Urgent Care     Preferred Hospital     Preferred Pharmacy [id number] [address] [facility] [address]     Behavioral Health Crisis Line The National Suicide Prevention Lifeline at 1-383.589.4996 or 911             My Care Team Members  Patient Care Team       Relationship  Specialty Notifications Start End    Lindy Dodge MD PCP - General Internal Medicine  12/26/20     Phone: [phone] Fax: [phone] [address] [facility] [address]    Lindy Dodge MD Assigned PCP   12/3/20     Phone: [phone] Fax: [phone]         [address] [facility] [address]    Bela Sethi RN Lead Care Coordinator   12/15/20             My Care Plans  Self Management and Treatment Plan  Goals and (Comments)  Goals        General    1. Pain Management (pt-stated)     Notes - Note created  12/15/2020  3:42 PM by Bela Sethi RN    Goal Statement: I will verbalized a decrease in my pain level to a 3/10.  Date Goal set: 12/15/2020  Barriers: Language barrier  Strengths: Patient is motivated to better manage his health and pain.   Date to Achieve By: 6/15/2021  Patient expressed understanding of goal: Yes.   Action steps to achieve this goal:  1. I will meet with PCP 12/16/20 to address my chronic pain, and follow their recommendations.   2. I will meet with PT 12/16/20 to address my chronic pain, and follow their recommendations.   3. I will continue to work with care coordination for any additional concerns               Action Plans on File:                       Advance Care Plans/Directives Type:        My Medical and Care Information  Problem List   Patient Active Problem List   Diagnosis     Troponin level elevated     2019 novel coronavirus disease (COVID-19)     COVID-19     Hepatitis C, chronic (H)      Current Medications and Allergies:    Current Outpatient Medications   Medication Instructions     apixaban ANTICOAGULANT (ELIQUIS) 2.5 mg, Oral, 2 TIMES DAILY     fish Oil 1,200 mg, Oral, DAILY     multivitamin (CENTRUM SILVER) tablet 1 tablet, Oral, DAILY     omeprazole (PRILOSEC) 20 mg, Oral, DAILY     ondansetron (ZOFRAN ODT) 4 mg, Oral, EVERY 8 HOURS PRN     oxyCODONE (ROXICODONE) 5-10 mg, Oral, EVERY 6 HOURS PRN     polyethylene glycol  (MIRALAX) 17 GM/Dose powder 1 capful, Oral, DAILY, As needed for constipation (while on narcotics)     sucralfate (CARAFATE) 1 g, Oral, 4 TIMES DAILY         Care Coordination Start Date: 12/15/2020   Frequency of Care Coordination: 2 weeks   Form Last Updated: 03/19/2021

## 2021-03-19 NOTE — PROGRESS NOTES
Clinic Care Coordination Contact    Follow Up Progress Note      Assessment: Received return call from patient's daughter Wolf. She states that her father's pain is now being well managed with new pain medications prescribed after 3/18/21 [facility] ED visit. Wolf denies any new need for support or resources.     Goals addressed this encounter:   Goals Addressed                 This Visit's Progress      1. Pain Management (pt-stated)   50%     Goal Statement: I will verbalized a decrease in my pain level to a 3/10.  Date Goal set: 12/15/2020  Barriers: Language barrier  Strengths: Patient is motivated to better manage his health and pain.   Date to Achieve By: 6/15/2021  Patient expressed understanding of goal: Yes.   Action steps to achieve this goal:  1. I will meet with PCP 12/16/20 to address my chronic pain, and follow their recommendations.   2. I will meet with PT 12/16/20 to address my chronic pain, and follow their recommendations.   3. I will continue to work with care coordination for any additional concerns               Intervention/Education provided during outreach: Chronic disease management.      Outreach Frequency: 2 weeks    Plan: Patient will follow up with imaging at [facility] next week. RN Care Coordinator will send updated 90 days complex care plan via [facility] and will follow up in two weeks.    Elmira Sethi RN Care Coordinator  [facility]  Email: [email or web address]  Phone: [phone]

## 2021-04-05 NOTE — PROGRESS NOTES
Clinic Care Coordination Contact  [facility]/Voicemail       Clinical Data: Care Coordinator Outreach  Outreach attempted x 1.  Left message on patient's voicemail with call back information and requested return call.  Plan: Care Coordinator will try to reach patient again in 10 business days.    Elmira Sethi RN Care Coordinator  [facility]  Email: [email or web address]  Phone: [phone]

## 2021-04-07 NOTE — ED PROVIDER NOTES
History   Chief Complaint:  Fall     The history is provided by the patient. A  was used (Chinese).      Alexandra Alfaro is a 66 year old male with history of hepatitis C, liver mass, and COVID-19 infection who presents with fall. Patient arrives with daughter. Patient rolled his right ankle while attempting to kneel to pray this morning at approximately 0600. He denies head trauma or loss of consciousness. He states the pain is localized to his fifth toe of his right foot. He denies numbness or tingling to his right foot. He denies any radiating pain to his right knee or ankle. He states he broke his foot in the same area 25 years ago. He denies use of pain medications prior to arrival and does not take Tylenol due to a history of liver cancer.     Review of Systems   Musculoskeletal: Negative for arthralgias.        Fifth toe pain of right foot   Neurological: Negative for syncope and numbness.   All other systems reviewed and are negative.      Allergies:  No known drug allergies    Medications:  Eliquis   Omeprazole  Carafate     Past Medical History:    COVID-19  Hepatitis C, Chronic   Right Inguinal Hernia   Liver Mass, Right Lobe  Chronic Neck Pain     Past Surgical History:    DaVinci Herniorrhaphy Inguinal     Social History:  Patient arrives with daughter.     Physical Exam     Patient Vitals for the past 24 hrs:   BP Temp Temp src Pulse Resp SpO2   04/07/21 1203 119/74 -- -- 89 18 98 %   04/07/21 0844 121/80 97.9  F (36.6  C) Temporal 99 16 97 %       Physical Exam  General: the patient is awake and interactive  HEENT:  Moist mucous membranes, conjunctiva normal  Pulmonary:  Normal respiratory effort  Cardiovascular:  Well perfused  Musculoskeletal:  Moving 4 extremities grossly wnl, no deformities  Right foot/ankle -  No medial/lateral malleolus tenderness.  Mild swelling and tenderness at base of the 5th metatarsal.  No pain to the midfoot.  No tenderness over proximal fibula.  5/5  strength dorsiflexion/plantar flexion.  Normal sensation to light touch in foot.  Capillary refill < 2 seconds, DP/PT pulse 2+  Neuro:  Speech normal, no focal deficits  Psych:  Normal affect     Emergency Department Course     Imaging:  Foot  XR, G/E 3 Views, Right  Nondisplaced fracture of the fifth metatarsal base.  Reading per radiology    XR Ankle Right G/E 3 Views  Nondisplaced fracture at the fifth metatarsal base.  Reading per radiology    Procedures    Short Leg Posterior Splint Placement (Orthoglass)     Splint was applied and after placement I checked and adjusted the fit to ensure proper positioning. Patient was more comfortable with splint in place. Sensation and circulation are intact after splint placement.    Emergency Department Course:    Reviewed:  I reviewed nursing notes, past medical history and care everywhere    Assessments:  0956 I obtained history and examined the patient as noted above.   1126 I rechecked the patient and explained findings. He also placed a splint.      Disposition:  The patient was discharged to home.     Impression & Plan     Medical Decision Making:  Alexandra Alfaro is a 66 year old male who presents to the ER for evaluation of right foot pain after inversion injury.  Please see above for details of HPI and exam.  He is CMS intact to the distal extremity with point tenderness to the base of the fifth metatarsal.  Radiograph confirms nondisplaced fracture of the base of the fifth metatarsal.  The rest of his extremity exam was unremarkable for injury or pain.  Plan for NWB, crutches, short leg posterior splint, follow-up with orthopedics in the next few days in regards to this injury.  Norco for breakthrough pain.  RICE discussed.  Reasons to return to the ER discussed.  Patient and daughter are comfortable with this plan.     Diagnosis:    ICD-10-CM    1. Closed displaced fracture of fifth metatarsal bone of right foot, initial encounter  S92.351A        Discharge  Medications:  Discharge Medication List as of 4/7/2021 11:56 AM      START taking these medications    Details   !! oxyCODONE (ROXICODONE) 5 MG tablet Take 1 tablet (5 mg) by mouth every 6 hours as needed for breakthrough pain or moderate to severe pain, Disp-10 tablet, R-0, Local Print       !! - Potential duplicate medications found. Please discuss with provider.          Scribe Disclosure:  I, Terry Shea, am serving as a scribe at 9:34 AM on 4/7/2021 to document services personally performed by Burke Alvarez MD based on my observations and the provider's statements to me.                Burke Alvarez MD  04/07/21 1809

## 2021-04-07 NOTE — ED TRIAGE NOTES
Fell while kneeling to pray this am about 0600.  Injured right ankle and scraped up left knee. Did not strike head and no LOC.

## 2021-04-08 NOTE — PROGRESS NOTES
Clinic Care Coordination Contact  [facility]/Voicemail    4/7: Received voicemail from patient's daughter Wolf, informing RN CC of patient's fall and ankle fracture.      Clinical Data: Care Coordinator Outreach  Outreach attempted x 1.  Left message on patient's voicemail with call back information and requested return call.  Plan: Care Coordinator will try to reach patient again in 1-2 business days.    Elmira Sethi RN Care Coordinator  [facility]  Email: [email or web address]  Phone: [phone]

## 2021-04-23 NOTE — PROGRESS NOTES
Clinic Care Coordination Contact  [facility]/Voicemail       Clinical Data: Care Coordinator Outreach  Outreach attempted x 3.  Left message on patient's voicemail with call back information and requested return call.  Plan: Care Coordinator will try one final time to reach patient again in 1 month.    Elmira Sethi, RN Care Coordinator  [facility]  Email: [email or web address]  Phone: [phone]

## 2021-05-25 PROBLEM — U07.1 2019 NOVEL CORONAVIRUS DISEASE (COVID-19): Status: RESOLVED | Noted: 2020-01-01 | Resolved: 2021-01-01

## 2021-05-25 PROBLEM — D64.9 ANEMIA, UNSPECIFIED TYPE: Status: ACTIVE | Noted: 2021-01-01

## 2021-05-25 PROBLEM — U07.1 COVID-19: Status: RESOLVED | Noted: 2020-01-01 | Resolved: 2021-01-01

## 2021-05-25 PROBLEM — C22.0 HEPATOCELLULAR CARCINOMA (H): Status: ACTIVE | Noted: 2021-01-01

## 2021-05-25 PROBLEM — R79.89 TROPONIN LEVEL ELEVATED: Status: RESOLVED | Noted: 2020-01-01 | Resolved: 2021-01-01

## 2021-05-25 NOTE — NURSING NOTE
/81 (BP Location: Left arm, Patient Position: Sitting, Cuff Size: Adult Regular)   Pulse 113   Temp 98.5  F (36.9  C) (Oral)   Resp 18   Wt 75.2 kg (165 lb 12.8 oz)   SpO2 94%   BMI 27.62 kg/m

## 2021-05-25 NOTE — PROGRESS NOTES
Clinic Care Coordination Contact  [facility]/Voicemail       Clinical Data: Care Coordinator Outreach  Outreach attempted x 4.  Left message on patient's voicemail with call back information and requested return call.  Plan: Care Coordinator will send unable to contact letter with care coordinator contact information via [employer]. RN Care Coordinator will try to reach patient one last time in 1 month.    Elmira Sethi RN Care Coordinator  [facility]  Email: [email or web address]  Phone: [phone]

## 2021-05-25 NOTE — PROGRESS NOTES
Assessment & Plan     Hepatocellular carcinoma (H)  He had recent embolization procedure, has follow up with GI and oncology as scheduled. Continue a few tylenol prn pain, can also try topical lidocaine cream around the incision   - Comprehensive metabolic panel (BMP + Alb, Alk Phos, ALT, AST, Total. Bili, TP)    Other cirrhosis of liver (H)  As above    Other ascites  Not really accumulating much fluid, follow up IG     Anemia, unspecified type  Recheck, no bleeding currently  - Hemoglobin      No follow-ups on file.    Angelina Johnson MD  [facility] NIKOLE Morris is a 66 year old who presents for the following health issues  accompanied by his daughter:    Hospitals in [place]       Hospital Follow-up Visit:    Hospital/Nursing Home/IP Rehab Facility: [facility]   Date of Admission: 05/06/2021  Date of Discharge: 05/21/2021  Reason(s) for Admission: Hepatocellular carcinoma, cirrhosis with ascites. He was admitted for angiogram and embolization of the liver tumor, had a paracentesis done for ascites. Cytology was negative for malignancy in ascitic fluid.       Was your hospitalization related to COVID-19? No   Problems taking medications regularly:  None  Medication changes since discharge: None  Problems adhering to non-medication therapy:      Summary of hospitalization:  [facility] discharge summary reviewed  Diagnostic Tests/Treatments reviewed.  Follow up needed: lab  Other Healthcare Providers Involved in Patient s Care:         Specialist appointment - [facility], Oncology  Update since discharge: stable.     He has had some pain at the incision site right lateral abdomen, tylenol is helping.   He has not had any significant accumulation of ascites.   He has had poor appetite and is no a low salt diet. His daughter is concerned about amounts of electrolytes in supplements,  was told to watch potassium.         Post Discharge Medication Reconciliation: discharge  medications reconciled, continue medications without change.  Plan of care communicated with patient and family          Patient Active Problem List   Diagnosis     Hepatitis C, chronic (H)     Hepatocellular carcinoma (H)     Anemia, unspecified type     Other cirrhosis of liver (H)     Other ascites     Current Outpatient Medications   Medication Sig Dispense Refill     furosemide (LASIX) 40 MG tablet Take 40 mg by mouth daily       apixaban ANTICOAGULANT (ELIQUIS) 2.5 MG tablet Take 1 tablet (2.5 mg) by mouth 2 times daily 60 tablet 0     multivitamin (CENTRUM SILVER) tablet Take 1 tablet by mouth daily       Omega-3 Fatty Acids (FISH OIL) 1200 MG capsule Take 1,200 mg by mouth daily       omeprazole (PRILOSEC) 20 MG DR capsule Take 1 capsule (20 mg) by mouth daily 90 capsule 0     sucralfate (CARAFATE) 1 GM tablet Take 1 tablet (1 g) by mouth 4 times daily 28 tablet 0          Review of Systems   No fever, chills, no other abdominal pain, no dyspnea, no nausea or vomiting. No black or tar like stools.       Objective    /81 (BP Location: Left arm, Patient Position: Sitting, Cuff Size: Adult Regular)   Pulse 113   Temp 98.5  F (36.9  C) (Oral)   Resp 18   Wt 75.2 kg (165 lb 12.8 oz)   SpO2 94%   BMI 27.62 kg/m    Body mass index is 27.62 kg/m .  Physical Exam     Abdomen: not distended, soft, not tender.

## 2021-05-25 NOTE — LETTER
[facility] CARE COORDINATION  [address]  [facility] [address]    May 25, 2021    Alexandra Alfaro  [address]   [place] [address]      Dear Alexandra,    I have been unsuccessful in reaching you since our last contact. At this time the Care Coordination team will make no further attempts to reach you, however this does not change your ability to continue receiving care from your providers at your primary care clinic. If you need additional support from a care coordinator in the future please contact me.    All of us at [facility] are invested in your health and are here to assist you in meeting your goals.     Sincerely,    Elmira Sethi RN Care Coordinator  [facility]  Email: [email or web address]  Phone: [phone]

## 2021-05-26 PROBLEM — R18.8 OTHER ASCITES: Status: ACTIVE | Noted: 2021-01-01

## 2021-05-26 PROBLEM — K74.69 OTHER CIRRHOSIS OF LIVER (H): Status: ACTIVE | Noted: 2021-01-01

## 2021-06-25 NOTE — LETTER
[facility] CARE COORDINATION  [address] [facility] [address]    June 25, 2021    Alexandra LOCKETT Angelina  [address]   [facility] [address]    Dear Alexandra,  Your Care Team congratulates you on your journey to maintain wellness. This document will help guide you on your journey to maintain a healthy lifestyle.  You can use this to help you overcome any barriers you may encounter.  If you should have any questions or concerns, you can contact the members of your Care Team or contact your Primary Care Clinic for assistance.     Health Maintenance  Health Maintenance Reviewed:    Health Maintenance Due   Topic Date Due     ADVANCE CARE PLANNING  Never done     DEPRESSION ACTION PLAN  Never done     Pneumococcal Vaccine: 65+ Years (1 of 2 - PPSV23) Never done     COLORECTAL CANCER SCREENING  Never done     HEPATITIS B IMMUNIZATION (1 of 3 - Risk 3-dose series) Never done     LIPID  Never done     ZOSTER IMMUNIZATION (1 of 2) Never done     MEDICARE ANNUAL WELLNESS VISIT  Never done     PHQ-9  06/16/2021         My Access Plan  Medical Emergency 911   Primary Clinic Line [facility] - [phone]   24 Hour Appointment Line [phone] or  [phone]) (toll-free)   24 Hour Nurse Line [phone] (toll-free)   Preferred Urgent Care     Preferred Hospital [facility]  [phone]   Preferred Pharmacy [id number] [address] AT [facility] [address] & [facility]     Behavioral Health Crisis Line The National Suicide Prevention Lifeline at 1-910.921.7172 or 911     My Care Team Members  Patient Care Team       Relationship Specialty Notifications Start End    Lindy Dodge MD PCP - General Internal Medicine  12/26/20     Phone: [phone] Fax: [phone]         [address] [facility] [address]    Angelina Johnson MD Assigned PCP   6/6/21     Phone: [phone] Fax: [phone]         [address] [facility] [address] [facility] [address]              Goals       COMPLETED: 1. Pain Management (pt-stated)      Goal Statement: I will verbalized a decrease in my pain level to a 3/10.  Date Goal set: 12/15/2020  Barriers: Language barrier  Strengths: Patient is motivated to better manage his health and pain.   Date to Achieve By: 6/15/2021  Patient expressed understanding of goal: Yes.   Action steps to achieve this goal:  1. I will meet with PCP 12/16/20 to address my chronic pain, and follow their recommendations.   2. I will meet with PT 12/16/20 to address my chronic pain, and follow their recommendations.   3. I will continue to work with care coordination for any additional concerns               Advance Care Plans/Directives Type:      We notice that you do not have an Advance Directive on file. Upon completion of your Health Care Directive, please bring a copy with you to your next office visit.    It has been your Clinic Care Team's pleasure to work with you on your goals.    Regards,  Your Clinic Care Team    Elmira Sethi RN Care Coordinator  [facility]  Email: [email or web address]  Phone: [phone]

## 2021-06-25 NOTE — PROGRESS NOTES
Clinic Care Coordination Contact    Follow Up Progress Note      Assessment: Spoke with patient's daughter Wolf, patient gives verbal consent. Patient states that he feels that specialty oncology team is handling all questions and concerns. Patient will have surgery on 7/15/21 at [facility]. Patient requests to graduate from primary care cooridnation at this time, stating that he feels that his health and wellness are being well managed at this time and all need for support and resources has been met.     Goals addressed this encounter:   Goals Addressed                 This Visit's Progress      COMPLETED: 1. Pain Management (pt-stated)   100%     Goal Statement: I will verbalized a decrease in my pain level to a 3/10.  Date Goal set: 12/15/2020  Barriers: Language barrier  Strengths: Patient is motivated to better manage his health and pain.   Date to Achieve By: 6/15/2021  Patient expressed understanding of goal: Yes.   Action steps to achieve this goal:  1. I will meet with PCP 12/16/20 to address my chronic pain, and follow their recommendations.   2. I will meet with PT 12/16/20 to address my chronic pain, and follow their recommendations.   3. I will continue to work with care coordination for any additional concerns               Intervention/Education provided during outreach: Chronic disease management     Enrollment status: Graduated.      Plan: No further outreaches at this time.  Patient will continue to follow the plan of care.  If new needs arise a new Care Coordination referral may be placed.  FYI to PCP    Elmira Sethi RN Care Coordinator  [facility]  Email: [email or web address]  Phone: [phone]

## 2021-06-30 NOTE — LETTER
July 9, 2021    Alexandra Alfaro  [address]  [address]  [place] [facility] [facility] [address]    Dear Mr. Alfaro,   The purpose of this letter is to let you know that on  the [facility] Multi-Disciplinary Tumor Board reviewed the results of your liver cancer treatment and imaging. Based on the results of this review and the selection criteria used by our program, the decision was made to not evaluate you for the liver transplant list.  This is because your liver cancer is currently out of a transplant criteria.   Important things you should know:    If you would like to discuss the decision, or if your medical status changes you may schedule a return visits with your doctor by calling [phone] and asking to speak to your transplant coordinator.    We recommend you continue to follow with Dr. Arias and the IR team at [facility] to continue treatment of the liver cancer. They will re-refer you once it is appropriate to do so.     We recommend that you continue to follow up with your primary care doctor in order to manage your health concerns.  Enclosed is a letter from [facility] which describes the services offered to patients by [facility] and the Organ Procurement and Transplantation Network.  Thank you for allowing us to participate in your care.  We wish you well.  Sincerely,    Srinivasan Tenorio Jr., BSN, RN  Liver Transplant Coordinator  [phone]    Enclosures: OS Letter  cc: Care Team            The Organ Procurement and Transplantation Network  Toll-free patient services line:     Your resource for organ transplant information    If you have a question regarding your own medical care, you always should call your transplant hospital first. However, for general organ transplant-related information, you can call the Organ Procurement and Transplantation Network (OPTN) toll-free patient services line at 3-030-983- 2818. Anyone, including potential transplant candidates, candidates,  recipients, family members, friends, living donors, and donor family members, can call this number to:          Talk about organ donation, living donation, the transplant process, the donation process, and transplant policies.    Get a free patient information kit with helpful booklets, waiting list and transplant information, and a list of all transplant hospitals.    Ask questions about the OPTN website (https://optn.transplant.hrsa.gov/), the United Network for Organ Sharing s (UNOS) website (https://unos.org/), or the UNOS website for living donors and transplant recipients. (https://www.transplantliving.org/).    Learn how the OPTN can help you.    Talk about any concerns that you may have with a transplant hospital.    The [facility] transplant system, the OPTN, is managed under federal contract by the United Network for Organ Sharing (UNOS), which is a non-profit charitable organization. The OPTN helps create and define organ sharing policies that make the best use of donated organs. This process continuously evaluating new advances and discoveries so policies can be adapted to best serve patients waiting for transplants. To do so, the OPTN works closely with transplant professionals, transplant patients, transplant candidates, donor families, living donors, and the public. All transplant programs and organ procurement organizations throughout the country are OPTN members and are obligated to follow the policies the OPTN creates for allocating organs.    The OPTN also is responsible for:      Providing educational material for patients, the public, and professionals.    Raising awareness of the need for donated organs and tissue.    Coordinating organ procurement, matching, and placement.    Collecting information about every organ transplant and donation that occurs in the [facility].    Remember, you should contact your transplant hospital directly if you have questions or concerns about your own medical  care including medical records, work-up progress, and test results.    We are not your transplant hospital, and our staff will not be able to answer questions about your case, so please keep your transplant hospital s phone number handy.    However, while you research your transplant needs and learn as much as you can about transplantation and donation, we welcome your call to our toll-free patient services line at [phone].          Updated 4/1/2019

## 2021-06-30 NOTE — TELEPHONE ENCOUNTER
Patient was asked the following questions during liver intake call.     Referring Provider: Self   Referring Diagnosis: Liver Mass/HCC/Hep C   PCP: Dr. Angelina Johnson     1)Do you know why you have liver disease: Yes             If Alcoholic Cirrhosis is present when was your last drink: NA             Have you ever been through treatment for alcohol: No  2) Presence of Ascites: Yes Paracentesis: Yes  3) Presence of Hepatic Encephalopathy: No Medications: No  4) History of GI Bleeding: No   5) Oxygen Use: None  6) EGD: No  7) Colonoscopy: No   8) MELD Score: 24  9) Insurance information: [facility]       Policy frias: Self       Subscriber/policy/ID number: [id number]      Group Number: [id number]    Referral intake process completed.  Patient is aware that after financial approval is received, medical records will be requested.   Patient confirmed for a callback from transplant coordinator on 7/5/21.  Tentative evaluation date TBD.    Confirmed coordinator will discuss evaluation process in more detail at the time of their call.   Patient is aware of the need to arrange age appropriate cancer screening, vaccinations, and dental care.  Reminded patient to complete questionnaire, complete medical records release, and review packet prior to evaluation visit .  Assessed patient for special needs (ie--wheelchair, assistance, guardian, and ):  Needs Malay    Patient instructed to call [phone] with questions.     Patient gave verbal consent during intake call to obtain medical records and documents outside of [facility]:  Yes     CONCEPCION Sarmiento, LPN   Solid Organ Transplant

## 2021-07-02 NOTE — TELEPHONE ENCOUNTER
LIVER DISEASE  HCV / HCC  REFERRING PROVIDER Maxi Tillman M.D.  /[place] 4/2021. Srinivasan Padilla MD  -yolanda/DENZEL  -----------------------------------------------------------------------------------------------------------------------------  67 yo born in [place], COVID 12/20 with imaging showing cirrhosis. Further work up HCV (now SVR), HCC per 2/15/21 5X (13.3 cm), bx 3/16/21 well differentiated HCC, 3/25/21 reduced Y90 (elevated hepatic pulm shunt), 5/6/21 bland embolization (inpt 2 wks post).   Bld type A  Surgery-DaVinci Herniorrhaphy Inguinal   Variceal screening Last EGD. Location. Varices no/yes. Rec follow up.    ---------------------------------------------------------------------------------------------------------------------------------   Defer txp evaluation. Providers to review and discuss plan of care.

## 2021-07-02 NOTE — Clinical Note
Imagin/15/21 MRI outside scan- 5x (13.3cm)                21 MRI outside scan- >5cm viable, 5b 2.8cm, multifocal HCC  Plan/Recommendations: Dr. Miller to review plan with referring, defer txp evaluation  Outside [place]

## 2021-07-02 NOTE — PROGRESS NOTES
Alexandra Alfaro discussed at the multidisciplinary tumor board on 21. The attendees consisted of representatives from hepatology, oncology, radiation oncology, surgical subspecialty including liver transplant and radiology.    Imagin/15/21 MRI outside scan- 5x (13.3cm)                 21 MRI outside scan- >5cm viable, 5b 2.8cm, multifocal HCC    Plan/Recommendations:  Dr. Miller to review plan with referring, defer txp evaluation   Outside [place]    CONCEPCION GargN, RN  Liver transplant coordinator

## 2021-07-05 NOTE — TELEPHONE ENCOUNTER
Spoke with one of patient's daughters, Radha, and the plan will be for patient to continue care at [facility], with providers collaborating as needed.  Care team updated.

## 2021-07-08 NOTE — TELEPHONE ENCOUNTER
Patient's daughter called stated no one has called to set up an appointment for the patient to see a Hepatologist.

## 2021-07-09 NOTE — TELEPHONE ENCOUNTER
Spoke with daughter about plan for patient, similar to what Lorelei did last week (see her encounter).    Confirmed with daughter they know about Dr. Juana rodgers on 7/30 at [facility] for continued HCC care/treatment.     She will call back with any questions.

## 2021-07-20 PROBLEM — B18.2 CHRONIC HEPATITIS C VIRUS INFECTION (H): Status: ACTIVE | Noted: 2021-01-01

## 2021-07-20 PROBLEM — C22.0: Status: ACTIVE | Noted: 2021-01-01

## 2021-07-20 PROBLEM — R79.89 ELEVATED LFTS: Status: ACTIVE | Noted: 2021-01-01

## 2021-07-20 PROBLEM — R10.13 EPIGASTRIC PAIN: Status: ACTIVE | Noted: 2021-01-01

## 2021-07-20 NOTE — ED TRIAGE NOTES
Pt presents for evaluation of generalized abdominal pain, dizziness and shortness of breath x 3 days. Nausea and emesis started today. Hx of liver cancer. Denies any urinary or bowel issues.

## 2021-07-20 NOTE — LETTER
[facility]  Inpatient Care Coordination   [phone]  [facility]         [facility] MD Arden Bolaños [facility] [phone]  Fax [phone]    Again will need Romanian

## 2021-07-20 NOTE — LETTER
TO WHOM IT MAY CONCERN               Alexandra Alfaro (YOB: 1955) was admitted to the hospital on 2021  3:27 PM for medical care and remains hospitalized due to worsening improvement in his clinical status.     The patient is requesting that his daughter Migdalia Mahoney and her children (Abhi Lantigua and Alex Waterman Abdkacey Grzegorz) spend time with him before he expires.     The family is awaiting a visa at this time. The patient is terminal and is likely to  without treatment in 1-2 months. This may be sooner if he fails to respond to medical therapies.     Thank you.     Mariposa Horan DO MD  Internal Medicine   [facility] [address] [place] [address] [phone]

## 2021-07-20 NOTE — ED PROVIDER NOTES
"  History   Chief Complaint:  Abdominal Pain     A  was used.      Alexandra Alfaro is a 66 year old male with history of hepatitis, hepatic cirrhosis, liver mass and Covid-19 who presents with abdominal pain. Three days ago, the patient developed abdominal pain, continuous chest pain and shortness of breath. His O2 levels were normal. His chest pain is exacerbated by taking deep breaths. Since then, his abdominal pain has increased and he had a fever yesterday. This morning, he experienced one episode of non-bloody emesis that he describes as looking like \"yellow water\". He has not been able to eat or drink anything today secondary to his abdominal pain. Additionally, he hasn't been able to take his normal medications. He denies any cough.     Review of Systems   Constitutional: Positive for fever.   Respiratory: Positive for shortness of breath. Negative for cough.    Cardiovascular: Positive for chest pain.   Gastrointestinal: Positive for abdominal pain and vomiting (non-bloody).   All other systems reviewed and are negative.    Allergies:  No Known Allergies    Medications:  Miralax  Compazine  Senexon-S  Aldactone  Ultram  Lasix  Prilosec  Eliquis    Past Medical History:    Hepatitis  Covid-19  Hepatic cirrhosis  Liver mass, right lobe    Past Surgical History:    DaVinci herniorrhaphy inguinal  IR hepatic angio x3    Social History:  The patient was accompanied to the ED by a .    Physical Exam     Patient Vitals for the past 24 hrs:   BP Temp Temp src Pulse Resp SpO2 Height Weight   07/20/21 2245 112/65 -- -- 108 12 90 % -- --   07/20/21 2230 118/70 -- -- 107 14 99 % -- --   07/20/21 2215 120/80 -- -- 106 12 99 % -- --   07/20/21 2200 117/76 -- -- 107 18 99 % -- --   07/20/21 2145 117/71 -- -- 105 22 99 % -- --   07/20/21 2130 91/68 -- -- 102 18 100 % -- --   07/20/21 2115 104/69 -- -- 101 (!) 38 98 % -- --   07/20/21 2100 112/78 -- -- 103 17 94 % -- --   07/20/21 2045 110/73 -- -- " "99 23 100 % -- --   07/20/21 2030 108/69 -- -- 97 22 99 % -- --   07/20/21 2015 108/70 -- -- 100 21 100 % -- --   07/20/21 1815 122/77 -- -- 98 18 99 % -- --   07/20/21 1530 123/81 -- -- 101 28 100 % -- --   07/20/21 1327 121/79 98.1  F (36.7  C) Oral 111 16 97 % 1.753 m (5' 9\") 72.6 kg (160 lb)       Physical Exam  General: Alert, cooperative. Speaks Portuguese.   Head:  Scalp is atraumatic.  Eyes:  The pupils are equal, round, and reactive to light. Scleral icterus.  ENT:                                      Ears:  The external ears are normal.   Nose:  The external nose is normal.  Throat:  The oropharynx is normal. Mucus membranes are moist.                 Neck:  Normal range of motion.   CV:  tachycardic rate. No murmur. 2+ radial pulses  Resp:  Breath sounds are clear bilaterally. Non-labored, no retractions or accessory muscle use.  GI:  Abdomen is soft, no distension, epigastric tenderness.   MS:  Normal range of motion. No acute deformities.   Skin:  Warm and dry. No rash. Jaundice appearing.   Neuro:  Alert. Strength and sensation grossly intact.   Psych:  Awake. Alert.  Appropriate interactions.     Emergency Department Course   ECG  ECG taken at 1337, ECG read at 1340  Sinus tachycardia with 1st degree A-V block   Otherwise normal ECG  No significant change compared to EKG dated 03/13/2021  Rate 106 bpm. LA interval 212 ms. QRS duration 86 ms. QT/QTc 330/438 ms. P-R-T axes 38 2 30.     Imaging:  CT Chest (PE) Abdomen Pelvis w Contrast  1.  No pulmonary embolism identified. Modest motion artifact present and its possible tiny peripheral emboli could be overlooked.  2.  Cirrhotic liver with innumerable hepatic lesions and one large focus of presumed previous treatment occupying much of the right lobe, findings are similar to previous study with no definite acute change.  3.  Cirrhosis and portal venous hypertension and mild ascites similar to previous exam.  4.  Mild wall thickening of gallbladder and right " hemicolon also similar to prior study, slightly improved.  Reading per radiology    Laboratory:  UA with Microscopic: Bilirubin Small (A), Protein Albumin 20 (A), Mucous Urine Present (A) o/w Negative    CBC: WBC 10.3, HGB 11.6 (L),    BMP: Glucose 133 (H), Sodium: 127 (L), o/w WNL (Creatinine: 0.93)    Troponin (Collected 1533): 0.021  Troponin (Collected 1941): 0.030    Hepatic Panel: Bilirubin Total 6.3 (H), Bilirubin Direct 3.8 (H), Albumin 2.1 (L), Alkaline Phosphate 170 (H),  (HH),  (H) o/w WNL    Lipase: 78    Asymptomatic COVID-19 Virus (Coronavirus) by PCR Nasopharyngeal swab: Pending    Blood Culture x2: Pending    Emergency Department Course:  Reviewed:  I reviewed nursing notes, vitals, past medical history and care everywhere    Assessments:  ED Course as of Jul 20 2327 Tue Jul 20, 2021 1733 The patient was examined and their history was obtained as noted above.       2004 ALT(!): 281   2007 The patient was rechecked and updated.          Consults:    I spoke with the hospitalist, Dr. Martinez, regarding placement for the patient.    Interventions:  1802 Zofran 4mg IV  1802 NS 1L IV Bolus  1804 Protonix 40mg IV  1806 Lidocaine 2% 30mL PO  2059 Dilaudid 0.5mg IV    Disposition:  The patient was admitted to the hospital under the care of Dr. Martinez.     Impression & Plan   Covid-19  Alexandra Alfaro was evaluated during a global COVID-19 pandemic, which necessitated consideration that the patient might be at risk for infection with the SARS-CoV-2 virus that causes COVID-19.   Applicable protocols for evaluation were followed during the patient's care.   COVID-19 was considered as part of the patient's evaluation. The plan for testing is:  a test was obtained during this visit.    Medical Decision Making:  Alexandra Alfaro is a 66 year old male with past medical history including hepatocellular carcinoma undergoing treatment, none alcoholic cirrhosis, hepatitis C, presents  emergency department with epigastric pain for the last 3 days.  He notes some pain radiates into his chest.  EKG reveals sinus tachycardia with first-degree AV block.  There is no ischemic changes.  Initial troponin and serial troponin negative.  Low suspicion for acute coronary syndrome.  CT chest without evidence of pulmonary embolism.  CT of the abdomen revealed cirrhotic liver with hepatic lesions, findings are consistent with previous CT.  Cirrhosis and portal venous hypertension similar to previous exam.  Mild gallbladder wall thickening also similar to previous exam.  Lab work notable for elevated LFTs, ,  and bilirubin 6.3.  Lipase normal.  Patient was given a GI cocktail in the emergency department and did have significant relief of the pain raising suspicion for gastritis/ulcer.  Given the increase in LFTs, concern for possible hepatobiliary etiology and I believe patient benefit from admission for GI consult and further monitoring.  He has not eaten or drink anything today due to the pain and he remained tachycardic despite 1 L of fluids, suspect dehydration.  Patient made hemodynamically stable in the emergency department and appropriate for admission.  Patient and daughter agree with this plan    Diagnosis:    ICD-10-CM    1. Hepatic carcinoma (H)  C22.0    2. Chronic hepatitis C virus infection (H)  B18.2    3. Elevated LFTs  R79.89    4. Epigastric pain  R10.13      Scribe Disclosure:  I, Hipolito Redding, am serving as a scribe at 5:42 PM on 7/20/2021 to document services personally performed by Rosaura Ortiz PA-C based on my observations and the provider's statements to me.       Rosaura Ortiz PA-C  07/20/21 [id number]

## 2021-07-21 PROBLEM — R10.9 ABDOMINAL PAIN: Status: ACTIVE | Noted: 2021-01-01

## 2021-07-21 NOTE — PLAN OF CARE
"Shift report from [id number]:    Patient Aox4. Daughters present at bedside. Maori speaking,  Ipad available as well. Patient resting comfortably. PIV running continuously, getting IV abx. Tmax=99.5. Tele=ST. Soft BP. Waiting for MRI this evening. Possible transfer to [facility] tomorrow AM. Will continue to monitor and follow plan of care.    BP 97/58 (BP Location: Right arm)   Pulse 102   Temp 99.5  F (37.5  C) (Oral)   Resp 16   Ht 1.753 m (5' 9\")   Wt 72.2 kg (159 lb 1.6 oz)   SpO2 95%   BMI 23.49 kg/m      Amanda Seay RN on 7/21/2021 at 6:30 PM    "

## 2021-07-21 NOTE — UTILIZATION REVIEW
"  Admission Status; Secondary Review Determination         Under the authority of the Utilization Management Committee, the utilization review process indicated a secondary review on the above patient.  The review outcome is based on review of the medical records, discussions with staff, and applying clinical experience noted on the date of the review.        (X)      Inpatient Status Appropriate - This patient's medical care is consistent with medical management for inpatient care and reasonable inpatient medical practice.      () Observation Status Appropriate - This patient does not meet hospital inpatient criteria and is placed in observation status. If this patient's primary payer is Medicare and was admitted as an inpatient, Condition Code 44 should be used and patient status changed to \"observation\".   () Admission Status NOT Appropriate - This patient's medical care is not consistent with medical management for Inpatient or Observation Status.          RATIONALE FOR DETERMINATION     66 year old male with PMH including COVID-19 in January 2020, hepatocellular carcinoma undergoing treatment, history of nonalcoholic cirrhosis, hep C who presented with 3 days of epigastric abdominal pain found to have abnormal LFTs.  Patient developed a fever and has an elevated Procalcitonin.  Cultures were obtained and patient was initiated on IV antibiotics.  Patient is also receiving IV fluids and anti-emetics.  He is appropriate for Inpatient status.    The severity of illness, intensity of service provided, expected LOS and risk for adverse outcome make the care complex, high risk and appropriate for hospital admission.        The information on this document is developed by the utilization review team in order for the business office to ensure compliance.  This only denotes the appropriateness of proper admission status and does not reflect the quality of care rendered.         The definitions of Inpatient Status and " Observation Status used in making the determination above are those provided in the CMS Coverage Manual, Chapter 1 and Chapter 6, section 70.4.      Sincerely,     Selvin Monte MD  Physician Advisor  Utilization Review/ Case Management  [facility].

## 2021-07-21 NOTE — CONSULTS
[facility]  [facility]  Gastroenterology Consultation    Alexandra Alfaro  [address]    [facility] [address]  66 year old male    Admission Date/Time: 7/20/2021  Primary Care Provider: Lindy Dodge    We were asked to see the patient in consultation by Dr. Martinez for evaluation of elevated LFTs.    HPI:  Alexandra Alfaro is a 66 year old male with PMH including HCV cirrhosis and  hepatocellular carcinoma s/p bland embolization 5/2021 who was admitted 7/20/21 with abdominal pain. Patient reported epigastric abdominal pain for 3 days with associated nausea and vomiting. No associated fever or diarrhea. Presented to ED where labs notable for hemoglobin 11.6, sodium 127, , , , bilirubin 6.3, direct bili 3.8, INR 1.32. LFTs and bilirubin noted to be higher compared to labs from 5/2021 (bili 3.1 at that time). CT scan showed cirrhotic liver with numerous hepatic lesions. Pain improved after receiving GI cocktail.    Early this morning patient febrile to 100.9F and noted to have rising LFTs this morning (although bilirubin stable). RUQ US showed new portal vein thrombosis.    Patient follows at [facility] for management of HCC. Had prolonged admission 5/2021 for variceal bleeding, recurrent fever and ascites following embolization. Started on diuretics at that time but apparently stopped them once the prescription ran out. He is outside Jeanmarie criteria for transplant.     ROS: A comprehensive ten point review of systems was negative aside from those in mentioned in the HPI.      MEDICATIONS: No current facility-administered medications on file prior to encounter.  apixaban ANTICOAGULANT (ELIQUIS) 2.5 MG tablet, Take 1 tablet (2.5 mg) by mouth 2 times daily  furosemide (LASIX) 40 MG tablet, Take 40 mg by mouth daily  multivitamin (CENTRUM SILVER) tablet, Take 1 tablet by mouth daily  Omega-3 Fatty Acids (FISH OIL) 1200 MG capsule, Take 1,200 mg by mouth  "daily  omeprazole (PRILOSEC) 20 MG DR capsule, Take 1 capsule (20 mg) by mouth daily  sucralfate (CARAFATE) 1 GM tablet, Take 1 tablet (1 g) by mouth 4 times daily      ALLERGIES: No Known Allergies    Past Medical History:   Diagnosis Date     2019 novel coronavirus disease (COVID-19) 12/25/2020     COVID-19 12/25/2020     Hepatitis     Hep C        Past Surgical History:   Procedure Laterality Date     DAVINCI HERNIORRHAPHY INGUINAL Right 8/7/2018    Procedure: DAVINCI HERNIORRHAPHY INGUINAL;  Robotic Assisted Right Inguinal Hernia Repair with Mesh ;  Surgeon: Prabhu Allred MD;  Location:  OR       SOCIAL HISTORY:  Social History     Tobacco Use     Smoking status: Former Smoker     Types: Cigarettes     Quit date: 4/6/2018     Years since quitting: 3.2     Smokeless tobacco: Never Used   Substance Use Topics     Alcohol use: No     Drug use: No       FAMILY HISTORY:  Family History   Problem Relation Age of Onset     No Known Problems Brother      No Known Problems Mother      No Known Problems Sister      No Known Problems Daughter        PHYSICAL EXAM:   /69 (BP Location: Left arm)   Pulse 116   Temp 98.9  F (37.2  C) (Oral)   Resp 18   Ht 1.753 m (5' 9\")   Wt 72.2 kg (159 lb 1.6 oz)   SpO2 95%   BMI 23.49 kg/m      Constitutional: No acute distress.  Head: Normocephalic, atraumatic.    Neck: Neck supple. No adenopathy.  Eyes: No scleral icterus.  ENT: Hearing adequate. Pharynx normal without erythema or exudate.  Cardiovascular: RRR, normal S1/S2, no murmurs.  Respiratory: Nonlabored, CTAB.  Abdomen: Soft, nondistended, nontender, no guarding/rebound.  Neuro: CN II-XII grossly intact. No focal deficits.  Extremities: No edema.  Skin: No suspicious lesions, rashes, or jaundice.  Psychiatric: Mentation appears normal and affect normal.      ADDITIONAL COMMENTS:   I reviewed the patient's new clinical lab test results.   Recent Labs   Lab Test 07/21/21  0530 07/20/21  1533 05/25/21  0747 " 03/18/21 0217 12/29/20  0635 12/28/20  0617   WBC 13.2* 10.3  --  8.4 9.1 9.3   HGB 10.6* 11.6* 11.3* 13.2* 13.0* 13.4   MCV 95 96  --  100 95 95    266  --  215 192 182   INR 1.32*  --   --   --  1.07 1.07     Recent Labs   Lab Test 07/21/21 0530 07/20/21  1533 05/25/21  0747   * 127* 129*   POTASSIUM 4.5 5.0 5.0   CHLORIDE 96 94 98   CO2 25 28 28   BUN 15 17 14   CR 0.95 0.93 0.81   ANIONGAP 5 5 3   REBECCA 8.4* 9.1 9.2   * 133* 111*     Recent Labs   Lab Test 07/21/21  0530 07/20/21 2012 07/20/21  1533 05/25/21  0747 03/18/21 0217 12/27/20  0605 12/25/20  2110 12/14/20  1643   ALBUMIN 1.8*  --  2.1* 3.4 2.5* 2.2* 2.1*  --    BILITOTAL 5.8*  --  6.3* 3.1* 1.9* 0.8 0.9  --    DBIL  --   --  3.8*  --   --  0.4* 0.5*  --    *  --  281* 58 87* 63 64  --    AST 1,024*  --  671* 94* 158* 125* 140*  --    ALKPHOS 168*  --  170* 151* 157* 120 118  --    PROTEIN  --  20 *  --   --   --   --   --  Negative   LIPASE  --   --  78  --  76  --   --   --        IMAGING/ENDOSCOPY    CT CAP 7/20/21   - No pulmonary embolism identified. Modest motion artifact present and its possible tiny peripheral emboli could be overlooked.   - Cirrhotic liver with innumerable hepatic lesions and one large focus of presumed previous treatment occupying much of the right lobe, findings are similar to previous study with no definite acute change.   - Cirrhosis and portal venous hypertension and mild ascites similar to previous exam.   - Mild wall thickening of gallbladder and right hemicolon also similar to prior study, slightly improved.    RUQ US 7/20/21 (preliminary)   - Lack of color signal at the main portal vein consistent with portal venous thrombus. This is new as compared to older imaging from an outside MRI dated 6/8/2021, and CT dated 5/11/2021.   - Heterogeneous hepatic mass consistent with malignancy. This is better evaluated at CT and MRI comparison exams.   - No biliary ductal dilatation with the common  duct measuring 4 mm.   - Gallbladder sludge identified. There is also gallbladder wall thickening, and the patient has diffuse abdominal pain during the scan. Cholecystitis cannot be excluded by ultrasound.    CONSULTATION ASSESSMENT AND PLAN:    Alexandra Alfaro is a 66-year-old male with PMH including HCV cirrhosis complicated by HCC s/p radioembolization who was admitted 7/20/21 with epigastric pain and elevated LFTs. Found to have portal vein thrombosis.    1) Elevated LFTs: Portal vein thrombosis likely the cause of his worsening liver function, especially in the setting of previous hepatic embolization. MD reviewed with hepatology who recommends anticoagulation despite prior history of variceal bleeding. Lovenox started by IM and they are coordinating transfer to [facility] where patient primarily gets his care. No evidence of biliary obstruction on imaging.        - Trend LFTs.        - Antibiotics, anticoagulation per IM.        - PPI / Carafate for abdominal pain (since this seems to help).        - Agree with transfer to [facility] for further management.    I discussed the patient plan with Dr. Guzmán. Thank you for asking us to participate in the care of this patient.    Yaneth Cooper PA-C  [facility] ([facility])

## 2021-07-21 NOTE — PROGRESS NOTES
"/63 (BP Location: Right arm)   Pulse 118   Temp (!) 100.9  F (38.3  C) (Oral)   Resp 16   Ht 1.753 m (5' 9\")   Wt 72.6 kg (160 lb)   SpO2 92%   BMI 23.63 kg/m      Pt A&Ox4. Requires Divehi . GI cocktail given x 1 for abd pain & IV zofran x 1 for nausea. Temp 100.9 - MD notified & blood cx orderd & IV zosyn given. SBA for amb. PCDs on. On tele running ST. NPO since MN for US in AM. Repeat LFTs in AM. GI & onc consult for AM.      Karen Krueger RN    "

## 2021-07-21 NOTE — ED NOTES
[facility]  ED Nurse Handoff Report    Alexandra Alfaro is a 66 year old male   ED Chief complaint: Abdominal Pain  . ED Diagnosis:   Final diagnoses:   Hepatic carcinoma (H)   Chronic hepatitis C virus infection (H)   Elevated LFTs   Epigastric pain     Allergies: No Known Allergies    Code Status: Full Code  Activity level - Baseline/Home:  Assist X 1. Activity Level - Current:   Assist X 1. Lift room needed: No. Bariatric: No   Needed: No   Isolation: No. Infection: Not Applicable.     Vital Signs:   Vitals:    07/20/21 2100 07/20/21 2115 07/20/21 2130 07/20/21 2145   BP: 112/78 104/69 91/68 117/71   Pulse: 103 101 102 105   Resp: 17 (!) 38 18 22   Temp:       TempSrc:       SpO2: 94% 98% 100% 99%   Weight:       Height:           Cardiac Rhythm: NSR     Pain level: only nods yes and no if having pain    Patient confused: No. Patient Falls Risk: Yes.   Elimination Status: Has voided   Patient Report - Initial Complaint: Abdominal pain and nausea. Focused Assessment: Alert understands some english speaks Yi. daughter was here earlier able to interpret some things. Patient given one liter of fluid, IV Protonix,zofran, GI cocktail which help pain and nausea. Developed pain again Dilaudid given with good results.Did eat some jello and drink water. No respiratory distress noted.Went to CT scan IV infiltrated in right forearm. Hot packs applied to area. CT tech thought that IV may have extravagated. Able to get scans completed.  Tests Performed: Labs, CT scan. Abnormal Results:   Labs Ordered and Resulted from Time of ED Arrival Up to the Time of Departure from the ED   BASIC METABOLIC PANEL - Abnormal; Notable for the following components:       Result Value    Sodium 127 (*)     Glucose 133 (*)     All other components within normal limits   CBC WITH PLATELETS AND DIFFERENTIAL - Abnormal; Notable for the following components:    RBC Count 3.42 (*)     Hemoglobin 11.6 (*)     Hematocrit 32.7  (*)     MCH 33.9 (*)     RDW 15.7 (*)     Absolute Lymphocytes 0.7 (*)     Absolute Immature Granulocytes 0.1 (*)     All other components within normal limits   HEPATIC FUNCTION PANEL - Abnormal; Notable for the following components:    Bilirubin Total 6.3 (*)     Bilirubin Direct 3.8 (*)     Albumin 2.1 (*)     Alkaline Phosphatase 170 (*)      (*)      (*)     All other components within normal limits   ROUTINE UA WITH MICROSCOPIC REFLEX TO CULTURE - Abnormal; Notable for the following components:    Color Urine Dark Yellow (*)     Bilirubin Urine Small (*)     Protein Albumin Urine 20  (*)     Mucus Urine Present (*)     All other components within normal limits    Narrative:     Urine Culture not indicated   LIPASE - Normal   TROPONIN I - Normal   TROPONIN I - Normal   EXTRA BLUE TOP TUBE   EXTRA RED TOP TUBE   EXTRA GREEN TOP (LITHIUM HEPARIN) TUBE   EXTRA PURPLE TOP TUBE   EXTRA GREEN TOP (LITHIUM HEPARIN) ON ICE   EXTRA BLOOD BANK PURPLE TOP TUBE   ETHYL ALCOHOL LEVEL   ACETAMINOPHEN LEVEL   PROCALCITONIN   SARS-COV2 (COVID-19) VIRUS RT-PCR   PERIPHERAL IV CATHETER   COVID-19 VIRUS (CORONAVIRUS) BY PCR    Narrative:     The following orders were created for panel order Asymptomatic COVID-19 Virus (Coronavirus) by PCR Nasopharyngeal.  Procedure                               Abnormality         Status                     ---------                               -----------         ------                     SARS-COV2 (COVID-19) Vir...[[id number]]                      In process                   Please view results for these tests on the individual orders.   BLOOD CULTURE   BLOOD CULTURE   CBC WITH PLATELETS & DIFFERENTIAL    Narrative:     The following orders were created for panel order CBC with Platelets & Differential.  Procedure                               Abnormality         Status                     ---------                               -----------         ------                      CBC with platelets and d...[[id number]]  Abnormal            Final result                 Please view results for these tests on the individual orders.   EXTRA TUBE    Narrative:     The following orders were created for panel order Extra Tube ([facility] Draw).  Procedure                               Abnormality         Status                     ---------                               -----------         ------                     Extra Blue Top Tube[[id number]]                              Final result               Extra Red Top Tube[[id number]]                               Final result               Extra Green Top (Lithium...[[id number]]                      Final result               Extra Purple Top Tube[[id number]]                            Final result               Extra Blood Bank Purple ...[[id number]]                      In process                 Extra Green Top (Lithium...[[id number]]                      Final result                 Please view results for these tests on the individual orders.      Treatments provided: Pain medication, meds for stomach see MAR  Family Comments: Aware that patient will be admitted  OBS brochure/video discussed/provided to patient:  N/A  ED Medications:   Medications   ondansetron (ZOFRAN) injection 4 mg (4 mg Intravenous Given 7/20/21 1802)   0.9% sodium chloride BOLUS (0 mLs Intravenous Stopped 7/20/21 2135)   lidocaine (XYLOCAINE) 2 % 15 mL, alum & mag hydroxide-simethicone (MAALOX) 15 mL GI Cocktail (30 mLs Oral Given 7/20/21 1806)   pantoprazole (PROTONIX) IV push injection 40 mg (40 mg Intravenous Given 7/20/21 1804)   CT Flush 100mL Saline (88 mLs Intravenous Given 7/20/21 1828)   iopamidol (ISOVUE-370) solution 500 mL (67 mLs Intravenous Given 7/20/21 1829)   HYDROmorphone (PF) (DILAUDID) injection 0.5 mg (0.5 mg Intravenous Given 7/20/21 2059)     Drips infusing:  No  For the majority of the shift, the patient's behavior Green. Interventions performed were  none.    Sepsis treatment initiated: No     Patient tested for COVID 19 prior to admission: YES    ED Nurse Name/Phone Number: Ruthy Mack RN,   9:57 PM    RECEIVING UNIT ED HANDOFF REVIEW    Above ED Nurse Handoff Report was reviewed: Yes  Reviewed by: Karen Krueger RN on July 20, 2021 at 11:45 PM

## 2021-07-21 NOTE — CONSULTS
[facility]    Hematology / Oncology Consultation     Date of Admission:  7/20/2021    Assessment & Plan   Alexandra Alfaro is a 66 year old male who was admitted on 7/20/2021. I was asked to see the patient for history of hepatocellular carcinoma.     Assessment:  Plan:  1.  Portal vein thrombosis: Main portal vein:   -Findings of portal vein thrombosis would explain clinical presentation.    Plan   -Discussed with Dr. Padilla vascular interventional radiologist at [facility] and Dr. Kee.   -We will start patient on anticoagulation with Lovenox 1 mg/kg body weight twice daily.   -We will request an MRI of liver to evaluate the anatomy.  Dr. Ward did not recommend any intervention at this point but will be happy to review the MRI once available.  MRI should be pushed to [facility] once available.   -Broad-spectrum antibiotics to cover possibility of septic thrombus.     2.  Hepatocellular carcinoma  -Patient had bland embolization (could not tolerate yttrium-90 due to shunt)   -Also had immune checkpoint admitted with atezolizumab in the past on hold since April.     Plan   -Further treatment for hepatocellular carcinoma on hold for now.  -It is unlikely the patient will be candidate for further embolization given portal vein thrombosis.    3.  Hepatitis C  -Patient is completed treatment for hepatitis C  -He is seronegative now    4.  Cirrhosis of liver  -Follows with gastroenterology.       Vinnie Navarrete MD   [facility]  Office:  [phone]    Code Status    Full Code    Reason for Consult   Reason for consult: History of hepatocellular carcinoma    Primary Care Physician   Lindy Dodge    Chief Complaint   Abdominal pain.  Fever anorexia.       History of Present Illness   Alexandra Alfaro is a 66 year old male who presents with 4-day history of abdominal pain low-grade fever anorexia.   Patient reported that he has sudden onset of very severe epigastric pain followed by  vomiting about 4 days ago since then he has been having a lot of pain, he has very little appetite, pain is focused on right upper quadrant.  Associated with low-grade fever at home.     Patient past medical history significant for hepatocellular carcinoma.  I have summarized his oncologic chronology below.  Patient had bland embolization to treat the large right-sided hepatocellular carcinoma.  Patient is also had some treatments with immune checkpoint inhibitor therapy atezolizumab however immune checkpoint therapy has been on hold since April due to vascular procedures.     Evaluation in the ER showed patient has severely elevated liver function test, sodium was 126 albumin 1.8 bilirubin 5.8 alkaline phosphatase 168 AST 1024 .  White blood cell count is 13.2 hemoglobin 10.6 platelets 267.  Earlier today he had an ultrasound of abdomen, which showed lack of color signal at main portal vein consistent with portal vein thrombosis.  This is new compared to MRI dated 6/8/2021.  Other findings are stable.       12/25/2020 patient was admitted with fatigue abdominal pain, hospital work-up was significant for a 12.7 x 12.6 x 10.5 cm heterogeneous right hepatic lobe mass.  Occupying much of the right hepatic lobe, the findings on MRI were consistent with hepatocellular carcinoma.  There was hepatic cirrhosis.  Further work-up showed patient was positive for hepatitis C.  Labs done in the hospital showed  AFP 26 CA was 19.957 bilirubin 0.8 ALT 75 , however these were done at the time when patient had COVID-19 infection and may not be a true representation of his baseline liver function.  Patient denies any history of blood transfusions or IV drug use.  Was not aware of hepatitis C infection prior to this recent hospital admission.   2/15/2020: Patient had a repeat MRI, and a follow-up with Dr. Hansen in hepatobiliary service at [facility].  The MRI showed liver mass measuring 13.3 x 11.9 x  13.1 cm.  Unfortunately there were additional foci of enhancement.  These findings were compatible with multifocal HCC Dustin RADS category LR 5.  Dr. Hansen recommended against surgical resection.  Patient is now awaiting interventional radiology consult to see if he would be candidate for liver directed therapy.   2/24/2020: Patient had a follow-up in medical oncology clinic we have discussed the results from new MRI and systemic therapy with atezolizumab and bevacizumab.   3/8/2021 Patient had a CT scan of chest to complete his staging There was a single borderline enlarged lymph node at station 2R.   3/8/2021: Patient had a consultation with interventional radiology interventional radiology recommended thousand arterial liver directed therapy with yttrium 90 radioembolization.  3/15/2020 when patient had SPECT scan there was inhomogeneous uptake of activity in right lobe of liver consistent with known liver tumor there was no focal area of extrahepatic uptake  3/15/2021 patient underwent a planning session with hepatic arteriogram there was variant hepatic arterial anatomy there was small foci of disease noted throughout both lobes of liver the dominant lesion and majority of daughter lesions were consistent with hepatocellular carcinoma in posterior right hepatic lobe perfused by accessory right hepatic artery.   A test dose of MAA was delivered to accessory right hepatic artery which was delivered a high lung shunt of 15.3% Dr. Lopez felt that due to the shunt he would like to do the procedure over 2 sessions 1 month apart. A liver biopsy was also completed which confirmed presence ofWell-differentiated hepatocellular carcinoma.   3/19/2021 patient started on systemic therapy with atezolizumab alone,  bevacizumab was hold as patient is going through yetrium 90 treatment.   3/25/2021 Patient has the first procedure of yttrium 90 microsphere administration.  Patient did not receive the full dose due to presence  of a hepatopulmonary shunt. Patient tolerated procedure well.  5/6/2021: Patient has persistent hepatopulmonary shunt and was unable to receive additional Y 90 radioembolization.  He received hepatic angiogram and planned embolization of hepatocellular carcinoma right hepatic lobe.  Procedure was complicated with significant pain and development of decompensation of liver with ascites.   6/8/2021: Patient had an MRI of abdomen which showed large mass in the right hepatic lobe, interval bland embolization of accessory right hepatic artery 80% of previously treated mass demonstrated no residual enhancement.  There was residual enhancement in posterior inferior aspect of mass.  Additionally there were few small foci's of arterial enhancement noted along the superior margin of mass.  There was background cirrhosis with evidence for portal hypertension.   7/7/2021: Patient had a follow-up with Dr. Padilla, then interventional radiology at [facility].  Patient had more ascites at the time and his diuretics were adjusted.  There was plans for repeat bland embolization to the residual tumor however those plans were at hold due to liver decompensation.             Past Medical History   I have reviewed this patient's medical history and updated it with pertinent information if needed.   Past Medical History:   Diagnosis Date     2019 novel coronavirus disease (COVID-19) 12/25/2020     COVID-19 12/25/2020     Hepatitis     Hep C        Past Surgical History   I have reviewed this patient's surgical history and updated it with pertinent information if needed.  Past Surgical History:   Procedure Laterality Date     DAVINCI HERNIORRHAPHY INGUINAL Right 8/7/2018    Procedure: DAVINCI HERNIORRHAPHY INGUINAL;  Robotic Assisted Right Inguinal Hernia Repair with Mesh ;  Surgeon: Prabhu Allred MD;  Location:  OR       Prior to Admission Medications   Prior to Admission Medications   Prescriptions Last Dose  Informant Patient Reported? Taking?   Omega-3 Fatty Acids (FISH OIL) 1200 MG capsule   Yes No   Sig: Take 1,200 mg by mouth daily   apixaban ANTICOAGULANT (ELIQUIS) 2.5 MG tablet   No No   Sig: Take 1 tablet (2.5 mg) by mouth 2 times daily   furosemide (LASIX) 40 MG tablet   Yes No   Sig: Take 40 mg by mouth daily   multivitamin (CENTRUM SILVER) tablet   Yes No   Sig: Take 1 tablet by mouth daily   omeprazole (PRILOSEC) 20 MG DR capsule   No No   Sig: Take 1 capsule (20 mg) by mouth daily   sucralfate (CARAFATE) 1 GM tablet   No No   Sig: Take 1 tablet (1 g) by mouth 4 times daily      Facility-Administered Medications: None     Allergies   No Known Allergies    Social History   I have reviewed this patient's social history and updated it with pertinent information if needed. Alexandra Alfaro  reports that he quit smoking about 3 years ago. His smoking use included cigarettes. He has never used smokeless tobacco. He reports that he does not drink alcohol and does not use drugs.    Family History   I have reviewed this patient's family history and updated it with pertinent information if needed.   Family History   Problem Relation Age of Onset     No Known Problems Brother      No Known Problems Mother      No Known Problems Sister      No Known Problems Daughter        Review of Systems   Review of system as per HPI  Physical Exam   Temp: 98.9  F (37.2  C) Temp src: Oral BP: 121/69 Pulse: 116   Resp: 18 SpO2: 95 % O2 Device: None (Room air)    Vital Signs with Ranges  Temp:  [98.1  F (36.7  C)-100.9  F (38.3  C)] 98.9  F (37.2  C)  Pulse:  [] 116  Resp:  [12-38] 18  BP: ()/(63-81) 121/69  SpO2:  [90 %-100 %] 95 %  159 lbs 1.6 oz    Constitutional: Awake, alert, cooperative, patient is in severe distress with abdominal pain performance status 2.    Eyes: Conjunctiva and pupils examined there is pallor and icterus   HEENT: Moist mucous membranes, normal dentition.    GI: Soft, there is mild distention there  is ascites there is tender hepatomegaly.  Lymph/Hematologic: No anterior cervical or supraclavicular adenopathy.  Skin: No rashes, no cyanosis, no edema.  Musculoskeletal: No joint swelling, erythema or tenderness.  Psychiatric: Alert, oriented to person, place and time, no obvious anxiety or depression.    Data   Laboratory studies and imaging studies reviewed plan discussed with Dr. Kee and Dr. Padilla

## 2021-07-21 NOTE — PLAN OF CARE
"Vital signs:  Temp: 98.7  F (37.1  C) Temp src: Oral BP: 103/66 Pulse: 107   Resp: 18 SpO2: 93 % O2 Device: None (Room air)   Height: 175.3 cm (5' 9\") Weight: 72.2 kg (159 lb 1.6 oz)     Patient alert and oriented. VSS. Yakut speaking,  ipad at bedside. BC pending. GI, Oncology following. SBA.  ml/hr. IV Zosyn q 6 hours. Tele is ST. MRI to be completed today, checklist complete and faxed. Pending transfer to [facility]. Will continue to monitor and provide cares.     Sriram Oviedo RN   "

## 2021-07-21 NOTE — PLAN OF CARE
ROOM # 228    Living Situation (if not independent, order SW consult): home with wife and children  Facility name:  : [facility] [phone]    Activity level at baseline: IND  Activity level on admit: SBA      Patient registered to observation; given Patient Bill of Rights; given the opportunity to ask questions about observation status and their plan of care.  Patient has been oriented to the observation room, bathroom and call light is in place.    Discussed discharge goals and expectations with patient/family.

## 2021-07-21 NOTE — PROGRESS NOTES
[facility]    Medicine Progress Note - Hospitalist Service       Date of Admission:  7/20/2021    Assessment & Plan           Alexandra Alfaro is a 66 year old male with PMH including COVID-19 in January 2020, hepatocellular carcinoma undergoing treatment ([facility]/Jennifer Coelho), s/p radioembolization 5/2021 with complicated prolonged hospitalization  history of nonalcoholic cirrhosis, hep C who presents with 3 days of epigastric abdominal pain found to have abnormal LFTs- due to portal vein thrombosis    Portal vein thrombosis    - abdominal pain is improved with meds    - RUQ US shows portal vein thrombosis and gallbladder sludge/thickening    - pain likely due to portal vein thrombosis    - started lovenox 1mg/kg BID      Gallbladder sludge/thickening    - seen on US    - had temp 100.6 and mild elevation of WBC    - zosyn started overnight    - will continue for now to cover gallbladder etiology    HCC     - follows with Dr. Navarrete and Dr. Miller ([facility])?    - I personally spoke with Dr. Navarrete who also spoke with Dr. Valles (IR at [facility])    - will obtain abdominal MRI at this time    - s/p bland embolization in May 2021 with prolonged hospital course following     -  - will attempt to transfer to [facility] as this is where he has been treated    Acute on chronic hepatitis in the setting of Hep C cirrhosis    - likely due to underlying HCC/treatment and now portal vein thrombosis    - trend LFTs    - GI following (I did speak with the PA)     I did speak to the daughter while using the  iPad with patient. Questions answered    I did speak with the Hospitalist Dr. Wagner at [facility] who will follow-up on possible transfer     Diet: Combination Diet Regular Diet Adult    DVT Prophylaxis: Enoxaparin (Lovenox) SQ  Zimmerman Catheter: Not present  Central Lines: None  Code Status: Full Code      Disposition Plan   Expected discharge: 07/23/2021 recommended to unclear once stable.     The patient's care  was discussed with the Bedside Nurse, Patient, Patient's Family and IR and Oncology Consultant.    Kashmir Kee MD  Hospitalist Service  [facility]  Securely message with the [employer] Console (learn more here)  Text page via [employer] Paging/Directory      Risk Factors Present on Admission         # Hyponatremia: Na = 126 mmol/L (Ref range: 133 - 144 mmol/L) on admission, will monitor as appropriate     # Coagulation Defect: home medication list includes an anticoagulant medication       ______________________________________________________________________    Interval History   Patient in bed. States his abdominal pain (RUQ) is much better. 3/10. No chest pain or sob. iPad  used.    Data reviewed today: I reviewed all medications, new labs and imaging results over the last 24 hours. I personally reviewed the US image(s) showing portal vein thrombosis.    Physical Exam   Vital Signs: Temp: 98.9  F (37.2  C) Temp src: Oral BP: 121/69 Pulse: 116   Resp: 18 SpO2: 95 % O2 Device: None (Room air)    Weight: 159 lbs 1.6 oz  Constitutional: awake, alert, cooperative, no apparent distress, and appears stated age  Eyes: Lids and lashes normal, pupils equal, round and reactive to light, extra ocular muscles intact, sclera clear, conjunctiva normal  ENT: Normocephalic, without obvious abnormality, atraumatic, sinuses nontender on palpation, external ears without lesions, oral pharynx with moist mucous membranes, tonsils without erythema or exudates, gums normal and good dentition.  Respiratory: No increased work of breathing, good air exchange, clear to auscultation bilaterally, no crackles or wheezing  Cardiovascular: Normal apical impulse, regular rate and rhythm, normal S1 and S2, no S3 or S4, and no murmur noted  GI: soft +RUQ tenderness  Skin: no bruising or bleeding    Data   Recent Labs   Lab 07/21/21  0530 07/20/21  1941 07/20/21  1533   WBC 13.2*  --  10.3   HGB 10.6*  --  11.6*    MCV 95  --  96     --  266   INR 1.32*  --   --    *  --  127*   POTASSIUM 4.5  --  5.0   CHLORIDE 96  --  94   CO2 25  --  28   BUN 15  --  17   CR 0.95  --  0.93   ANIONGAP 5  --  5   REBECCA 8.4*  --  9.1   *  --  133*   ALBUMIN 1.8*  --  2.1*   PROTTOTAL 7.4  --  8.5   BILITOTAL 5.8*  --  6.3*   ALKPHOS 168*  --  170*   *  --  281*   AST 1,024*  --  671*   LIPASE  --   --  78   TROPONIN 0.024 0.030 0.021     Recent Results (from the past 24 hour(s))   CT Chest (PE) Abdomen Pelvis w Contrast    Narrative    EXAM: CT CHEST PE ABDOMEN PELVIS W CONTRAST  LOCATION: [facility]  DATE/TIME: 7/20/2021 6:27 PM    INDICATION: chest/ upper abdominal pain, known hepatic carcinoma  COMPARISON: 5/10/2021 and 5/11/2020  TECHNIQUE: CT chest pulmonary angiogram and routine CT abdomen pelvis with IV contrast. Arterial phase through the chest and venous phase through the abdomen and pelvis. Multiplanar reformats and MIP reconstructions were performed. Dose reduction   techniques were used.   CONTRAST: 67mL [id number]-370    FINDINGS:  ANGIOGRAM CHEST: There is modest respiratory motion artifact resulting in blurring of pulmonary arteries. There is no convincing pulmonary embolism identified. Thoracic aorta is negative for dissection. No CT evidence of right heart strain.     LUNGS AND PLEURA: Motion artifact. No definite focal pneumonia or pleural effusion. Minimal dependent atelectasis improved compared to prior study.    MEDIASTINUM/AXILLAE: Small calcified nodes, no significant adenopathy.    CORONARY ARTERY CALCIFICATION: Moderate.    HEPATOBILIARY: Cirrhotic liver with innumerable low-attenuation foci throughout right lobe similar to previous exam. Very large presumed treated lesion occupying the inferior half of right lobe of liver similar to previous exam with a complex cystic   structure measuring approximately 13.5 x 12.5 x 10 cm. Previously there were numerous air bubbles within this  collection which have resolved. There remains heterogeneous soft tissue density along the inferior aspect of this collection which could relate   to debris or blood products though enhancing tumor elements also possible. Mild amount of ascites surrounds liver.  Mild nonspecific gallbladder wall thickening similar to previous exam. Bile ducts unremarkable.    PANCREAS: Normal.    SPLEEN: Normal.    ADRENAL GLANDS: Normal.    KIDNEYS/BLADDER: Normal.    BOWEL: Mild wall thickening involving right hemicolon less pronounced than on the previous exam and may relate to third spacing. No definite new focal site of inflammation. No obstruction.    LYMPH NODES: Normal.    VASCULATURE: Prominent esophageal varices. There also appears to be a splenorenal shunt and likely pelvic varices through perirectal vasculature.    PELVIC ORGANS: Mild ascites.    MUSCULOSKELETAL: Grade 1 spondylolisthesis and bilateral spondylolysis at L5-S1.      Impression    IMPRESSION:  1.  No pulmonary embolism identified. Modest motion artifact present and its possible tiny peripheral emboli could be overlooked.  2.  Cirrhotic liver with innumerable hepatic lesions and one large focus of presumed previous treatment occupying much of the right lobe, findings are similar to previous study with no definite acute change.  3.  Cirrhosis and portal venous hypertension and mild ascites similar to previous exam.  4.  Mild wall thickening of gallbladder and right hemicolon also similar to prior study, slightly improved.   US Abdomen Limited    Narrative    ULTRASOUND ABDOMEN LIMITED  7/21/2021 7:36 AM    CLINICAL HISTORY: Evaluate for biliary obstruction. History of HCC,  now worsened LFTs.  Obstruction?    TECHNIQUE: Limited abdominal ultrasound.    COMPARISON: CT chest, abdomen and pelvis 7/20/2021.    FINDINGS:  GALLBLADDER: Diffuse tenderness of the abdomen. Gallbladder sludge  identified. Gallbladder wall is 4 mm, mildly thickened.    BILE DUCTS: There  is no biliary dilatation. The common duct measures 4  mm.    LIVER: Large heterogeneous hepatic mass again identified on the right  side that is approximately 10.4 x 11.1 x 10.3 cm. This is better  assessed on the recent comparison CT and other comparison imaging.  Color imaging in the area of the main portal vein shows a lack of  color signal, see images 41 and 42.    RIGHT KIDNEY: No hydronephrosis.    PANCREAS: The visualized portions of the pancreas are normal.    No ascites.      Impression    IMPRESSION:  1.  Lack of color signal at the main portal vein consistent with  portal venous thrombus. This is new as compared to older imaging from  an outside MRI dated 6/8/2021, and CT dated 5/11/2021.  2.  Heterogeneous hepatic mass consistent with malignancy. This is  better evaluated at CT and MRI comparison exams.  3.  No biliary ductal dilatation with the common duct measuring 4 mm.  4.  Gallbladder sludge identified. There is also gallbladder wall  thickening, and the patient has diffuse abdominal pain during the  scan. Cholecystitis cannot be excluded by ultrasound.

## 2021-07-21 NOTE — ED PROVIDER NOTES
Emergency Department Attending Supervision Note  7/20/2021  9:03 PM      I evaluated this patient in conjunction with Rosaura Ortiz PA-C      Briefly, the patient presented with abdominal pain. Patient has had abdominal pain, chest pain and shortness of breath for the last 3 days. Chest pain is exacerbated with deep breaths. He also had a fever yesterday. This morning he had one episode of non-bloody emesis. Unable to eat or drink due to abdominal pain. Denies cough.  He has a known history of liver cancer.  On my evaluation, he states he is feeling greatly improved with no significant pain.      On my exam,  Constitutional: Well appearing.  HEENT: Atraumatic. Moist mucous membranes.  Neck: Soft.  Supple.    Cardiac: Regular rate and rhythm.  No murmur or rub.  Respiratory: Clear to auscultation bilaterally.  No respiratory distress.   Abdomen: Soft and nontender.  No guarding.  Nondistended.  Musculoskeletal: No edema.  Normal range of motion.  Neurologic: Alert and oriented.  Normal tone and bulk.    Skin: No rashes.  No edema.  Psych: Normal affect.  Normal behavior.      Results:  ECG  ECG taken at 1337, ECG read at 1340  Sinus tachycardia with 1st degree A-V block   Otherwise normal ECG  No significant change compared to EKG dated 03/13/2021  Rate 106 bpm. NJ interval 212 ms. QRS duration 86 ms. QT/QTc 330/438 ms. P-R-T axes 38 2 30.      Imaging:  CT Chest (PE) Abdomen Pelvis w Contrast  1.  No pulmonary embolism identified. Modest motion artifact present and its possible tiny peripheral emboli could be overlooked.  2.  Cirrhotic liver with innumerable hepatic lesions and one large focus of presumed previous treatment occupying much of the right lobe, findings are similar to previous study with no definite acute change.  3.  Cirrhosis and portal venous hypertension and mild ascites similar to previous exam.  4.  Mild wall thickening of gallbladder and right hemicolon also similar to prior study, slightly  improved.  Reading per radiology     Laboratory:  UA with Microscopic: Bilirubin Small (A), Protein Albumin 20 (A), Mucous Urine Present (A) o/w Negative     CBC: WBC 10.3, HGB 11.6 (L),    BMP: Glucose 133 (H), Sodium: 127 (L), o/w WNL (Creatinine: 0.93)     Troponin (Collected 1533): 0.021  Troponin (Collected 1941): 0.030     Hepatic Panel: Bilirubin Total 6.3 (H), Bilirubin Direct 3.8 (H), Albumin 2.1 (L), Alkaline Phosphate 170 (H),  (HH),  (H) o/w WNL     Lipase: 78    ED course:  2130: I examined the patient and obtained history.     I agree with Rosaura's evaluation, impression, and plan.  On my evaluation, he has no significant abdominal pain and has a benign exam.  We reviewed his work-up noted as above.  He has a acutely elevated liver enzymes and with his ongoing pain and a liver cancer, discussed plan for admission for further evaluation observation for potential obstruction versus other.  He is in agreement.  He is in stable condition at this time.        Diagnosis    ICD-10-CM    1. Hepatic carcinoma (H)  C22.0    2. Chronic hepatitis C virus infection (H)  B18.2    3. Elevated LFTs  R79.89    4. Epigastric pain  R10.13          Ro BLUE, am serving as a scribe at 9:05 PM on 7/20/2021 to document services personally performed by Bharath Cordova MD based on my observations and the provider's statements to me.          Bharath Cordova MD  07/21/21 0059

## 2021-07-21 NOTE — PHARMACY-ADMISSION MEDICATION HISTORY
Admission medication history interview status for this patient is complete. See [facility] admission navigator for allergy information, prior to admission medications and immunization status.     Medication history interview done, indicate source(s): Patient via   Medication history resources (including written lists, pill bottles, clinic record): pill bottles    Changes made to PTA medication list:  Added: spironolactone, tylenol prn  Deleted: apixaban, multivitamin, fish oil, sucralfate  Changed: omeprazole    Actions taken by pharmacist (provider contacted, etc):None     Additional medication history information:None    Medication reconciliation/reorder completed by provider prior to medication history?  N   (Y/N)     Prior to Admission medications    Medication Sig Last Dose Taking? Auth Provider   Acetaminophen (TYLENOL PO) Take by mouth daily as needed for mild pain or fever  Yes Unknown, Entered By History   furosemide (LASIX) 40 MG tablet Take 40 mg by mouth daily 7/19/2021 at Unknown time Yes Reported, Patient   omeprazole (PRILOSEC) 40 MG DR capsule Take 40 mg by mouth daily 7/19/2021 at Unknown time Yes Unknown, Entered By History   spironolactone (ALDACTONE) 100 MG tablet Take 100 mg by mouth daily 7/19/2021 at Unknown time Yes Unknown, Entered By History

## 2021-07-21 NOTE — H&P
[facility]  Hospitalist Admission Note  Name: Alexandra Alfaro    MRN: [id number]  YOB: 1955    Age: 66 year old  Date of admission: 7/20/2021  Primary care provider: Lindy Dodge    Chief Complaint:  Abdominal pain    Alexandra Alfaro is a 66 year old male with PMH including COVID-19 in January 2020, hepatocellular carcinoma undergoing treatment, history of nonalcoholic cirrhosis, hep C who presents with 3 days of epigastric abdominal pain found to have abnormal LFTs.     Here in the emergency room he was hemodynamically stable.  Lab work-up was notable for sodium of 127, worsened LFTs were his baseline with bilirubin of 6.3 (3.8 direct), Say phosphatase of 170, ALT of 281, AST of 671.  Lipase was normal and troponin was 0.021.  Blood count was technically normal at 10.3.  Analysis was bland.  CT scan of his chest abdomen and pelvis revealed no PE or pneumonia and did show innumerable hepatic mets similar to previous.  There was mild gallbladder wall thickening as well.  He was given a GI cocktail and pain nearly resolved.    At this point he is being admitted to evaluate his worsened LFTs.    Assessment and Plan:   1. Abdominal Pain with abnormal LFTs: Hyperbilirubinemia noted though only 3.8 of 6.3 is direct.  For reference, on 7/7/21 his bili was 3.2 (1.9 direct), alk phos 144, alt 50 and AST 84.  Denies fevers or chills and pain seems to be mostly resolved at this time.  Given that his pain resolved so quickly with a GI cocktail I do suspect he may have an upper GI source of pain such as gastritis, esophagitis or peptic ulcer.  Denies NSAID or over-the-counter pain medication use.  Non-smoker, nondrinker.  His LFTs raised the question of hepatobiliary pathology but at this point I would favor monitoring clinically, rechecking these in the morning and checking an ultrasound as well as consulting GI.  Checking blood cultures and pro calcitonin but holding off on antibiotics for  now.  --admit under inpatient status  --check Hep C viral load/PCR, acetaminophen level and ethanol level for completeness  --GI consult  --recheck LFTs in the AM  --No tylenol  --if febrile would start broad abx  -- check procal and blood cultures given immunosuppression.  Low threshold to start antibiotics.    2.   Hepatiocellular Carcinoma: Has been following with in the [facility] system.  Reportedly underwent embolization of a large lesion on May 6 and he had a prolonged hospital stay related to that complicated by development of ascites.  Per his most recent oncology note there is consideration being given to repeat embolization as well as chemotherapy/immunotherapy.    3.   Cirrhosis with ascites, hx of Hep C: Reports his only medications are spironolactone and Lasix in addition to Protonix.  --hold lasix/prone lactone for now    4.   Detectable troponin: recheck x1 to rule out a major ischemic event.  Likely demand ischemia.    5.   COVID-19 recovered: diagnosed 12/2020.    6.  Hyponatremia: Likely related to poor oral intake given recent abdominal pain in addition to what appears to be a fairly low chronic baseline level.  Not currently hypervolemic so we will give a small amount of normal saline and hold diuretics.  Recheck in the morning.    DVT Prophylaxis: Pneumatic Compression Devices pending procedural plan.  Code Status: Full Code  Discharge Dispo: admit under inpatient status      History of Present Illness:  Alexandra Alfaro is a 66 year old male with PMH including COVID-19 in January 2020, hepatocellular carcinoma undergoing treatment, history of nonalcoholic cirrhosis, hep C who presents with 3 days of epigastric abdominal pain found to have abnormal LFTs.  He is Sami speaking and we did use a professional  via iPad for the history.  His daughter was also present.    During this time he had very severe epigastric pain and one episode of vomiting today.  Denies any urinary  changes or bowel movement changes.  No fevers or chills.  Pain did seem to radiate into his lower chest a bit.  He reports having undergone surgery at [facility] to remove part of his liver cancer.  He reports being scheduled for radiation as well.    Here in the emergency room he was hemodynamically stable.  Lab work-up was notable for sodium of 127, worsened LFTs were his baseline with bilirubin of 6.3 (3.8 direct), Say phosphatase of 170, ALT of 281, AST of 671.  Lipase was normal and troponin was 0.021.  Blood count was technically normal at 10.3.  Analysis was bland.  CT scan of his chest abdomen and pelvis revealed no PE or pneumonia and did show innumerable hepatic mets similar to previous.  There was mild gallbladder wall thickening as well.  He was given a GI cocktail and pain nearly resolved.         Past Medical History:  Past Medical History:   Diagnosis Date     2019 novel coronavirus disease (COVID-19) 12/25/2020     COVID-19 12/25/2020     Hepatitis     Hep C      Past Surgical History:  Past Surgical History:   Procedure Laterality Date     DAVINCI HERNIORRHAPHY INGUINAL Right 8/7/2018    Procedure: DAVINCI HERNIORRHAPHY INGUINAL;  Robotic Assisted Right Inguinal Hernia Repair with Mesh ;  Surgeon: Prabhu Allred MD;  Location: [facility] OR     Social History:  Social History     Tobacco Use     Smoking status: Former Smoker     Types: Cigarettes     Quit date: 4/6/2018     Years since quitting: 3.2     Smokeless tobacco: Never Used   Substance Use Topics     Alcohol use: No     Social History     Social History Narrative     Not on file     Family History:  Family History   Problem Relation Age of Onset     No Known Problems Brother      No Known Problems Mother      No Known Problems Sister      No Known Problems Daughter      Allergies:  No Known Allergies  Medications:  No current facility-administered medications on file prior to encounter.  apixaban ANTICOAGULANT (ELIQUIS) 2.5  "MG tablet, Take 1 tablet (2.5 mg) by mouth 2 times daily  furosemide (LASIX) 40 MG tablet, Take 40 mg by mouth daily  multivitamin (CENTRUM SILVER) tablet, Take 1 tablet by mouth daily  Omega-3 Fatty Acids (FISH OIL) 1200 MG capsule, Take 1,200 mg by mouth daily  omeprazole (PRILOSEC) 20 MG DR capsule, Take 1 capsule (20 mg) by mouth daily  sucralfate (CARAFATE) 1 GM tablet, Take 1 tablet (1 g) by mouth 4 times daily        Review of Systems:  A Comprehensive greater than 10 system review of systems was carried out.  Pertinent positives and negatives are noted above.  Otherwise negative for contributory information.     Physical Exam:  Blood pressure 108/69, pulse 97, temperature 98.1  F (36.7  C), temperature source Oral, resp. rate 22, height 1.753 m (5' 9\"), weight 72.6 kg (160 lb), SpO2 99 %.  Wt Readings from Last 1 Encounters:   07/20/21 72.6 kg (160 lb)     Exam:  General: Alert, awake, no acute distress.  HEENT: NC/AT, eyes anicteric, external occular movements intact, face symmetric.  Dentition WNL, MM moist.  Cardiac: RRR, S1, S2.  No murmurs appreciated.  Pulmonary: Normal chest rise, normal work of breathing.  Lungs CTA BL  Abdomen: soft, non-tender, non-distended.  Bowel Sounds Present.  No guarding.  Extremities: no deformities.  Warm, well perfused.  Skin: no rashes or lesions noted.  Warm and Dry.  Neuro: No focal deficits noted.  Speech clear.  Coordination and strength grossly normal.  Psych: Appropriate affect.    Data:  EKG:  Sinus tachycardia with first degree AVB  Imaging:  Results for orders placed or performed during the hospital encounter of 07/20/21   CT Chest (PE) Abdomen Pelvis w Contrast    Narrative    EXAM: CT CHEST PE ABDOMEN PELVIS W CONTRAST  LOCATION: Health system  DATE/TIME: 7/20/2021 6:27 PM    INDICATION: chest/ upper abdominal pain, known hepatic carcinoma  COMPARISON: 5/10/2021 and 5/11/2020  TECHNIQUE: CT chest pulmonary angiogram and routine CT abdomen pelvis " with IV contrast. Arterial phase through the chest and venous phase through the abdomen and pelvis. Multiplanar reformats and MIP reconstructions were performed. Dose reduction   techniques were used.   CONTRAST: 67mL Isovue-370    FINDINGS:  ANGIOGRAM CHEST: There is modest respiratory motion artifact resulting in blurring of pulmonary arteries. There is no convincing pulmonary embolism identified. Thoracic aorta is negative for dissection. No CT evidence of right heart strain.     LUNGS AND PLEURA: Motion artifact. No definite focal pneumonia or pleural effusion. Minimal dependent atelectasis improved compared to prior study.    MEDIASTINUM/AXILLAE: Small calcified nodes, no significant adenopathy.    CORONARY ARTERY CALCIFICATION: Moderate.    HEPATOBILIARY: Cirrhotic liver with innumerable low-attenuation foci throughout right lobe similar to previous exam. Very large presumed treated lesion occupying the inferior half of right lobe of liver similar to previous exam with a complex cystic   structure measuring approximately 13.5 x 12.5 x 10 cm. Previously there were numerous air bubbles within this collection which have resolved. There remains heterogeneous soft tissue density along the inferior aspect of this collection which could relate   to debris or blood products though enhancing tumor elements also possible. Mild amount of ascites surrounds liver.  Mild nonspecific gallbladder wall thickening similar to previous exam. Bile ducts unremarkable.    PANCREAS: Normal.    SPLEEN: Normal.    ADRENAL GLANDS: Normal.    KIDNEYS/BLADDER: Normal.    BOWEL: Mild wall thickening involving right hemicolon less pronounced than on the previous exam and may relate to third spacing. No definite new focal site of inflammation. No obstruction.    LYMPH NODES: Normal.    VASCULATURE: Prominent esophageal varices. There also appears to be a splenorenal shunt and likely pelvic varices through perirectal vasculature.    PELVIC  ORGANS: Mild ascites.    MUSCULOSKELETAL: Grade 1 spondylolisthesis and bilateral spondylolysis at L5-S1.      Impression    IMPRESSION:  1.  No pulmonary embolism identified. Modest motion artifact present and its possible tiny peripheral emboli could be overlooked.  2.  Cirrhotic liver with innumerable hepatic lesions and one large focus of presumed previous treatment occupying much of the right lobe, findings are similar to previous study with no definite acute change.  3.  Cirrhosis and portal venous hypertension and mild ascites similar to previous exam.  4.  Mild wall thickening of gallbladder and right hemicolon also similar to prior study, slightly improved.     Labs:  Recent Labs   Lab 07/20/21  1533   WBC 10.3   HGB 11.6*   HCT 32.7*   MCV 96        Recent Labs   Lab 07/20/21  1533   *   POTASSIUM 5.0   CHLORIDE 94   CO2 28   ANIONGAP 5   *   BUN 17   CR 0.93   GFRESTIMATED 85   REBECCA 9.1   PROTTOTAL 8.5   ALBUMIN 2.1*   BILITOTAL 6.3*   ALKPHOS 170*   *   *     No results for input(s): INR in the last 168 hours.      Rober Martinez MD  Hospitalist  [facility]

## 2021-07-22 NOTE — PROGRESS NOTES
[facility]    Hematology / Oncology f/u     Date of Admission:  7/20/2021    Assessment & Plan   Alexandra Alfaro is a 66 year old male who was admitted on 7/20/2021. I was asked to see the patient for history of hepatocellular carcinoma.     Assessment:  Plan:  1.  Portal vein thrombosis: Main portal vein:   -Findings of portal vein thrombosis would explain clinical presentation.  -Patient had MRI of liver earlier today which showed, enhancing portal vein thrombus involving main portal vein and right greater than left portal vein.  There was evidence of interval progression with enlargement of irregular mass of tumor enhancement closer to dominant mass in right inferior liver.  Also there were innumerable new large nodules throughout the liver.  There was new mass with bony destruction identified along the right posterior 11th rib.       Plan   -Broad-spectrum antibiotics to cover possibility of septic thrombus/bacterial peritonitis  -MRI results show that portal vein thrombosis is likely tumor thrombus.  Also there is evidence of progression of disease.  -Patient is unlikely to benefit from any interventional radiology procedure, and we will not pursue transfer to [facility].   -Continue with anticoagulation with Lovenox.   I updated patient's sister about the results.  His overall prognosis is guarded.  Would recommend a palliative care consult.     2.  Hepatocellular carcinoma  -Patient had bland embolization (could not tolerate yttrium-90 due to shunt)   -Also had immune checkpoint admitted with atezolizumab in the past on hold since April.     Plan   -Further treatment for hepatocellular carcinoma on hold for now.  -It is unlikely the patient will be candidate for further embolization given portal vein thrombosis.  -MRI results show extensive progression of his disease.  Patient has evidence of liver failure with severely elevated transaminases and bilirubin.   -    3.  Hepatitis C  -Patient is  completed treatment for hepatitis C  -He is seronegative now    4.  Cirrhosis of liver  -Follows with gastroenterology.       Vinnie Navarrete MD   [facility]  Office:  [phone]    Code Status    Full Code    Reason for Consult   Reason for consult: History of hepatocellular carcinoma    Primary Care Physician   Lindy Dodge    Chief Complaint   Abdominal pain.  Fever anorexia.       History of Present Illness   Alexandra Alfaro is a 66 year old male who presents with 4-day history of abdominal pain low-grade fever anorexia.   Patient reported that he has sudden onset of very severe epigastric pain followed by vomiting about 4 days ago since then he has been having a lot of pain, he has very little appetite, pain is focused on right upper quadrant.  Associated with low-grade fever at home.     Patient past medical history significant for hepatocellular carcinoma.  I have summarized his oncologic chronology below.  Patient had bland embolization to treat the large right-sided hepatocellular carcinoma.  Patient is also had some treatments with immune checkpoint inhibitor therapy atezolizumab however immune checkpoint therapy has been on hold since April due to vascular procedures.     Evaluation in the ER showed patient has severely elevated liver function test, sodium was 126 albumin 1.8 bilirubin 5.8 alkaline phosphatase 168 AST 1024 .  White blood cell count is 13.2 hemoglobin 10.6 platelets 267.  Earlier today he had an ultrasound of abdomen, which showed lack of color signal at main portal vein consistent with portal vein thrombosis.  This is new compared to MRI dated 6/8/2021.  Other findings are stable.       12/25/2020 patient was admitted with fatigue abdominal pain, hospital work-up was significant for a 12.7 x 12.6 x 10.5 cm heterogeneous right hepatic lobe mass.  Occupying much of the right hepatic lobe, the findings on MRI were consistent with hepatocellular carcinoma.  There was hepatic  cirrhosis.  Further work-up showed patient was positive for hepatitis C.  Labs done in the hospital showed  AFP 26 CA was 19.957 bilirubin 0.8 ALT 75 , however these were done at the time when patient had COVID-19 infection and may not be a true representation of his baseline liver function.  Patient denies any history of blood transfusions or IV drug use.  Was not aware of hepatitis C infection prior to this recent hospital admission.   2/15/2020: Patient had a repeat MRI, and a follow-up with Dr. Hansen in hepatobiliary service at [facility].  The MRI showed liver mass measuring 13.3 x 11.9 x 13.1 cm.  Unfortunately there were additional foci of enhancement.  These findings were compatible with multifocal HCC Dustin RADS category LR 5.  Dr. Hansen recommended against surgical resection.  Patient is now awaiting interventional radiology consult to see if he would be candidate for liver directed therapy.   2/24/2020: Patient had a follow-up in medical oncology clinic we have discussed the results from new MRI and systemic therapy with atezolizumab and bevacizumab.   3/8/2021 Patient had a CT scan of chest to complete his staging There was a single borderline enlarged lymph node at station 2R.   3/8/2021: Patient had a consultation with interventional radiology interventional radiology recommended thousand arterial liver directed therapy with yttrium 90 radioembolization.  3/15/2020 when patient had SPECT scan there was inhomogeneous uptake of activity in right lobe of liver consistent with known liver tumor there was no focal area of extrahepatic uptake  3/15/2021 patient underwent a planning session with hepatic arteriogram there was variant hepatic arterial anatomy there was small foci of disease noted throughout both lobes of liver the dominant lesion and majority of daughter lesions were consistent with hepatocellular carcinoma in posterior right hepatic lobe perfused by accessory right  hepatic artery.   A test dose of MAA was delivered to accessory right hepatic artery which was delivered a high lung shunt of 15.3% Dr. Lopez felt that due to the shunt he would like to do the procedure over 2 sessions 1 month apart. A liver biopsy was also completed which confirmed presence ofWell-differentiated hepatocellular carcinoma.   3/19/2021 patient started on systemic therapy with atezolizumab alone,  bevacizumab was hold as patient is going through yetrium 90 treatment.   3/25/2021 Patient has the first procedure of yttrium 90 microsphere administration.  Patient did not receive the full dose due to presence of a hepatopulmonary shunt. Patient tolerated procedure well.  5/6/2021: Patient has persistent hepatopulmonary shunt and was unable to receive additional Y 90 radioembolization.  He received hepatic angiogram and planned embolization of hepatocellular carcinoma right hepatic lobe.  Procedure was complicated with significant pain and development of decompensation of liver with ascites.   6/8/2021: Patient had an MRI of abdomen which showed large mass in the right hepatic lobe, interval bland embolization of accessory right hepatic artery 80% of previously treated mass demonstrated no residual enhancement.  There was residual enhancement in posterior inferior aspect of mass.  Additionally there were few small foci's of arterial enhancement noted along the superior margin of mass.  There was background cirrhosis with evidence for portal hypertension.   7/7/2021: Patient had a follow-up with Dr. Padilla, then interventional radiology at [facility].  Patient had more ascites at the time and his diuretics were adjusted.  There was plans for repeat bland embolization to the residual tumor however those plans were at hold due to liver decompensation.     Interval history  7/22/21 patient continues to have severe pain in his chest wall, right upper quadrant.  He has no appetite.       Past  Medical History   I have reviewed this patient's medical history and updated it with pertinent information if needed.   Past Medical History:   Diagnosis Date     2019 novel coronavirus disease (COVID-19) 12/25/2020     COVID-19 12/25/2020     Hepatitis     Hep C        Past Surgical History   I have reviewed this patient's surgical history and updated it with pertinent information if needed.  Past Surgical History:   Procedure Laterality Date     DAVINCI HERNIORRHAPHY INGUINAL Right 8/7/2018    Procedure: DAVINCI HERNIORRHAPHY INGUINAL;  Robotic Assisted Right Inguinal Hernia Repair with Mesh ;  Surgeon: Prabhu Allred MD;  Location:  OR       Prior to Admission Medications   Prior to Admission Medications   Prescriptions Last Dose Informant Patient Reported? Taking?   Acetaminophen (TYLENOL PO)   Yes Yes   Sig: Take by mouth daily as needed for mild pain or fever   furosemide (LASIX) 40 MG tablet 7/19/2021 at Unknown time  Yes Yes   Sig: Take 40 mg by mouth daily   omeprazole (PRILOSEC) 40 MG DR capsule 7/19/2021 at Unknown time  Yes Yes   Sig: Take 40 mg by mouth daily   spironolactone (ALDACTONE) 100 MG tablet 7/19/2021 at Unknown time  Yes Yes   Sig: Take 100 mg by mouth daily      Facility-Administered Medications: None     Allergies   No Known Allergies    Social History   I have reviewed this patient's social history and updated it with pertinent information if needed. Alexandra Alfaro  reports that he quit smoking about 3 years ago. His smoking use included cigarettes. He has never used smokeless tobacco. He reports that he does not drink alcohol and does not use drugs.    Family History   I have reviewed this patient's family history and updated it with pertinent information if needed.   Family History   Problem Relation Age of Onset     No Known Problems Brother      No Known Problems Mother      No Known Problems Sister      No Known Problems Daughter        Review of Systems   Review of system as per  HPI  Physical Exam   Temp: 99.2  F (37.3  C) Temp src: Oral BP: 92/53 Pulse: 106   Resp: 16 SpO2: 96 % O2 Device: None (Room air)    Vital Signs with Ranges  Temp:  [98.8  F (37.1  C)-101.5  F (38.6  C)] 99.2  F (37.3  C)  Pulse:  [] 106  Resp:  [16-18] 16  BP: ()/(53-72) 92/53  SpO2:  [95 %-99 %] 96 %  159 lbs 1.6 oz    Constitutional: Awake, alert, cooperative, patient is in severe distress with abdominal pain performance status 2.  There is evidence of jaundice pallor.  There is evidence of ascites right upper quadrant pain and tenderness hepatomegaly      Data   Laboratory studies and imaging studies reviewed plan discussed with Dr. Kee.

## 2021-07-22 NOTE — PROGRESS NOTES
[facility]    Medicine Progress Note - Hospitalist Service       Date of Admission:  7/20/2021    Assessment & Plan             Alexandra Alfaro is a 66 year old male with PMH including COVID-19 in January 2020, hepatocellular carcinoma undergoing treatment ([facility]/Jennifer Coelho), s/p radioembolization 5/2021 with complicated prolonged hospitalization  history of nonalcoholic cirrhosis, hep C who presents with 3 days of epigastric abdominal pain found to have abnormal LFTs- due to portal vein thrombosis    Portal vein thrombosis    - abdominal pain is improved with meds    - RUQ US shows portal vein thrombosis and gallbladder sludge/thickening    - pain likely due to portal vein thrombosis    - started lovenox 1mg/kg BID    - repeat MRI today shows progression of disease    Gallbladder sludge/thickening    - seen on US    - had temp 100.6 and mild elevation of WBC    - zosyn started overnight    - will continue for now to cover gallbladder etiology    - would not obtain paracentesis: we are already covering with antibiotics and I would not stop anticoagulation    HCC     - follows with Dr. Navarrete and Dr. Miller ([facility])?    - I personally spoke with Dr. Navarrete who also spoke with Dr. Valles (IR at [facility])    - will obtain abdominal MRI at this time    - s/p bland embolization in May 2021 with prolonged hospital course following    - MRI shows disease progression    - would cancel transfer    - I spoke extensively with Dr. Navarrete    Acute on chronic hepatitis in the setting of Hep C cirrhosis    - likely due to underlying HCC/treatment and now portal vein thrombosis    - trend LFTs    - GI following (I did speak with the PA)     Spoke with daughter and sister for > 45 mins with ipad  as well       Diet: Snacks/Supplements Adult: Ensure Enlive; Between Meals    DVT Prophylaxis: Enoxaparin (Lovenox) SQ  Zimmerman Catheter: Not present  Central Lines: None  Code Status: Full Code      Disposition  Plan   Expected discharge: 07/22/2021 recommended to unclear once stable.     The patient's care was discussed with the Bedside Nurse, Patient, Patient's Family and IR and Oncology Consultant.    Kashmir Kee MD  Hospitalist Service  [facility]  Securely message with the Vocera Web Console (learn more here)  Text page via [facility] Paging/Directory      Risk Factors Present on Admission                ______________________________________________________________________    Interval History   Patient in bed. Daughter and sister in room. Abdo pain almost gone with meds. Pain localized to RUQ. No other new complaints  Data reviewed today: I reviewed all medications, new labs and imaging results over the last 24 hours. I personally reviewed the US image(s) showing portal vein thrombosis.    Physical Exam   Vital Signs: Temp: 99.2  F (37.3  C) Temp src: Oral BP: 92/53 Pulse: 106   Resp: 16 SpO2: 96 % O2 Device: None (Room air)    Weight: 159 lbs 1.6 oz  Constitutional: awake, alert, cooperative, no apparent distress, and appears stated age  Eyes: Lids and lashes normal, pupils equal, round and reactive to light, extra ocular muscles intact, sclera clear, conjunctiva normal  ENT: Normocephalic, without obvious abnormality, atraumatic, sinuses nontender on palpation, external ears without lesions, oral pharynx with moist mucous membranes, tonsils without erythema or exudates, gums normal and good dentition.  Respiratory: No increased work of breathing, good air exchange, clear to auscultation bilaterally, no crackles or wheezing  Cardiovascular: Normal apical impulse, regular rate and rhythm, normal S1 and S2, no S3 or S4, and no murmur noted  GI: soft +RUQ tenderness. No rebound.  Skin: no bruising or bleeding    Data   Recent Labs   Lab 07/22/21  0517 07/21/21  1212 07/21/21  0530 07/20/21  1941 07/20/21  1533   WBC  --  13.3* 13.2*  --  10.3   HGB  --  10.0* 10.6*  --  11.6*   MCV  --  96 95  --   96    240 267  --  266   INR  --   --  1.32*  --   --    *  --  126*  --  127*   POTASSIUM 4.7  --  4.5  --  5.0   CHLORIDE 98  --  96  --  94   CO2 25  --  25  --  28   BUN 17  --  15  --  17   CR 1.27*  --  0.95  --  0.93   ANIONGAP 5  --  5  --  5   REBECCA 7.8*  --  8.4*  --  9.1   GLC 95  --  108*  --  133*   ALBUMIN 1.5*  --  1.8*  --  2.1*   PROTTOTAL 6.6*  --  7.4  --  8.5   BILITOTAL 7.0*  --  5.8*  --  6.3*   ALKPHOS 163*  --  168*  --  170*   *  --  446*  --  281*   *  --  1,024*  --  671*   LIPASE  --   --   --   --  78   TROPONIN  --   --  0.024 0.030 0.021     Recent Results (from the past 24 hour(s))   MR Liver wo & w Contrast    Narrative    MR LIVER WITHOUT AND WITH CONTRAST   7/22/2021 10:13 AM     HISTORY: HCC, new PV thrombus.    TECHNIQUE: Multiplanar, multiecho MRI was performed using T1, T2, and  in- and out-of-phase imaging. Pre- and postcontrast T1 images were  acquired after the administration of 7 mL Gadavist IV.     COMPARISON: Ultrasound abdomen 7/21/2021, CT chest, abdomen and pelvis  7/20/2021. MRI abdomen 6/8/2021. CT abdomen 5/11/2021.    FINDINGS: Cirrhotic configuration of the liver. Dominant rounded mass  occupying the right mid to inferior liver is again identified. It is  similar in overall size measuring 13.2 x 10 x 12.3 cm, series 12 image  56, series 2 image 22. This is similar in size compared to the prior  MRI from 6/8/2021. There are irregular nodular and curvilinear areas  of internal enhancement occupying the posterior and peripheral aspect  of the mass extending inferiorly. The area of enhancement internally  is slightly larger compared to the prior MRI and is now 8.5 x 8.6 cm  measured on series 15 image 60, previously 8.0 x 6.3 cm. Further,  immediately adjacent to the posterior inferior aspect of this mass is  a new enhancing mass associated with destruction of the right  posterior eleventh rib that measures 2.7 x 1.3 cm, series 12 image  67.  This is new over time compared to older CT examinations, for example  progressed compared to 5/11/2021. New finding of prominent portal vein  thrombus involving the main portal vein and extending to the right  anterior portal vein and a portion of the central left portal vein,  for example series 12 images 40, 41, and 46. The subtraction imaging  shows a degree of enhancement of this thrombus, series 13 image 45 and  image 39, worrisome for enhancing tumor thrombus.    There are innumerable additional hypoenhancing nodules occupying the  majority of the right liver and at multiple locations within the left  liver. Many of these were also present on prior imaging, but this  appears progressed and several are larger in size. For example, a  segment VIII complex nodular mass is 3.2 cm, series 12 image 26,  previously 1 cm. A hypodense nodule is 1.2 cm at the left liver  segment III, newly identified. There are numerous other examples of  progression.    Trace right pleural fluid. Stable nora hepatis region lymph node that  is 1 cm, series 12 image 50. There are other stable prominent upper  abdominal lymph nodes. Unremarkable left adrenal. Right adrenal is  difficult to visualize but may be occupied by 2.7 cm hypoenhancing  nodular mass, previously 1.5 cm, series 12 image 40. Splenomegaly is  14.4 cm. There are a few incidental renal cysts not requiring specific  follow-up. The pancreas shows no new abnormality. Several vascular  varices noted near the stomach, spleen, and at the upper abdomen  retroperitoneum.      Impression    IMPRESSION:  1. New finding of enhancing portal venous thrombus involving the main  portal vein and right greater than left-sided portal veins consistent  with tumor thrombus and progression of malignancy compared to prior  imaging.  2. Dominant mass occupying the right mid to inferior liver is again  noted and shows imaging evidence for prior treatment, though  internally there are  irregular areas of tumor enhancement that is  larger in size indicating interval progression.  3. Innumerable new and/or larger nodules throughout the liver  consistent with other areas of hepatic malignancy progression. Though  these are mostly within the right and central liver, progression  within the left liver also demonstrated.  4. New mass with bony destruction identified along the right posterior  eleventh rib consistent with bony metastatic disease progression.  5. The right adrenal is difficult to visualize and may be occupied by  a metastatic mass that is larger over time.  6. Enlarged but stable upper abdominal lymph nodes.  7. Trace right pleural fluid.

## 2021-07-22 NOTE — PROGRESS NOTES
"Vitals: BP 92/53 (BP Location: Right arm)   Pulse 106   Temp 99.2  F (37.3  C) (Oral)   Resp 16   Ht 1.753 m (5' 9\")   Wt 72.2 kg (159 lb 1.6 oz)   SpO2 96%   BMI 23.49 kg/m       [id number]    Neuro: alert and oriented x4. Yi speaking.  I-pad and family in use for interpreting.    Cardiac: on tele running ST/HR in the 100s. Soft BP.  Lungs: wdl  GI: rounded abdomen. BS active and present in all quadrants. Last BM days ago per pt report. Pt said they don't feel constipated. Denies nausea/ vomiting. Reported pain in the RUQ of abdomen.    : voids without problem  Pain: IV dilaudid given before going down for MRI for abdominal pain rated 5/10. Reported reduced pain level 2/10 after returning from a MRI.   IV: NS @100mL/hr. Zosyn Q6 hrs for intraabdominal infection.   Labs/Imaging: trops detectable. . . INR 1.32. Kcl 4.7. MRI results show portal vein thrombosis is likely tumor thrombus.   Diet: regular diet. Snacks/ Supplements between meals.   Activity: stand by assist  Consult: oncology, GI  Plan: IV antibiotic. IV hydration. Anticoagulation subQ. Monitor on tele. Pain management. Follow up with oncology and GI.    Will continue to monitor and provide supportive care.    "

## 2021-07-22 NOTE — PLAN OF CARE
"Patient is A/Ox4 and is SBA. Patients vital signs are stable but BP does run soft. BP 93/61 (BP Location: Right arm)   Pulse 94   Temp 100.4  F (38  C) (Oral)   Resp 18   Ht 1.753 m (5' 9\")   Wt 72.2 kg (159 lb 1.6 oz)   SpO2 95%   BMI 23.49 kg/m  Patient is Slovak speaking but his daughters visit and speak English; iPad  is in room. Patient is comb. regular diet.  Patient has fluids running at 100 mL/hr and Zosyn running every 6 hours for intraabdominal infection. Patient is receiving Lovenox for portal vein thrombosis.   Patient has elevated ALT (446), AST (1024), and WBC (13.3). Patient also has elevated troponin (0.021). Patient is on mag and K+ protocols but redraws were not indicated. Oncology and GI are following. Patient normally goes to [facility] and is awaiting MRI here before he is transferred to [facility] this morning.  "

## 2021-07-22 NOTE — PROGRESS NOTES
GI Progress note    Reviewed MRI.  Discussed with Dr. Kee.  No role for GI intervention.  Will sign off, please call with questions.    Wei Guzmán MD

## 2021-07-22 NOTE — PROGRESS NOTES
SPIRITUAL HEALTH SERVICES Progress Note  [facility]    Met with pt Alexandra's family per interdisciplinary team request. Oriented to Spiritual Health Services. Family declined spiritual care at this time. Informed them that they could request a visit at any time by asking a nurse. Spiritual Health Services remain available for further consultation.    Og Qureshi   Intern  Pager [phone]

## 2021-07-23 NOTE — PROGRESS NOTES
[facility]    Hematology / Oncology f/u     Date of Admission:  7/20/2021    Assessment & Plan   Alexandra Alfaro is a 66 year old male who was admitted on 7/20/2021. I was asked to see the patient for history of hepatocellular carcinoma.     Assessment:  Plan:  1.  Portal vein thrombosis: Main portal vein:   -Findings of portal vein thrombosis would explain clinical presentation.  -Patient had MRI of liver on 7/22 which showed, enhancing portal vein thrombus involving main portal vein and right greater than left portal vein.  There was evidence of interval progression with enlargement of irregular mass of tumor enhancement closer to dominant mass in right inferior liver.  Also there were innumerable new large nodules throughout the liver.  There was new mass with bony destruction identified along the right posterior 11th rib.   -Slight improvement in liver function test bilirubin 6.6  .  Albumin is 1.4    Plan   -I had a long discussion with patient and his family today.  -I reviewed the MRI results with patient, his sister and daughter.  I explained that MRI show worsening hepatocellular carcinoma in his liver, with several new areas of involvement with hepatocellular carcinoma, and tumor thrombus.   -I also explained that patient is unlikely to benefit from any interventional radiology procedure, and we will not pursue transfer to [facility].   -Best case senario would be that his liver function stabilized with conservative management and he could go home.   -I recommended a consultation with palliative care however patient and his sister are hesitant at this point.   -Patient and his sister expressed that patient would like to go to [place] if that is possible to see his family.   -Would recommend continuing on antibiotics to cover for possibility of bacterial peritonitis.  -Continue with anticoagulation with Lovenox.  Could switch to 1.5 mg/kg body weight daily dose.  - Paracentesis  as needed for discomfort.   - His overall prognosis is guarded.  -Plan to see him in clinic on for follow-up on discharge.        2.  Hepatocellular carcinoma  -Patient had bland embolization (could not tolerate yttrium-90 due to shunt)   -Also had immune checkpoint admitted with atezolizumab in the past on hold since April.     Plan   -Further treatment for hepatocellular carcinoma on hold for now.  -It is unlikely the patient will be candidate for further embolization given portal vein thrombosis.  -MRI results show extensive progression of his disease.  Patient has evidence of liver failure with severely elevated transaminases and bilirubin.   -If patient clinically improves we could reconsider systemic therapy, however seems unlikely at this point.     3.  Hepatitis C  -Patient is completed treatment for hepatitis C  -He is seronegative now    4.  Cirrhosis of liver  -Follows with gastroenterology.       Vinnie Navarrete MD   [facility]  Office:  [phone]    Code Status    Full Code    Reason for Consult   Reason for consult: History of hepatocellular carcinoma    Primary Care Physician   Lindy Dodge    Chief Complaint   Abdominal pain.  Fever anorexia.       History of Present Illness   Alexandra Alfaro is a 66 year old male who presents with 4-day history of abdominal pain low-grade fever anorexia.   Patient reported that he has sudden onset of very severe epigastric pain followed by vomiting about 4 days ago since then he has been having a lot of pain, he has very little appetite, pain is focused on right upper quadrant.  Associated with low-grade fever at home.     Patient past medical history significant for hepatocellular carcinoma.  I have summarized his oncologic chronology below.  Patient had bland embolization to treat the large right-sided hepatocellular carcinoma.  Patient is also had some treatments with immune checkpoint inhibitor therapy atezolizumab however immune checkpoint therapy has  been on hold since April due to vascular procedures.     Evaluation in the ER showed patient has severely elevated liver function test, sodium was 126 albumin 1.8 bilirubin 5.8 alkaline phosphatase 168 AST 1024 .  White blood cell count is 13.2 hemoglobin 10.6 platelets 267.  Earlier today he had an ultrasound of abdomen, which showed lack of color signal at main portal vein consistent with portal vein thrombosis.  This is new compared to MRI dated 6/8/2021.  Other findings are stable.       12/25/2020 patient was admitted with fatigue abdominal pain, hospital work-up was significant for a 12.7 x 12.6 x 10.5 cm heterogeneous right hepatic lobe mass.  Occupying much of the right hepatic lobe, the findings on MRI were consistent with hepatocellular carcinoma.  There was hepatic cirrhosis.  Further work-up showed patient was positive for hepatitis C.  Labs done in the hospital showed  AFP 26 CA was 19.957 bilirubin 0.8 ALT 75 , however these were done at the time when patient had COVID-19 infection and may not be a true representation of his baseline liver function.  Patient denies any history of blood transfusions or IV drug use.  Was not aware of hepatitis C infection prior to this recent hospital admission.   2/15/2020: Patient had a repeat MRI, and a follow-up with Dr. Hansen in hepatobiliary service at [facility].  The MRI showed liver mass measuring 13.3 x 11.9 x 13.1 cm.  Unfortunately there were additional foci of enhancement.  These findings were compatible with multifocal HCC Dustin RADS category LR 5.  Dr. Hansen recommended against surgical resection.  Patient is now awaiting interventional radiology consult to see if he would be candidate for liver directed therapy.   2/24/2020: Patient had a follow-up in medical oncology clinic we have discussed the results from new MRI and systemic therapy with atezolizumab and bevacizumab.   3/8/2021 Patient had a CT scan of chest to complete  his staging There was a single borderline enlarged lymph node at station 2R.   3/8/2021: Patient had a consultation with interventional radiology interventional radiology recommended thousand arterial liver directed therapy with yttrium 90 radioembolization.  3/15/2020 when patient had SPECT scan there was inhomogeneous uptake of activity in right lobe of liver consistent with known liver tumor there was no focal area of extrahepatic uptake  3/15/2021 patient underwent a planning session with hepatic arteriogram there was variant hepatic arterial anatomy there was small foci of disease noted throughout both lobes of liver the dominant lesion and majority of daughter lesions were consistent with hepatocellular carcinoma in posterior right hepatic lobe perfused by accessory right hepatic artery.   A test dose of MAA was delivered to accessory right hepatic artery which was delivered a high lung shunt of 15.3% Dr. Lopez felt that due to the shunt he would like to do the procedure over 2 sessions 1 month apart. A liver biopsy was also completed which confirmed presence ofWell-differentiated hepatocellular carcinoma.   3/19/2021 patient started on systemic therapy with atezolizumab alone,  bevacizumab was hold as patient is going through yetrium 90 treatment.   3/25/2021 Patient has the first procedure of yttrium 90 microsphere administration.  Patient did not receive the full dose due to presence of a hepatopulmonary shunt. Patient tolerated procedure well.  5/6/2021: Patient has persistent hepatopulmonary shunt and was unable to receive additional Y 90 radioembolization.  He received hepatic angiogram and planned embolization of hepatocellular carcinoma right hepatic lobe.  Procedure was complicated with significant pain and development of decompensation of liver with ascites.   6/8/2021: Patient had an MRI of abdomen which showed large mass in the right hepatic lobe, interval bland embolization of accessory right  hepatic artery 80% of previously treated mass demonstrated no residual enhancement.  There was residual enhancement in posterior inferior aspect of mass.  Additionally there were few small foci's of arterial enhancement noted along the superior margin of mass.  There was background cirrhosis with evidence for portal hypertension.   7/7/2021: Patient had a follow-up with Dr. Padilla, then interventional radiology at [facility].  Patient had more ascites at the time and his diuretics were adjusted.  There was plans for repeat bland embolization to the residual tumor however those plans were at hold due to liver decompensation.     Interval history  7/22/21 patient continues to have severe pain in his chest wall, right upper quadrant.  He has no appetite.    7/23/2021: Mr. Morris continues to have abdominal pain slightly better but he received pain medications just recently.  Appetite continues to remain poor abdomen is distended      Past Medical History   I have reviewed this patient's medical history and updated it with pertinent information if needed.   Past Medical History:   Diagnosis Date     2019 novel coronavirus disease (COVID-19) 12/25/2020     COVID-19 12/25/2020     Hepatitis     Hep C        Past Surgical History   I have reviewed this patient's surgical history and updated it with pertinent information if needed.  Past Surgical History:   Procedure Laterality Date     DAVINCI HERNIORRHAPHY INGUINAL Right 8/7/2018    Procedure: DAVINCI HERNIORRHAPHY INGUINAL;  Robotic Assisted Right Inguinal Hernia Repair with Mesh ;  Surgeon: Prabhu Allred MD;  Location: [facility] OR       Prior to Admission Medications   Prior to Admission Medications   Prescriptions Last Dose Informant Patient Reported? Taking?   Acetaminophen (TYLENOL PO)   Yes Yes   Sig: Take by mouth daily as needed for mild pain or fever   furosemide (LASIX) 40 MG tablet 7/19/2021 at Unknown time  Yes Yes   Sig: Take 40 mg by mouth  daily   omeprazole (PRILOSEC) 40 MG DR capsule 7/19/2021 at Unknown time  Yes Yes   Sig: Take 40 mg by mouth daily   spironolactone (ALDACTONE) 100 MG tablet 7/19/2021 at Unknown time  Yes Yes   Sig: Take 100 mg by mouth daily      Facility-Administered Medications: None     Allergies   No Known Allergies    Social History   I have reviewed this patient's social history and updated it with pertinent information if needed. Alexandra Alfaro  reports that he quit smoking about 3 years ago. His smoking use included cigarettes. He has never used smokeless tobacco. He reports that he does not drink alcohol and does not use drugs.    Family History   I have reviewed this patient's family history and updated it with pertinent information if needed.   Family History   Problem Relation Age of Onset     No Known Problems Brother      No Known Problems Mother      No Known Problems Sister      No Known Problems Daughter        Review of Systems   Review of system as per HPI  Physical Exam   Temp: 99.4  F (37.4  C) Temp src: Oral BP: 100/58 Pulse: 104   Resp: 16 SpO2: 93 % O2 Device: None (Room air)    Vital Signs with Ranges  Temp:  [99.3  F (37.4  C)-100  F (37.8  C)] 99.4  F (37.4  C)  Pulse:  [104-119] 104  Resp:  [16-18] 16  BP: ()/(58-65) 100/58  SpO2:  [93 %-96 %] 93 %  159 lbs 1.6 oz    Constitutional: Awake, alert, cooperative, patient is in severe distress with abdominal pain performance status 2.  There is evidence of jaundice pallor.  There is evidence of ascites right upper quadrant pain and tenderness hepatomegaly      Data   Laboratory studies and imaging studies reviewed plan discussed with Dr. Kee.

## 2021-07-23 NOTE — PROGRESS NOTES
[facility]    Medicine Progress Note - Hospitalist Service       Date of Admission:  7/20/2021    Assessment & Plan             Alexandra Alfaro is a 66 year old male with PMH including COVID-19 in January 2020, hepatocellular carcinoma undergoing treatment ([facility]/Jennifer Coelho), s/p radioembolization 5/2021 with complicated prolonged hospitalization  history of nonalcoholic cirrhosis, hep C who presents with 3 days of epigastric abdominal pain found to have abnormal LFTs- due to portal vein thrombosis. Possible cholecystitis. HCC appears to have advanced per imaging.     Portal vein thrombosis    - abdominal pain is improved with meds    - RUQ US shows portal vein thrombosis and gallbladder sludge/thickening    - pain likely due to portal vein thrombosis    - started lovenox 1mg/kg BID    - repeat MRI on 7/22 showed progression of disease    - as per Dr. Navarrete, will change lovenox to 1.5mg/kg daily indefinitely    - told family and nurse that patient and two daughters will need lovenox teaching    Gallbladder sludge/thickening    - seen on US    - had temp 100.6 and mild elevation of WBC on admission    - having mild elevation of temps: 100.4    - zosyn started on admission (Day 3/10)    - will continue for now to cover gallbladder etiology    - would not obtain paracentesis: we are already covering with antibiotics and I would not stop anticoagulation    - would change to augmentin and treat for total of 10 days when ready for discharge    HCC     - likely due to Hep C    - follows with Dr. Navarrete     - I personally spoke with Dr. Navarrete who also spoke with Dr. Valles (IR at [facility])     - s/p bland embolization in May 2021 with prolonged hospital course following    - MRI shows disease progression    -Initial plan on admission was to transfer to [facility], however given the MRI findings there is no further treatment.  The transfer was canceled.    - I have spoken extensively with Dr. Navarrete.   He has been clear with the family that there is no further treatment at this time.    Acute on chronic hepatitis in the setting of Hep C cirrhosis    - likely due to underlying HCC/treatment and now portal vein thrombosis    - trend LFTs- improving    - GI following now signed off     Social    - I have had extensive conversations with the patient's family every day    -I have been very clear about his diagnosis, his MRI findings, and his prognosis    -the patient's sister has good insight into his disease, however his daughters and wife do not    - the family has also chosen to keep his prognosis from him, as they do not want to affect his mental status    Dispo    - I have indicated to patient and family that the patient may be able to discharge tomorrow or the following day    - Patient's p.o. intake has improved.  He enjoys the Ensure supplements    - Patient's pain has been controlled with the narcotics I indicated that the patient should walk in the halls today    - If he is doing well tomorrow, we can consider discharge home with antibiotics and pain medications    - Dr. Navarrete will follow up with the patient as an outpatient    Spoke with daughter, wife and sister for > 60 mins today     Diet: Snacks/Supplements Adult: Ensure Enlive; Between Meals  Regular Diet Adult    DVT Prophylaxis: Enoxaparin (Lovenox) SQ  Zimmerman Catheter: Not present  Central Lines: None  Code Status: Full Code      Disposition Plan   Expected discharge: 07/23/2021 recommended to unclear once stable.     The patient's care was discussed with the Bedside Nurse, Patient, Patient's Family and IR and Oncology Consultant.    Kashmir Kee MD  Hospitalist Service  [facility]  Securely message with the Vocera Web Console (learn more here)  Text page via DocOnYou Paging/Directory      Risk Factors Present on Admission                ______________________________________________________________________    Interval History    Patient was seen with his sister in the room.  His sister provides translation as per patient request.  Patient states he is doing well today.  He still has some right upper quadrant pain, but it is controlled with his pain medications.  He has been able to tolerate some p.o. intake.  He states he enjoys the Ensure supplements.  He denies any chest pain, shortness of breath, nausea, vomiting, diarrhea.  He has been up ambulating in the room.    Data reviewed today: I reviewed all medications, new labs and imaging results over the last 24 hours. I personally reviewed the US image(s) showing portal vein thrombosis.    Physical Exam   Vital Signs: Temp: 99.8  F (37.7  C) Temp src: Oral BP: 93/57 Pulse: 105   Resp: 16 SpO2: 95 % O2 Device: None (Room air)    Weight: 159 lbs 1.6 oz  Constitutional: awake, alert, cooperative, no apparent distress, and appears stated age  Eyes: Lids and lashes normal, pupils equal, round and reactive to light, extra ocular muscles intact, sclera clear, conjunctiva normal  ENT: Normocephalic, without obvious abnormality, atraumatic, sinuses nontender on palpation, external ears without lesions, oral pharynx with moist mucous membranes, tonsils without erythema or exudates, gums normal and good dentition.  Respiratory: No increased work of breathing, good air exchange, clear to auscultation bilaterally, no crackles or wheezing  Cardiovascular: Normal apical impulse, regular rate and rhythm, normal S1 and S2, no S3 or S4, and no murmur noted  GI: soft +RUQ tenderness. No rebound.  Skin: no bruising or bleeding    Data   Recent Labs   Lab 07/23/21  0519 07/22/21  0517 07/21/21  1212 07/21/21  0530 07/20/21  1941 07/20/21  1533   WBC 11.5*  --  13.3* 13.2*  --  10.3   HGB 8.9*  --  10.0* 10.6*  --  11.6*   MCV 97  --  96 95  --  96    229 240 267  --  266   INR  --   --   --  1.32*  --   --    * 128*  --  126*  --  127*   POTASSIUM 4.3 4.7  --  4.5  --  5.0   CHLORIDE 98 98  --   96  --  94   CO2 25 25  --  25  --  28   BUN 23 17  --  15  --  17   CR 1.21 1.27*  --  0.95  --  0.93   ANIONGAP 4 5  --  5  --  5   REBECCA 7.6* 7.8*  --  8.4*  --  9.1   * 95  --  108*  --  133*   ALBUMIN 1.4* 1.5*  --  1.8*  --  2.1*   PROTTOTAL 6.2* 6.6*  --  7.4  --  8.5   BILITOTAL 6.6* 7.0*  --  5.8*  --  6.3*   ALKPHOS 232* 163*  --  168*  --  170*   * 496*  --  446*  --  281*   * 959*  --  1,024*  --  671*   LIPASE  --   --   --   --   --  78   TROPONIN  --   --   --  0.024 0.030 0.021     No results found for this or any previous visit (from the past 24 hour(s)).

## 2021-07-23 NOTE — CONSULTS
Care Management Discharge Note    Discharge Date: 07/24/2021 or 7/25/21     Discharge Disposition: Home with family support     Discharge Services:  None anticipated    Discharge Transportation:  Family    Private pay costs discussed: Not applicable     Patient/Family in Agreement with the Plan:  Yes    Handoff Referral Completed: No    Additional Information:  SW consulted for unplanned readmission risk of >20%. Pt followed by Hem/Onc and GI. No longer plan to transfer to [facility] given new MRI findings. Plan is to discharge home with family support in 1-2 days when medically stable. No additional SW needs identified at this time. Please re-consult if needed.     1620: Outpatient CC referral placed.     ANDREW Fisher

## 2021-07-23 NOTE — PROGRESS NOTES
"Vitals: BP 93/57 (BP Location: Left arm)   Pulse 105   Temp 99.8  F (37.7  C) (Oral)   Resp 16   Ht 1.753 m (5' 9\")   Wt 72.2 kg (159 lb 1.6 oz)   SpO2 95%   BMI 23.49 kg/m       Neuro: alert and oriented x4. Able to make needs known. Kinyarwanda speaking. Family and I-pad in use for interpreting.  Cardiac: on tele running ST/HR in the 100s.  Lungs: wdl  GI: abdomen distended and tender. Denies nausea/vomiting. Reported pain highest 4/10. Oxycodone given with improvement. BS active and present.    : voids without difficulty   Pain: abdominal pain. Oxycodone given with improvement.   IV: IVF discontinued. On Zosyn Q6 hrs for intraabdominal infection.     Labs/Imaging: . . WBC 11.5. Kcl 4.3. Mg 2.1.   Diet: regular. Snack/supplement between meals.   Activity: stand by assist  Consult: oncology  Plan: IV antibiotic. Anticoagulation subQ. Monitor on tele. Pain management. Ambulate in a ramirez. Follow up with oncology. Possible discharge home tomorrow with daily Lovenox shot and oral antibiotic.        "

## 2021-07-23 NOTE — PLAN OF CARE
"Patient is A/Ox4 but is Maori speaking; iPad  is in room. Patient is SBA and vitals have been stable, although his BP does run soft. BP 94/62 (BP Location: Right arm)   Pulse 119   Temp 100  F (37.8  C) (Oral)   Resp 18   Ht 1.753 m (5' 9\")   Wt 72.2 kg (159 lb 1.6 oz)   SpO2 96%   BMI 23.49 kg/m     Patient complained of mild abdominal pain and was given PRN meds per MAR. Patient also complained of abdominal fullness; patient was able to void a large BM and found relief.  Tele--.  Patients family requested tylenol for elevated temp; writer let them know that patient cannot have tylenol and that an elevated temp can be expected. Patient had liver MRI completed today and it was found that he had mets all over. Patients ultrasound showed sludgy gallbladder; zosyn is continued. Patient is on lovenox for portal vein thrombosis. GI consult and transfer to [facility] was called off due to MRI findings. Patient plans to meet with palliative care morning of 07/23.    "

## 2021-07-24 NOTE — PLAN OF CARE
Pt is alert and oriented, family at bedside to translate. Pt this am short of breath, sats 93% on room air, oxygen placed at 2 liters for comfort, sats up to 97%. Pt complaining of epigastric pain, oral oxycodone and atarax given. Pt is a possible discharge to home today. Family declining palliative consult.

## 2021-07-24 NOTE — PROCEDURES
[facility]    Procedure: Parcentesis.     Date/Time: 7/24/2021 4:13 PM  Performed by: Brunilda Danielson DO  Authorized by: Brunilda Danielson DO     UNIVERSAL PROTOCOL   Site Marked: Yes  Prior Images Obtained and Reviewed:  Yes  Required items: Required blood products, implants, devices and special equipment available    Patient identity confirmed:  Verbally with patient, arm band, provided demographic data and hospital-assigned identification number  Patient was reevaluated immediately before administering moderate or deep sedation or anesthesia  Confirmation Checklist:  Patient's identity using two indicators, relevant allergies, procedure was appropriate and matched the consent or emergent situation and correct equipment/implants were available  Time out: Immediately prior to the procedure a time out was called    Universal Protocol: the Joint Commission Universal Protocol was followed    Preparation: Patient was prepped and draped in usual sterile fashion           ANESTHESIA    Anesthesia: Local infiltration  Local Anesthetic:  Lidocaine 1% without epinephrine      SEDATION    Patient Sedated: No    See dictated procedure note for full details.  Findings: Paracentesis.     Specimens: none and fluid and/or tissue for laboratory analysis    Complications: None    Condition: Stable    PROCEDURE   Patient Tolerance:  Patient tolerated the procedure well with no immediate complications    Length of time physician/provider present for 1:1 monitoring during sedation: 0

## 2021-07-24 NOTE — PLAN OF CARE
"Blood pressure 100/64, pulse 100, temperature 98.6  F (37  C), temperature source Oral, resp. rate 18, height 1.753 m (5' 9\"), weight 72.2 kg (159 lb 1.6 oz), SpO2 95 %.      Neuro:   Alert, orientated x 4, all questions asked through the aid of an interpretor.  Family and IPad used for interpreting.    Cardiac:Monitor per teltech:  ST., no ectopy  100-110  Pulm:    LS clear, adequate sats on room air.  Patient had an episode of profound SOB and chest discomfort, improved with Dilaudid IV and 02 placement at 2 l.  Symptoms later improved. 02 dcd.  12 lead EKG with no changes.    GI:         Abdomen distended, taught.  Oxycodone given x 2 for c/o of abdominal discomfort.    Activity:  Standby assist, ambulating to the bathroom prn.    Plan:     Probable discharge tomorrow with daily lovenox and antibiotics.  Family refusing palliative cares.    "

## 2021-07-24 NOTE — PROGRESS NOTES
Talked with IR on call provider Dr. Mcmillan. Per provider, they can't do STAT paracentesis because they are in a different hospital doing procedure. Provider to come to do paracentesis right after they done with the procedure. Per provider, pt doesn't have to be NPO. Provider aware pt are on Lovenox.

## 2021-07-24 NOTE — PROGRESS NOTES
"Vitals: BP 97/68 (BP Location: Left arm)   Pulse 100   Temp 98.9  F (37.2  C) (Oral)   Resp 18   Ht 1.753 m (5' 9\")   Wt 72.2 kg (159 lb 1.6 oz)   SpO2 97%   BMI 23.49 kg/m       Neuro: alert and oriented x4. Able to make needs known. Indonesian speaking. Family and I-pad in use for interpreting.  Cardiac: on tele running ST/HR in the 100s.  Lungs: SOB without hypoxia due to ascites. Abdomen distended. Required supplemental oxygen overnight for comfort. Able to wean during the day.    GI: abdomen distended and tender. Denies nausea/vomiting. Reported pain and pressure highest 6/10. IV Dilaudid given x1 with improvement. BS active and present.  Frequent hiccups.  : voids without difficulty   Pain: abdominal pain. Dilaudid given with improvement.   IV:  On Zosyn Q6 hrs for intraabdominal infection.  saline locked between antibiotic.   Labs/Imaging: . . WBC 12.7 Kcl 4.9. Mg 2.3.   Diet: NPO since 0800 for paracentesis. Had breakfast around 0730. Will resume regular diet with snack/supplement between meals post procedure.   Activity: stand by assist with a walker and gelt belt.  Consult: oncology  Plan: IV antibiotic. Anticoagulation subQ daily. Monitor on tele. Pain management. Paracentesis due to ascites with SOB and abdominal discomfort/pressure. Follow up with oncology. Possible discharge home tomorrow with daily Lovenox shot and oral antibiotic if clinically stable.      "

## 2021-07-24 NOTE — PROGRESS NOTES
[facility]  Hospitalist Progress Note    Assessment & Plan   Alexandra Alfaro is a 66 year old male with PMH including COVID-19 in January 2020, hepatocellular carcinoma undergoing treatment ([facility]/Jennifer Coelho), s/p radioembolization 5/2021 with complicated prolonged hospitalization  history of nonalcoholic cirrhosis, hep C who presents with 3 days of epigastric abdominal pain found to have abnormal LFTs- due to portal vein thrombosis. Possible cholecystitis. HCC appears to have advanced per imaging.     Portal vein thrombosis: RUQ US shows portal vein thrombosis and gallbladder sludge/thickening. MRI 7/22 showed progression of HCC with metastasis. Results discussed with family by Dr. Kee. Improvement in abdominal pain with oral medications. Initially on Lovenox 1 mg/kg BID but dose adjusted per heme/onc.     - as per Dr. Navarrete, will change lovenox to 1.5mg/kg daily indefinitely    - Lovenox education to be provided prior to discharge      Gallbladder sludge/thickening: RUQ US showed gallbladder wall thickening/sludge. T-max 100.6 and WBC elevation. Abx ordered for possible infectious gallbladder etiology. In addition, abx will cover for SBP.     - Zosyn 7/21 - present. Plan for 10 day total course of abx. Transition to oral Augmentin at discharge.      HCC with refractory ascites and diffuse metastasis: This is likely secondary to Hep C. Patient follows with Dr. Navarrete, consulted on admission.  Dr. Navarrete who also spoke with Dr. Valles (IR at [facility]). Patient is s/p bland embolization in May 2021 with prolonged hospital course following. 7/22 MRI shows disease progression. Initial plan on admission was to transfer to [facility], however given the MRI findings there is no further treatment.  The transfer was canceled. MRI findings were discussed with family per Dr. Kashmir Kee.     - Dr. Navarrete and Dr. Kee spoke with family and patient extensively that at this time there are no further  treatment options.     - IR consulted with fluid studies for paracentesis - 7/24 worsening abdominal distension resulting in discomfort and shortness of breath without hypoxia. IR notified of need for paracentesis and that patient is on anticoagulation.     - Family declining narcotic therapies for pain control associated with metastatic disease. Prn medications available.     Acute on chronic hepatitis in the setting of Hep C cirrhosis: Likely due to underlying HCC/treatment and now portal vein thrombosis. GI reviewed chart and given metastatic disease there is no new recommendations.     - LFTs being trended - worsening 7/24. Likely a component of hepatocellular congestion given worsening abdominal ascites.      Social: Family is updated daily. Diagnosis with review of MRI findings with overall poor prognosis shared with family per Dr. Kee, Dr. Navarrete, and myself. The patient's sister has good insight into his disease, however his daughters and wife do not. The family has also chosen to keep his prognosis from him, as they do not want to affect his mental status     FEN: Oral hydration; monitor; regular/NPO for procedure   Activity: As tolerated; ambulation encouraged  DVT Prophylaxis: Enoxaparin (Lovenox) SQ - full dose anticoagulation   Family update: Yes, wife/daughter/sister at bedside.     Dispo: Patient is clinically worse today. Oral intake is stable and he likes Ensure supplements. However, he has worsening back/rib pain, abdominal distension, and shortness of breath. Patient to be evaluated for discharge tomorrow if making clinical improvement following paracentesis. Patient to be seen by Dr. Navarrete as an outpatient on discharge.     Code Status: Full Code  Text Page (7am - 6pm, M-F)    Interval History    Patient is not feeling well today. He continues to feel worse. Afebrile. Abdomen distended resulting in discomfort diffusely. Worse with cough. Reports back pain with coughing. Feels like he can't take  big deep breaths. Chest with increased heaviness. Reports tightness. This is similar to prior experiences with increased abdominal fluid. Slight cough without increased sputum. Worsening rib/back pain. Family concerned. They feel like in general he does not look well. Uncomfortable. No n/v. Terrible appetite, unchanged.     -Data reviewed today: I reviewed all new labs and imaging results over the last 24 hours. I personally reviewed:     Physical Exam   Temp: 98.9  F (37.2  C) Temp src: Oral BP: 97/68 Pulse: 100   Resp: 18 SpO2: 97 % O2 Device: Nasal cannula Oxygen Delivery: 2 LPM  Vitals:    07/20/21 1327 07/21/21 0030 07/21/21 0737   Weight: 72.6 kg (160 lb) 75.6 kg (166 lb 11.2 oz) 72.2 kg (159 lb 1.6 oz)     Vital Signs with Ranges  Temp:  [98  F (36.7  C)-99.2  F (37.3  C)] 98.9  F (37.2  C)  Pulse:  [100-107] 100  Resp:  [16-18] 18  BP: ()/(56-68) 97/68  SpO2:  [93 %-97 %] 97 %  I/O last 3 completed shifts:  In: 100 [P.O.:100]  Out: -     Constitutional: Awake, alert, cooperative, no apparent distress. Non-toxic. Appears stated age.   HEENT: Atraumatic. Normocephalic. Conjunctiva non-injected. Sclera anicteric. MMM. No erythema or exudates of posterior oral pharynx.   Respiratory: Moves air bilaterally. Shallow breathing. Clear to auscultation bilaterally, no crackles or wheezing  Cardiovascular: Tachycardic and regular rhythm, normal S1 and S2, and no murmur noted  GI: Round, firm, distended, diffusely tender, no rebound tenderness, mild guarding, hypoactive BS+   Skin/Integumen: No rashes, no cyanosis, no edema    Medications       enoxaparin ANTICOAGULANT  1.5 mg/kg Subcutaneous Q24H     famotidine  20 mg Oral BID     lidocaine  1 patch Transdermal Q24H     lidocaine   Transdermal Q8H     piperacillin-tazobactam  4.5 g Intravenous Q6H     sodium chloride (PF)  3 mL Intracatheter Q8H     Data   Recent Labs   Lab 07/24/21  1031 07/24/21  0520 07/23/21  0519 07/22/21  0517 07/21/21  1212 07/21/21  0530  07/21/21  0530 07/20/21  1941 07/20/21  1533   WBC  --  12.7* 11.5*  --  13.3*  --  13.2*  --  10.3   HGB  --  9.6* 8.9*  --  10.0*  --  10.6*  --  11.6*   MCV  --  98 97  --  96  --  95  --  96   PLT  --  216 210 229 240   < > 267  --  266   INR  --   --   --   --   --   --  1.32*  --   --    NA  --  126* 127* 128*  --   --  126*  --  127*   POTASSIUM  --  4.9 4.3 4.7  --   --  4.5  --  5.0   CHLORIDE  --  95 98 98  --   --  96  --  94   CO2  --  28 25 25  --   --  25  --  28   BUN  --  26 23 17  --   --  15  --  17   CR  --  1.22 1.21 1.27*  --   --  0.95  --  0.93   ANIONGAP  --  3 4 5  --   --  5  --  5   REBECCA  --  7.9* 7.6* 7.8*  --   --  8.4*  --  9.1   GLC  --  101* 137* 95  --   --  108*  --  133*   ALBUMIN 1.5* 1.4* 1.4* 1.5*  --   --  1.8*  --  2.1*   PROTTOTAL  --  6.6* 6.2* 6.6*  --   --  7.4  --  8.5   BILITOTAL  --  7.3* 6.6* 7.0*  --   --  5.8*  --  6.3*   ALKPHOS  --  171* 232* 163*  --   --  168*  --  170*   ALT  --  491* 379* 496*  --   --  446*  --  281*   AST  --  925* 588* 959*  --   --  1,024*  --  671*   LIPASE  --   --   --   --   --   --   --   --  78   TROPONIN  --   --   --   --   --   --  0.024 0.030 0.021    < > = values in this interval not displayed.     No results found for this or any previous visit (from the past 24 hour(s)).

## 2021-07-24 NOTE — PROGRESS NOTES
Progress Note     Primary Oncologist/Hematologist:  Dr. Navarrete          Assessment and Plan:New actions/orders today (07/24/2021) are underlined.     Alexandra Alfaro is a 66 year old male who was admitted on 7/20/2021. He has a history of hepatocellular carcinoma.      Portal vein thrombosis: Main portal vein:   - Findings of portal vein thrombosis would explain clinical presentation.  - Patient had MRI of liver on 7/22 which showed, enhancing portal vein thrombus involving main portal vein and right greater than left portal vein.  There was evidence of interval progression with enlargement of irregular mass of tumor enhancement closer to dominant mass in right inferior liver.  Also there were innumerable new large nodules throughout the liver.  There was new mass with bony destruction identified along the right posterior 11th rib.   - Slight improvement in liver function test bilirubin 6.6  .  Albumin is 1.4  - MRI show worsening hepatocellular carcinoma in his liver, with several new areas of involvement with hepatocellular carcinoma, and tumor thrombus.   - Dr. Navarrete discussed unlikely benefit from any interventional radiology procedure and we will not pursue transfer to [facility].   - Best case senario would be that his liver function stabilized with conservative management and he could go home.   - Palliative care has been recommended, however patient and his sister are hesitant at this point.   - Patient and his sister expressed that patient would like to go to [place] if that is possible to see his family.   - Would recommend continuing on antibiotics to cover for possibility of bacterial peritonitis.  - Continue with anticoagulation with Lovenox.  Could switch to 1.5 mg/kg body weight daily dose.  - Paracentesis as needed for discomfort.   - His overall prognosis is guarded.  - Plan to see him in clinic on for follow-up on discharge.      Hepatocellular carcinoma  - Patient had bland  embolization (could not tolerate yttrium-90 due to shunt)   - Also had immune checkpoint admitted with atezolizumab in the past on hold since April.   - Further treatment for hepatocellular carcinoma on hold for now.  - It is unlikely the patient will be candidate for further embolization given portal vein thrombosis.  - MRI results show extensive progression of his disease.  Patient has evidence of liver failure with severely elevated transaminases and bilirubin.   - If patient clinically improves we could reconsider systemic therapy, however seems unlikely at this point.      Hepatitis C  Cirrhosis  Ascites  Hypoalbuminemia  - Patient is completed treatment for hepatitis C  - He is seronegative now  - Follows with gastroenterology.     Fever  - Elevated WBC, low grade fevers  - Gallbladder sludge on US  - ? SBP--> fluid could be sent for cell count, gram stain and culture  - BC negative  - On IV antibiotics    Terry Perez APRN, CNP  [facility]  [phone] (office), [phone] (cell)        Interval History:     Feeling full and short of breath. Hopeful for paracentesis. Discussed with family that serve as               Review of Systems:     The 5 point Review of Systems is negative other than noted in the HPI             Medications:   Scheduled Medications    enoxaparin ANTICOAGULANT  1.5 mg/kg Subcutaneous Q24H     famotidine  20 mg Oral BID     lidocaine  1 patch Transdermal Q24H     lidocaine   Transdermal Q8H     piperacillin-tazobactam  4.5 g Intravenous Q6H     sodium chloride (PF)  3 mL Intracatheter Q8H     PRN Medications  alum & mag hydroxide-simethicone, diclofenac, HYDROmorphone, hydrOXYzine, lidocaine 4%, lidocaine (buffered or not buffered), melatonin, naloxone **OR** naloxone **OR** naloxone **OR** naloxone, ondansetron **OR** ondansetron, oxyCODONE, sodium chloride (PF)               Physical Exam:   Vitals were reviewed  Blood pressure 97/68, pulse 100, temperature 98.9  " F (37.2  C), temperature source Oral, resp. rate 18, height 1.753 m (5' 9\"), weight 72.2 kg (159 lb 1.6 oz), SpO2 97 %.  Wt Readings from Last 4 Encounters:   07/21/21 72.2 kg (159 lb 1.6 oz)   06/30/21 72.6 kg (160 lb)   05/25/21 75.2 kg (165 lb 12.8 oz)   03/18/21 77.1 kg (170 lb)       I/O last 3 completed shifts:  In: 100 [P.O.:100]  Out: -     Constitutional: Awake, alert, cooperative. He is uncomfortable.   Abdomen: Distended. Mildly tender with palpation.   Other:               Data:   All laboratory data and imaging studies reviewed.    CMP  Recent Labs   Lab 07/24/21  1031 07/24/21  0520 07/23/21  0836 07/23/21  0519 07/22/21  0517 07/21/21  0530   NA  --  126*  --  127* 128* 126*   POTASSIUM  --  4.9  --  4.3 4.7 4.5   CHLORIDE  --  95  --  98 98 96   CO2  --  28  --  25 25 25   ANIONGAP  --  3  --  4 5 5   GLC  --  101*  --  137* 95 108*   BUN  --  26  --  23 17 15   CR  --  1.22  --  1.21 1.27* 0.95   GFRESTIMATED  --  61  --  62 58* 83   REBECCA  --  7.9*  --  7.6* 7.8* 8.4*   MAG  --  2.3 2.1  --   --  2.1   PROTTOTAL  --  6.6*  --  6.2* 6.6* 7.4   ALBUMIN 1.5* 1.4*  --  1.4* 1.5* 1.8*   BILITOTAL  --  7.3*  --  6.6* 7.0* 5.8*   ALKPHOS  --  171*  --  232* 163* 168*   AST  --  925*  --  588* 959* 1,024*   ALT  --  491*  --  379* 496* 446*     CBC  Recent Labs   Lab 07/24/21  0520 07/23/21  0519 07/22/21  0517 07/21/21  1212 07/21/21  0530   WBC 12.7* 11.5*  --  13.3* 13.2*   RBC 2.88* 2.66*  --  3.03* 3.23*   HGB 9.6* 8.9*  --  10.0* 10.6*   HCT 28.1* 25.7*  --  29.1* 30.7*   MCV 98 97  --  96 95   MCH 33.3* 33.5*  --  33.0 32.8   MCHC 34.2 34.6  --  34.4 34.5   RDW 16.5* 16.8*  --  16.2* 15.9*    210 229 240 267     INR  Recent Labs   Lab 07/21/21  0530   INR 1.32*                   "

## 2021-07-24 NOTE — PLAN OF CARE
"Pt Aox4, Albanian speaking - use of  or family to assist for non-medical needs, standby assist with or w/o walker, post-procedure diet advanced from NPO to regular, Zosyn antibiotic given - IV saline locked, paracentesis today w/ abdominal distention and abdominal pain improving per pt - 5/10 and 5mg Oxycodone given, abdomen still firm and round, abdominal lidocaine patch still in place.   Telemetry reading -sinus tach 100s, /72 (BP Location: Left arm)   Pulse 107   Temp 98.3  F (36.8  C) (Oral)   Resp 20   Ht 1.753 m (5' 9\")   Wt 72.2 kg (159 lb 1.6 oz)   SpO2 93%   BMI 23.49 kg/m      PRIMARY DIAGNOSIS: EPIGASTRIC PAIN; PORTAL VEIN THROMBOSIS; LIVER DYSFUNCTION     OUTPATIENT/OBSERVATION GOALS TO BE MET BEFORE DISCHARGE  1. Orthostatic performed: No    2. Tolerating PO medications: Yes    3. Return to near baseline physical activity: No    4. Cleared for discharge by consultants (if involved): No    Discharge Planner Nurse   Safe discharge environment identified: Yes  Barriers to discharge: Yes       Entered by: Viktor Márquez 07/24/2021      Please review provider order for any additional goals.   Nurse to notify provider when observation goals have been met and patient is ready for discharge.  "

## 2021-07-25 NOTE — PROGRESS NOTES
RT- Attempted x2 to see patient and family to complete Flutter valve education. Will wait for family to return to complete education.    Hector Bunn, RT on 7/25/2021 at 2:33 PM

## 2021-07-25 NOTE — PLAN OF CARE
Pt is alert and oriented, up with standby assist. Pt taking oxycodone for pain, atarax given X1 for hiccups. Pt on IV zosyn for antibiotics. Pt had a paracentesis yesterday, abdomen continues to be rounded and distended. Pt is a possible discharge to home with family today pending am labs.

## 2021-07-25 NOTE — PLAN OF CARE
-Pt is calm and Aox4, assist 1 w/ walker (generalized weakness), gave Reglan for continued hiccups - water sips and HOB elevation help, Lasix 1 time dose given, IV saline locked when not infusing antibiotics, pain improved - rated 2/10 pt declined oral pain meds, pt had coughing episode - was given robitussin and tessalon pearls for cough, scheduled paracentesis for tomorrow 7/26 - pt should be NPO at midnight for procedure, fluids encouraged  -Telemetry read - sinus tach 108    PRIMARY DIAGNOSIS: EPIGASTRIC PAIN; LIVER DYSFUNCTION    OUTPATIENT/OBSERVATION GOALS TO BE MET BEFORE DISCHARGE  2. Tolerating PO fluid and/or antibiotics (if applicable): Yes    4. Pain status: Improved-controlled with oral pain medications.    5. Return to near baseline physical activity: No    Discharge Planner Nurse   Safe discharge environment identified: Yes  Barriers to discharge: Yes       Entered by: Viktor Márquez 07/25/2021     Please review provider order for any additional goals.   Nurse to notify provider when observation goals have been met and patient is ready for discharge.

## 2021-07-25 NOTE — PROGRESS NOTES
"Vitals: /62 (BP Location: Left arm)   Pulse 104   Temp 98.3  F (36.8  C) (Oral)   Resp 20   Ht 1.753 m (5' 9\")   Wt 72.2 kg (159 lb 1.6 oz)   SpO2 91%   BMI 23.49 kg/m       [id number]    Neuro: alert and oriented x4. Able to make needs known. Turkmen speaking. Family and I-pad in use for interpreting.  Cardiac: on tele running ST/HR in the 100s.  Lungs: mild OWUSU without hypoxia when walking. Abdomen distended. Pt had paracentesis yesterday with minimal improvement with pain and OWUSU     GI: abdomen distended and tender. Had paracentesis yesterday with 1.9L output but pt reported increase in pain today . Reported pain and pressure in the upper abdomen with a highest pain rate 9/10. Oxycodone 10mg given x2 with minimal improvement. BS active and present.  Denies nausea/vomiting. No BM today. Frequent hiccups.   : voids without difficulty   Pain: abdominal pain. Oxycodone, Dilaudid, Lidocaine patch and Voltaren gel in use for pain management.     IV:  Zosyn switched to Invanz Q24 hrs for intraabdominal infection.  saline locked between antibiotic.   Labs/Imaging: . . WBC 14.3 Kcl 4.8. Mg 2.3.   Diet: regular diet with snack/supplement between meals. Poor appetite.    Activity: stand by assist with a walker.  Consult: oncology  Plan: IV antibiotic. Anticoagulation subQ daily. Monitor on tele. Pain management. Encourage use of IS. Recheck labs in AM.  Follow up with oncology. Possible discharge home tomorrow with daily Lovenox shot and oral antibiotic if pt clinically stable.      Will continue to monitor and provide supportive care.  "

## 2021-07-26 NOTE — PROGRESS NOTES
Patient returned from Interventional Radiology and medications MAR was mostly discontinued per IR. Reviewed medications with pharmacy to re-order prn/scheduled medications. Nursing to review and notify if any additional medications are missing.

## 2021-07-26 NOTE — PROCEDURES
[facility]    Procedure: Imaging Procedure Note    Date/Time: 7/26/2021 1:01 PM  Performed by: Buzz Vieyra MD  Authorized by: Buzz Vieyra MD     UNIVERSAL PROTOCOL   Site Marked: Yes  Prior Images Obtained and Reviewed:  Yes  Required items: Required blood products, implants, devices and special equipment available    Patient identity confirmed:  Verbally with patient, arm band and provided demographic data  NA - No sedation, light sedation, or local anesthesia  Confirmation Checklist:  Patient's identity using two indicators, relevant allergies, procedure was appropriate and matched the consent or emergent situation and correct equipment/implants were available  Time out: Immediately prior to the procedure a time out was called    Universal Protocol: the Joint Commission Universal Protocol was followed    Preparation: Patient was prepped and draped in usual sterile fashion    ESBL (mL):  5         ANESTHESIA    Anesthesia: Local infiltration  Local Anesthetic:  Lidocaine 1% without epinephrine  Anesthetic Total (mL):  10      SEDATION    Patient Sedated: No    See dictated procedure note for full details.  PROCEDURE   Patient Tolerance:  Patient tolerated the procedure well with no immediate complications  Describe Procedure: RLQ paracentesis.  Only significant fluid pocket was in the inferior, medial right lower quadrant.  Serosanguinous fluid aspirated.    Length of time physician/provider present for 1:1 monitoring during sedation: 0

## 2021-07-26 NOTE — PLAN OF CARE
VSS, pt is Romansh speaking- IPad  used, pt is NPO for paracentesis today, oxycodone given for pain overnight. Pt abdomen is distended, assist x1 with walker, hem/onc following, family to be at bedside today, IV abx- plan to switch to orals for discharge. Will continue to monitor. Tele- sinus tach 101      Vital signs:  Temp: 97.7  F (36.5  C) Temp src: Oral BP: 104/65 Pulse: 96   Resp: 18 SpO2: 93 % O2 Device: None (Room air)

## 2021-07-26 NOTE — CONSULTS
[facility]  Palliative Care Consultation   Text Page    Assessment & Plan   Alexandra Alfaro is a 66 year old male who was admitted on 7/20/2021.   Consulted by Mariposa Horan DO to assist with symptom management, goals of care, and faciliate a of plan.     Recommendations:  1. Goals of Care- Full Code  Hospitalization goals discussed :  Reviewed code status with patient, daughter and sister with interpretor present. Want all option including intubation, mechanical ventilation, ICU for specialized care, ongoing paracentesis.  As needed.  Family states does not want to tell the patient he is dying. They do not want him to loose hope. He confirmed via interpretor that MRI shows disease progression.       Decisional Capacity- Intact. Patient does not have an advance directive. Include patient in decision making as much as possible and involve next of kin as surrogate decision maker  attempt consensus with patient. Patient is Citizen of the [place] and Romansh speaking, per family he understands limited english.  Family has consented to use of an interpretor today Patient  Also confirmed through the use of an interpretor that family can speak and interpret om his behalf.   POLST There is no POLST form on file, plan to complete prior to DC.    2. 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    -oxycodone 5 mg every 3 hrs prn  -dilaudid 0.3 mg every 2 hrs prn  -Schedule diclofenac gel. Qid , lidocaine patch schedule for hs starting 7/27  - consider binder     Patient's opioid use in past 24 hours: IV Hydromorphone 0.3mg, =Oxycodone 30 mg = 66.6 mg Daily Morphine Equivalent  [place] Board of Pharmacy Data Base Reviewed:    YES; As expected, no concern for misuse/abuse of controlled medications based on this report.  ORT     Opioid Risk Tool   This tool should be administered to  patients upon an initial visit prior to beginning opioid therapy for pain management. A score of 3 or lower indicates low risk for future opioid abuse, a score of 4 to 7 indicates moderate risk for opioid abuse, and a score of 8 or higher indicates a high risk for opioid abuse. Alexys each box that applies  Female  Male    Family history of substance abuse    Alcohol  1  3    Illegal drugs  2  3    Rx drugs  4  4    Personal history of substance abuse    Alcohol  3  3    Illegal drugs  4  4    Rx drugs  5  5    Age between 16--45 years  1  1    History of preadolescent sexual abuse  3  0    Psychological disease    ADD, OCD, bipolar, schizophrenia  2  2    Depression  1  1        total score      _______- _______0_________    Questionnaire developed by Lorelei Harris MD to asses risk of opioid addiction.   Steven ABDALLA, Steven SERRANO. Predicting aberrant behaviors in Opioid?treated patients: preliminary validation of the Opioid risk too. Pain Med. 2005; 6 (6) : 432    3. Nausea vomiting and hiccup with past associated weight loss. Anorexia 4 days PTA secondary to hepatic cancer disease progression.   -Regular diet ok to bring food in from home   -small frequent meals  -high caloric in between meals  -weights   -antinauseants   -thorazine 10 mg po tid prn or Thorazine 25 mg IM tid prn   - medical management of fluid    -diuretics, paracentesis   4. Dyspnea  Not at this time,  Continue to monitor for dyspnea in the setting of on going paracentesis     4. Spiritual Care  Oriented to Spiritual Health as part of Palliative Care team. Spiritual Health Services declined at this time.  Spiritual Background:  [employer], family states our alber is not like [employer] alber , there is no go between  Like you have it is a relationship between you and God, Sister fees use of Imam or other spiritual leader would make the patient feel he is dying and will loose hope.     5. Care Planning  SW consultation placed for discharge planning .  "likely home and Home care services will be needed and paracentesis as out patient, [facility] follow up   Palliative care out patient consult highly advised with [facility]    Medications for discharge  None from palliative care     Medical Decision Making and Goals of Care:  Discussed on July 26, 2021 with Avani Vergara-Ronnie APRN, CNP:  I introduced my self to the patient and family and the purpose of my visit.  I talked to the daughter NADA and the patients sister with out the patient present. I was told the by the nurse that the family did not want the patient to know his prognosis. I stated I would like to hear what the patient know and how much he is willing to go through for more time.  They said this was fine, they did not want us to mention to him that he was \"dying\"  Both felt they know him well enough that it would have him loose all hope. When I met with them initially, they requested to be the interpretor for him. I requested we use an interpretor to also help them have a deeper understanding and also I needed him to say family being interpretor is ok.  They both agreed to use of interpretor.   We entered the room the patient was sleepy and was having hiccups, pain on the right back .  He felt better after the paracentesis with less bloating and discomfort. He continues to c/o of increase fatigue and weakness and was falling asleep during my time in the room.    I asked if we could talk about his hopes and goals.  At first this was hard to understand for him.  He then with some help agreed he wanted to get stronger so he could have more treatment for the cancer, despite disease progression.   He endorsed his current code status of Full Code, and also his desired to continue treatment including rehospitalization.  I talked to family further about my worries and concerns with aggressive treatment like CPR and intubation, potentially leading to a) not surviving, b) increasing pain ( rib fractures " from CPR) and c) greater debility which would lead to greater weakness and making him non elegible for further cancer treatment.   For now they still want to stay the course.  They are concerned about getting more help in the house.as they are unable to care for him this weak.  They are most interested in discharge to home with home care, but if he gets stronger and qualifies for TCU they might consider.  The family will bring TCU care up as a potential option and will discuss with the rest of the family.  I suggested we see how the next few days go and continue with ongoing discussion and provide support to the patient and family. We outlined a plan for symptom management.  All were appreciate of the time spent in discussion. The patient thanked me for my time and interest in his care         Thank you for involving us in the patient's care.     Avani Bergeron RN,PGMT- BC, APRN, CNP,ACHPN   Pain Management and Palliative Care  [facility]  Pgr: [phone]    Time Spent on this Encounter   Total unit/floor time 120  minutes, time consisted of the following, examination of the patient, reviewing the record and completing documentation. >50% of time spent in counseling and coordination of care, Bedside Nurse Nita Soler RN  and Hospitalist  Mariposa Horan DO .  Time spend counseling with patient and family consisted of the following topics, goals of care and symptom management.    Understanding of disease process:   This has been discussed with patient and family by medical team.  They have good understanding, but fear if he knows he is dying will loose all hope.    History of Present Illness   History is obtained from the patient, electronic health record, patient's daughter and patient's sister    Alexandra Alfaro is a 66 year old male with a past medical history of hepatitis C with cirrohsis, COVID-19, recovered this past January, hepatic cancer with disease progression and bony  metastases  right posterior 11 th rib. The patient received his COVID 19 pfizer vaccine x 2 this past April. and is currently COVID-19 negative. He was diagnosed with liver cancer in January 2021 and under the care of Vinnie Navarrete MD, Due to interventional needs for bland embolization  X 2 (intolerance to  Xytrum 90) , he has had some immune therapy  chemotherapy of recommended atezolizumab.  He  Presented on 7/20/21 with a 3-7 day history of anorexia, nausea, vomiting , increase weakness pain RUQ and thoracic area, bloating,.  Ultrasound of the abdomen show portal vein thrombus. MRI liver done 7.20 shows disease progression, metastases to the right posterior 12 rib.  He went for paracentesis today for 1400 ml removed, noting peritonial infection from cultures obtained.   The outlook is poor and oncology as well as the medical team discuss transition to hospice plan. The family is not interested.  The medical team approached family over last several days about palliative consultation and today were in agreement with this.   The patient has been hospitalized or in the ED 3 time oaer the past 7 month. Although his weights (suspect false due to ascites)  are stable there was a 15 lb weight loss noted prior to his cancer diagnosis. He has had a significant functional decline over the past 7 days. Prior to this per notes he was doing well at his last oncology visit 6/24/21.     Past Medical History   I have reviewed this patient's medical history and updated it with pertinent information if needed.   Past Medical History:   Diagnosis Date     2019 novel coronavirus disease (COVID-19) 12/25/2020     COVID-19 12/25/2020     Hepatitis     Hep C        Past Surgical History   I have reviewed this patient's surgical history and updated it with pertinent information if needed.  Past Surgical History:   Procedure Laterality Date     DAVINCI HERNIORRHAPHY INGUINAL Right 8/7/2018    Procedure: DAVINCI HERNIORRHAPHY INGUINAL;   Robotic Assisted Right Inguinal Hernia Repair with Mesh ;  Surgeon: Prabhu Allred MD;  Location:  OR       Prior to Admission Medications   Prior to Admission Medications   Prescriptions Last Dose Informant Patient Reported? Taking?   Acetaminophen (TYLENOL PO)   Yes Yes   Sig: Take by mouth daily as needed for mild pain or fever   furosemide (LASIX) 40 MG tablet 7/19/2021 at Unknown time  Yes Yes   Sig: Take 40 mg by mouth daily   omeprazole (PRILOSEC) 40 MG DR capsule 7/19/2021 at Unknown time  Yes Yes   Sig: Take 40 mg by mouth daily   spironolactone (ALDACTONE) 100 MG tablet 7/19/2021 at Unknown time  Yes Yes   Sig: Take 100 mg by mouth daily      Facility-Administered Medications: None     Allergies   No Known Allergies    Social History   I have updated and reviewed the following Social History Narrative:   Social History     Social History Narrative     Not on file          Living situation:  At home        Support system:  family       Actual/Potential Caregiver:  Family -- seem overwhelmed        Functional status:  Significant decline from last Oncology visit 6/24/21       Financial concerns:  No        Substance use disorder (past / present):  None        Occupation:  Unsure        Hobbies:  unsure    Family History   I have reviewed this patient's family history and updated it with pertinent information if needed.   Family History   Problem Relation Age of Onset     No Known Problems Brother      No Known Problems Mother      No Known Problems Sister      No Known Problems Daughter        Review of Systems   The 10 point Review of Systems is negative other than noted in the HPI or here.  Jaundice, abdominal bloating and fullness, generalized weakness, hiccups    Palliative Symptom Review (0=no symptom/no concern, 1=mild, 2=moderate, 3=severe):      Pain: 2-moderate      Fatigue: 2-moderate      Nausea: 1-mild      Constipation: 1-mild, BM 7/23      Diarrhea: 0-none      Depressive Symptoms:  0-none      Anxiety: 0-none      Drowsiness: 2-moderate      Poor Appetite: 2-moderate      Shortness of Breath: 0-none      Insomnia: 0-none      Other:        Overall (0 good/no concerns, 3 very poor):  2+    Physical Exam   Temp:  [97.7  F (36.5  C)-98  F (36.7  C)] 98  F (36.7  C)  Pulse:  [] 104  Resp:  [16-18] 16  BP: (104-122)/(65-76) 106/66  SpO2:  [91 %-93 %] 93 %  174 lbs 8 oz  Exam:  GEN:  Alert with intermittent somolence oriented x 3, appears comfortable, NAD. Cachetic   HEENT:  Normocephalic/atraumatic, no scleral icterus, no nasal discharge, mouth moist.  CV:  RRR, S1, S2; no murmurs or other irregularities noted.  +3 DP/PT pulses bilatererally; no edema BLE.  RESP:  Clear to auscultation bilaterally without rales/rhonchi/wheezing/retractions.  Symmetric chest rise on inhalation noted.  Normal respiratory effort.  ABD:  Rounded, soft, non-tender/non-distended.  +BS  EXT:  Edema & pulses as noted above.  CMS intact x 4.     M/S:   Tender to palpation right lower back over 11th rib, abdomen.    SKIN:  Dry to touch, no exanthems noted in the visualized areas, scleral yellowing .    NEURO: Symmetric movement and power   Sensation to touch intact all extremities.  PAIN BEHAVIOR: Cooperative  Psych:  Normal affect.  Calm, cooperative, conversant appropriately.    Delirium Screen/CAM:  Delirium = (#1 and #2 = YES) + (#3 and/or #4)   1) Acute onset and fluctuating course:   No   (acute change in mental status from baseline over last 24 hours)  2) Inattention:   YES   (difficulty focusing, distractible, can't follow conversation)  3) Disorganized thinking:   No   (score only if #1 and #2 are YES)  (rambling/irrelevant conversation, unclear/illogical thoughts, inconsistency)  4) Altered level of consciousness:   YES   (score only if #1 and #2 are YES)  (other than alert, calm, cooperative)    Delirium/CAM score: 2/4  Interpretation:  1)  Delirium:  Absent  2)  Type:  hypoactive  3)  Severity:  Related to  medication effect    Data   Results for orders placed or performed during the hospital encounter of 07/20/21 (from the past 24 hour(s))   CBC with Platelets & Differential    Narrative    The following orders were created for panel order CBC with Platelets & Differential.  Procedure                               Abnormality         Status                     ---------                               -----------         ------                     CBC with platelets and d...[[id number]]  Abnormal            Final result                 Please view results for these tests on the individual orders.   Comprehensive metabolic panel   Result Value Ref Range    Sodium 124 (L) 133 - 144 mmol/L    Potassium 5.0 3.4 - 5.3 mmol/L    Chloride 92 (L) 94 - 109 mmol/L    Carbon Dioxide (CO2) 27 20 - 32 mmol/L    Anion Gap 5 3 - 14 mmol/L    Urea Nitrogen 38 (H) 7 - 30 mg/dL    Creatinine 1.28 (H) 0.66 - 1.25 mg/dL    Calcium 8.4 (L) 8.5 - 10.1 mg/dL    Glucose 125 (H) 70 - 99 mg/dL    Alkaline Phosphatase 189 (H) 40 - 150 U/L     (H) 0 - 45 U/L     (H) 0 - 70 U/L    Protein Total 6.8 6.8 - 8.8 g/dL    Albumin 1.4 (L) 3.4 - 5.0 g/dL    Bilirubin Total 8.0 (H) 0.2 - 1.3 mg/dL    GFR Estimate 58 (L) >60 mL/min/1.73m2   Phosphorus   Result Value Ref Range    Phosphorus 2.3 (L) 2.5 - 4.5 mg/dL   Magnesium   Result Value Ref Range    Magnesium 2.5 (H) 1.6 - 2.3 mg/dL   CBC with platelets and differential   Result Value Ref Range    WBC Count 15.5 (H) 4.0 - 11.0 10e3/uL    RBC Count 2.97 (L) 4.40 - 5.90 10e6/uL    Hemoglobin 9.8 (L) 13.3 - 17.7 g/dL    Hematocrit 28.7 (L) 40.0 - 53.0 %    MCV 97 78 - 100 fL    MCH 33.0 26.5 - 33.0 pg    MCHC 34.1 31.5 - 36.5 g/dL    RDW 16.9 (H) 10.0 - 15.0 %    Platelet Count 213 150 - 450 10e3/uL    % Neutrophils 78 %    % Lymphocytes 6 %    % Monocytes 12 %    % Eosinophils 1 %    % Basophils 0 %    % Immature Granulocytes 3 %    NRBCs per 100 WBC 0 <1 /100    Absolute Neutrophils 12.0 (H)  1.6 - 8.3 10e3/uL    Absolute Lymphocytes 0.9 0.8 - 5.3 10e3/uL    Absolute Monocytes 1.9 (H) 0.0 - 1.3 10e3/uL    Absolute Eosinophils 0.2 0.0 - 0.7 10e3/uL    Absolute Basophils 0.1 0.0 - 0.2 10e3/uL    Absolute Immature Granulocytes 0.4 (H) <=0.0 10e3/uL    Absolute NRBCs 0.0 10e3/uL   Cell count with differential fluid    Narrative    The following orders were created for panel order Cell count with differential fluid.  Procedure                               Abnormality         Status                     ---------                               -----------         ------                     Cell Count Body Fluid[[id number]]                            In process                   Please view results for these tests on the individual orders.   Albumin fluid   Result Value Ref Range    Albumin fluid 0.2 g/dL    Narrative    No reference ranges have been established.  This result should be interpreted in the context of the patient's clinical condition and compared to simultaneous measurement in the patient's blood.   Protein fluid   Result Value Ref Range    Protein Total Fluid 1.0 g/dL    Narrative    No reference ranges have been established.  This result should be interpreted in the context of the patient's clinical condition and compared to simultaneous measurement in the patient's blood.   US Paracentesis    Narrative    ULTRASOUND GUIDED PARACENTESIS   7/26/2021 12:35 PM     HISTORY:  HIGH VOLUME paracentesis with or without diagnostic fluid  analysis with labs to be drawn if ordered. Total paracentesis volume  as much as possible. Hepatocellular carcinoma. COVID 19. Portal vein  thrombosis.    PROCEDURE: Informed consent was obtained from the patient prior to the  procedure with discussion including the possible risks of bleeding,  infection and organ injury.    Limited ultrasound of the abdomen demonstrated an accessible fluid  pocket within the medial right lower quadrant.    The site was prepped and draped  in the usual sterile fashion. A total  of 10 mL of 1% lidocaine was anesthetized within the subcutaneous and  peritoneal tissues for anesthetic. Under sonographic guidance, a 8  Finnish paracentesis catheter was advanced into the peritoneal fluid. A  permanent image was stored for documentation. Needle was removed and  catheter was used to aspirate 1400 mL of blood tinged serous fluid in  vacuum bottles. A sample was sent for laboratory studies. There were  no immediate complications.       Impression    IMPRESSION: Successful ultrasound-guided diagnostic and therapeutic  paracentesis. Total 1400 mL blood-tinged fluid.    BUZZ VIEYRA MD         SYSTEM ID:  [id number]   Imaging Procedure Note    Narrative    Buzz Vieyra MD     7/26/2021  1:02 PM  [facility]    Procedure: Imaging Procedure Note    Date/Time: 7/26/2021 1:01 PM  Performed by: Buzz Vieyra MD  Authorized by: Buzz Vieyra MD     UNIVERSAL PROTOCOL   Site Marked: Yes  Prior Images Obtained and Reviewed:  Yes  Required items: Required blood products, implants, devices and special   equipment available    Patient identity confirmed:  Verbally with patient, arm band and provided   demographic data  NA - No sedation, light sedation, or local anesthesia  Confirmation Checklist:  Patient's identity using two indicators, relevant   allergies, procedure was appropriate and matched the consent or emergent   situation and correct equipment/implants were available  Time out: Immediately prior to the procedure a time out was called    Universal Protocol: the Joint Commission Universal Protocol was followed    Preparation: Patient was prepped and draped in usual sterile fashion    ESBL (mL):  5         ANESTHESIA    Anesthesia: Local infiltration  Local Anesthetic:  Lidocaine 1% without epinephrine  Anesthetic Total (mL):  10      SEDATION    Patient Sedated: No    See dictated procedure note for full details.  PROCEDURE    Patient Tolerance:  Patient tolerated the procedure well with no immediate   complications  Describe Procedure: RLQ paracentesis.  Only significant fluid pocket was   in the inferior, medial right lower quadrant.  Serosanguinous fluid   aspirated.    Length of time physician/provider present for 1:1 monitoring during   sedation: 0

## 2021-07-26 NOTE — PROGRESS NOTES
Paracentesis completed per Dr. Vieyra for 1400 ml dark maikel fluid, patient tolerated well. Fluid to lab for diagnostics as ordered. Patient transferred to room via cart in stable condition.

## 2021-07-26 NOTE — PROGRESS NOTES
Oncology/Hematology Follow Up Note:    Assessment and Plan:  1.  Portal vein thrombosis: Main portal vein:   -Findings of portal vein thrombosis would explain clinical presentation.  -Patient had MRI of liver on 7/22 which showed, enhancing portal vein thrombus involving main portal vein and right greater than left portal vein.  There was evidence of interval progression with enlargement of irregular mass of tumor enhancement closer to dominant mass in right inferior liver.  Also there were innumerable new large nodules throughout the liver.  There was new mass with bony destruction identified along the right posterior 11th rib.   - Started Lovenox for portal vein thrombus  -Slight improvement in liver enzymes today.  Bilirubin still trending up.     Plan   - Continue Lovenox 1 mg/kg BID, indefinitely, unless he has any bleeding issues  - Not sure how much his portal vein thrombus will improve with anticoagulation, since this is mostly a tumor thrombus        2.  Hepatocellular carcinoma  -Patient had bland embolization (could not tolerate yttrium-90 due to shunt)   -Also had immune checkpoint admitted with atezolizumab in the past on hold since April.   - Recent scan showed progression of disease     Plan   - Performance status too poor for any further systemic treatment at this time  - I had an extensive conversation with the patient and his sister about a comfort approach such as hospice, but they are not interested.  They are still hoping his performance status will improve enough with further treatment of portal vein thrombus and SBP to receive further systemic therapy.  -It is unlikely the patient will be candidate for further embolization given portal vein thrombosis.    3. Spontaneous bacterial peritonitis  - Now afebrile  - Scheduled for repeat paracentesis today.  If findings suggestive of persistent SBP, would not recommend large volume paracentesis     4.  Hepatitis C  -Patient is completed treatment for  "hepatitis C  -He is seronegative now     5.  Cirrhosis of liver  -Follows with gastroenterology.     6. Hiccups  - Prescribed thorazine PRN      Subjective:    Patient reports feeling better today.  Still no appetite. Mild nausea but no vomiting.  Constipated. Requiring assist x 1 with ambulation    Scheduled Medications:  Reviewed active medications    Labs:  CBC RESULTS: Recent Labs   Lab Test 07/26/21  0509 07/25/21  0551 07/24/21  0520   WBC 15.5* 14.3* 12.7*   HGB 9.8* 9.5* 9.6*   HCT 28.7* 28.2* 28.1*   MCV 97 96 98    227 216       CMP  Recent Labs   Lab 07/26/21  0509 07/25/21  0551 07/24/21  1031 07/24/21  0520 07/23/21  0836 07/23/21  0519 07/21/21  0530   * 124*  --  126*  --  127*  --    POTASSIUM 5.0 4.8  --  4.9  --  4.3  --    CHLORIDE 92* 93*  --  95  --  98  --    CO2 27 27  --  28  --  25  --    ANIONGAP 5 4  --  3  --  4  --    * 150*  --  101*  --  137*  --    BUN 38* 33*  --  26  --  23  --    CR 1.28* 1.28*  --  1.22  --  1.21  --    GFRESTIMATED 58* 58*  --  61  --  62  --    REBECCA 8.4* 8.3*  --  7.9*  --  7.6*  --    MAG 2.5* 2.3  --  2.3 2.1  --   --    PHOS 2.3*  --   --   --   --   --   --    PROTTOTAL 6.8 6.8  --  6.6*  --  6.2*  --    ALBUMIN 1.4* 1.4* 1.5* 1.4*  --  1.4*   < >   BILITOTAL 8.0* 7.8*  --  7.3*  --  6.6*  --    ALKPHOS 189* 181*  --  171*  --  232*  --    * 614*  --  925*  --  588*  --    * 398*  --  491*  --  379*  --     < > = values in this interval not displayed.       INR  Recent Labs   Lab 07/21/21  0530   INR 1.32*       Objective/Physical Exam:  Blood pressure 104/65, pulse 96, temperature 97.7  F (36.5  C), temperature source Oral, resp. rate 18, height 1.753 m (5' 9\"), weight 79.2 kg (174 lb 8 oz), SpO2 93 %.  General appearance:awake, alert, and oriented.  Jaundiced  HEENT:Scleral icterus noted  Lungs: chest is clear to auscultation  Abdomen: tense, distended, epigastric area tender to palpation    Arden Bolaños MD  [place] [facility]  7/26/2021 8:31 AM

## 2021-07-26 NOTE — PROGRESS NOTES
[facility]  Hospitalist Progress Note    Assessment & Plan   Alexandra Alfaro is a 66 year old male with PMH including COVID-19 in January 2020, hepatocellular carcinoma undergoing treatment (JUAN ANTONIO/Jennifer Coelho), s/p radioembolization 5/2021 with complicated prolonged hospitalization  history of nonalcoholic cirrhosis, hep C who presents with 3 days of epigastric abdominal pain found to have abnormal LFTs- due to portal vein thrombosis. Possible cholecystitis. HCC appears to have advanced per imaging.     Portal vein thrombosis: RUQ US shows portal vein thrombosis and gallbladder sludge/thickening. MRI 7/22 showed progression of HCC with metastasis. Results discussed with family by Dr. Kee. Improvement in abdominal pain with oral medications. Initially on Lovenox 1 mg/kg BID but dose adjusted per heme/onc.     - as per Dr. Navarrete, will change lovenox to 1.5mg/kg daily indefinitely      - Lovenox education to be provided prior to discharge     - Per Onc concern for minimal improvement in thrombus given this is a tumor thrombus     Gallbladder sludge/thickening: RUQ US showed gallbladder wall thickening/sludge. T-max 100.6 and WBC elevation. Abx ordered for possible infectious gallbladder etiology. In addition, abx will cover for SBP.     - Zosyn 7/21 - 7/25. Ertapenem 1 g q 24 hours 7/25 - present.  Plan for 10 day total course of abx. Transition to oral Augmentin at discharge if no resistance present. Adjustment in abx therapy due to concern for SBP and worsening clinical status while on Zosyn 7/25.     Cough with sputum production; pulmonary congestion:   -Abx coverage as above. Pulmonary hygiene with IC and flutter valve. Supportive cares available but attempt to minimize and encourage clearance of oral secretions. Not on oxygen. Patient feels like symptoms are improved  7/26.     Leucocytosis: Etiology multifactorial. Source of infection from abdomen (SBP/gallbladder sludge/thickening) On  broad spectrum abx. Cultures remained negative. Afebrile. Suspect a component of stress induced with pain/hiccups complicated by metastatic cancer. Clinically improved from 7/25 with increased abx coverage. Monitor with labs. If no improvement can consider repeat cultures. No indication for change in abx today. Awaiting repeat paracentesis.      HCC with refractory ascites and diffuse metastasis; SBP: This is likely secondary to Hep C. Patient follows with Dr. Navarrete, consulted on admission.  Dr. Navarrete who also spoke with Dr. Valles (IR at [facility]). Patient is s/p bland embolization in May 2021 with prolonged hospital course following. 7/22 MRI shows disease progression. Initial plan on admission was to transfer to [facility], however given the MRI findings there is no further treatment.  The transfer was canceled. MRI findings were discussed with family per Dr. Kashmir Kee. IR paracentesis 7/24 1.9 L removed - yellow fluid  and 60% neutrophils.     - Dr. Navarrete, Dr. Bolaños, Dr. Kee, and myself spoke with family and patient extensively that at this time there are no further treatment options. They are fixated that Oncology informed them that 10-20% have functional recovery and are eligible for cancer treatments.This is not consistent with documentation provided from Oncology.   - IR consult 7/26 for consideration of repeat paracentesis given rapid accumulation of fluid - discuss if pleurx catheter would be of benefit (unlikley at this time given treatment for acute infection). Onc recommending low volume paracentesis if SBP present.    - Family declining changes in narcotic therapies for pain control associated with metastatic disease. Prn medications available. Patient feels like he is getting adequate pain control.    - Family agreeable to palliative care consult 7/26/21/     Chronic hyponatremia: Sodium 124. Baseline 126-129. Associated with malignancy. Monitor/unchanged.     Acute kidney injury: Cr. 0.95  on admission. Remains elevated and stable. Associated with likely third spacing of fluids and metastatic liver disease. Monitor urine output and volume removed with paracentesis. Patient may benefit from albumin vs IVF. High risk for worsening 3rd spacing of fluids. Monitor.     Hiccups: Associated with diaphragmatic irritation from fluid and malignancy. Thorazine and Reglan prn.     Acute on chronic hepatitis in the setting of Hep C cirrhosis: Likely due to underlying HCC/treatment and now portal vein thrombosis. GI reviewed chart and given metastatic disease there is no new recommendations. Patient completed hepatitis C treatment and is seronegative.     - LFTs being trended - slowly improving but bilirubin remains elevated - followed with [facility]     Social: Family is updated daily. Diagnosis with review of MRI findings with overall poor prognosis shared with family per Dr. Kee, Dr. Navarrete, and myself. The patient's sister has good insight into his disease, however his daughters and wife do not. The family has also chosen to keep his prognosis from him, as they do not want to affect his mental status. 7/25 updated family of ongoing concerns of worsening clinical status despite treatment. Encouraged consideration of goals of care and quality of life. Note provided for family to assist in getting a Visa for his daughter - Migdalia Mahoney and children. 7/26 reviewed overall poor prognosis and unlikely probability that he will improve functional status to tolerate cancer treatments moving forward. Family also expressed concern about providing cares for him at home given his symptoms. However, they do not want to consider hospice or TCU. They seem to have a disconnect in wanting full aggressive treatments and his realistic ability to recover. They indicated that Oncology informed them that 10-20% of patients have functional recovery and have cancer treatments. They felt like no one can predict if he  will be curative and they want to move forward with aggressive treatment. Discussed code status and concern for greater harm to patient if he were to require CPR/mechanical intubation. Family feels like they are unable to decide about this today. Agreeable to palliative care discussion.      FEN: Oral hydration; monitor; regular/NPO for procedure   Activity: As tolerated; ambulation encouraged  DVT Prophylaxis: Enoxaparin (Lovenox) SQ - full dose anticoagulation   Family update: Yes, daughter/sister at bedside.     Dispo: Unclear. 2-3 days pending improvement in functional status, pain control, and response to abx therapy. Family does not feel like he can return in his current clinical state but is not ready for hospice/skilled nursing.     >30 minutes in patient care provided.    Code Status: Full Code  Text Page (7am - 6pm, M-F)    Interval History    Family present and declined  services. Cough with sputum production present but improved. Denies SOB or difficulty breathing. Attempting to use IC/flutter valve with family assistance. Hiccups present and irritating. Constat with minimal change. Abdomen continues to be distended with decrease in pain. No n/v. Poor appetite but tolerating alex oral intake. No chest pain or palpitations. Remains tachycardic.     -Data reviewed today: I reviewed all new labs and imaging results over the last 24 hours. I personally reviewed:     Physical Exam   Temp: 98  F (36.7  C) Temp src: Oral BP: 106/66 Pulse: 104   Resp: 16 SpO2: 93 % O2 Device: None (Room air)    Vitals:    07/21/21 0030 07/21/21 0737 07/26/21 0233   Weight: 75.6 kg (166 lb 11.2 oz) 72.2 kg (159 lb 1.6 oz) 79.2 kg (174 lb 8 oz)     Vital Signs with Ranges  Temp:  [97.7  F (36.5  C)-98  F (36.7  C)] 98  F (36.7  C)  Pulse:  [] 104  Resp:  [16-18] 16  BP: (104-122)/(65-76) 106/66  SpO2:  [91 %-93 %] 93 %  No intake/output data recorded.    Constitutional: Awake, alert, cooperative, no apparent  distress. Non-toxic. Appears stated age. Chronically ill in appearance. Uncomfortable.   HEENT: Atraumatic. Normocephalic. Conjunctiva non-injected. Sclera anicteric. MMM.   Respiratory: Moves air bilaterally. Shallow breathing. Diminished airflow from effort. Hiccups. Not on oxygen. Talks without use of accessory respiratory muscles. Clear to auscultation bilaterally, no crackles or wheezing  Cardiovascular: Tachycardic and regular rhythm, normal S1 and S2, and no murmur noted  GI: Round, firm, distended, non-tender, no rebound tenderness, mild guarding, hypoactive BS+   Skin/Integumen: No rashes, no cyanosis, no edema    Medications       enoxaparin ANTICOAGULANT  1.5 mg/kg Subcutaneous Q24H     ertapenem (INVanz) IV  1 g Intravenous Q24H     famotidine  20 mg Oral BID     lidocaine  1 patch Transdermal Q24H     lidocaine   Transdermal Q8H     polyethylene glycol  17 g Oral Daily     senna-docusate  2 tablet Oral BID     sodium chloride (PF)  3 mL Intracatheter Q8H     Data   Recent Labs   Lab 07/26/21  0509 07/25/21  0551 07/24/21  0520 07/21/21  1212 07/21/21  0530 07/20/21  1941 07/20/21  1533   WBC 15.5* 14.3* 12.7*  --  13.2*  --  10.3   HGB 9.8* 9.5* 9.6*  --  10.6*  --  11.6*   MCV 97 96 98  --  95  --  96    227 216  --  267  --  266   INR  --   --   --   --  1.32*  --   --    * 124* 126*   < > 126*  --  127*   POTASSIUM 5.0 4.8 4.9   < > 4.5  --  5.0   CHLORIDE 92* 93* 95   < > 96  --  94   CO2 27 27 28   < > 25  --  28   BUN 38* 33* 26   < > 15  --  17   CR 1.28* 1.28* 1.22   < > 0.95  --  0.93   ANIONGAP 5 4 3   < > 5  --  5   REBECCA 8.4* 8.3* 7.9*   < > 8.4*  --  9.1   * 150* 101*   < > 108*  --  133*   ALBUMIN 1.4* 1.4* 1.4*   < > 1.8*  --  2.1*   PROTTOTAL 6.8 6.8 6.6*   < > 7.4  --  8.5   BILITOTAL 8.0* 7.8* 7.3*   < > 5.8*  --  6.3*   ALKPHOS 189* 181* 171*   < > 168*  --  170*   * 398* 491*   < > 446*  --  281*   * 614* 925*   < > 1,024*  --  671*   LIPASE  --   --    --   --   --   --  78   TROPONIN  --   --   --   --  0.024 0.030 0.021    < > = values in this interval not displayed.     No results found for this or any previous visit (from the past 24 hour(s)).

## 2021-07-26 NOTE — PROGRESS NOTES
"Patient is Telugu speaking, but family has been interpreting for patient. Family refuses to use the ipad. Paracentesis completed. Patient has not had a bowel movement, but senna and miralax given. Patient continues on ertapenem. SL. Regular diet. Tele is SR HR >100. Patient received an Thorazine injection for his hiccups. Patient is a 1 assist with walker and gait belt when up.     /66 (BP Location: Left arm)   Pulse 104   Temp 98  F (36.7  C) (Oral)   Resp 16   Ht 1.753 m (5' 9\")   Wt 79.2 kg (174 lb 8 oz)   SpO2 93%   BMI 25.77 kg/m      "

## 2021-07-27 NOTE — PLAN OF CARE
End of Shift Summary  For vital signs and complete assessments, please see documentation flowsheets.     Pertinent assessments: A&O per family. Occitan speaking. Family interpreting. VSS. RA. Denies pain. I  Abd distended. Para site CDI. Urine dark tea colored incontinent x1  Skin jaundiced.  Appetite fair     Major Shift Events: Lethargic and sleepy all day ---    Treatment Plan: .Lovenox, Invanz    Bedside Nurse:Laurence Barone RN

## 2021-07-27 NOTE — PROGRESS NOTES
[facility]  Hospitalist Progress Note    Assessment & Plan   Alexandra Alfaro is a 66 year old male with PMH including COVID-19 in January 2020, hepatocellular carcinoma undergoing treatment (JUAN ANTONIO/Jennifer Coelho), s/p radioembolization 5/2021 with complicated prolonged hospitalization  history of nonalcoholic cirrhosis, hep C who presents with 3 days of epigastric abdominal pain found to have abnormal LFTs- due to portal vein thrombosis. Possible cholecystitis. HCC appears to have advanced per imaging.     Portal vein thrombosis: RUQ US shows portal vein thrombosis and gallbladder sludge/thickening. MRI 7/22 showed progression of HCC with metastasis. Results discussed with family by Dr. Kee. Improvement in abdominal pain with oral medications. Initially on Lovenox 1 mg/kg BID but dose adjusted per heme/onc.     - as per Dr. Navarrete, will change lovenox to 1.5mg/kg daily indefinitely      - Lovenox education to be provided prior to discharge     - Per Onc concern for minimal improvement in thrombus given this is a tumor thrombus     Gallbladder sludge/thickening: RUQ US showed gallbladder wall thickening/sludge. T-max 100.6 and WBC elevation. Abx ordered for possible infectious gallbladder etiology. In addition, abx will cover for SBP.     - Zosyn 7/21 - 7/25. Ertapenem 1 g q 24 hours 7/25 - present.  Plan for 10 day total course of abx. Transition to oral Augmentin at discharge if no resistance present. Adjustment in abx therapy due to concern for SBP and worsening clinical status while on Zosyn 7/25. Clinically improving.     Cough with sputum production; pulmonary congestion:   -Abx coverage as above. Pulmonary hygiene with IC and flutter valve. Supportive cares available but attempt to minimize and encourage clearance of oral secretions. Not on oxygen. Patient feels like symptoms are improved 7/26.     Leucocytosis: Etiology multifactorial. Source of infection from abdomen (SBP/gallbladder  sludge/thickening) On broad spectrum abx. Cultures remained negative. Afebrile. Suspect a component of stress induced with pain/hiccups complicated by metastatic cancer. Clinically improved from 7/25 with increased abx coverage. Resolved 7/27.     HCC with refractory ascites and diffuse metastasis; SBP: This is likely secondary to Hep C. Patient follows with Dr. Navarrete, consulted on admission.  Dr. Navarrete who also spoke with Dr. Valles (IR at [facility]). Patient is s/p bland embolization in May 2021 with prolonged hospital course following. 7/22 MRI shows disease progression. Initial plan on admission was to transfer to [facility], however given the MRI findings there is no further treatment.  The transfer was canceled. MRI findings were discussed with family per Dr. Kashmir Kee. Dr. Navarrete, Dr. Bolaños, Dr. Kee, and myself spoke with family and patient extensively that at this time there are no further treatment options. They are fixated that Oncology informed them that 10-20% have functional recovery and are eligible for cancer treatments.This is not consistent with documentation provided from Oncology.    IR paracentesis 7/24 1.9 L removed - yellow fluid  and 60% neutrophils. 7/26 paracentesis 1400 ml removed.  55% neutrophils.    - Family declining changes in narcotic therapies for pain control associated with metastatic disease. Prn medications available. Patient feels like he is getting adequate pain control.    - Palliative care consulted 7/26 - family previously refusing services. SW assisting with possible home palliative care services.   -Dronabinol 2.5 mg daily for appetitive     Chronic hyponatremia: Sodium 124. Baseline 126-129. Associated with malignancy. Monitor/unchanged. 7/27 IVF with albumin.     Acute kidney injury: Cr. 0.95 on admission. 7/27 Cr. 1.4 due to 3rd spacing of fluids and minimal oral intake. In addition to paracentesis removal of fluid  -IVF  75 ml/hr for 12 hours; Albumin  25% once.   -Repeat BMP in afternoon     Hiccups: Associated with diaphragmatic irritation from fluid and malignancy. Thorazine prn (dose decreased and changed to oral given sedation) and Reglan prn.     Acute on chronic hepatitis in the setting of Hep C cirrhosis: Likely due to underlying HCC/treatment and now portal vein thrombosis. GI reviewed chart and given metastatic disease there is no new recommendations. Patient completed hepatitis C treatment and is seronegative.     - LFTs being trended - slowly improving but bilirubin remains elevated - followed with [facility]     Social: Family is updated daily. Diagnosis with review of MRI findings with overall poor prognosis shared with family per Dr. Kee, Dr. Navarrete, and myself. The patient's sister has good insight into his disease, however his daughters and wife do not. The family has also chosen to keep his prognosis from him, as they do not want to affect his mental status. 7/25 updated family of ongoing concerns of worsening clinical status despite treatment. Encouraged consideration of goals of care and quality of life. Note provided for family to assist in getting a Visa for his daughter - May Alexandra Cardenaskomal Jada Mahoney and children. 7/26 reviewed overall poor prognosis and unlikely probability that he will improve functional status to tolerate cancer treatments moving forward. Family also expressed concern about providing cares for him at home given his symptoms. However, they do not want to consider hospice or TCU. They seem to have a disconnect in wanting full aggressive treatments and his realistic ability to recover. They indicated that Oncology informed them that 10-20% of patients have functional recovery and have cancer treatments. They felt like no one can predict if he will be curative and they want to move forward with aggressive treatment. Discussed code status and concern for greater harm to patient if he were to require CPR/mechanical  intubation. Family feels like they are unable to decide about this today. Agreeable to palliative care discussion.      FEN: Oral hydration; monitor; regular  Activity: As tolerated; ambulation encouraged  DVT Prophylaxis: Enoxaparin (Lovenox) SQ - full dose anticoagulation   Family update: Yes, daughter/sister at bedside.     Dispo: Unclear. 2-3 days pending improvement in functional status, pain control, and response to abx therapy. Family does not feel like he can return in his current clinical state but is not ready for hospice/skilled nursing.     >30 minutes in patient care provided. Family discussion regarding care plan extensive including chart review from May hospitalization, review of labs, and discussion with consultants.     Code Status: Full Code  Text Page (7am - 6pm, M-F)    Interval History    Continued conversations with family given treatment plan and prognosis. Disconnect between family understanding treatment plan and patient's overall poor prognosis. Multiple attempted by multiple providers to communicate clinical status. Hiccups resolved with thorazine but patient sedated. Family frustrated b/c he is not communicating/ambulating/eating well given sedation. Pain controlled. Abdomen less distended. No respiratory distress. Sleeping. No BM. Discussed with nursing.     -Data reviewed today: I reviewed all new labs and imaging results over the last 24 hours. I personally reviewed:     Physical Exam   Temp: 97.6  F (36.4  C) Temp src: Oral BP: 103/54 Pulse: 104   Resp: 20 SpO2: 92 % O2 Device: None (Room air)    Vitals:    07/21/21 0030 07/21/21 0737 07/26/21 0233   Weight: 75.6 kg (166 lb 11.2 oz) 72.2 kg (159 lb 1.6 oz) 79.2 kg (174 lb 8 oz)     Vital Signs with Ranges  Temp:  [97.5  F (36.4  C)-98.2  F (36.8  C)] 97.6  F (36.4  C)  Pulse:  [100-110] 104  Resp:  [16-20] 20  BP: (100-106)/(54-68) 103/54  SpO2:  [90 %-93 %] 92 %  I/O last 3 completed shifts:  In: 500 [P.O.:500]  Out: -      Constitutional: Sleeping, awakens but falls back to sleep, cooperative, no apparent distress. Non-toxic. Appears stated age. Chronically ill in appearance.   HEENT: Atraumatic. Normocephalic. Conjunctiva non-injected. Sclera anicteric. MMM.   Respiratory: Moves air bilaterally. Shallow breathing. Diminished airflow from effort. Not on oxygen.  Clear to auscultation bilaterally, no crackles or wheezing  Cardiovascular: Tachycardic and regular rhythm, normal S1 and S2, and no murmur noted  GI: Round, soft, non-distended, non-tender, no rebound tenderness, no guarding hypoactive BS+   Skin/Integumen: No rashes, no cyanosis, no edema    Medications     sodium chloride 75 mL/hr at 07/27/21 0925       diclofenac  2 g Topical 4x Daily     dronabinol  2.5 mg Oral Daily     enoxaparin ANTICOAGULANT  1.5 mg/kg Subcutaneous Q24H     ertapenem (INVanz) IV  1 g Intravenous Q24H     famotidine  20 mg Oral BID     lidocaine  1 patch Transdermal Q24H     lidocaine   Transdermal Q8H     polyethylene glycol  17 g Oral Daily     senna-docusate  2 tablet Oral BID     sodium chloride (PF)  3 mL Intracatheter Q8H     Data   Recent Labs   Lab 07/27/21  0648 07/26/21  0509 07/25/21  0551 07/21/21  1212 07/21/21  0530 07/20/21  1941 07/20/21  1533   WBC 10.8 15.5* 14.3*  --  13.2*  --  10.3   HGB 9.8* 9.8* 9.5*  --  10.6*  --  11.6*   MCV 96 97 96  --  95  --  96    213 227  --  267  --  266   INR  --   --   --   --  1.32*  --   --    * 124* 124*   < > 126*  --  127*   POTASSIUM 5.2 5.0 4.8   < > 4.5  --  5.0   CHLORIDE 92* 92* 93*   < > 96  --  94   CO2 27 27 27   < > 25  --  28   BUN 49* 38* 33*   < > 15  --  17   CR 1.45* 1.28* 1.28*   < > 0.95  --  0.93   ANIONGAP 5 5 4   < > 5  --  5   REBECCA 8.4* 8.4* 8.3*   < > 8.4*  --  9.1   * 125* 150*   < > 108*  --  133*   ALBUMIN 1.3* 1.4* 1.4*   < > 1.8*  --  2.1*   PROTTOTAL 6.5* 6.8 6.8   < > 7.4  --  8.5   BILITOTAL 7.6* 8.0* 7.8*   < > 5.8*  --  6.3*   ALKPHOS 186*  189* 181*   < > 168*  --  170*   * 331* 398*   < > 446*  --  281*   * 361* 614*   < > 1,024*  --  671*   LIPASE  --   --   --   --   --   --  78   TROPONIN  --   --   --   --  0.024 0.030 0.021    < > = values in this interval not displayed.     No results found for this or any previous visit (from the past 24 hour(s)).

## 2021-07-27 NOTE — PLAN OF CARE
Pertinent assessments: A&O per family. Occitan speaking. Family interpreting. Up ax2 with walker. VSS. RA. Denies pain. IM thorazine given for hiccups.  Abd distended. Para site CDI. Urine dark tea colored. Skin jaundiced.     Major Shift Events: Uneventful    Treatment Plan: .Lovenox, Invanz    Bedside Nurse: Aissatou Felder RN

## 2021-07-27 NOTE — CONSULTS
CLINICAL NUTRITION SERVICES  -  ASSESSMENT NOTE      Recommendations Ordered by Registered Dietitian (RD):   Continue regular diet and supplements as ordered  Pt. Intakes <25% for >/=7 days, not meeting nutritional needs. Additional interventions per provider team.   Malnutrition:   % Weight Loss:  Unable to determine  % Intake:  </= 50% for >/= 5 days (severe malnutrition)  Subcutaneous Fat Loss:  Orbital region severe depletion and Upper arm region severe depletion  Muscle Loss:  Temporal region severe depletion, Clavicle bone region severe depletion, Acromion bone region severe depletion, Anterior thigh region moderate depletion and Posterior calf region mild depletion  Fluid Retention:  Abdominal - paracentesis needed    Malnutrition Diagnosis: Severe malnutrition  In Context of:  Acute illness or injury  Chronic illness or disease        REASON FOR ASSESSMENT  Alexandra Alfaro is a 66 year old male seen by Registered Dietitian for [facility]    PMH:  nonalcoholic cirrhosis, HCC with refractory ascites and diffuse metastasis    NUTRITION HISTORY    Information obtained from patient family    Food allergies/intolerances: NKFA - no pork, avoids beef (upsets stomach)    Patient is on a regular diet at home.    Preferred foods - eggs, spinach orange salad, vanilla pudding, mashed potatoes, italian roasted vegetables, whole milk    Preferred flavors for supplements: strawberry, vanilla.     CURRENT NUTRITION ORDERS  Diet Order:     Regular     Current Intake/Tolerance:  No food intakes recorded in flow sheets since admit 7/20  ~1 meal per day ordered from kitchen, minimal intakes.  Factors affecting nutrition intake include: nausea, vomiting decreased appetite, early satiety, disease progression (advanced liver cancer)    NUTRITION FOCUSED PHYSICAL ASSESSMENT FOR DIAGNOSING MALNUTRITION)  Yes        Obtained from Chart/Interdisciplinary Team:  Reviewed stooling patterns, last BM recorded 7/23.  - paracentesis  - restorative  "goals of care  Per Oncology note:  - Performance status too poor for any further systemic treatment at this time  - Previously Dr. Navarrete had an extensive conversation with the patient and his sister about a comfort approach such as hospice, but they are not interested.  They are still hoping his performance status will improve enough with further treatment of portal vein thrombus and SBP to receive further systemic therapy.  -It is unlikely the patient will be candidate for further embolization given portal vein thrombosis.  - Appreciate palliative care input, hospice is appropriate. Family still not ready for this and patient is not aware ( per their wishes)    - Nausea vomiting and hiccup with past associated weight loss. Anorexia 4 days PTA secondary to hepatic cancer disease progression      ANTHROPOMETRICS  Height: 5' 9\"  Weight: 174 lbs 8 oz  Body mass index is 25.77 kg/m .  Weight Status:  Overweight BMI 25-29.9  Weight History: weight fluctuations likely fluid-related. Unable to determine true weight loss at this time.  Wt Readings from Last 10 Encounters:   07/26/21 79.2 kg (174 lb 8 oz)   06/30/21 72.6 kg (160 lb)   05/25/21 75.2 kg (165 lb 12.8 oz)   03/18/21 77.1 kg (170 lb)   12/16/20 78 kg (172 lb)   08/07/18 84.8 kg (187 lb)   08/06/18 86 kg (189 lb 9.5 oz)       LABS  Labs reviewed  Labs:  Electrolytes  Potassium (mmol/L)   Date Value   07/27/2021 5.2   07/26/2021 5.0   07/25/2021 4.8   05/25/2021 5.0   03/18/2021 4.9   12/28/2020 4.8     Phosphorus (mg/dL)   Date Value   07/27/2021 3.0   07/26/2021 2.3 (L)    Blood Glucose  Glucose (mg/dL)   Date Value   07/27/2021 136 (H)   07/26/2021 125 (H)   07/25/2021 150 (H)   07/24/2021 101 (H)   07/23/2021 137 (H)   05/25/2021 111 (H)   03/18/2021 109 (H)   12/28/2020 123 (H)   12/25/2020 185 (H)   12/14/2020 88    Inflammatory Markers  CRP Inflammation (mg/L)   Date Value   12/27/2020 31.1 (H)     WBC (10e9/L)   Date Value   03/18/2021 8.4   12/29/2020 9.1 "   12/28/2020 9.3     WBC Count (10e3/uL)   Date Value   07/27/2021 10.8   07/26/2021 15.5 (H)   07/25/2021 14.3 (H)     Albumin (g/dL)   Date Value   07/27/2021 1.3 (L)   07/26/2021 1.4 (L)   07/25/2021 1.4 (L)   05/25/2021 3.4   03/18/2021 2.5 (L)   12/28/2020 2.1 (L)      Magnesium (mg/dL)   Date Value   07/26/2021 2.5 (H)   07/25/2021 2.3   07/24/2021 2.3     Sodium (mmol/L)   Date Value   07/27/2021 124 (L)   07/26/2021 124 (L)   07/25/2021 124 (L)   05/25/2021 129 (L)   03/18/2021 137   12/28/2020 136    Renal  Urea Nitrogen (mg/dL)   Date Value   07/27/2021 49 (H)   07/26/2021 38 (H)   07/25/2021 33 (H)   05/25/2021 14   03/18/2021 14   12/28/2020 14     Creatinine (mg/dL)   Date Value   07/27/2021 1.45 (H)   07/26/2021 1.28 (H)   07/25/2021 1.28 (H)   05/25/2021 0.81   03/18/2021 1.01   12/28/2020 0.76     Additional  Ketones Urine (mg/dL)   Date Value   07/20/2021 Negative   12/14/2020 Negative          MEDICATIONS  Medications reviewed    diclofenac  2 g Topical 4x Daily     dronabinol  2.5 mg Oral Daily     enoxaparin ANTICOAGULANT  1.5 mg/kg Subcutaneous Q24H     ertapenem (INVanz) IV  1 g Intravenous Q24H     famotidine  20 mg Oral BID     lidocaine  1 patch Transdermal Q24H     lidocaine   Transdermal Q8H     polyethylene glycol  17 g Oral Daily     senna-docusate  2 tablet Oral BID     sodium chloride (PF)  3 mL Intracatheter Q8H        sodium chloride 75 mL/hr at 07/27/21 0925      acetaminophen **OR** acetaminophen, benzonatate, chlorproMAZINE, guaiFENesin-dextromethorphan, HYDROmorphone, hydrOXYzine, melatonin, metoclopramide, naloxone **OR** naloxone **OR** naloxone **OR** naloxone, ondansetron **OR** ondansetron, oxyCODONE, sodium chloride (PF)     ASSESSED NUTRITION NEEDS PER APPROVED PRACTICE GUIDELINES:    Dosing Weight 72.2 kg  Estimated Energy Needs: 7837-8436 kcals (25-30 Kcal/Kg)  Justification: maintenance  Estimated Protein Needs:  grams protein (1.2-1.5 g pro/Kg)  Justification:  hypercatabolism with cancer-associated cachexia and preservation of lean body mass  Estimated Fluid Needs: per provider pending fluid status    NUTRITION DIAGNOSIS:  Malnutrition related to chronic disease progression (advanced cancer) as evidenced by <25% intakes x7 days, severe muscle loss, severe fat loss    NUTRITION INTERVENTIONS  Recommendations / Nutrition Prescription  Continue regular diet and supplements as ordered  Pt. Intakes <25% for >/=7 days, not meeting nutritional needs. Additional interventions per provider team.    Implementation  Nutrition education: Not appropriate at this time due to patient condition, encouraged  Supplements to family in the room, told them they can order whatever food/supplements he likes  Medical Food Supplement: Ensure Enlive strawberry 1x/day  Collaboration and Referral of Nutrition care: discussed pt. Care during rounds    Nutrition Goals  Goals of nutritional care established within 48-72 hours.    MONITORING AND EVALUATION:  Progress towards goals will be monitored and evaluated per protocol and Practice Guidelines    Patricia Reyes RDN, LD  Clinical Dietitian

## 2021-07-27 NOTE — PROGRESS NOTES
[facility]  Palliative Care Progress Note  Text Page     Assessment & Plan   Recommendations:  Alexandra Alfaro is a 66 year old male who was admitted on 7/20/2021.   Consulted by Mariposa Horan DO to assist with symptom management, goals of care, and faciliate a of plan.      Recommendations:  1. Goals of Care- Full Code  Hospitalization goals discussed :  Reviewed code status with patient, daughter and sister with interpretor present. Want all option including intubation, mechanical ventilation, ICU for specialized care, ongoing paracentesis.  As needed.  Family states does not want to tell the patient he is dying. They do not want him to loose hope. He confirmed via interpretor that MRI shows disease progression.       Decisional Capacity- Intact. Patient does not have an advance directive. Include patient in decision making as much as possible and involve next of kin as surrogate decision maker  attempt consensus with patient. Patient is Wallisian and Malay speaking, per family he understands limited english.  Family has consented to use of an interpretor today Patient  Also confirmed through the use of an interpretor that family can speak and interpret om his behalf.   POLST There is no POLST form on file, plan to complete prior to DC.     2. 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    -oxycodone 5 mg every 3 hrs prn  -dilaudid 0.3 mg every 2 hrs prn  -Schedule diclofenac gel. Qid , lidocaine patch schedule for hs starting 7/27  - consider binder for chest splinting      Patient's opioid use in past 24 hours: IV Hydromorphone 0 mg, =Oxycodone 30 mg = 45 mg Daily Morphine Equivalent  [place] Board of Pharmacy Data Base Reviewed:    YES; As expected, no concern for misuse/abuse of controlled medications based on this report.  ORT  0  3. Nausea vomiting  and hiccup with past associated weight loss. Anorexia 4 days PTA secondary to hepatic cancer disease progression.   -Regular diet ok to bring food in from home   -small frequent meals  -high caloric in between meals ensure and magic cup  -weights   -antinauseants   -thorazine 10 mg po tid prn, consider adding gabapentin 100 mg bid or tid or schedule metoclopramide if hiccup become intractable   - medical management of fluid              -diuretics, paracentesis   -add marinol 2.5 mg every day   4. Dyspnea  Not at this time,  Continue to monitor for dyspnea in the setting of on going paracentesis      4. Spiritual Care  Oriented to Spiritual Health as part of Palliative Care team. Spiritual Health Services declined at this time.  Spiritual Background:  Sabianism, family states our alber is not like Worship alber , there is no go between  Like you have it is a relationship between you and God, Sister fees use of Imam or other spiritual leader would make the patient feel he is dying and will loose hope.      5. Care Planning  SW consultation placed for discharge planning .[facility] home and Home care services,with [facility]  care will be needed, paracentesis as out patient [facility] preferred with Dr. Avani Danielson , [facility] follow up   Palliative care out patient consult highly advised with [facility]    Medications for discharge  None from palliative care            Thank you for involving us in the patient's care.   Avani Bergeron RN, PGMT- BC, APRN, CNP, ACHPN  Pain Management and Palliative Care  [facility]  Pgr: [phone]      Time Spent on this Encounter   Total unit/floor time 25 minutes, time consisted of the following, examination of the patient, reviewing the record and completing documentation. >50% of time spent in counseling and coordination of care, Bedside Nurse Laurence Barone RN and Hospitalist  Mariposa Horan DO.  Time spend counseling with patient and  "family consisted of the following topics, care planning for discharge and symptom management.    Medical Decision Making and Goals of Care:  Discussed on July 27, 2021 with Avani Bergeron, IRIS-C,APRN,CNP,ACHPN   Sister and daughter in the room. They are concerned with his fatigue, low energy and lack of oral intake.  Asked about IV nutrition if this would help? Stated many times IV or NG nutrition per the literature review does not bring the outcomes desired in cancer with disease progression. .  Advise we can continue to address through allowing favorite food, high protein snacks and small frequent meals.   Hiccups are better so they do not want schedule medications to address this. Less bloating and pain today.  Asked about discharge needs. Would like to take him home and care for him, NO TCU!.  Family agree to start Marinol every day for appetite stimulant. Advise further medication changes for appetite stimulant to be cleared with oncology.  No changes in code status.   Relevant labs and imaging reviewed with family, no further questions     Discussed on July 26, 2021 with Avani SOLORZANO, CNP:  I introduced my self to the patient and family and the purpose of my visit.  I talked to the daughter NADA and the patients sister with out the patient present. I was told the by the nurse that the family did not want the patient to know his prognosis. I stated I would like to hear what the patient know and how much he is willing to go through for more time.  They said this was fine, they did not want us to mention to him that he was \"dying\"  Both felt they know him well enough that it would have him loose all hope. When I met with them initially, they requested to be the interpretor for him. I requested we use an interpretor to also help them have a deeper understanding and also I needed him to say family being interpretor is ok.  They both agreed to use of interpretor.   We entered the room " the patient was sleepy and was having hiccups, pain on the right back .  He felt better after the paracentesis with less bloating and discomfort. He continues to c/o of increase fatigue and weakness and was falling asleep during my time in the room.    I asked if we could talk about his hopes and goals.  At first this was hard to understand for him.  He then with some help agreed he wanted to get stronger so he could have more treatment for the cancer, despite disease progression.   He endorsed his current code status of Full Code, and also his desired to continue treatment including rehospitalization.  I talked to family further about my worries and concerns with aggressive treatment like CPR and intubation, potentially leading to a) not surviving, b) increasing pain ( rib fractures from CPR) and c) greater debility which would lead to greater weakness and making him non elegible for further cancer treatment.   For now they still want to stay the course.  They are concerned about getting more help in the house.as they are unable to care for him this weak.  They are most interested in discharge to home with home care, but if he gets stronger and qualifies for TCU they might consider.  The family will bring TCU care up as a potential option and will discuss with the rest of the family.  I suggested we see how the next few days go and continue with ongoing discussion and provide support to the patient and family. We outlined a plan for symptom management.  All were appreciate of the time spent in discussion. The patient thanked me for my time and interest in his care      Review of Systems    CONSTITUTIONAL: NEGATIVE for fever, chills, change in weight  ENT/MOUTH: NEGATIVE for ear, mouth and throat problems  RESP: NEGATIVE for significant cough or SOB  CV: NEGATIVE for chest pain, palpitations or peripheral edema    Palliative Symptom Review (0=no symptom/no concern, 1=mild, 2=moderate, 3=severe):      Pain: 1-mild       Fatigue: 3-severe      Nausea: 0-none      Constipation: 0-none      Diarrhea: 0-none      Depressive Symptoms: 1-mild      Anxiety: 0-none      Drowsiness: 2-moderate      Poor Appetite: 3-severe      Shortness of Breath: 0-none      Insomnia: 0-none      Other: jaundice  2-moderate      Overall (0 good/no concerns, 3 very poor):   2+    Physical Exam   Temp:  [97.5  F (36.4  C)-98.2  F (36.8  C)] 97.6  F (36.4  C)  Pulse:  [100-110] 104  Resp:  [16-20] 20  BP: (100-106)/(54-68) 103/54  SpO2:  [90 %-93 %] 92 %  174 lbs 8 oz  Exam:  GEN:  Alert, oriented x 3, non english speaking, appears comfortable, NAD.,+ fatigue and sleepiness   HEENT:  Normocephalic/atraumatic, no scleral icterus, no nasal discharge, mouth moist.  CV:  RRR, S1, S2; no murmurs or other irregularities noted.  +3 DP/PT pulses bilatererally;   RESP:  Clear to auscultation bilaterally without rales/rhonchi/wheezing/retractions.  Symmetric chest rise on inhalation noted.  Normal respiratory effort.  ABD:  Rounded, soft, non-tender/ soft frim-distended.  +BS  EXT:  Edema bilateral LE R>L, CMS intact x 4.     SKIN:  Dry to touch, no exanthems noted in the visualized areas.  Jaundice  NEURO: Symmetric movement.  Sensation to touch intact all extremities.   PAIN BEHAVIOR: Cooperative  Psych:  Normal affect.  Calm, cooperative, conversant appropriately.    Medications     sodium chloride 75 mL/hr at 07/27/21 0925       diclofenac  2 g Topical 4x Daily     enoxaparin ANTICOAGULANT  1.5 mg/kg Subcutaneous Q24H     ertapenem (INVanz) IV  1 g Intravenous Q24H     famotidine  20 mg Oral BID     lidocaine  1 patch Transdermal Q24H     lidocaine   Transdermal Q8H     omeprazole  40 mg Oral QAM AC     polyethylene glycol  17 g Oral Daily     senna-docusate  2 tablet Oral BID     sodium chloride (PF)  3 mL Intracatheter Q8H       Data   Results for orders placed or performed during the hospital encounter of 07/20/21 (from the past 24 hour(s))   CBC with  Platelets & Differential    Narrative    The following orders were created for panel order CBC with Platelets & Differential.  Procedure                               Abnormality         Status                     ---------                               -----------         ------                     CBC with platelets and d...[[id number]]  Abnormal            Final result               Manual Differential[[id number]]          Abnormal            Final result                 Please view results for these tests on the individual orders.   Comprehensive metabolic panel   Result Value Ref Range    Sodium 124 (L) 133 - 144 mmol/L    Potassium 5.2 3.4 - 5.3 mmol/L    Chloride 92 (L) 94 - 109 mmol/L    Carbon Dioxide (CO2) 27 20 - 32 mmol/L    Anion Gap 5 3 - 14 mmol/L    Urea Nitrogen 49 (H) 7 - 30 mg/dL    Creatinine 1.45 (H) 0.66 - 1.25 mg/dL    Calcium 8.4 (L) 8.5 - 10.1 mg/dL    Glucose 136 (H) 70 - 99 mg/dL    Alkaline Phosphatase 186 (H) 40 - 150 U/L     (H) 0 - 45 U/L     (H) 0 - 70 U/L    Protein Total 6.5 (L) 6.8 - 8.8 g/dL    Albumin 1.3 (L) 3.4 - 5.0 g/dL    Bilirubin Total 7.6 (H) 0.2 - 1.3 mg/dL    GFR Estimate 50 (L) >60 mL/min/1.73m2   Phosphorus   Result Value Ref Range    Phosphorus 3.0 2.5 - 4.5 mg/dL   CBC with platelets and differential   Result Value Ref Range    WBC Count 10.8 4.0 - 11.0 10e3/uL    RBC Count 2.90 (L) 4.40 - 5.90 10e6/uL    Hemoglobin 9.8 (L) 13.3 - 17.7 g/dL    Hematocrit 27.8 (L) 40.0 - 53.0 %    MCV 96 78 - 100 fL    MCH 33.8 (H) 26.5 - 33.0 pg    MCHC 35.3 31.5 - 36.5 g/dL    RDW 17.3 (H) 10.0 - 15.0 %    Platelet Count 199 150 - 450 10e3/uL   Manual Differential   Result Value Ref Range    % Neutrophils 82 %    % Lymphocytes 4 %    % Monocytes 7 %    % Eosinophils 1 %    % Basophils 0 %    % Metamyelocytes 4 %    % Myelocytes 2 %    Absolute Neutrophils 8.9 (H) 1.6 - 8.3 10e3/uL    Absolute Lymphocytes 0.4 (L) 0.8 - 5.3 10e3/uL    Absolute Monocytes 0.8 0.0 - 1.3  10e3/uL    Absolute Eosinophils 0.1 0.0 - 0.7 10e3/uL    Absolute Basophils 0.0 0.0 - 0.2 10e3/uL    Absolute Metamyelocytes 0.4 (H) <=0.0 10e3/uL    Absolute Myelocytes 0.2 (H) <=0.0 10e3/uL    RBC Morphology Confirmed RBC Indices     Platelet Assessment  Automated Count Confirmed. Platelet morphology is normal.     Automated Count Confirmed. Platelet morphology is normal.    Polychromasia Slight (A) None Seen    Target Cells Slight (A) None Seen

## 2021-07-27 NOTE — PROGRESS NOTES
Oncology/Hematology Follow Up Note:    Assessment and Plan:  1.  Portal vein thrombosis: Main portal vein:   -Findings of portal vein thrombosis would explain clinical presentation.  -Patient had MRI of liver on 7/22 which showed, enhancing portal vein thrombus involving main portal vein and right greater than left portal vein.  There was evidence of interval progression with enlargement of irregular mass of tumor enhancement closer to dominant mass in right inferior liver.  Also there were innumerable new large nodules throughout the liver.  There was new mass with bony destruction identified along the right posterior 11th rib.   - Started Lovenox for portal vein thrombus  -Slight improvement in liver enzymes today.  Bilirubin still trending up.     Plan   - Continue Lovenox 1 mg/kg BID, indefinitely, unless he has any bleeding issues  - Not sure how much his portal vein thrombus will improve with anticoagulation, since this is mostly a tumor thrombus        2.  Hepatocellular carcinoma  -Patient had bland embolization (could not tolerate yttrium-90 due to shunt)   -Also had immune checkpoint admitted with atezolizumab in the past on hold since April.   - Recent scan showed progression of disease     Plan   - Performance status too poor for any further systemic treatment at this time  - Previously Dr. Navarrete had an extensive conversation with the patient and his sister about a comfort approach such as hospice, but they are not interested.  They are still hoping his performance status will improve enough with further treatment of portal vein thrombus and SBP to receive further systemic therapy.  -It is unlikely the patient will be candidate for further embolization given portal vein thrombosis.  - Appreciate palliative care input, hospice is appropriate. Family still not ready for this and patient is not aware ( per their wishes)     3. Spontaneous bacterial peritonitis( removed 1400ml on 7/26)  - Now afebrile  -  Scheduled for repeat paracentesis today.  If findings suggestive of persistent SBP, would not recommend large volume paracentesis     4.  Hepatitis C  -Patient is completed treatment for hepatitis C  -He is seronegative now     5.  Cirrhosis of liver  -Follows with gastroenterology.      6. Hiccups  - Prescribed thorazine PRN     CODE FULL- this has also been d/w family and recommend DNR.  Subjective:     Weak, tired, denied pain      Labs:  All labs reviewed    CBCRecent Labs   Lab 07/27/21  0648 07/26/21  0509 07/25/21  0551   WBC 10.8 15.5* 14.3*   HGB 9.8* 9.8* 9.5*   MCV 96 97 96    213 227       CMP  Recent Labs   Lab 07/27/21  0648 07/26/21  0509 07/25/21  0551 07/24/21  1031 07/24/21  0520 07/23/21  0836   * 124* 124*  --  126*  --    POTASSIUM 5.2 5.0 4.8  --  4.9  --    CHLORIDE 92* 92* 93*  --  95  --    CO2 27 27 27  --  28  --    ANIONGAP 5 5 4  --  3  --    * 125* 150*  --  101*  --    BUN 49* 38* 33*  --  26  --    CR 1.45* 1.28* 1.28*  --  1.22  --    GFRESTIMATED 50* 58* 58*  --  61  --    REBECCA 8.4* 8.4* 8.3*  --  7.9*  --    MAG  --  2.5* 2.3  --  2.3 2.1   PHOS 3.0 2.3*  --   --   --   --    PROTTOTAL 6.5* 6.8 6.8  --  6.6*  --    ALBUMIN 1.3* 1.4* 1.4* 1.5* 1.4*  --    BILITOTAL 7.6* 8.0* 7.8*  --  7.3*  --    ALKPHOS 186* 189* 181*  --  171*  --    * 361* 614*  --  925*  --    * 331* 398*  --  491*  --        INR  Recent Labs   Lab 07/21/21  0530   INR 1.32*       Blood CultureNo results for input(s): CULT in the last 168 hours.      Kiki Hess MD  [facility]  7/27/2021 9:51 AM

## 2021-07-28 NOTE — PROGRESS NOTES
[facility]  Hospitalist Progress Note    Assessment & Plan   Alexandra Alfaro is a 66 year old male with PMH including COVID-19 in January 2020, hepatocellular carcinoma undergoing treatment (JUAN ANTONIO/Jennifer Coelho), s/p radioembolization 5/2021 with complicated prolonged hospitalization  history of nonalcoholic cirrhosis, hep C who presents with 3 days of epigastric abdominal pain found to have abnormal LFTs- due to portal vein thrombosis. Possible cholecystitis. HCC appears to have advanced per imaging.     Portal vein thrombosis: RUQ US shows portal vein thrombosis and gallbladder sludge/thickening. MRI 7/22 showed progression of HCC with metastasis. Results discussed with family by Dr. Kee. Improvement in abdominal pain with oral medications. Initially on Lovenox 1 mg/kg BID but dose adjusted per heme/onc.     - as per Dr. Navarrete, will change lovenox to 1.5mg/kg daily indefinitely      - Lovenox education to be provided prior to discharge     - Per Onc concern for minimal improvement in thrombus given this is a tumor thrombus     Gallbladder sludge/thickening: RUQ US showed gallbladder wall thickening/sludge. T-max 100.6 and WBC elevation. Abx ordered for possible infectious gallbladder etiology. In addition, abx will cover for SBP.     - Zosyn 7/21 - 7/25. Ertapenem 1 g q 24 hours 7/25 - present.  Plan for 10 day total course of abx (last dose 7/30 for 10 day course - stop abx and asses if still clinically indicated) Adjustment abx therapy due to concern for SBP and worsening clinical status while on Zosyn 7/25. Clinically improving.     Cough with sputum production; pulmonary congestion:   -Abx coverage as above. Pulmonary hygiene with IC and flutter valve. Supportive cares available but attempt to minimize and encourage clearance of oral secretions. Not on oxygen. Patient feels like symptoms are improved 7/26. Resolved.     Leucocytosis: Etiology multifactorial. Source of infection from  abdomen (SBP/gallbladder sludge/thickening) On broad spectrum abx. Cultures remained negative. Afebrile. Suspect a component of stress induced with pain/hiccups complicated by metastatic cancer. Clinically improved from 7/25 with increased abx coverage. Resolved 7/27.     HCC with refractory ascites and diffuse metastasis; SBP: This is likely secondary to Hep C. Patient follows with Dr. Navarrete, consulted on admission.  Dr. Navarrete who also spoke with Dr. Valles (IR at [facility]). Patient is s/p bland embolization in May 2021 with prolonged hospital course following. 7/22 MRI shows disease progression. Initial plan on admission was to transfer to [facility], however given the MRI findings there is no further treatment.  The transfer was canceled. MRI findings were discussed with family per Dr. Kashmir Kee. Dr. Navarrete, Dr. Bolaños, Dr. Kee, and myself spoke with family and patient extensively that at this time there are no further treatment options. They are fixated that Oncology informed them that 10-20% have functional recovery and are eligible for cancer treatments.This is not consistent with documentation provided from Oncology.    IR paracentesis 7/24 1.9 L removed - yellow fluid  and 60% neutrophils. 7/26 paracentesis 1400 ml removed.  55% neutrophils.    - Family declining changes in narcotic therapies for pain control associated with metastatic disease. Prn medications available. Patient feels like he is getting adequate pain control.    - Palliative care consulted 7/26 - SW assisting with possible home palliative care services. Care conference 7/30 at 4:30 PM.    -Dronabinol 2.5 mg daily for appetitive   -IR consulted for repeat paracentesis 7/29 - therapeutic - low volume      Chronic hyponatremia: Sodium 124. Baseline 126-129. Associated with malignancy. Monitor/unchanged. Sodium returned to baseline for patient 7/28 with albumin and IVF.     Acute kidney injury: Cr. 0.95 on admission. 7/27 Cr.  1.4 due to 3rd spacing of fluids and minimal oral intake. In addition to paracentesis removal of fluid  -Monitor daily BMP. Renal function improving. Encourage oral hydration given worsening abdominal distension    Hiccups: Associated with diaphragmatic irritation from fluid and malignancy. Thorazine prn (dose decreased and changed to oral given sedation) and Reglan prn.     Acute on chronic hepatitis in the setting of Hep C cirrhosis: Likely due to underlying HCC/treatment and now portal vein thrombosis. GI reviewed chart and given metastatic disease there is no new recommendations. Patient completed hepatitis C treatment and is seronegative.     - LFTs being trended - slowly improving but bilirubin remains elevated - follows with [facility]    Normocytic anemia: Hemoglobin 8-9 baseline. Stable. No signs of bleeding. Monitor.      Social: Family is updated daily. Diagnosis with review of MRI findings with overall poor prognosis shared with family per Dr. Kee, Dr. Navarrete, and myself. The patient's sister has good insight into his disease, however his daughters and wife do not. The family has also chosen to keep his prognosis from him, as they do not want to affect his mental status. 7/25 updated family of ongoing concerns of worsening clinical status despite treatment. Encouraged consideration of goals of care and quality of life. Note provided for family to assist in getting a [place]isa for his daughter - Migdalia Mahoney and children. 7/26 reviewed overall poor prognosis and unlikely probability that he will improve functional status to tolerate cancer treatments moving forward. Family also expressed concern about providing cares for him at home given his symptoms. However, they do not want to consider hospice or TCU. They seem to have a disconnect in wanting full aggressive treatments and his realistic ability to recover. They indicated that Oncology informed them that 10-20% of patients have functional  recovery and have cancer treatments. They felt like no one can predict if he will be curative and they want to move forward with aggressive treatment. Discussed code status and concern for greater harm to patient if he were to require CPR/mechanical intubation. Family feels like they are unable to decide about this today. Agreeable to palliative care discussion.      FEN: Oral hydration; monitor; regular  Activity: As tolerated; ambulation encouraged  DVT Prophylaxis: Enoxaparin (Lovenox) SQ - full dose anticoagulation   Family update: Yes, granddaughter at bedside; Family updated by palliative care    Dispo: Unclear. 2-3 days pending improvement in functional status, pain control, and response to abx therapy. Family does not feel like he can return in his current clinical state but is not ready for hospice/skilled nursing.     >30 minutes in patient care provided. Family discussion regarding clinical status, medications, and care plan. Discussed with specialists.     Code Status: Full Code  Text Page (7am - 6pm, M-F)    Interval History    Utilized . Patient is more awake today. Comfortable in bed. Slight cough without increased sputum production. Afebrile. Mild abdominal pain with worsening fluid accumulation. BP improved. Less sedated. Eating more than yesterday. No other concerns. Discussed with nursing, SW, and palliative care.     -Data reviewed today: I reviewed all new labs and imaging results over the last 24 hours. I personally reviewed:     Physical Exam   Temp: 97.4  F (36.3  C) Temp src: Axillary BP: 112/61 Pulse: 95   Resp: 16 SpO2: 92 % O2 Device: None (Room air)    Vitals:    07/21/21 0030 07/21/21 0737 07/26/21 0233   Weight: 75.6 kg (166 lb 11.2 oz) 72.2 kg (159 lb 1.6 oz) 79.2 kg (174 lb 8 oz)     Vital Signs with Ranges  Temp:  [97.4  F (36.3  C)-98.3  F (36.8  C)] 97.4  F (36.3  C)  Pulse:  [] 95  Resp:  [16-20] 16  BP: (100-115)/(53-68) 112/61  SpO2:  [91 %-93 %] 92  %  I/O last 3 completed shifts:  In: 2669 [P.O.:1050; I.V.:1619]  Out: 50 [Urine:50]    Constitutional: Awake, cooperative, no apparent distress. Non-toxic. Appears stated age. Chronically ill in appearance. Thin. Horse voice.   HEENT: Atraumatic. Normocephalic. Conjunctiva non-injected. Sclera anicteric. MMM.   Respiratory: Moves air bilaterally. Shallow breathing. Diminished airflow from effort. Not on oxygen.  Clear to auscultation bilaterally, no crackles or wheezing  Cardiovascular: Regular rate and regular rhythm, normal S1 and S2, and no murmur noted  GI: Round, soft, mildly distended, non-tender, no rebound tenderness, no guarding hypoactive BS+   Skin/Integumen: No rashes, no cyanosis, trace edema    Medications       diclofenac  2 g Topical 4x Daily     dronabinol  2.5 mg Oral Daily     enoxaparin ANTICOAGULANT  1.5 mg/kg Subcutaneous Q24H     ertapenem (INVanz) IV  1 g Intravenous Q24H     famotidine  20 mg Oral BID     lidocaine  1 patch Transdermal Q24H     lidocaine   Transdermal Q8H     polyethylene glycol  17 g Oral Daily     senna-docusate  2 tablet Oral BID     sodium chloride (PF)  3 mL Intracatheter Q8H     Data   Recent Labs   Lab 07/28/21  0626 07/27/21  2141 07/27/21  1500 07/27/21  0648 07/26/21  0509   WBC 8.5  --   --  10.8 15.5*   HGB 8.9*  --   --  9.8* 9.8*   MCV 96  --   --  96 97     --   --  199 213   * 124* 123* 124* 124*   POTASSIUM 4.8 5.3 5.4* 5.2 5.0   CHLORIDE 94 92* 91* 92* 92*   CO2 26 27 26 27 27   BUN 44* 49* 51* 49* 38*   CR 1.25 1.41* 1.40* 1.45* 1.28*   ANIONGAP 7 5 6 5 5   REBECCA 8.4* 8.2* 8.2* 8.4* 8.4*   * 149* 164* 136* 125*   ALBUMIN 1.7*  --   --  1.3* 1.4*   PROTTOTAL 6.4*  --   --  6.5* 6.8   BILITOTAL 7.9*  --   --  7.6* 8.0*   ALKPHOS 172*  --   --  186* 189*   *  --   --  237* 331*   *  --   --  217* 361*     No results found for this or any previous visit (from the past 24 hour(s)).

## 2021-07-28 NOTE — PROGRESS NOTES
X COVER.  I got signout to follow-up the labs due to low sodium.  Sodium is 124, K5.3, BUN is 49.  All slight improvement.  This patient looks intravascularly dry with third spacing.  Follow up labs in the morning.  N/S at 50 ml/hr x 6 hours, Albumin 25 g IV x1 ordered.

## 2021-07-28 NOTE — PROGRESS NOTES
Oncology/Hematology Follow Up Note:    Assessment and Plan:  1.  Portal vein thrombosis: Main portal vein:   -Findings of portal vein thrombosis would explain clinical presentation.  -Patient had MRI of liver on 7/22 which showed, enhancing portal vein thrombus involving main portal vein and right greater than left portal vein.  There was evidence of interval progression with enlargement of irregular mass of tumor enhancement closer to dominant mass in right inferior liver.  Also there were innumerable new large nodules throughout the liver.  There was new mass with bony destruction identified along the right posterior 11th rib.   - Started Lovenox for portal vein thrombus  -Continued improvement in liver enzymes, Bilirubin still trending up.     Plan   - Continue therapeutic Lovenox indefinitely, unless he has any bleeding issues  - Not sure how much his portal vein thrombus will improve with anticoagulation, since this is mostly a tumor thrombus        2.  Hepatocellular carcinoma  -Patient had bland embolization (could not tolerate yttrium-90 due to shunt)   -Also had immune checkpoint inhibitor atezolizumab in the past on hold since April.   - Recent scan showed progression of disease     Plan   - Performance status too poor for any further systemic treatment at this time  - I again had an extensive conversation with the patient and his daughter about a comfort approach such as hospice, but they are not still interested.  They are still hoping his performance status will improve enough with further treatment of portal vein thrombus and SBP to receive further systemic therapy.  I think this is highly unlikely.  -It is unlikely the patient will be candidate for further embolization given portal vein thrombosis.  - Schedule a care conference involving us, primary team and palliative care later this week     3. Spontaneous bacterial peritonitis  - Now afebrile  - Repeat ascitic fluid gram stain negative.  Culture  "negative so far  - Scheduled for repeat paracentesis tomorrow     4.  Hepatitis C  -Patient is completed treatment for hepatitis C  -He is seronegative now     5.  Cirrhosis of liver  -Follows with gastroenterology.      6. Hiccups  - Prescribed thorazine PRN      Subjective:    Feels about the same compared to Monday.  Still not eating much.  Hiccups have resolved but is also more sedated.  No bleeding issues on Lovenox.  Still requiring 2 person assist for ambulation    Scheduled Medications:  Reviewed active medications    Labs:  CBC RESULTS: Recent Labs   Lab Test 07/28/21  0626 07/27/21  0648 07/26/21  0509   WBC 8.5 10.8 15.5*   HGB 8.9* 9.8* 9.8*   HCT 25.5* 27.8* 28.7*   MCV 96 96 97    199 213       CMP  Recent Labs   Lab 07/28/21  0626 07/27/21  2141 07/27/21  1500 07/27/21  0648 07/26/21  0509 07/25/21  0551 07/24/21  0520   * 124* 123* 124* 124* 124* 126*   POTASSIUM 4.8 5.3 5.4* 5.2 5.0 4.8 4.9   CHLORIDE 94 92* 91* 92* 92* 93* 95   CO2 26 27 26 27 27 27 28   ANIONGAP 7 5 6 5 5 4 3   * 149* 164* 136* 125* 150* 101*   BUN 44* 49* 51* 49* 38* 33* 26   CR 1.25 1.41* 1.40* 1.45* 1.28* 1.28* 1.22   GFRESTIMATED 60* 52* 52* 50* 58* 58* 61   REBECCA 8.4* 8.2* 8.2* 8.4* 8.4* 8.3* 7.9*   MAG  --   --  2.6*  --  2.5* 2.3 2.3   PHOS  --   --   --  3.0 2.3*  --   --    PROTTOTAL 6.4*  --   --  6.5* 6.8 6.8 6.6*   ALBUMIN 1.7*  --   --  1.3* 1.4* 1.4* 1.4*   BILITOTAL 7.9*  --   --  7.6* 8.0* 7.8* 7.3*   ALKPHOS 172*  --   --  186* 189* 181* 171*   *  --   --  217* 361* 614* 925*   *  --   --  237* 331* 398* 491*       INRNo lab results found in last 7 days.    Objective/Physical Exam:  Blood pressure 111/59, pulse 108, temperature 97.8  F (36.6  C), temperature source Axillary, resp. rate 18, height 1.753 m (5' 9\"), weight 79.2 kg (174 lb 8 oz), SpO2 93 %.  General appearance:awake, alert, and oriented.  Jaundiced  HEENT:Scleral icterus noted  Lungs: chest is clear to " auscultation  Abdomen: Slightly distended, nontender    Arden Bolaños MD  [facility]  7/28/2021 6:46 PM

## 2021-07-28 NOTE — PROGRESS NOTES
[facility]  Palliative Care Progress Note  Text Page     Assessment & Plan   Recommendations:  Alexandra Alfaro is a 66 year old male who was admitted on 7/20/2021.   Consulted by Mariposa Horan DO to assist with symptom management, goals of care, and faciliate a of plan.      Recommendations:  1. Goals of Care- Full Code  Hospitalization goals discussed :  Reviewed code status with patient, daughter and sister with interpretor present. Want all option including intubation, mechanical ventilation, ICU for specialized care, ongoing paracentesis.  As needed.  Family states does not want to tell the patient he is dying. They do not want him to loose hope. He confirmed via interpretor that MRI shows disease progression.       Decisional Capacity- Intact. Patient does not have an advance directive. Include patient in decision making as much as possible and involve next of kin as surrogate decision maker  attempt consensus with patient. Patient is Ukrainian and Telugu speaking, per family he understands limited english.  Family has consented to use of an interpretor today Patient  Also confirmed through the use of an interpretor that family can speak and interpret om his behalf.   POLST There is no POLST form on file, plan to complete prior to DC.     2. 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    -oxycodone 5 mg every 3 hrs prn  -dilaudid 0.3 mg every 2 hrs prn  -Schedule diclofenac gel. Qid , lidocaine patch schedule for hs starting 7/27  - consider binder for chest splinting      Patient's opioid use in past 24 hours: IV Hydromorphone 0 mg, =Oxycodone 30 mg = 45 mg Daily Morphine Equivalent  [place] Board of Pharmacy Data Base Reviewed:    YES; As expected, no concern for misuse/abuse of controlled medications based on this report.  ORT  0  3. Nausea vomiting  and hiccup with past associated weight loss. Anorexia 4 days PTA secondary to hepatic cancer disease progression.   -Regular diet ok to bring food in from home   -small frequent meals  -high caloric in between meals ensure and magic cup  -weights   -antinauseants   -thorazine 10 mg po tid prn, consider adding gabapentin 100 mg bid or tid or schedule metoclopramide if hiccup become intractable   - medical management of fluid              -diuretics, paracentesis   -add marinol 2.5 mg every day   4. Dyspnea  Not at this time,  Continue to monitor for dyspnea in the setting of on going paracentesis      4. Spiritual Care  Oriented to Spiritual Health as part of Palliative Care team. Spiritual Health Services declined at this time.  Spiritual Background:  Orthodoxy, family states our alber is not like Taoist alber , there is no go between  Like you have it is a relationship between you and God, Sister fees use of Imam or other spiritual leader would make the patient feel he is dying and will loose hope.      5. Care Planning  SW consultation placed for discharge planning .[facility] care services,with [facility]  care will be needed, paracentesis as out patient [facility] preferred with Dr. Avani Danielson , [facility] follow up   Palliative care out patient consult highly advised with [facility]    Medications for discharge  None from palliative care            Thank you for involving us in the patient's care.   Avani Bergeron RN, PGMT- BC, APRN, CNP, ACHPN  Pain Management and Palliative Care  [facility]  Pgr: [phone]      Time Spent on this Encounter   Total unit/floor time 15 minutes, time consisted of the following, examination of the patient, reviewing the record and completing documentation. >50% of time spent in counseling and coordination of care, Bedside Nurse Laurence Barone RN and Hospitalist  Mariposa Horan DO.  Time spend counseling with patient and  "family consisted of the following topics, goals of care and symptom management.    Medical Decision Making and Goals of Care:  Discussed July 28, 2021 with Avani Bergeron RN-C,APRN,CNP,ACHPN;  Spouse and daughter to bedside.  Had no further questions:  They tell me  would like to do family conference Friday 7/30.  Disappointed all family can not be in room only 2. They are aware the use of phone, ipad for more family involvement. They are still hopeful for discharge home and that he will get stronger. Reviewed Dr. Bolaños's note and reviewed with family.\" He may not get strong enough, even if imprroved functional status to start treatment for cancer.\" (per his note).  I relayed my concerns that even with current treatment and if he improved that he may suffer more and more likely survive, if care escalation like CPR and intubation are needed.  My hope and plan are to manage symptom, reduce pain and suffering. Emphasized the importance of having hope, even if that hope changes.   Wife and daughter Thankful and appreciative of time this writer spent with them today      Discussed on July 27, 2021 with Avani Bergeron RN-C,APRN,CNP,ACHPN   Sister and daughter in the room. They are concerned with his fatigue, low energy and lack of oral intake.  Asked about IV nutrition if this would help? Stated many times IV or NG nutrition per the literature review does not bring the outcomes desired in cancer with disease progression. .  Advise we can continue to address through allowing favorite food, high protein snacks and small frequent meals.   Hiccups are better so they do not want schedule medications to address this. Less bloating and pain today.  Asked about discharge needs. Would like to take him home and care for him, NO TCU!.  Family agree to start Marinol every day for appetite stimulant. Advise further medication changes for appetite stimulant to be cleared with oncology.  No changes in code " "status.   Relevant labs and imaging reviewed with family, no further questions     Discussed on July 26, 2021 with Avani Vergara-Ronnie APRN, CNP:  I introduced my self to the patient and family and the purpose of my visit.  I talked to the daughter NADA and the patients sister with out the patient present. I was told the by the nurse that the family did not want the patient to know his prognosis. I stated I would like to hear what the patient know and how much he is willing to go through for more time.  They said this was fine, they did not want us to mention to him that he was \"dying\"  Both felt they know him well enough that it would have him loose all hope. When I met with them initially, they requested to be the interpretor for him. I requested we use an interpretor to also help them have a deeper understanding and also I needed him to say family being interpretor is ok.  They both agreed to use of interpretor.   We entered the room the patient was sleepy and was having hiccups, pain on the right back .  He felt better after the paracentesis with less bloating and discomfort. He continues to c/o of increase fatigue and weakness and was falling asleep during my time in the room.    I asked if we could talk about his hopes and goals.  At first this was hard to understand for him.  He then with some help agreed he wanted to get stronger so he could have more treatment for the cancer, despite disease progression.   He endorsed his current code status of Full Code, and also his desired to continue treatment including rehospitalization.  I talked to family further about my worries and concerns with aggressive treatment like CPR and intubation, potentially leading to a) not surviving, b) increasing pain ( rib fractures from CPR) and c) greater debility which would lead to greater weakness and making him non elegible for further cancer treatment.   For now they still want to stay the course.  They are " concerned about getting more help in the house.as they are unable to care for him this weak.  They are most interested in discharge to home with home care, but if he gets stronger and qualifies for TCU they might consider.  The family will bring TCU care up as a potential option and will discuss with the rest of the family.  I suggested we see how the next few days go and continue with ongoing discussion and provide support to the patient and family. We outlined a plan for symptom management.  All were appreciate of the time spent in discussion. The patient thanked me for my time and interest in his care.      Interval visit:  Up in chair and ambulating earlier.    LFT;s renally stable   ascites, generalized edema   Appetite poor     Review of Systems    CONSTITUTIONAL: NEGATIVE for fever, chills, change in weight  ENT/MOUTH: NEGATIVE for ear, mouth and throat problems  RESP: NEGATIVE for significant cough or SOB  CV: NEGATIVE for chest pain, palpitations or peripheral edema    Palliative Symptom Review (0=no symptom/no concern, 1=mild, 2=moderate, 3=severe):      Pain: 1-mild      Fatigue: 3-severe      Nausea: 0-none      Constipation: 0-none      Diarrhea: 0-none      Depressive Symptoms: 1-mild      Anxiety: 0-none      Drowsiness: 2-moderate      Poor Appetite: 3-severe      Shortness of Breath: 0-none      Insomnia: 0-none      Other: jaundice  2-moderate      Overall (0 good/no concerns, 3 very poor):   2+    Physical Exam   Temp:  [97.4  F (36.3  C)-98.3  F (36.8  C)] 97.4  F (36.3  C)  Pulse:  [] 95  Resp:  [16-20] 16  BP: (100-115)/(53-68) 112/61  SpO2:  [91 %-93 %] 92 %  174 lbs 8 oz  Exam:  GEN:  Asleep  appears comfortable, NAD.,+ fatigue  HEENT:  Normocephalic/atraumatic, no scleral icterus, no nasal discharge, mouth moist.  CV:  RRR, S1, S2; no murmurs or other irregularities noted.  +3 DP/PT pulses bilatererally;   RESP:  Normal respiratory effort.  ABD:  Rounded, soft, non-tender/ soft firm  -distended.  +BS  EXT:  Edema bilateral LE R>L, CMS intact x 4.     SKIN:  Dry to touch, no exanthems noted in the visualized areas. Edema + Jaundice  NEURO: Symmetric movement.  Sensation to touch intact all extremities.   PAIN BEHAVIOR: Cooperative  Psych:  Normal affect.  Calm, cooperative, conversant appropriately.    Medications       diclofenac  2 g Topical 4x Daily     dronabinol  2.5 mg Oral Daily     enoxaparin ANTICOAGULANT  1.5 mg/kg Subcutaneous Q24H     ertapenem (INVanz) IV  1 g Intravenous Q24H     famotidine  20 mg Oral BID     lidocaine  1 patch Transdermal Q24H     lidocaine   Transdermal Q8H     polyethylene glycol  17 g Oral Daily     senna-docusate  2 tablet Oral BID     sodium chloride (PF)  3 mL Intracatheter Q8H       Data   Results for orders placed or performed during the hospital encounter of 07/20/21 (from the past 24 hour(s))   Basic metabolic panel   Result Value Ref Range    Sodium 123 (L) 133 - 144 mmol/L    Potassium 5.4 (H) 3.4 - 5.3 mmol/L    Chloride 91 (L) 94 - 109 mmol/L    Carbon Dioxide (CO2) 26 20 - 32 mmol/L    Anion Gap 6 3 - 14 mmol/L    Urea Nitrogen 51 (H) 7 - 30 mg/dL    Creatinine 1.40 (H) 0.66 - 1.25 mg/dL    Calcium 8.2 (L) 8.5 - 10.1 mg/dL    Glucose 164 (H) 70 - 99 mg/dL    GFR Estimate 52 (L) >60 mL/min/1.73m2   Magnesium   Result Value Ref Range    Magnesium 2.6 (H) 1.6 - 2.3 mg/dL   Basic metabolic panel   Result Value Ref Range    Sodium 124 (L) 133 - 144 mmol/L    Potassium 5.3 3.4 - 5.3 mmol/L    Chloride 92 (L) 94 - 109 mmol/L    Carbon Dioxide (CO2) 27 20 - 32 mmol/L    Anion Gap 5 3 - 14 mmol/L    Urea Nitrogen 49 (H) 7 - 30 mg/dL    Creatinine 1.41 (H) 0.66 - 1.25 mg/dL    Calcium 8.2 (L) 8.5 - 10.1 mg/dL    Glucose 149 (H) 70 - 99 mg/dL    GFR Estimate 52 (L) >60 mL/min/1.73m2   Comprehensive metabolic panel   Result Value Ref Range    Sodium 127 (L) 133 - 144 mmol/L    Potassium 4.8 3.4 - 5.3 mmol/L    Chloride 94 94 - 109 mmol/L    Carbon Dioxide (CO2)  26 20 - 32 mmol/L    Anion Gap 7 3 - 14 mmol/L    Urea Nitrogen 44 (H) 7 - 30 mg/dL    Creatinine 1.25 0.66 - 1.25 mg/dL    Calcium 8.4 (L) 8.5 - 10.1 mg/dL    Glucose 121 (H) 70 - 99 mg/dL    Alkaline Phosphatase 172 (H) 40 - 150 U/L     (H) 0 - 45 U/L     (H) 0 - 70 U/L    Protein Total 6.4 (L) 6.8 - 8.8 g/dL    Albumin 1.7 (L) 3.4 - 5.0 g/dL    Bilirubin Total 7.9 (H) 0.2 - 1.3 mg/dL    GFR Estimate 60 (L) >60 mL/min/1.73m2   CBC with platelets   Result Value Ref Range    WBC Count 8.5 4.0 - 11.0 10e3/uL    RBC Count 2.66 (L) 4.40 - 5.90 10e6/uL    Hemoglobin 8.9 (L) 13.3 - 17.7 g/dL    Hematocrit 25.5 (L) 40.0 - 53.0 %    MCV 96 78 - 100 fL    MCH 33.5 (H) 26.5 - 33.0 pg    MCHC 34.9 31.5 - 36.5 g/dL    RDW 17.9 (H) 10.0 - 15.0 %    Platelet Count 193 150 - 450 10e3/uL

## 2021-07-28 NOTE — PLAN OF CARE
Pertinent assessments: A&O per family. Kiswahili speaking. Family interpreting. Extremely weak and lethargic. Ax2 with sudha steady. VSS. RA. Denies pain.  Abd distended. Para site CDI. Urine dark tea colored. Skin/eyes jaundiced. Daughter at bedside overnight      Major Shift Events: IVF rate decreased & another dose of albumin per MD due to third spacing    Treatment Plan: .Lovenox, Invanz     Bedside Nurse: Aissatou Felder RN

## 2021-07-29 NOTE — PROGRESS NOTES
Patient tolerated paracentesis well. Skin, warm and dry. 50 mls of maikel fluid removed from right side. Insertion site clean and dry, Band-Aid dressing and glue applied no drainage or bleeding noted. Specimens sent to lab. Vital signs stable. patient to return to [facility] with ultrasound tech, report given to Rosaura SIMMONS.

## 2021-07-29 NOTE — PROGRESS NOTES
Reviewed labs.  Not much change from yesterday.  Liver enzymes continue to decrease, but bilirubin continues to increase.    Patient has very poor functional status, which is highly unlikely to improve to a point where we can potentially offer him more systemic therapy, but family has unrealistic expectations.    Care conference scheduled tomorrow at 4:30.    Arden Bolaños M.D.  [facility] [facility]  [phone]

## 2021-07-29 NOTE — PROGRESS NOTES
[facility]    Hospitalist Progress Note  Name: Alexandra Alfaro    MRN: [id number]  Provider:  Iam Ring DO  Date of Service: 07/29/2021    Summary of Stay: Alexandra Alfaro is a 66 year old male with a history of covid 19 in January 2020, hepatocellular carcinoma s/p embolization and Atezolizumab in April 2021 with repeat imaging showing disease progression, hepatitis C cirrhosis admitted on 7/20/2021 with abdominal pain and abnormal LFTs.  Upon arrival the patient was noted to have a sodium of 127, worsening LFTs from his baseline with bilirubin of 6.3, alkaline phosphatase of 170, AST//281, lipase normal and troponin 0 0.021.  The patient did have a CT chest/abdomen/pelvis that showed no pulmonary embolism however did note innumerable hepatic mets similar to previous, mild gallbladder wall thickening.  Gastroenterology was consulted to see the patient.  The patient's home Lasix and spironolactone were held secondary to his hyponatremia.  The patient did have a right upper quadrant ultrasound that showed portal vein thrombosis with gallbladder wall sludge/thickening.  The patient was started on therapeutic Lovenox.  Given the patients history of hepatocellular carcinoma, Oncology was consulted to see the patient.  The patient was started on IV Zosyn for possible cholecystitis.  The patient had a paracentesis showing concern for SBP.  The patient was transitioned to IV Ertapenem.  Several discussions with the patient as well as his family were completed in regards to his cancer treatment failures as well as his poor performance status.  It was recommended several times to the patient as well as his family to pursue hospice/palliative care.    TODAY'S PLAN:  Pt daughter at bedside and daughter via phone assisted in translation.  Pt denies abd pain.  Eating breakfast with daughters help/encouragement.  Continue Ertapenem for now.  Family requests no medications with sedating properties.   Planning for care conference tomorrow with Oncology and Palliative Care.     Problem List:   Portal vein thrombosis: RUQ US shows portal vein thrombosis and gallbladder sludge/thickening. MRI 7/22 showed progression of HCC with metastasis. Results discussed with family by Dr. Kee. Improvement in abdominal pain with oral medications. Initially on Lovenox 1 mg/kg BID but dose adjusted per heme/onc.     - as per Dr. Navarrete, will change lovenox to 1.5mg/kg daily indefinitely      - Lovenox education to be provided prior to discharge     - Per Onc concern for minimal improvement in thrombus given this is a tumor thrombus     Gallbladder sludge/thickening: RUQ US showed gallbladder wall thickening/sludge. T-max 100.6 and WBC elevation. Abx ordered for possible infectious gallbladder etiology. In addition, abx will cover for SBP.     - Zosyn 7/21 - 7/25. Ertapenem 1 g q 24 hours 7/25 - present.  Plan for 10 day total course of abx (last dose 7/30 for 10 day course - stop abx and asses if still clinically indicated) Adjustment abx therapy due to concern for SBP and worsening clinical status while on Zosyn 7/25. Clinically improving.      Cough with sputum production; pulmonary congestion:   - Abx coverage as above. Pulmonary hygiene with IC and flutter valve. Supportive cares available but attempt to minimize and encourage clearance of oral secretions. Not on oxygen. Patient feels like symptoms are improved 7/26. Resolved.      Leucocytosis: Etiology multifactorial. Source of infection from abdomen (SBP/gallbladder sludge/thickening) On broad spectrum abx. Cultures remained negative. Afebrile. Suspect a component of stress induced with pain/hiccups complicated by metastatic cancer. Clinically improved from 7/25 with increased abx coverage. Resolved 7/27.     HCC with refractory ascites and diffuse metastasis; SBP: This is likely secondary to Hep C. Patient follows with Dr. Navarrete, consulted on admission.  Dr. Navarrete who also  spoke with Dr. Valles (IR at [facility]). Patient is s/p bland embolization in May 2021 with prolonged hospital course following. 7/22 MRI shows disease progression. Initial plan on admission was to transfer to [facility], however given the MRI findings there is no further treatment.  The transfer was canceled. MRI findings were discussed with family per Dr. Kashmir Kee. Dr. Navarrete, Dr. Bolaños, Dr. Kee, and myself spoke with family and patient extensively that at this time there are no further treatment options. They are fixated that Oncology informed them that 10-20% have functional recovery and are eligible for cancer treatments.This is not consistent with documentation provided from Oncology.    IR paracentesis 7/24 1.9 L removed - yellow fluid  and 60% neutrophils. 7/26 paracentesis 1400 ml removed.  55% neutrophils.    - Family declining changes in narcotic therapies for pain control associated with metastatic disease. Prn medications available. Patient feels like he is getting adequate pain control.    - Palliative care consulted 7/26 - SW assisting with possible home palliative care services. Care conference 7/30 at 4:30 PM.    -Dronabinol 2.5 mg daily for appetitive   -IR consulted for repeat paracentesis 7/29 - therapeutic - low volume       Chronic hyponatremia: Sodium 124. Baseline 126-129. Associated with malignancy. Monitor/unchanged. Sodium returned to baseline for patient 7/28 with albumin and IVF.      Acute kidney injury: Cr. 0.95 on admission. 7/27 Cr. 1.4 due to 3rd spacing of fluids and minimal oral intake. In addition to paracentesis removal of fluid  - Monitor daily BMP. Renal function improving. Encourage oral hydration given worsening abdominal distension     Hiccups: Associated with diaphragmatic irritation from fluid and malignancy. Thorazine prn (dose decreased and changed to oral given sedation) and Reglan prn.      Acute on chronic hepatitis in the setting of Hep C  cirrhosis: Likely due to underlying HCC/treatment and now portal vein thrombosis. GI reviewed chart and given metastatic disease there is no new recommendations. Patient completed hepatitis C treatment and is seronegative.     - LFTs being trended - slowly improving but bilirubin remains elevated - follows with [facility]     Normocytic anemia: Hemoglobin 8-9 baseline. Stable. No signs of bleeding. Monitor.      Social: Family is updated daily. Diagnosis with review of MRI findings with overall poor prognosis shared with family per Dr. Kee, Dr. Navarrete, and myself. The patient's sister has good insight into his disease, however his daughters and wife do not. The family has also chosen to keep his prognosis from him, as they do not want to affect his mental status. 7/25 updated family of ongoing concerns of worsening clinical status despite treatment. Encouraged consideration of goals of care and quality of life. Note provided for family to assist in getting a Visa for his daughter - Migdalia Mahoney and children. 7/26 reviewed overall poor prognosis and unlikely probability that he will improve functional status to tolerate cancer treatments moving forward. Family also expressed concern about providing cares for him at home given his symptoms. However, they do not want to consider hospice or TCU.  They seem to have a disconnect in wanting full aggressive treatments and his realistic ability to recover. They indicated that Oncology informed them that 10-20% of patients have functional recovery and have cancer treatments. They felt like no one can predict if he will be curative and they want to move forward with aggressive treatment. Discussed code status and concern for greater harm to patient if he were to require CPR/mechanical intubation. Family feels like they are unable to decide about this today. Agreeable to palliative care discussion.     DVT Prophylaxis: Enoxaparin (Lovenox) SQ  Code Status:  Full Code  Diet: Regular Diet Adult  Snacks/Supplements Adult: Magic Cup; Between Meals  Snacks/Supplements Adult: Ensure Enlive; Between Meals    Zimmerman Catheter: Not present  Disposition: Expected discharge in 2-4 days to TCU vs home with home care. Goals prior to discharge include antibiotic plan established, palliative care discussion complete.   Family updated today: Yes      Interval History   Pt seen and examined.  Pt denies abd pain.    -Data reviewed today: I personally reviewed all new labs and imaging results over the last 24 hours.     Physical Exam   Temp: 98  F (36.7  C) Temp src: Oral BP: 113/64 Pulse: 100   Resp: 16 SpO2: 95 % O2 Device: None (Room air)    Vitals:    07/21/21 0030 07/21/21 0737 07/26/21 0233   Weight: 75.6 kg (166 lb 11.2 oz) 72.2 kg (159 lb 1.6 oz) 79.2 kg (174 lb 8 oz)     Vital Signs with Ranges  Temp:  [97.8  F (36.6  C)-98  F (36.7  C)] 98  F (36.7  C)  Pulse:  [] 100  Resp:  [16-18] 16  BP: (108-114)/(59-68) 113/64  SpO2:  [91 %-95 %] 95 %  I/O last 3 completed shifts:  In: 240 [P.O.:240]  Out: -     GENERAL: No apparent distress. Awake, alert, and fully oriented.  HEENT: Normocephalic, atraumatic. Extraocular movements intact.  CARDIOVASCULAR: Regular rate and rhythm without murmurs or rubs. No S3.  PULMONARY: Clear bilaterally.  GASTROINTESTINAL: Soft, non-tender, non-distended. Bowel sounds normoactive.   EXTREMITIES: 1+ edema BLE  NEUROLOGICAL: CN 2-12 grossly intact, no focal neurological deficits.  DERMATOLOGICAL: No rash, ulcer, bruising, nor jaundice.    Medications       dronabinol  2.5 mg Oral Daily     enoxaparin ANTICOAGULANT  1.5 mg/kg Subcutaneous Q24H     ertapenem (INVanz) IV  1 g Intravenous Q24H     famotidine  20 mg Oral BID     lidocaine  1 patch Transdermal Q24H     lidocaine   Transdermal Q8H     polyethylene glycol  17 g Oral Daily     senna-docusate  2 tablet Oral BID     sodium chloride (PF)  3 mL Intracatheter Q8H     Data     Laboratory:  Recent  Labs   Lab 07/29/21  0632 07/28/21  0626 07/27/21  0648   WBC 8.3 8.5 10.8   HGB 9.6* 8.9* 9.8*   HCT 27.6* 25.5* 27.8*   MCV 96 96 96    193 199     Recent Labs   Lab 07/29/21  0632 07/28/21  0626 07/27/21  2141   * 127* 124*   POTASSIUM 4.6 4.8 5.3   CHLORIDE 98 94 92*   CO2 27 26 27   ANIONGAP 5 7 5   * 121* 149*   BUN 34* 44* 49*   CR 1.00 1.25 1.41*   GFRESTIMATED 78 60* 52*   REBECCA 8.3* 8.4* 8.2*     Recent Labs   Lab 07/29/21  0632 07/28/21  0626 07/27/21  0648   * 161* 217*   * 183* 237*   ALKPHOS 192* 172* 186*   BILITOTAL 8.1* 7.9* 7.6*     No results for input(s): INR in the last 168 hours.  No results for input(s): CULT in the last 168 hours.    Imaging:  Recent Results (from the past 24 hour(s))   US Paracentesis    Narrative    ULTRASOUND PARACENTESIS 7/29/2021 9:33 AM     HISTORY: Cancer. Current ascites.    FINDINGS: Limited preprocedure ultrasound was performed, images show a  sufficient amount of ascites for paracentesis. An image was archived.  Written and oral informed consent was obtained. A pause for the cause  procedure to verify the correct patient and correct procedure. The  skin overlying the right lower quadrant was prepped and draped in the  usual sterile fashion. The subcutaneous tissues were anesthetized with  10 mL 1% lidocaine. Under direct ultrasound guidance the catheter was  advanced into the peritoneal space and 50 mL of straw colored fluid  was drained. The catheter was removed and a sterile dressing was  applied. There were no immediate complications. Ultrasound images were  permanently stored.  Patient left the ultrasound suite in satisfactory  condition.      Impression    IMPRESSION: Technically successful paracentesis without immediate  complications. Per the order only 50 mL of ascites was removed.    JANIA PATEL DO         SYSTEM ID:  [id number]         Iam Ring DO  [facility] Hospitalist  [address] [place]  [address]  07/29/2021

## 2021-07-29 NOTE — PROCEDURES
[facility]    Procedure: Paracentesis.     Date/Time: 7/29/2021 10:32 AM  Performed by: Brunilda Danielson DO  Authorized by: Brunilda Danielson DO     UNIVERSAL PROTOCOL   Site Marked: Yes  Prior Images Obtained and Reviewed:  Yes  Required items: Required blood products, implants, devices and special equipment available    Patient identity confirmed:  Verbally with patient, arm band, provided demographic data and hospital-assigned identification number  Patient was reevaluated immediately before administering moderate or deep sedation or anesthesia  Confirmation Checklist:  Patient's identity using two indicators, relevant allergies, procedure was appropriate and matched the consent or emergent situation and correct equipment/implants were available  Time out: Immediately prior to the procedure a time out was called    Universal Protocol: the Joint Commission Universal Protocol was followed    Preparation: Patient was prepped and draped in usual sterile fashion           ANESTHESIA    Anesthesia: Local infiltration  Local Anesthetic:  Lidocaine 1% without epinephrine      SEDATION    Patient Sedated: No    See dictated procedure note for full details.  Findings: Paracentesis.     Specimens: none and fluid and/or tissue for laboratory analysis    Complications: None    Condition: Stable    PROCEDURE   Patient Tolerance:  Patient tolerated the procedure well with no immediate complications    Length of time physician/provider present for 1:1 monitoring during sedation: 0

## 2021-07-29 NOTE — PROGRESS NOTES
[facility]  Palliative Care Progress Note  Text Page    No family at bedside. Patient resting peacefully after this mornings paracentesis. I will plan to participate in family conference tomorrow at 4:30 PM.    Recommendations:  1. Goals of Care- Full Code - Restorative care.    Hospitalization goals :  Family conference planned tomorrow at 4:30 PM. Code status was reviewed with patient, daughter and sister with interpretor present. They request all options including intubation, mechanical ventilation, ICU for specialized care, and ongoing paracentesis as needed.  Family states they do not want to tell the patient he is dying, as they do not want him to loose hope. Patient confirmed via interpretor that MRI demonstrates disease progression.       Decisional Capacity - Intact. Patient does not have an advance directive. Include patient in decision making as much as possible and involve next of kin as surrogate decision maker  attempt consensus with patient. Patient is Surinamese and German speaking. Patient has confirmed through the use of an interpretor that family can speak and interpret on his behalf.     POLST There is no POLST form on file, plan to complete prior to DC.     2. 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    - Oxycodone 5 mg every 3 hrs prn  - Dilaudid 0.3 mg every 2 hrs prn  - Lidocaine patch at bedtime. Diclofenac gel QID PRN as patient/family refusing this option.  - Consider binder for chest splinting      Patient's opioid use in past 24 hours: None = 0 mg Daily Morphine Equivalent  [place] Board of Pharmacy Data Base Reviewed:    YES; As expected, no concern for misuse/abuse of controlled medications based on this report.    3. Severe malnutrition - Appreciate input of Nutrition Associate Patricia Reyes. Continue Dronabinol 2.5 mg daily     4.  Hiccup - No  hiccup noted currently. Thorazine 10 mg po tid prn was too sedating and discontinued. Consider starting Gabapentin if it becomes an issue again.      5.  Dyspnea - Patient resting peacefully with regular respirations at 16. Paracentesis with 50 mls removed this morning. Continue to monitor.     6. Spiritual Care  Appreciate input of  Intern Og Qureshi.  Spiritual Background:  Spiritism. Sister fees use of Imam or other spiritual leader would make the patient feel he is dying and will loose hope.      7. Care Planning  Appreciate input of  ANDREW Fisher as family plan to take the patient home with home care services, with TLC  care will be needed, paracentesis as out patient [facility] preferred with Dr. Avani Danielson , [facility] follow up   Palliative care out patient consult highly advised with [place] [facility]    Medications for discharge  None from palliative care       Thank you for involving us in the patient's care.     Krista Vazquez MS, RN, CNS, APRN, ACHPN, FAACVPR  Pain and Palliative Care  Pager [phone]  Office [phone]       Time Spent on this Encounter   Total unit/floor time Noon until 12:25 PM, time consisted of the following, examination of the patient, reviewing the record and completing documentation.  Time spent in coordination of care with Bedside Nurse Rosaura Parker RN and Hospitalist Brennan Ring DO.     Interval History   Chart reviewed -  Uncomplicated paracentesis this morning.       Physical Exam   Temp:  [97.8  F (36.6  C)-98  F (36.7  C)] 98  F (36.7  C)  Pulse:  [] 100  Resp:  [16-18] 16  BP: (108-114)/(59-68) 113/64  SpO2:  [91 %-95 %] 95 %  174 lbs 8 oz  GEN:  Elderly cachectic and frail male sleeping peacefully with no apparent distress  HEENT:  Normocephalic/atraumatic, no nasal discharge, mouth moist.  RESP:  Symmetric chest rise on inhalation noted.  Normal respiratory effort.    Medications       diclofenac  2 g Topical 4x Daily      dronabinol  2.5 mg Oral Daily     enoxaparin ANTICOAGULANT  1.5 mg/kg Subcutaneous Q24H     ertapenem (INVanz) IV  1 g Intravenous Q24H     famotidine  20 mg Oral BID     lidocaine  1 patch Transdermal Q24H     lidocaine   Transdermal Q8H     polyethylene glycol  17 g Oral Daily     senna-docusate  2 tablet Oral BID     sodium chloride (PF)  3 mL Intracatheter Q8H       Data   Results for orders placed or performed during the hospital encounter of 07/20/21 (from the past 24 hour(s))   Asymptomatic COVID-19 Virus (Coronavirus) by PCR Nasopharyngeal    Specimen: Nasopharyngeal; Swab    Narrative    The following orders were created for panel order Asymptomatic COVID-19 Virus (Coronavirus) by PCR Nasopharyngeal.  Procedure                               Abnormality         Status                     ---------                               -----------         ------                     SARS-COV2 (COVID-19) Vir...[[id number]]  Normal              Final result                 Please view results for these tests on the individual orders.   SARS-COV2 (COVID-19) Virus RT-PCR    Specimen: Nasopharyngeal; Swab   Result Value Ref Range    SARS CoV2 PCR Negative Negative    Narrative    Testing was performed using the shelby  SARS-CoV-2 & Influenza A/B Assay on the shelby  Sonja  System.  This test should be ordered for the detection of SARS-COV-2 in individuals who meet SARS-CoV-2 clinical and/or epidemiological criteria. Test performance is unknown in asymptomatic patients.  This test is for in vitro diagnostic use under the FDA EUA for laboratories certified under CLIA to perform moderate and/or high complexity testing. This test has not been FDA cleared or approved.  A negative test does not rule out the presence of PCR inhibitors in the specimen or target RNA in concentration below the limit of detection for the assay. The possibility of a false negative should be considered if the patient's recent exposure or clinical  presentation suggests COVID-19.  [facility] are certified under the Clinical Laboratory Improvement Amendments of 1988 (CLIA-88) as qualified to perform moderate and/or high complexity laboratory testing.   Comprehensive metabolic panel   Result Value Ref Range    Sodium 130 (L) 133 - 144 mmol/L    Potassium 4.6 3.4 - 5.3 mmol/L    Chloride 98 94 - 109 mmol/L    Carbon Dioxide (CO2) 27 20 - 32 mmol/L    Anion Gap 5 3 - 14 mmol/L    Urea Nitrogen 34 (H) 7 - 30 mg/dL    Creatinine 1.00 0.66 - 1.25 mg/dL    Calcium 8.3 (L) 8.5 - 10.1 mg/dL    Glucose 139 (H) 70 - 99 mg/dL    Alkaline Phosphatase 192 (H) 40 - 150 U/L     (H) 0 - 45 U/L     (H) 0 - 70 U/L    Protein Total 6.5 (L) 6.8 - 8.8 g/dL    Albumin 1.6 (L) 3.4 - 5.0 g/dL    Bilirubin Total 8.1 (H) 0.2 - 1.3 mg/dL    GFR Estimate 78 >60 mL/min/1.73m2   CBC with platelets   Result Value Ref Range    WBC Count 8.3 4.0 - 11.0 10e3/uL    RBC Count 2.87 (L) 4.40 - 5.90 10e6/uL    Hemoglobin 9.6 (L) 13.3 - 17.7 g/dL    Hematocrit 27.6 (L) 40.0 - 53.0 %    MCV 96 78 - 100 fL    MCH 33.4 (H) 26.5 - 33.0 pg    MCHC 34.8 31.5 - 36.5 g/dL    RDW 18.6 (H) 10.0 - 15.0 %    Platelet Count 191 150 - 450 10e3/uL   Cell count with differential fluid    Narrative    The following orders were created for panel order Cell count with differential fluid.  Procedure                               Abnormality         Status                     ---------                               -----------         ------                     Cell Count Body Fluid[[id number]]        Abnormal            Final result               Differential Body Fluid[[id number]]                          Final result                 Please view results for these tests on the individual orders.   Albumin fluid   Result Value Ref Range    Albumin fluid 0.2 g/dL    Narrative    No reference ranges have been established.  This result should be interpreted in the context of the patient's  clinical condition and compared to simultaneous measurement in the patient's blood.   Protein fluid   Result Value Ref Range    Protein Total Fluid 0.9 g/dL    Narrative    No reference ranges have been established.  This result should be interpreted in the context of the patient's clinical condition and compared to simultaneous measurement in the patient's blood.   Cell Count Body Fluid   Result Value Ref Range    Color Yellow (A) Colorless    Clarity Hazy (A) Clear    Total Nucleated Cells 236 /uL    Narrative    No reference ranges have been established.  This result  should be interpreted in the context of the patient's clinical condition and   compared to simultaneous measurement in the patient's blood.         Differential Body Fluid   Result Value Ref Range    % Neutrophils 30 %    % Lymphocytes 22 %    % Monocyte/Macrophages 47 %    % Lining Cells 1 %    Narrative    No reference ranges have been established.  This result   should be interpreted in the context of the patient's clinical condition and   compared to simultaneous measurement in the patient's blood.   US Paracentesis    Narrative    ULTRASOUND PARACENTESIS 7/29/2021 9:33 AM     HISTORY: Cancer. Current ascites.    FINDINGS: Limited preprocedure ultrasound was performed, images show a  sufficient amount of ascites for paracentesis. An image was archived.  Written and oral informed consent was obtained. A pause for the cause  procedure to verify the correct patient and correct procedure. The  skin overlying the right lower quadrant was prepped and draped in the  usual sterile fashion. The subcutaneous tissues were anesthetized with  10 mL 1% lidocaine. Under direct ultrasound guidance the catheter was  advanced into the peritoneal space and 50 mL of straw colored fluid  was drained. The catheter was removed and a sterile dressing was  applied. There were no immediate complications. Ultrasound images were  permanently stored.  Patient left the  ultrasound suite in satisfactory  condition.      Impression    IMPRESSION: Technically successful paracentesis without immediate  complications. Per the order only 50 mL of ascites was removed.    BRUNILDA DANIELSON DO         SYSTEM ID:  [id number]   paracentesis.     Narrative    Brunilda Danielson DO     7/29/2021 10:32 AM  [facility]    Procedure: Paracentesis.     Date/Time: 7/29/2021 10:32 AM  Performed by: Brunilda Danielson DO  Authorized by: Brunilda Danielson DO     UNIVERSAL PROTOCOL   Site Marked: Yes  Prior Images Obtained and Reviewed:  Yes  Required items: Required blood products, implants, devices and special   equipment available    Patient identity confirmed:  Verbally with patient, arm band, provided   demographic data and hospital-assigned identification number  Patient was reevaluated immediately before administering moderate or deep   sedation or anesthesia  Confirmation Checklist:  Patient's identity using two indicators, relevant   allergies, procedure was appropriate and matched the consent or emergent   situation and correct equipment/implants were available  Time out: Immediately prior to the procedure a time out was called    Universal Protocol: the Joint Commission Universal Protocol was followed    Preparation: Patient was prepped and draped in usual sterile fashion           ANESTHESIA    Anesthesia: Local infiltration  Local Anesthetic:  Lidocaine 1% without epinephrine      SEDATION    Patient Sedated: No    See dictated procedure note for full details.  Findings: Paracentesis.     Specimens: none and fluid and/or tissue for laboratory analysis    Complications: None    Condition: Stable    PROCEDURE   Patient Tolerance:  Patient tolerated the procedure well with no immediate   complications    Length of time physician/provider present for 1:1 monitoring during   sedation: 0

## 2021-07-29 NOTE — PLAN OF CARE
End of shift summary.  For vital signs and complete assessments, please see documentation flowsheet.     Pertinent assessments: Daughter at bedside overnight. Large BM this shift. Patient continues to be jaundice, poor appetite, shallow breathing noted. C/o pain of 3/10 to RUQ, no pain medication given. Patient slept majority of shift.     Major Shift Events:  uneventful.     Treatment Plan: diagnostic paracentesis on 7/29.

## 2021-07-30 NOTE — PROGRESS NOTES
[facility]    Hospitalist Progress Note  Name: Alexandra Alfaro    MRN: [id number]  Provider:  Iam Ring DO  Date of Service: 07/30/2021    Summary of Stay: Alexandra Alfaro is a 66 year old male with a history of covid 19 in January 2020, hepatocellular carcinoma s/p embolization and Atezolizumab in April 2021 with repeat imaging showing disease progression, hepatitis C cirrhosis admitted on 7/20/2021 with abdominal pain and abnormal LFTs.  Upon arrival the patient was noted to have a sodium of 127, worsening LFTs from his baseline with bilirubin of 6.3, alkaline phosphatase of 170, AST//281, lipase normal and troponin 0 0.021.  The patient did have a CT chest/abdomen/pelvis that showed no pulmonary embolism however did note innumerable hepatic mets similar to previous, mild gallbladder wall thickening.  Gastroenterology was consulted to see the patient.  The patient's home Lasix and spironolactone were held secondary to his hyponatremia.  The patient did have a right upper quadrant ultrasound that showed portal vein thrombosis with gallbladder wall sludge/thickening.  The patient was started on therapeutic Lovenox.  Given the patients history of hepatocellular carcinoma, Oncology was consulted to see the patient.  The patient was started on IV Zosyn for possible cholecystitis.  The patient had a paracentesis showing concern for SBP.  The patient was transitioned to IV Ertapenem.  Several discussions with the patient as well as his family were completed in regards to his cancer treatment failures as well as his poor performance status.  It was recommended several times to the patient as well as his family to pursue hospice/palliative care.      TODAY'S PLAN: Discussed with daughter at bedside who assisted with translation.  Patient denies any abdominal pain today.  States he feels okay.  Patient still has no voice but denies any pain with swallowing or any neck pain.  He denies any  shortness of breath or cough or runny nose.  No clear evidence of oral thrush.  We will continue to monitor that aspect.  His sodium seems to be improving as do his liver function test.  Discussed with oncology who is planning on a care conference this afternoon with the patient, palliative care and his family.  Will consult PT/OT.    Problem List:   1.  Portal vein thrombosis: RUQ US shows portal vein thrombosis and gallbladder sludge/thickening. MRI 7/22 showed progression of HCC with metastasis. Results discussed with family by Dr. Kee. Improvement in abdominal pain with oral medications. Initially on Lovenox 1 mg/kg BID but dose adjusted per heme/onc.     - as per Dr. Navarrete, will change lovenox to 1.5mg/kg daily indefinitely      - Lovenox education to be provided prior to discharge     - Per Onc concern for minimal improvement in thrombus given this is a tumor thrombus     2.  Gallbladder sludge/thickening: RUQ US showed gallbladder wall thickening/sludge. T-max 100.6 and WBC elevation. Abx ordered for possible infectious gallbladder etiology. In addition, abx will cover for SBP.     - Zosyn 7/21 - 7/25. Ertapenem 1 g q 24 hours 7/25 - 7/30.  Plan for 10 day total course of abx (last dose 7/30 for 10 day course - stop abx and asses if still clinically indicated) Adjustment abx therapy due to concern for SBP and worsening clinical status while on Zosyn 7/25. Clinically improving.   - Completed IV Ertapenem on 7/30/2021     3.  Cough with sputum production; pulmonary congestion:   - Abx coverage as above. Pulmonary hygiene with IC and flutter valve. Supportive cares available but attempt to minimize and encourage clearance of oral secretions. Not on oxygen. Patient feels like symptoms are improved 7/26. Resolved.      4.  Leucocytosis: Etiology multifactorial. Source of infection from abdomen (SBP/gallbladder sludge/thickening) On broad spectrum abx. Cultures remained negative. Afebrile. Suspect a component of  stress induced with pain/hiccups complicated by metastatic cancer. Clinically improved from 7/25 with increased abx coverage. Resolved 7/27.     5.  HCC with refractory ascites and diffuse metastasis; SBP: This is likely secondary to Hep C. Patient follows with Dr. Navarrete, consulted on admission.  Dr. Navarrete who also spoke with Dr. Valles (IR at [facility]). Patient is s/p bland embolization in May 2021 with prolonged hospital course following. 7/22 MRI shows disease progression. Initial plan on admission was to transfer to [facility], however given the MRI findings there is no further treatment.  The transfer was canceled. MRI findings were discussed with family per Dr. Kashmir Kee. Dr. Navarrete, Dr. Bolaños, Dr. Kee, and myself spoke with family and patient extensively that at this time there are no further treatment options. They are fixated that Oncology informed them that 10-20% have functional recovery and are eligible for cancer treatments.This is not consistent with documentation provided from Oncology.    IR paracentesis 7/24 1.9 L removed - yellow fluid  and 60% neutrophils. 7/26 paracentesis 1400 ml removed.  55% neutrophils.    - Family declining changes in narcotic therapies for pain control associated with metastatic disease. Prn medications available. Patient feels like he is getting adequate pain control.    - Palliative care consulted 7/26 - SW assisting with possible home palliative care services. Care conference 7/30 at 4:30 PM.    -Dronabinol 2.5 mg daily for appetitive   -IR consulted for repeat paracentesis 7/29 - therapeutic - low volume       6.  Chronic hyponatremia: Sodium 124. Baseline 126-129. Associated with malignancy. Monitor/unchanged. Sodium returned to baseline for patient 7/28 with albumin and IVF.   - Improving     7.  Acute kidney injury: Cr. 0.95 on admission. 7/27 Cr. 1.4 due to 3rd spacing of fluids and minimal oral intake. In addition to paracentesis removal of  fluid  - Monitor daily BMP. Renal function improving. Encourage oral hydration given worsening abdominal distension     8.  Hiccups: Associated with diaphragmatic irritation from fluid and malignancy. Thorazine prn (dose decreased and changed to oral given sedation) and Reglan prn.   - Improved     9.  Acute on chronic hepatitis in the setting of Hep C cirrhosis: Likely due to underlying HCC/treatment and now portal vein thrombosis. GI reviewed chart and given metastatic disease there is no new recommendations. Patient completed hepatitis C treatment and is seronegative.     - LFTs being trended - slowly improving but bilirubin remains elevated - follows with [facility]     10.  Normocytic anemia: Hemoglobin 8-9 baseline. Stable. No signs of bleeding. Monitor.      11.  Severe Malnutrition  - Nutrition following    12.  Physical Deconditioning  - Consult PT/OT  - Consult Social Work for discharge planning    13.  Social: Family is updated daily. Diagnosis with review of MRI findings with overall poor prognosis shared with family per Dr. Kee, Dr. Navarrete, and myself. The patient's sister has good insight into his disease, however his daughters and wife do not. The family has also chosen to keep his prognosis from him, as they do not want to affect his mental status. 7/25 updated family of ongoing concerns of worsening clinical status despite treatment. Encouraged consideration of goals of care and quality of life. Note provided for family to assist in getting a Visa for his daughter - Migdalia Prasad Jada Mahoney and children. 7/26 reviewed overall poor prognosis and unlikely probability that he will improve functional status to tolerate cancer treatments moving forward. Family also expressed concern about providing cares for him at home given his symptoms. However, they do not want to consider hospice or TCU.  They seem to have a disconnect in wanting full aggressive treatments and his realistic ability to recover.  They indicated that Oncology informed them that 10-20% of patients have functional recovery and have cancer treatments. They felt like no one can predict if he will be curative and they want to move forward with aggressive treatment. Discussed code status and concern for greater harm to patient if he were to require CPR/mechanical intubation. Family feels like they are unable to decide about this today. Agreeable to palliative care discussion.      DVT Prophylaxis: Enoxaparin (Lovenox) SQ  Code Status: Full Code  Diet: Regular Diet Adult  Snacks/Supplements Adult: Magic Cup; Between Meals  Snacks/Supplements Adult: Ensure Enlive; Between Meals  Calorie Counts    Zimmerman Catheter: Not present  Disposition: Expected discharge in 2-3 days to TCU vs home with home care. Goals prior to discharge include antibiotic plan established and PT evaluation complete.   Family updated today: Yes      Interval History   Patient seen and examined.  Patient's daughter at bedside assisted with translation.  Patient denies any abdominal pain, shortness of breath, cough, odynophagia/sore throat, runny nose.    -Data reviewed today: I personally reviewed all new labs and imaging results over the last 24 hours.     Physical Exam   Temp: 98.1  F (36.7  C) Temp src: Oral BP: 106/62 Pulse: 98   Resp: 18 SpO2: 94 % O2 Device: None (Room air)    Vitals:    07/21/21 0030 07/21/21 0737 07/26/21 0233   Weight: 75.6 kg (166 lb 11.2 oz) 72.2 kg (159 lb 1.6 oz) 79.2 kg (174 lb 8 oz)     Vital Signs with Ranges  Temp:  [97.6  F (36.4  C)-98.1  F (36.7  C)] 98.1  F (36.7  C)  Pulse:  [] 98  Resp:  [16-18] 18  BP: ()/(56-62) 106/62  SpO2:  [93 %-94 %] 94 %  No intake/output data recorded.    GENERAL: No apparent distress. Awake, alert, and fully oriented.  HEENT: Normocephalic, atraumatic. Extraocular movements intact.  CARDIOVASCULAR: Regular rate and rhythm without murmurs or rubs. No S3.  PULMONARY: Clear bilaterally.  GASTROINTESTINAL:  Soft, tender to palpation diffusely, non-distended. Bowel sounds normoactive.   EXTREMITIES: No cyanosis or clubbing. No edema.  NEUROLOGICAL: CN 2-12 grossly intact, no focal neurological deficits.  DERMATOLOGICAL: No rash, ulcer, bruising, nor jaundice.    Medications       dronabinol  2.5 mg Oral Daily     enoxaparin ANTICOAGULANT  1.5 mg/kg Subcutaneous Q24H     famotidine  20 mg Oral BID     lidocaine  1 patch Transdermal Q24H     lidocaine   Transdermal Q8H     polyethylene glycol  17 g Oral Daily     senna-docusate  2 tablet Oral BID     sodium chloride (PF)  3 mL Intracatheter Q8H     Data     Laboratory:  Recent Labs   Lab 07/30/21  0632 07/29/21  0632 07/28/21  0626   WBC 7.2 8.3 8.5   HGB 10.0* 9.6* 8.9*   HCT 29.2* 27.6* 25.5*   MCV 97 96 96    191 193     Recent Labs   Lab 07/30/21  0632 07/29/21  0632 07/28/21  0626   * 130* 127*   POTASSIUM 4.5 4.6 4.8   CHLORIDE 97 98 94   CO2 28 27 26   ANIONGAP 6 5 7   * 139* 121*   BUN 25 34* 44*   CR 0.95 1.00 1.25   GFRESTIMATED 83 78 60*   REBECCA 8.4* 8.3* 8.4*     Recent Labs   Lab 07/30/21  0632 07/29/21  0632 07/28/21  0626   * 137* 161*   * 161* 183*   ALKPHOS 197* 192* 172*   BILITOTAL 7.8* 8.1* 7.9*     No results for input(s): CULT in the last 168 hours.    Imaging:  No results found for this or any previous visit (from the past 24 hour(s)).      Iam Ring DO  [facility] Hospitalist  [address] [place] [address]  07/30/2021

## 2021-07-30 NOTE — PLAN OF CARE
"  PT: Will defer PT evaluation at this time, please refer to OT progress note included below:    \" Orders received. Chart reviewed and discussed with pain and palliative team.  At this time pt has poor prognosis. Unclear what pt's previous level of functioning was. Care Conference scheduled for today with pt to review options and to set patients goals for after discharge. Pain and palliative supported OT/PT deferring at this time, as pt so limited. Pain and palliative will re-order if necessary for family education for equipment and safe patient handling if that is what pt chooses today.   "

## 2021-07-30 NOTE — PROGRESS NOTES
CLINICAL NUTRITION SERVICES - REASSESSMENT NOTE    Recommendations Ordered by Registered Dietitian (RD):   Continue diet as ordered --> further nutrition plan/interventions per MD/Palliative   Continue oral nutritional supplements as ordered   Ordered Calorie Counts    Malnutrition: (7/27)  % Weight Loss:  Unable to determine --> fluid shifts   % Intake:  </= 50% for >/= 5 days (severe malnutrition)  Subcutaneous Fat Loss:  Orbital region severe depletion and Upper arm region severe depletion  Muscle Loss:  Temporal region severe depletion, Clavicle bone region severe depletion, Acromion bone region severe depletion, Anterior thigh region moderate depletion and Posterior calf region mild depletion  Fluid Retention:  Abdominal - paracentesis needed     Malnutrition Diagnosis: Severe malnutrition  In Context of:  Acute illness or injury  Chronic illness or disease     EVALUATION OF PROGRESS TOWARD GOALS   Diet: Regular Diet + Magic Cup + Ensure Enlive     Intake/Tolerance: Upon review of the flowsheets, pt is consuming <25-50% of meals ordered. Ordering small meals 1-2x per day. Overall likely consuming <25-50% of nutritional needs throughout admission (x 10 days). Unsure of oral nutritional supplements contribution.     ASSESSED NUTRITION NEEDS:  Dosing Weight 72.2 kg  Estimated Energy Needs: 2769-3124 kcals (25-30 Kcal/Kg)  Justification: maintenance  Estimated Protein Needs:  grams protein (1.2-1.5 g pro/Kg)  Justification: hypercatabolism with cancer-associated cachexia and preservation of lean body mass  Estimated Fluid Needs: per provider pending fluid status    NEW FINDINGS:   - Palliative following --> care conference at 4:30pm today, paged palliative to ask if they would address nutrition during this discussion   - underwent paracentesis on 7/29  - reviewed stooling patterns   - hem/onc following   - weight: weight status difficult to assess with fluid status   Vitals:    07/20/21 1327 07/21/21 0030  07/21/21 0737 07/26/21 0233   Weight: 72.6 kg (160 lb) 75.6 kg (166 lb 11.2 oz) 72.2 kg (159 lb 1.6 oz) 79.2 kg (174 lb 8 oz)     - medications:    dronabinol  2.5 mg Oral Daily     enoxaparin ANTICOAGULANT  1.5 mg/kg Subcutaneous Q24H     ertapenem (INVanz) IV  1 g Intravenous Q24H     famotidine  20 mg Oral BID     lidocaine  1 patch Transdermal Q24H     lidocaine   Transdermal Q8H     polyethylene glycol  17 g Oral Daily     senna-docusate  2 tablet Oral BID     sodium chloride (PF)  3 mL Intracatheter Q8H         acetaminophen **OR** acetaminophen, benzocaine 20%, benzonatate, bisacodyl, camphor-eucalyptus-menthol, diclofenac, guaiFENesin, HYDROmorphone, hydrOXYzine, melatonin, metoclopramide, naloxone **OR** naloxone **OR** naloxone **OR** naloxone, ondansetron **OR** ondansetron, oxyCODONE, phenol, sennosides, sodium chloride (PF)     - labs:   Labs:  Electrolytes  Potassium (mmol/L)   Date Value   07/30/2021 4.5   07/29/2021 4.6   07/28/2021 4.8   05/25/2021 5.0   03/18/2021 4.9   12/28/2020 4.8     Phosphorus (mg/dL)   Date Value   07/27/2021 3.0   07/26/2021 2.3 (L)    Blood Glucose  Glucose (mg/dL)   Date Value   07/30/2021 108 (H)   07/29/2021 139 (H)   07/28/2021 121 (H)   07/27/2021 149 (H)   07/27/2021 164 (H)   05/25/2021 111 (H)   03/18/2021 109 (H)   12/28/2020 123 (H)   12/25/2020 185 (H)   12/14/2020 88    Inflammatory Markers  CRP Inflammation (mg/L)   Date Value   12/27/2020 31.1 (H)     WBC (10e9/L)   Date Value   03/18/2021 8.4   12/29/2020 9.1   12/28/2020 9.3     WBC Count (10e3/uL)   Date Value   07/30/2021 7.2   07/29/2021 8.3   07/28/2021 8.5     Albumin (g/dL)   Date Value   07/30/2021 1.5 (L)   07/29/2021 1.6 (L)   07/28/2021 1.7 (L)   05/25/2021 3.4   03/18/2021 2.5 (L)   12/28/2020 2.1 (L)      Magnesium (mg/dL)   Date Value   07/27/2021 2.6 (H)   07/26/2021 2.5 (H)   07/25/2021 2.3     Sodium (mmol/L)   Date Value   07/30/2021 131 (L)   07/29/2021 130 (L)   07/28/2021 127 (L)    05/25/2021 129 (L)   03/18/2021 137   12/28/2020 136    Renal  Urea Nitrogen (mg/dL)   Date Value   07/30/2021 25   07/29/2021 34 (H)   07/28/2021 44 (H)   05/25/2021 14   03/18/2021 14   12/28/2020 14     Creatinine (mg/dL)   Date Value   07/30/2021 0.95   07/29/2021 1.00   07/28/2021 1.25   05/25/2021 0.81   03/18/2021 1.01   12/28/2020 0.76     Additional  Ketones Urine (mg/dL)   Date Value   07/20/2021 Negative   12/14/2020 Negative        Previous Goals:   Goals of nutritional care established within 48-72 hours.  Evaluation: Not met    Previous Nutrition Diagnosis:   Malnutrition related to chronic disease progression (advanced cancer) as evidenced by <25% intakes x7 days, severe muscle loss, severe fat loss  Evaluation: Declining    MALNUTRITION (7/27)  % Weight Loss:  Unable to determine  % Intake:  </= 50% for >/= 5 days (severe malnutrition)  Subcutaneous Fat Loss:  Orbital region severe depletion and Upper arm region severe depletion  Muscle Loss:  Temporal region severe depletion, Clavicle bone region severe depletion, Acromion bone region severe depletion, Anterior thigh region moderate depletion and Posterior calf region mild depletion  Fluid Retention:  Abdominal - paracentesis needed     Malnutrition Diagnosis: Severe malnutrition  In Context of:  Acute illness or injury  Chronic illness or disease    CURRENT NUTRITION DIAGNOSIS  Malnutrition related to poor oral intake in the setting of chronic disease progression (advanced cancer) as evidenced by <25% intakes x 10 days, severe muscle loss, severe fat loss    INTERVENTIONS  Recommendations / Nutrition Prescription  Continue diet as ordered --> further nutrition plan/interventions per MD/Palliative   Continue oral nutritional supplements as ordered   Ordered Calorie Counts     Implementation  Medical Food Supplement: as above  Collaboration and Referral of Nutrition care: discussed pt during interdisciplinary rounds this morning     Goals  Goals of  nutritional care established within 24 hours.    MONITORING AND EVALUATION:  Progress towards goals will be monitored and evaluated per protocol and Practice Guidelines    Ana Cristina Ortega RD, LD  Clinical Dietitian

## 2021-07-30 NOTE — PROGRESS NOTES
[facility]  Palliative Care Progress Note  Text Page      I joined the family care conference in the fifth floor conference room with Oncologist Arden Bolaños MD. The patient's sister acting as  to the patient's two daughters and his wife. Dr. Bolaños explained the treatment options for cancer and explained that patient's performance status is to poor to consider systemic therapies. To receive treatment the patient's nutrition and activity needs to improve. Dr. Bolaños explained that he is not optimistic the patient will ever improve enough to receive further treatment. The patient's sister insist they will encouraged the patient to eat and become more mobile. Dr. Bolaños showed the family the MRI images to demonstrate there is only 25% of the liver that does not have tumor and the patient had cirrhosis prior to the cancer diagnosis. The patient's sister will talk with the patient to encourage him to eat and walk as much as possible. She acknowledged the probability the patient will likely will not have treatment, but she would like the patient to be encouraged to improve despite the poor prognosis.  Family would like to take the patient home, as the hospital and a rehab facility are too depressing. Obviously the family request to be optimistic. I encouraged them to consider what would give the patient hope and to focus on the things that he would like to pursue.        Recommendations:  1. Goals of Care- Full Code - Restorative care.     Hospitalization goals :  Family conference with the patient's sister, two daughters and wife. His sister acting as  request to continue all restorative efforts. She will explain to him that he need to eat and increase activity to hopefully be able to have further treatment. Oncologist Arden Bolaños MD acknowledged he is not optimistic the patient will improve enough to have further cancer treatment. Family request to be optimistic and continue all  treatments.   Code status previously reviewed with patient, daughter and sister with interpretor present. They request all options including intubation, mechanical ventilation, ICU for specialized care, and ongoing paracentesis as needed.  Family states they do not want to tell the patient he is dying, as they do not want him to loose hope. Patient confirmed via interpretor that MRI demonstrates disease progression.       Decisional Capacity - Intact. Patient does not have an advance directive. Include patient in decision making as much as possible and involve next of kin as surrogate decision maker  attempt consensus with patient. Patient is French and Romansh speaking. Patient has confirmed through the use of an interpretor that family can speak and interpret on his behalf.      POLST There is no POLST form on file, plan to complete prior to DC.     2. Severe malnutrition - Appreciate input of Registered Dietitian Ana Cristina Ortega, RD, LD. Increase Dronabinol 5 mg daily. Family wants to encourage oral intake and states that the patient would not want tube feeding as it would be too depressing.     3. Generalized weakness - Goals continue to be restorative. Family is optimistic as the patient was able to ambulate to the bathroom today. Appreciate input of Occupational Therapist Chen Marin OT. Continue with rehabilitation efforts with plan to dismiss to home in the future.     4.  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    - Oxycodone 5 mg every 3 hrs prn  - Dilaudid 0.3 mg every 2 hrs prn  - Lidocaine patch at bedtime. Diclofenac gel QID PRN as patient/family refusing this option.  - Consider binder for chest splinting      Patient's opioid use in past 24 hours: None = 0 mg Daily Morphine Equivalent  [place] Board of Pharmacy Data Base Reviewed:    YES; As expected, no concern for misuse/abuse  of controlled medications based on this report.     5.  Hiccup - No hiccup noted currently. Thorazine 10 mg po tid prn was too sedating and discontinued. Consider starting Gabapentin if it becomes an issue again.       6.  Dyspnea - Patient resting peacefully with regular respirations at 16. Paracentesis with 50 ml's removed yesterday. Continue to monitor.     7. Spiritual Care  Appreciate input of  Intern Og Qureshi.  Spiritual Background:  Yazdanism. Sister fees use of Imam or other spiritual leader would make the patient feel he is dying and will loose hope.      8. Care Planning  Appreciate input of  ANDREW Fisher as family plan to take the patient home with home care services, with [facility]  care will be needed, paracentesis as out patient [facility] preferred with [facility] follow up   Palliative care out patient consult highly advised with [place] [facility]    Medications for discharge: Dronabinol 5 mg daily.     Thank you for involving us in the patient's care.      Krista Vazquez MS, RN, CNS, APRN, ACHPN, FAACVPR  Pain and Palliative Care  Pager [phone]  Office [phone]       Time Spent on this Encounter   Total unit/floor time 4:45 PM until 6:15 PM, time consisted of the following, examination of the patient, reviewing the record and completing documentation. >50% of time spent in counseling and coordination of care.  Time spend counseling with family consisted of the following topics, goals of care, education about diagnosis, education about prognosis, care planning for discharge and symptom management.  Time spent in coordination of care with Bedside Nurse Jaime Kaur RN, Hospitalist Iam Ring DO, Dietician Ana Cristina Ortega, RD, LD and Occupational Therapist Chen Marin OT.     Interval History   Chart reviewed - limited improvement    Palliative Symptom Review (0=no symptom/no concern, 1=mild, 2=moderate, 3=severe):      Pain: 0-none       Fatigue: 3-severe      Nausea: 0-none      Constipation: 0-none      Diarrhea: 0-none      Depressive Symptoms: 1-mild      Anxiety: 0-none      Drowsiness: 3-severe      Poor Appetite: 2-moderate      Shortness of Breath: 0-none      Insomnia: 0-none          Physical Exam   Temp:  [97.9  F (36.6  C)-98.1  F (36.7  C)] 98.1  F (36.7  C)  Pulse:  [] 104  Resp:  [18] 18  BP: ()/(56-62) 103/60  SpO2:  [93 %-94 %] 94 %  174 lbs 8 oz  GEN:  Alert, oriented x 3, appears comfortable, NAD.    PAIN BEHAVIOR: Cooperative  Psych:  Normal affect.  Calm, cooperative, conversant appropriately.    Medications       dronabinol  2.5 mg Oral Daily     enoxaparin ANTICOAGULANT  1.5 mg/kg Subcutaneous Q24H     famotidine  20 mg Oral BID     lidocaine  1 patch Transdermal Q24H     lidocaine   Transdermal Q8H     polyethylene glycol  17 g Oral Daily     senna-docusate  2 tablet Oral BID     sodium chloride (PF)  3 mL Intracatheter Q8H       Data   Results for orders placed or performed during the hospital encounter of 07/20/21 (from the past 24 hour(s))   CBC with platelets   Result Value Ref Range    WBC Count 7.2 4.0 - 11.0 10e3/uL    RBC Count 3.02 (L) 4.40 - 5.90 10e6/uL    Hemoglobin 10.0 (L) 13.3 - 17.7 g/dL    Hematocrit 29.2 (L) 40.0 - 53.0 %    MCV 97 78 - 100 fL    MCH 33.1 (H) 26.5 - 33.0 pg    MCHC 34.2 31.5 - 36.5 g/dL    RDW 18.7 (H) 10.0 - 15.0 %    Platelet Count 200 150 - 450 10e3/uL   Basic metabolic panel   Result Value Ref Range    Sodium 131 (L) 133 - 144 mmol/L    Potassium 4.5 3.4 - 5.3 mmol/L    Chloride 97 94 - 109 mmol/L    Carbon Dioxide (CO2) 28 20 - 32 mmol/L    Anion Gap 6 3 - 14 mmol/L    Urea Nitrogen 25 7 - 30 mg/dL    Creatinine 0.95 0.66 - 1.25 mg/dL    Calcium 8.4 (L) 8.5 - 10.1 mg/dL    Glucose 108 (H) 70 - 99 mg/dL    GFR Estimate 83 >60 mL/min/1.73m2   Hepatic panel   Result Value Ref Range    Bilirubin Total 7.8 (H) 0.2 - 1.3 mg/dL    Bilirubin Direct 5.8 (H) 0.0 - 0.2 mg/dL    Protein  Total 6.9 6.8 - 8.8 g/dL    Albumin 1.5 (L) 3.4 - 5.0 g/dL    Alkaline Phosphatase 197 (H) 40 - 150 U/L     (H) 0 - 45 U/L     (H) 0 - 70 U/L   INR   Result Value Ref Range    INR 1.34 (H) 0.85 - 1.15

## 2021-07-30 NOTE — PLAN OF CARE
OT: Orders received. Chart reviewed and discussed with pain and palliative team.  At this time pt has poor prognosis. Unclear what pt's previous level of functioning was. Care Conference scheduled for today with pt to review options and to set patients goals for after discharge. Pain and palliative supported OT/PT deferring at this time, as pt so limited. Pain and palliative will re-order if necessary for family education for equipment and safe patient handling if that is what pt chooses today.

## 2021-07-30 NOTE — PLAN OF CARE
"To Do:  End of shift summary.  For vital signs and complete assessments, please see documentation flowsheet.     Pertinent assessments:  Pt A&O x4.  Denies pain.  Daughters in room throughout the day.  BM x1 this shift. Patient continues to be jaundice, poor appetite.  Patient slept a lot during the shift.    Major Shift Events:  uneventful.     Treatment Plan:  Paracentesis w\ 50 mL removed. Care conference later today    BP 97/56 (BP Location: Left arm)   Pulse 101   Temp 97.9  F (36.6  C) (Axillary)   Resp 18   Ht 1.753 m (5' 9\")   Wt 79.2 kg (174 lb 8 oz)   SpO2 93%   BMI 25.77 kg/m         Bedside Nurse:  Emily Henriquez RN    "

## 2021-07-30 NOTE — PLAN OF CARE
Pt A&O x4.  Denies pain.  Daughters in room throughout the day.  BM x3 this shift. Patient continues to be jaundice, poor appetite.  Patient slept a lot during the shift.    Major Shift Events:  uneventful.     Treatment Plan:  Paracentesis w\ 50 mL removed.    Rosaura Parker RN

## 2021-07-30 NOTE — PROGRESS NOTES
Oncology/Hematology Follow Up Note:    Had a 1 hour family care conference with the patient's sister, wife, and 2 daughters.  Krista Vazquez from palliative care was also involved.    The family is optimistic that Alexandra's liver enzymes continue to trend down, and his bilirubin is now starting to trend down.  He has been afebrile for a few days, and last ascitic fluid sample did not show any evidence of infection.  He is starting to eat a little more, especially after Marinol was introduced.  He remains weak and requires 1 person assist for ambulation.    I went over Alexandra's recent MRI images with the patient's family.  They understand he is in critical condition and based on my estimation only 20-25% of his liver is not infiltrated by cancer, and he also has underlying cirrhosis.  Most of his symptoms at this time are due to liver failure.  However, the recent SBP and portal vein thrombus have contributed as well.    The patient's family is very firm in their belief that the patient would like to do everything he can to get stronger in order to go home and potentially consider additional systemic therapy.  His disease has been very minimally treated with systemic therapy.  Has received 2 cycles of atezolizumab prior to embolization.  Has not had a TKI.  I explained the potential toxicities of these treatments and the low probability of response to the patient's family.  At this time, his ECOG PS is closer to a 4.  His PS needs to improve to at least a 2 before we can consider any additional systemic therapy.  I told the family multiple times that I am very pessimistic about his chances of improving his functional status enough to consider further treatment.  However, they insist he will at least give it a try.  His appetite is already getting better, but he still needs to get more calories in.  He needs to meet with a dietician to help with this.  He also needs to get significantly stronger.  In order to do that,  he needs to really push himself to be as active as possible.    We will continue to reassess on a daily basis.  Again, I am not very optimistic about the patient achieving meaningful functional improvement over the next week or so.    Arden Bolaños M.D.  [facility]  [phone]

## 2021-07-31 NOTE — PLAN OF CARE
End of Shift Summary  For vital signs and complete assessments, please see documentation flowsheets.     Pertinent assessments: AxOx4. Slept most of shift. Daughter in room. Jaundice skin, eyes. Poor appetite.  Major Shift Events: Add calorie Counting. Care conference planned for 4:30 pm.  Treatment Plan: Monitor. Care conference to determine plan of care.  Bedside Nurse: Kathy Greer RN

## 2021-07-31 NOTE — CONSULTS
Care Management Initial Consult    General Information  Assessment completed with: Patient, Family, Angelina and Radha  Type of CM/SW Visit: Initial Assessment    Primary Care Provider verified and updated as needed:     Readmission within the last 30 days:    Pt has moved his care for YUNIOR Khan to [facility]. Daughter does not remember MD   Also followed by [facility] oncology             Communication Assessment  Patient's communication style: spoken language (non-English) (Czech)    Hearing Difficulty or Deaf: no   Wear Glasses or Blind: yes    Cognitive  Cognitive/Neuro/Behavioral: WDL  Level of Consciousness: alert  Arousal Level: opens eyes spontaneously  Orientation: oriented x 4  Mood/Behavior: calm, cooperative     Speech: whispers    Living Environment:   People in home: spouse, child(megan), adult  Jatindera, Lanrea, Logana  Current living Arrangements: apartment    - ground floor, no stairs   Able to return to prior arrangements: yes       Family/Social Support:  Care provided by: self, spouse/significant other, child(megan)  Provides care for: no one  Marital Status:   Wife, Children  Maha       Description of Support System: Supportive, Involved    Support Assessment: Adequate family and caregiver support, Adequate social supports    Current Resources:   Patient receiving home care services: No     Community Resources: None  Equipment currently used at home: shower chair (walker with bench seat )  Family have requested standard walker for support in the home setting     Supplies currently used at home: None     Lifestyle & Psychosocial Needs:  Social Determinants of Health     Tobacco Use: Medium Risk     Smoking Tobacco Use: Former Smoker     Smokeless Tobacco Use: Never Used   Alcohol Use:      Frequency of Alcohol Consumption:      Average Number of Drinks:      Frequency of Binge Drinking:    Financial Resource Strain:      Difficulty of Paying Living Expenses:    Food Insecurity:      Worried About Running  Out of Food in the Last Year:      Ran Out of Food in the Last Year:    Transportation Needs:      Lack of Transportation (Medical):      Lack of Transportation (Non-Medical):    Physical Activity:      Days of Exercise per Week:      Minutes of Exercise per Session:    Stress:      Feeling of Stress :    Social Connections:      Frequency of Communication with Friends and Family:      Frequency of Social Gatherings with Friends and Family:      Attends Rastafarian Services:      Active Member of Clubs or Organizations:      Attends Club or Organization Meetings:      Marital Status:    Intimate Partner Violence:      Fear of Current or Ex-Partner:      Emotionally Abused:      Physically Abused:      Sexually Abused:    Depression: Not at risk     PHQ-2 Score: 0   Housing Stability:      Unable to Pay for Housing in the Last Year:      Number of Places Lived in the Last Year:      Unstable Housing in the Last Year:        Functional Status:  Prior to admission patient needed assistance:   Dependent ADLs:: Independent  Dependent IADLs:: Transportation, Shopping, Laundry, Meal Preparation, Medication Management, Cleaning, Cooking  Assesssment of Functional Status: Not at baseline with ADL Functioning, Not at baseline with mobility    Mental Health Status:  Mental Health Status: No Current Concerns       Chemical Dependency Status:  Chemical Dependency Status: No Current Concerns                     Additional Information:  Met with pt and 2 daughters. Pt and family agree that once stable to leave they would like pt to return home for support. Pt has his wife home full time and 3 adult daughter who are able to assist with needs as the arise. Daughters still lives at home with pt  Pt per Oncology note has poor prognosis with his hepatocellular carcinoma .. Oncology has recommended Hospice, Family are still on plan for restorative support at this time    Discuss options for home care support. Family are open to this and  do not have a preferred home care provider. Family are aware home care agencies are limited at this time and willing to accept any agency that can support them at this time  Family DO want PT eval and support while here at [facility].. Chart review pt has appt for tomorrow.     SW will continue to follow.. Will ask Family to bring in contact for new Primary care as they have moved pt's care to [facility]    Will request from MD RN.PT/OT home care orders for support and [facility] Bloom asked if pt would need follow up lab appt. SW indicated MD would make determination .     Corinne C. White, MELI

## 2021-07-31 NOTE — PROGRESS NOTES
CLINICAL NUTRITION SERVICES - BRIEF NOTE    - Chart check for patient.  Refer to RD reassessment completed yesterday.  - Per review of palliative and oncology team notes following care conference yesterday, patient is restorative despite poor prognosis and unlikely to receive further treatment - he declined a FT.  Patient/family would like to focus on PO intake push as able.    - As such, discontinued calorie counts.  Continue to support PO intakes as able with current diet and supplements.    - Will continue following.      Avani San RDN, LD  Clinical Dietitian  3rd floor/ICU: [phone]  All other floors: [phone]  Weekend/holiday: [phone]

## 2021-07-31 NOTE — PROGRESS NOTES
Oncology/Hematology Follow Up Note:    Assessment and Plan:  1.  Portal vein thrombosis: Main portal vein:   -Findings of portal vein thrombosis would explain clinical presentation.  -Patient had MRI of liver on 7/22 which showed, enhancing portal vein thrombus involving main portal vein and right greater than left portal vein.  There was evidence of interval progression with enlargement of irregular mass of tumor enhancement closer to dominant mass in right inferior liver.  Also there were innumerable new large nodules throughout the liver.  There was new mass with bony destruction identified along the right posterior 11th rib.   - Started Lovenox for portal vein thrombus  -Continued improvement in liver enzymes, Bilirubin still trending up.     Plan   - Continue therapeutic Lovenox indefinitely, unless he has any bleeding issues  - Not sure how much his portal vein thrombus will improve with anticoagulation, since this is mostly a tumor thrombus        2.  Hepatocellular carcinoma  -Patient had bland embolization (could not tolerate yttrium-90 due to shunt)   -Also had immune checkpoint inhibitor atezolizumab in the past on hold since April.   - Recent scan showed progression of disease     Plan   - Performance status too poor for any further systemic treatment at this time  - I again had an extensive conversation with the patient and his daughter about a comfort approach such as hospice, but they are not still interested.  They are still hoping his performance status will improve enough with further treatment of portal vein thrombus and SBP to receive further systemic therapy.  I think this is highly unlikely.  -It is unlikely the patient will be candidate for further embolization given portal vein thrombosis.  - Please see detailed note of lengthy care conference done by palliative care and Dr. Bolaños. Patient's family continues to have unrealistic expectations and want full aggressive cares and hope that patient  will be able to receive chemotherapy. Dr. Bolaños has made it clear that patient is in no condition to tolerate aggressive chemotherapy. Agree with the same     3. Spontaneous bacterial peritonitis  - Now afebrile  - Repeat ascitic fluid gram stain negative.  Culture negative so far  - Scheduled for repeat paracentesis tomorrow     4.  Hepatitis C  -Patient is completed treatment for hepatitis C  -He is seronegative now     5.  Cirrhosis of liver  -Follows with gastroenterology.      6. Hiccups  - Prescribed thorazine PRN    No family in the room. D/w RN.    Subjective:    Patient denies any pain, resting comfortably      Labs:  All labs reviewed    CBCRecent Labs   Lab 07/31/21  0628 07/30/21  0632 07/29/21  0632   WBC 7.5 7.2 8.3   HGB 9.3* 10.0* 9.6*   MCV 99 97 96    200 191       CMP  Recent Labs   Lab 07/31/21  0628 07/30/21  0632 07/29/21  0632 07/28/21  0626 07/27/21  1500 07/27/21  0648 07/26/21  0509 07/25/21  0551   * 131* 130* 127* 123* 124* 124* 124*   POTASSIUM 4.6 4.5 4.6 4.8 5.4* 5.2 5.0 4.8   CHLORIDE 97 97 98 94 91* 92* 92* 93*   CO2 29 28 27 26 26 27 27 27   ANIONGAP 5 6 5 7 6 5 5 4   GLC 86 108* 139* 121* 164* 136* 125* 150*   BUN 19 25 34* 44* 51* 49* 38* 33*   CR 0.95 0.95 1.00 1.25 1.40* 1.45* 1.28* 1.28*   GFRESTIMATED 83 83 78 60* 52* 50* 58* 58*   REBECCA 8.2* 8.4* 8.3* 8.4* 8.2* 8.4* 8.4* 8.3*   MAG  --   --   --   --  2.6*  --  2.5* 2.3   PHOS  --   --   --   --   --  3.0 2.3*  --    PROTTOTAL 6.6* 6.9 6.5* 6.4*  --  6.5* 6.8 6.8   ALBUMIN 1.4* 1.5* 1.6* 1.7*  --  1.3* 1.4* 1.4*   BILITOTAL 7.9* 7.8* 8.1* 7.9*  --  7.6* 8.0* 7.8*   ALKPHOS 182* 197* 192* 172*  --  186* 189* 181*   AST 91* 114* 137* 161*  --  217* 361* 614*   * 134* 161* 183*  --  237* 331* 398*       INR  Recent Labs   Lab 07/31/21  0628 07/30/21  0632   INR 1.32* 1.34*       Blood CultureNo results for input(s): CULT in the last 168 hours.      Kiki Hess MD  [facility]  7/31/2021 2:09 PM

## 2021-07-31 NOTE — PROGRESS NOTES
[facility]  Hospitalist Progress Note  Name: Alexandra Alfaro    MRN: [id number]  Provider:  Delgado Fraser MD  Date of Service: 07/31/2021    Summary of Stay: Alexandra Alfaro is a 66 year old male with a history of covid 19 in January 2020, hepatocellular carcinoma s/p embolization and Atezolizumab in April 2021 with repeat imaging showing disease progression, hepatitis C cirrhosis admitted on 7/20/2021 with abdominal pain and abnormal LFTs.  Upon arrival the patient was noted to have a sodium of 127, worsening LFTs from his baseline with bilirubin of 6.3, alkaline phosphatase of 170, AST//281, lipase normal and troponin 0 0.021.  The patient did have a CT chest/abdomen/pelvis that showed no pulmonary embolism however did note innumerable hepatic mets similar to previous, mild gallbladder wall thickening.  Gastroenterology was consulted to see the patient.  The patient's home Lasix and spironolactone were held secondary to his hyponatremia.  The patient did have a right upper quadrant ultrasound that showed portal vein thrombosis with gallbladder wall sludge/thickening.  The patient was started on therapeutic Lovenox.  Given the patients history of hepatocellular carcinoma, Oncology was consulted to see the patient.  The patient was started on IV Zosyn for possible cholecystitis.  The patient had a paracentesis showing concern for SBP.  The patient was transitioned to IV Ertapenem.  Several discussions with the patient as well as his family were completed in regards to his cancer treatment failures as well as his poor performance status.  It was recommended several times to the patient as well as his family to pursue hospice/palliative care.      Problem List:   1.  Portal vein thrombosis: RUQ US shows portal vein thrombosis and gallbladder sludge/thickening. MRI 7/22 showed progression of HCC with metastasis. Results discussed with family by Dr. Kee. Improvement in abdominal pain with  oral medications. Initially on Lovenox 1 mg/kg BID but dose adjusted per heme/onc.     - as per Dr. Navarrete, will change lovenox to 1.5mg/kg daily indefinitely      - Lovenox education to be provided prior to discharge     - Per Onc concern for minimal improvement in thrombus given this is a tumor thrombus     2.  Gallbladder sludge/thickening: RUQ US showed gallbladder wall thickening/sludge. T-max 100.6 and WBC elevation. Abx ordered for possible infectious gallbladder etiology. In addition, abx will cover for SBP.     - Zosyn 7/21 - 7/25. Ertapenem 1 g q 24 hours 7/25 - 7/30.  Plan for 10 day total course of abx (last dose 7/30 for 10 day course - stop abx and asses if still clinically indicated) Adjustment abx therapy due to concern for SBP and worsening clinical status while on Zosyn 7/25. Clinically improving.   - Completed IV Ertapenem on 7/30/2021     3.  Cough with sputum production; pulmonary congestion:   - Abx coverage as above. Pulmonary hygiene with IS and flutter valve. Supportive cares available but attempt to minimize and encourage clearance of oral secretions. Not on oxygen.      4.  Leucocytosis: Etiology multifactorial. Source of infection from abdomen (SBP/gallbladder sludge/thickening) On broad spectrum abx. Cultures remained negative. Afebrile. Suspect a component of stress induced with pain/hiccups complicated by metastatic cancer. Clinically improved from 7/25 with increased abx coverage. Resolved 7/27.     5.  HCC with refractory ascites and diffuse metastasis; SBP: This is likely secondary to Hep C. Patient follows with Dr. Navarrete, consulted on admission.  Dr. Navarrete who also spoke with Dr. Valles (IR at [facility]). Patient is s/p bland embolization in May 2021 with prolonged hospital course following. 7/22 MRI shows disease progression. Initial plan on admission was to transfer to [facility], however given the MRI findings there is no further treatment.  The transfer was canceled. MRI  findings were discussed with family per Dr. Kashmir Kee. Dr. Navarrete, Dr. Bolaños, Dr. Kee, and myself spoke with family and patient extensively that at this time there are no further treatment options. They are fixated that Oncology informed them that 10-20% have functional recovery and are eligible for cancer treatments.This is not consistent with documentation provided from Oncology.    IR paracentesis 7/24 1.9 L removed - yellow fluid  and 60% neutrophils. 7/26 paracentesis 1400 ml removed.  55% neutrophils.    - Family declining changes in narcotic therapies for pain control associated with metastatic disease. Prn medications available. Patient feels like he is getting adequate pain control.    - Palliative care consulted 7/26 - SW assisting with possible home palliative care services. Care conference 7/30 at 4:30 PM.    -Dronabinol 2.5 mg daily for appetitive   -IR consulted, repeat paracentesis 7/29 - therapeutic - low volume.  -Daughter asking if paracentesis is done again today, explained to her, there is no need at this point, but needs to be reevaluate in 2 days and likely will have paracentesis prior to discharging him likely Monday.    6.  Chronic hyponatremia: Sodium 124. Baseline 126-129. Associated with malignancy. Monitor/unchanged. Sodium returned to baseline for patient 7/28 with albumin and IVF.   - Improving     7.  Acute kidney injury: Cr. 0.95 on admission. 7/27 Cr. 1.4 due to 3rd spacing of fluids and minimal oral intake. In addition to paracentesis removal of fluid  - Monitor daily BMP. Renal function improving. Encourage oral hydration given worsening abdominal distension     8.  Hiccups: Associated with diaphragmatic irritation from fluid and malignancy. Thorazine prn (dose decreased and changed to oral given sedation) and Reglan prn.   - Improved     9.  Acute on chronic hepatitis in the setting of Hep C cirrhosis: Likely due to underlying HCC/treatment and now portal vein  thrombosis. GI reviewed chart and given metastatic disease there is no new recommendations. Patient completed hepatitis C treatment and is seronegative.     - LFTs being trended - slowly improving but bilirubin remains elevated - follows with [facility]     10.  Normocytic anemia: Hemoglobin 8-9 baseline. Stable. No signs of bleeding. Monitor.      11.  Severe Malnutrition in the context of acute illness on chronic diseases  - Nutrition following    12.  Physical Deconditioning  - Consult PT/OT  - Consult Social Work for discharge planning    13.  Social: Family is updated daily. Diagnosis with review of MRI findings with overall poor prognosis shared with family per Dr. Kee, Dr. Navarrete, and myself. The patient's sister has good insight into his disease, however his daughters and wife do not. The family has also chosen to keep his prognosis from him, as they do not want to affect his mental status. 7/25 updated family of ongoing concerns of worsening clinical status despite treatment. Encouraged consideration of goals of care and quality of life. Note provided for family to assist in getting a [place]isa for his daughter - Migdalia Mahoney and children. 7/26 reviewed overall poor prognosis and unlikely probability that he will improve functional status to tolerate cancer treatments moving forward. Family also expressed concern about providing cares for him at home given his symptoms. However, they do not want to consider hospice or TCU.  They seem to have a disconnect in wanting full aggressive treatments and his realistic ability to recover. They indicated that Oncology informed them that 10-20% of patients have functional recovery and have cancer treatments. They felt like no one can predict if he will be curative and they want to move forward with aggressive treatment. Discussed code status and concern for greater harm to patient if he were to require CPR/mechanical intubation. Family feels like they are  unable to decide about this today. Agreeable to palliative care discussion.      Today.  I discussed at length with his daughter at bedside, all his question and concerns addressed.  She is aware of the extent of his illness, still hopeful that he will improve and will be going back home.  She requested to review the labs, and reviewed it together at length.    DVT Prophylaxis: Enoxaparin (Lovenox) SQ  Code Status: Full Code  Diet: Regular Diet Adult  Snacks/Supplements Adult: Magic Cup; Between Meals  Snacks/Supplements Adult: Ensure Enlive; Between Meals    Zimmerman Catheter: Not present  Disposition: Expected discharge in 2 days to TCU vs home with home care. Goals prior to discharge include, functional improvement, patient is done with his antibiotics.   Family updated today: Yes      Interval History   Patient seen and examined, assumed care today, no new overnight issues, sleeping comfortably, his daughter at bedside, no reported issues or complaints.    -Data reviewed today: I personally reviewed all new labs and imaging results over the last 24 hours.     Physical Exam   Temp: 97.6  F (36.4  C) Temp src: Oral BP: 98/58 Pulse: 88   Resp: 16 SpO2: 96 % O2 Device: None (Room air)    Vitals:    07/21/21 0030 07/21/21 0737 07/26/21 0233   Weight: 75.6 kg (166 lb 11.2 oz) 72.2 kg (159 lb 1.6 oz) 79.2 kg (174 lb 8 oz)     Vital Signs with Ranges  Temp:  [97.6  F (36.4  C)-98.1  F (36.7  C)] 97.6  F (36.4  C)  Pulse:  [] 88  Resp:  [16-18] 16  BP: ()/(54-60) 98/58  SpO2:  [94 %-96 %] 96 %  I/O last 3 completed shifts:  In: 970 [P.O.:970]  Out: -     GENERAL: No apparent distress. Awake, alert, and fully oriented.  HEENT: Normocephalic, atraumatic. Extraocular movements intact.  CVS:  Regular rate and rhythm without murmurs or rubs. No S3.  CHEST: Clear bilaterally.  GASTROINTESTINAL: Soft, tender to palpation diffusely, non-distended. Bowel sounds normoactive.   EXT: No cyanosis or clubbing. No  edema.  NEUR: CN 2-12 grossly intact, no focal neurological deficits.  DERM: No rash, ulcer, bruising, nor jaundice.    Medications       dronabinol  5 mg Oral Daily     enoxaparin ANTICOAGULANT  1.5 mg/kg Subcutaneous Q24H     famotidine  20 mg Oral BID     lidocaine  1 patch Transdermal Q24H     lidocaine   Transdermal Q8H     polyethylene glycol  17 g Oral Daily     senna-docusate  2 tablet Oral BID     sodium chloride (PF)  3 mL Intracatheter Q8H     Data     Laboratory:  Recent Labs   Lab 07/31/21 0628 07/30/21  0632 07/29/21  0632   WBC 7.5 7.2 8.3   HGB 9.3* 10.0* 9.6*   HCT 27.8* 29.2* 27.6*   MCV 99 97 96    200 191     Recent Labs   Lab 07/31/21 0628 07/30/21  0632 07/29/21  0632   * 131* 130*   POTASSIUM 4.6 4.5 4.6   CHLORIDE 97 97 98   CO2 29 28 27   ANIONGAP 5 6 5   GLC 86 108* 139*   BUN 19 25 34*   CR 0.95 0.95 1.00   GFRESTIMATED 83 83 78   REBECCA 8.2* 8.4* 8.3*     Recent Labs   Lab 07/31/21 0628 07/30/21  0632 07/29/21  0632   AST 91* 114* 137*   * 134* 161*   ALKPHOS 182* 197* 192*   BILITOTAL 7.9* 7.8* 8.1*     No results for input(s): CULT in the last 168 hours.    Imaging:  No results found for this or any previous visit (from the past 24 hour(s)).      07/31/2021

## 2021-07-31 NOTE — PLAN OF CARE
End of Shift Summary  For vital signs and complete assessments, please see documentation flowsheets.     Pertinent assessments: AxOx4. Daughter in room to help with meals. Jaundice skin, eyes. Poor appetite.  Major Shift Events: Stop calorie Counting.   Treatment Plan: Monitor. Determine discharge planning.  Bedside Nurse: Kathy Greer RN      No

## 2021-07-31 NOTE — PLAN OF CARE
Assessments: A&Ox4. Generalized weakness. Denies any pain, nausea/vomiting. Up SBA with walker. Had BMs.     Major Shift Events: Care conference    Treatment Plan: restorative cares, dronabinol daily    Bedside Nurse: Jaime Kaur RN

## 2021-08-01 NOTE — PROGRESS NOTES
[facility]    Hospitalist Progress Note  Name: Alexandra Alfaro    MRN: [id number]  Provider:  Iam Ring DO  Date of Service: 08/01/2021    Summary of Stay: Alexandra Alfaro is a 66 year old male with a history of covid 19 in January 2020, hepatocellular carcinoma s/p embolization and Atezolizumab in April 2021 with repeat imaging showing disease progression, hepatitis C cirrhosis admitted on 7/20/2021 with abdominal pain and abnormal LFTs.  Upon arrival the patient was noted to have a sodium of 127, worsening LFTs from his baseline with bilirubin of 6.3, alkaline phosphatase of 170, AST//281, lipase normal and troponin 0 0.021.  The patient did have a CT chest/abdomen/pelvis that showed no pulmonary embolism however did note innumerable hepatic mets similar to previous, mild gallbladder wall thickening.  Gastroenterology was consulted to see the patient.  The patient's home Lasix and spironolactone were held secondary to his hyponatremia.  The patient did have a right upper quadrant ultrasound that showed portal vein thrombosis with gallbladder wall sludge/thickening.  The patient was started on therapeutic Lovenox.  Given the patients history of hepatocellular carcinoma, Oncology was consulted to see the patient.  The patient was started on IV Zosyn for possible cholecystitis.  The patient had a paracentesis showing concern for SBP.  The patient was transitioned to IV Ertapenem.  Several discussions with the patient as well as his family were completed in regards to his cancer treatment failures as well as his poor performance status.  It was recommended several times to the patient as well as his family to pursue hospice/palliative care.  The patient did have a care conference with oncology as well as palliative care.  Patient and his family declined hospice and wished to pursue full restorative cares.  The patient finished his course of IV ertapenem.  Patient was seen by physical  therapy who recommended TCU for the patient.  The patient and his family declined this and preferred the patient to return home.  The patient sodium gradually improved and liver enzymes gradually improved.    TODAY'S PLAN: Discussed with daughters at bedside via iPad .  We discussed that we will plan on holding the patient's diuretics for now with plans to repeat BMP to check sodium levels with his hepatologist/PCP this week.  He may be able to restart 1 or 2 of his diuretics at that time.  The more important thing for her we discussed that physical therapy is recommending TCU for the patient.  Patient and his family are refusing and would like to go home where his daughter will help him.  The patient and his daughter wish to have a paracentesis prior to his discharge tomorrow.  We will plan for IR consultation with paracentesis.  Patient will need IV albumin after the procedure.  He also reports feeling dizzy when standing.  Would suspect a component of fluid imbalances.  Discussed moving slowly and pt to work with PT today.      Problem List:   1.  Portal vein thrombosis: RUQ US shows portal vein thrombosis and gallbladder sludge/thickening. MRI 7/22 showed progression of HCC with metastasis. Results discussed with family by Dr. Kee. Improvement in abdominal pain with oral medications. Initially on Lovenox 1 mg/kg BID but dose adjusted per heme/onc.     - as per Dr. Navarrete, will change lovenox to 1.5mg/kg daily indefinitely      - Lovenox education to be provided prior to discharge     - Per Onc concern for minimal improvement in thrombus given this is a tumor thrombus     2.  Gallbladder sludge/thickening: RUQ US showed gallbladder wall thickening/sludge. T-max 100.6 and WBC elevation. Abx ordered for possible infectious gallbladder etiology. In addition, abx will cover for SBP.     - Zosyn 7/21 - 7/25. Ertapenem 1 g q 24 hours 7/25 - 7/30.  Plan for 10 day total course of abx (last dose 7/30 for 10 day  course - stop abx and asses if still clinically indicated) Adjustment abx therapy due to concern for SBP and worsening clinical status while on Zosyn 7/25. Clinically improving.   - Completed IV Ertapenem on 7/30/2021     3.  Cough with sputum production; pulmonary congestion:   - Abx coverage as above. Pulmonary hygiene with IS and flutter valve. Supportive cares available but attempt to minimize and encourage clearance of oral secretions. Not on oxygen.      4.  Leucocytosis: Etiology multifactorial. Source of infection from abdomen (SBP/gallbladder sludge/thickening) On broad spectrum abx. Cultures remained negative. Afebrile. Suspect a component of stress induced with pain/hiccups complicated by metastatic cancer. Clinically improved from 7/25 with increased abx coverage. Resolved 7/27.     5.  HCC with refractory ascites and diffuse metastasis; SBP: This is likely secondary to Hep C. Patient follows with Dr. Navarrete, consulted on admission.  Dr. Navarrete who also spoke with Dr. Valles (IR at [facility]). Patient is s/p bland embolization in May 2021 with prolonged hospital course following. 7/22 MRI shows disease progression. Initial plan on admission was to transfer to [facility], however given the MRI findings there is no further treatment.  The transfer was canceled. MRI findings were discussed with family per Dr. Kashmir Kee. Dr. Navarrete, Dr. Bolaños, Dr. Kee, and myself spoke with family and patient extensively that at this time there are no further treatment options. They are fixated that Oncology informed them that 10-20% have functional recovery and are eligible for cancer treatments.This is not consistent with documentation provided from Oncology.    IR paracentesis 7/24 1.9 L removed - yellow fluid  and 60% neutrophils. 7/26 paracentesis 1400 ml removed.  55% neutrophils.    - Family declining changes in narcotic therapies for pain control associated with metastatic disease. Prn medications  available. Patient feels like he is getting adequate pain control.    - Palliative care consulted 7/26 - KAYLYN assisting with possible home palliative care services. Care conference 7/30 at 4:30 PM.    - Dronabinol 2.5 mg daily for appetitive   - IR consulted, repeat paracentesis 7/29 - therapeutic - low volume.  - Plan for paracentesis tomorrow prior to discharge     6.  Chronic hyponatremia: Sodium 124. Baseline 126-129. Associated with malignancy. Monitor/unchanged. Sodium returned to baseline for patient 7/28 with albumin and IVF.   - Improving  - Recommend hold lasix and spironolactone for now.  Plan for recheck with hepatologist vs PCP in a few days as an outpatient for possibly restarting one of these at a lower dose     7.  Acute kidney injury: Cr. 0.95 on admission. 7/27 Cr. 1.4 due to 3rd spacing of fluids and minimal oral intake. In addition to paracentesis removal of fluid  - Monitor daily BMP. Renal function improving. Encourage oral hydration given worsening abdominal distension     8.  Hiccups: Associated with diaphragmatic irritation from fluid and malignancy. Thorazine prn (dose decreased and changed to oral given sedation) and Reglan prn.   - Improved     9.  Acute on chronic hepatitis in the setting of Hep C cirrhosis: Likely due to underlying HCC/treatment and now portal vein thrombosis. GI reviewed chart and given metastatic disease there is no new recommendations. Patient completed hepatitis C treatment and is seronegative.     - LFTs being trended - slowly improving but bilirubin remains elevated - follows with [facility]     10.  Normocytic anemia: Hemoglobin 8-9 baseline. Stable. No signs of bleeding. Monitor.      11.  Severe Malnutrition in the context of acute illness on chronic diseases  - Nutrition following     12.  Physical Deconditioning  - Consult PT/OT  - Consult Social Work for discharge planning     13.  Social: Family is updated daily. Diagnosis with review of MRI findings with overall  "poor prognosis shared with family per Dr. Kee, Dr. Navarrete, and myself. The patient's sister has good insight into his disease, however his daughters and wife do not. The family has also chosen to keep his prognosis from him, as they do not want to affect his mental status. 7/25 updated family of ongoing concerns of worsening clinical status despite treatment. Encouraged consideration of goals of care and quality of life. Note provided for family to assist in getting a Visa for his daughter - Migdalia Mahoney and children. 7/26 reviewed overall poor prognosis and unlikely probability that he will improve functional status to tolerate cancer treatments moving forward. Family also expressed concern about providing cares for him at home given his symptoms. However, they do not want to consider hospice or TCU.  They seem to have a disconnect in wanting full aggressive treatments and his realistic ability to recover. They indicated that Oncology informed them that 10-20% of patients have functional recovery and have cancer treatments. They felt like no one can predict if he will be curative and they want to move forward with aggressive treatment. Discussed code status and concern for greater harm to patient if he were to require CPR/mechanical intubation. Agreeable to palliative care discussion.     DVT Prophylaxis: Enoxaparin (Lovenox) SQ  Code Status: Full Code  Diet: Regular Diet Adult  Snacks/Supplements Adult: Magic Cup; Between Meals  Snacks/Supplements Adult: Ensure Enlive; Between Meals    Zimmerman Catheter: Not present  Disposition: Expected discharge tomorrow to home with home care. Goals prior to discharge include paracentesis performed and IV albumin completed.   Family updated today: Yes      Interval History   Pt seen and examined.  Patient with daughters at bedside.  iPad  assisted with translation.  Patient states his abdomen feels \"warm\".  Patient does report feeling dizzy when " he stands.    -Data reviewed today: I personally reviewed all new labs and imaging results over the last 24 hours.     Physical Exam   Temp: 97.7  F (36.5  C) Temp src: Oral BP: 122/65 Pulse: 104   Resp: 18 SpO2: 94 % O2 Device: None (Room air)    Vitals:    07/21/21 0030 07/21/21 0737 07/26/21 0233   Weight: 75.6 kg (166 lb 11.2 oz) 72.2 kg (159 lb 1.6 oz) 79.2 kg (174 lb 8 oz)     Vital Signs with Ranges  Temp:  [97.7  F (36.5  C)-98  F (36.7  C)] 97.7  F (36.5  C)  Pulse:  [100-104] 104  Resp:  [16-18] 18  BP: (101-122)/(56-67) 122/65  SpO2:  [94 %-95 %] 94 %  I/O last 3 completed shifts:  In: 970 [P.O.:970]  Out: -     GENERAL: No apparent distress. Awake, alert, and fully oriented.  HEENT: Normocephalic, atraumatic. Extraocular movements intact.  CARDIOVASCULAR: Regular rate and rhythm without murmurs or rubs. No S3.  PULMONARY: Clear bilaterally.  GASTROINTESTINAL: Soft, non-tender, distended. Bowel sounds normoactive.   EXTREMITIES: No cyanosis or clubbing. Trace edema  NEUROLOGICAL: CN 2-12 grossly intact, no focal neurological deficits.  DERMATOLOGICAL: No rash, ulcer, bruising, jaundice.    Medications       dronabinol  5 mg Oral Daily     enoxaparin ANTICOAGULANT  1.5 mg/kg Subcutaneous Q24H     famotidine  20 mg Oral BID     lidocaine  1 patch Transdermal Q24H     lidocaine   Transdermal Q8H     polyethylene glycol  17 g Oral Daily     senna-docusate  2 tablet Oral BID     sodium chloride (PF)  3 mL Intracatheter Q8H     Data     Laboratory:  Recent Labs   Lab 07/31/21 0628 07/30/21  0632 07/29/21  0632   WBC 7.5 7.2 8.3   HGB 9.3* 10.0* 9.6*   HCT 27.8* 29.2* 27.6*   MCV 99 97 96    200 191     Recent Labs   Lab 07/31/21  0628 07/30/21  0632 07/29/21  0632   * 131* 130*   POTASSIUM 4.6 4.5 4.6   CHLORIDE 97 97 98   CO2 29 28 27   ANIONGAP 5 6 5   GLC 86 108* 139*   BUN 19 25 34*   CR 0.95 0.95 1.00   GFRESTIMATED 83 83 78   REBECCA 8.2* 8.4* 8.3*     Recent Labs   Lab 07/31/21  0628  07/30/21  0632 07/29/21  0632   AST 91* 114* 137*   * 134* 161*   ALKPHOS 182* 197* 192*   BILITOTAL 7.9* 7.8* 8.1*     Recent Labs   Lab 07/31/21  0628 07/30/21  0632   INR 1.32* 1.34*     No results for input(s): CULT in the last 168 hours.    Imaging:  No results found for this or any previous visit (from the past 24 hour(s)).      Iam Ring DO  [facility] Hospitalist  [address] [place] [address]  08/01/2021

## 2021-08-01 NOTE — PROGRESS NOTES
ONCOLOGY CC:    No new recommendations, please see details for Dr. Bolaños's detailed care conference with family.  Hospice recommended, family wants to pursue restorative goals. SW following.

## 2021-08-01 NOTE — PROGRESS NOTES
08/01/21 1153   Quick Adds   Type of Visit Initial PT Evaluation       Present yes   Language Malay    Living Environment   People in home spouse;child(megan), adult   Current Living Arrangements apartment   Home Accessibility no concerns  (Patient lives on ground level. )   Transportation Anticipated family or friend will provide   Self-Care   Usual Activity Tolerance good   Current Activity Tolerance fair   Equipment Currently Used at Home shower chair   Disability/Function   Hearing Difficulty or Deaf no   Concentrating, Remembering or Making Decisions Difficulty no   Difficulty Communicating no   General Information   Onset of Illness/Injury or Date of Surgery 07/20/21   Referring Physician Iam Ring, DO   Patient/Family Therapy Goals Statement (PT) Patient would like to return home.    Pertinent History of Current Problem (include personal factors and/or comorbidities that impact the POC) Alexandra Alfaro is pleasant, 66 year old male present to ED on 7/20/21 for abdominal pain and abnomal LTFs values.  PMH including COVID-19 in January 2020, hepatocellular carcinoma undergoing treatment, history of nonalcoholic cirrhosis, hep C.    General Observations Increased abdominal swelling and noted swelling in RLE in knee and foot. Jaundice appearance noted.    Cognition   Orientation Status (Cognition) oriented x 4   Pain Assessment   Patient Currently in Pain Yes, see Vital Sign flowsheet  (Patient report 2/10 pain. )   Integumentary/Edema   Integumentary/Edema Comments Increased fluid observed in abdominal region and increased edema in R foot and knee.    Range of Motion (ROM)   ROM Quick Adds ROM deficits secondary to weakness;ROM deficits secondary to swelling  (RLE )   Strength   Strength Comments Limited RLE strength (4/5)   Bed Mobility   Comment (Bed Mobility) Independent with HOB flat and no bed rail. Required extra time to perform   Transfers   Transfer Safety Comments CGA  for sit to/from transfers    Gait/Stairs (Locomotion)   Distance in Feet (Required for LE Total Joints) 5   Comment (Gait/Stairs) CGA and use of FWW and gait belt   Clinical Impression   Criteria for Skilled Therapeutic Intervention yes, treatment indicated   PT Diagnosis (PT) Impaired functional mobility, decreased activity tolerance    Influenced by the following impairments lower extremity weakness, imbalance with transfers and ambulation, increased edema in abdomen and RLE    Functional limitations due to impairments impaired safety performing transfer and ambulation with assistive device in home and community settings   Clinical Presentation Evolving/Changing   Clinical Presentation Rationale Patient    Clinical Decision Making (Complexity) low complexity   Therapy Frequency (PT) Daily   Predicted Duration of Therapy Intervention (days/wks) 2-3 days   Planned Therapy Interventions (PT) balance training;home exercise program;bed mobility training;patient/family education;strengthening;ROM (range of motion);neuromuscular re-education   Anticipated Equipment Needs at Discharge (PT) walker, standard  (Front wheeled walker )   Risk & Benefits of therapy have been explained evaluation/treatment results reviewed;care plan/treatment goals reviewed;risks/benefits reviewed;current/potential barriers reviewed;participants voiced agreement with care plan;participants included;patient;daughter   PT Discharge Planning    PT Discharge Recommendation (DC Rec) home with home care physical therapy   PT Rationale for DC Rec Home with home health therapy due to limited activity tolerance and taxing effort during ambulation with assistive device. Requires CGA with ambulation and transfers for safety and use of assistive device.    PT Brief overview of current status  Patient CGA with transfers and ambulation with FWW.    Total Evaluation Time   Total Evaluation Time (Minutes) 18

## 2021-08-01 NOTE — PLAN OF CARE
End of Shift Summary  For vital signs and complete assessments, please see documentation flowsheets.     Pertinent assessments: AxOx4. Vss. SBA with GB Walker. L piv sl. Ba void. Stool softners held due to multiple loose Bms. Scrotal edema noted, MD aware. Ok po appetite. Tray set up.  Major Shift Events: PT.  Treatment Plan: Plan to discharge home tomorrow with home care consult.  Bedside Nurse: Kathy Greer RN

## 2021-08-01 NOTE — PLAN OF CARE
AxOx4. VSS. Up SBA with walker to bathroom. Denies pain, n/v. No SoB. Jaundiced. Fair appetite. SW following for discharge plan. Will continue POC.

## 2021-08-01 NOTE — PROGRESS NOTES
08/01/21 1153   Quick Adds   Type of Visit Initial PT Evaluation       Present yes   Language Telugu    Living Environment   People in home spouse;child(megan), adult   Current Living Arrangements apartment   Home Accessibility no concerns  (Patient lives on ground level. )   Transportation Anticipated family or friend will provide   Self-Care   Usual Activity Tolerance good   Current Activity Tolerance fair   Equipment Currently Used at Home shower chair   Disability/Function   Hearing Difficulty or Deaf no   Concentrating, Remembering or Making Decisions Difficulty no   Difficulty Communicating no   General Information   Onset of Illness/Injury or Date of Surgery 07/20/21   Referring Physician Iam Ring, DO   Patient/Family Therapy Goals Statement (PT) Patient would like to return home.    Pertinent History of Current Problem (include personal factors and/or comorbidities that impact the POC) Alexandra Alfaro is pleasant, 66 year old male present to ED on 7/20/21 for abdominal pain and abnomal LTFs values.  PMH including COVID-19 in January 2020, hepatocellular carcinoma undergoing treatment, history of nonalcoholic cirrhosis, hep C.    General Observations Increased abdominal swelling and noted swelling in RLE in knee and foot. Jaundice appearance noted.    Cognition   Orientation Status (Cognition) oriented x 4   Pain Assessment   Patient Currently in Pain Yes, see Vital Sign flowsheet  (Patient report 2/10 pain. )   Integumentary/Edema   Integumentary/Edema Comments Increased fluid observed in abdominal region and increased edema in R foot and knee.    Range of Motion (ROM)   ROM Quick Adds ROM deficits secondary to weakness;ROM deficits secondary to swelling  (RLE )   Strength   Strength Comments Limited RLE strength (4/5)   Bed Mobility   Comment (Bed Mobility) Independent with HOB flat and no bed rail. Required extra time to perform   Transfers   Transfer Safety Comments CGA  for sit to/from transfers    Gait/Stairs (Locomotion)   Distance in Feet (Required for LE Total Joints) 60   Comment (Gait/Stairs) CGA and use of FWW and gait belt   Clinical Impression   Criteria for Skilled Therapeutic Intervention yes, treatment indicated   PT Diagnosis (PT) Impaired functional mobility, decreased activity tolerance    Influenced by the following impairments lower extremity weakness, imbalance with transfers and ambulation, increased edema in abdomen and RLE    Functional limitations due to impairments impaired safety performing transfer and ambulation with assistive device in home and community settings   Clinical Presentation Evolving/Changing   Clinical Presentation Rationale Current medical status and decline in physical performance influenced by lab values.    Clinical Decision Making (Complexity) low complexity   Therapy Frequency (PT) Daily   Predicted Duration of Therapy Intervention (days/wks) 2-3 days   Planned Therapy Interventions (PT) balance training;home exercise program;bed mobility training;patient/family education;strengthening;ROM (range of motion);neuromuscular re-education   Anticipated Equipment Needs at Discharge (PT) walker, standard  (Front wheeled walker )   Risk & Benefits of therapy have been explained evaluation/treatment results reviewed;care plan/treatment goals reviewed;risks/benefits reviewed;current/potential barriers reviewed;participants voiced agreement with care plan;participants included;patient;daughter   PT Discharge Planning    PT Discharge Recommendation (DC Rec) home with home care physical therapy   PT Rationale for DC Rec Home with home health therapy due to limited activity tolerance and taxing effort during ambulation with assistive device. Requires CGA with ambulation and transfers for safety and use of assistive device.    PT Brief overview of current status  Patient CGA with transfers and ambulation with FWW.    Total Evaluation Time   Total  Evaluation Time (Minutes) 18

## 2021-08-02 NOTE — PROGRESS NOTES
Care Management Discharge Note    Discharge Date: 08/02/2021       Discharge Disposition: DME Walker     Discharge Services:Home Care RN/PT/OT     Discharge DME: Walker    Discharge Transportation: family or friend will provide    Private pay costs discussed: Not applicable    PPatient/family educated on Medicare website which has current facility and service quality ratings: no    Education Provided on the Discharge Plan:  yes  Persons Notified of Discharge Plans: Family   Patient/Family in Agreement with the Plan: yes    Handoff Referral Completed: Yes    Additional Information:     08/02/21 0944   Final Resources   Home Care   ([facility] [phone] f [phone])       PT able to d.c home today. No further needs. Home Care referral sent.   [facility] can do start of care this week   RNCC to set up new primary Care MD at clinic    Corinne C. White, LSW

## 2021-08-02 NOTE — PROGRESS NOTES
[facility]    Hospitalist Progress Note  Name: Alexandra Alfaro    MRN: [id number]  Provider:  Iam Ring DO  Date of Service: 08/02/2021    Summary of Stay: Alexandra Alfaro is a 66 year old male with a history of covid 19 in January 2020, hepatocellular carcinoma s/p embolization and Atezolizumab in April 2021 with repeat imaging showing disease progression, hepatitis C cirrhosis admitted on 7/20/2021 with abdominal pain and abnormal LFTs.  Upon arrival the patient was noted to have a sodium of 127, worsening LFTs from his baseline with bilirubin of 6.3, alkaline phosphatase of 170, AST//281, lipase normal and troponin 0 0.021.  The patient did have a CT chest/abdomen/pelvis that showed no pulmonary embolism however did note innumerable hepatic mets similar to previous, mild gallbladder wall thickening.  Gastroenterology was consulted to see the patient.  The patient's home Lasix and spironolactone were held secondary to his hyponatremia.  The patient did have a right upper quadrant ultrasound that showed portal vein thrombosis with gallbladder wall sludge/thickening.  The patient was started on therapeutic Lovenox.  Given the patients history of hepatocellular carcinoma, Oncology was consulted to see the patient.  The patient was started on IV Zosyn for possible cholecystitis.  The patient had a paracentesis showing concern for SBP.  The patient was transitioned to IV Ertapenem.  Several discussions with the patient as well as his family were completed in regards to his cancer treatment failures as well as his poor performance status.  It was recommended several times to the patient as well as his family to pursue hospice/palliative care.  The patient did have a care conference with oncology as well as palliative care.  Patient and his family declined hospice and wished to pursue full restorative cares.  The patient finished his course of IV ertapenem.  Patient was seen by physical  therapy who recommended TCU for the patient.  The patient and his family declined this and preferred the patient to return home.  The patient sodium gradually improved and liver enzymes gradually improved.  On 8/2/2021, the patient as well as his family were agreeable to home care/PT/OT.  His electrolytes and kidney function had improved.  At that point the patient was medically stable for discharge home with family.    TODAY'S PLAN: Patient with daughter and wife at bedside who assisted with translation.  Plan for paracentesis with albumin after.  Patient's family requesting restarting one of the diuretics.  Will restart low-dose Lasix with plans to recheck is electrolytes and kidney function in 2 to 3 days.  Patient and family are agreeable to home care/PT/OT.  Discussed with social work who will arrange this.  I discussed the importance of monitoring the patient for fevers and chills and encouraged the patient to continue eating and participating with physical therapy.  All questions answered.  We will discharge patient home with home care/PT/OT today.    Problem List:   1.  Portal vein thrombosis: RUQ US shows portal vein thrombosis and gallbladder sludge/thickening. MRI 7/22 showed progression of HCC with metastasis. Results discussed with family by Dr. Kee. Improvement in abdominal pain with oral medications. Initially on Lovenox 1 mg/kg BID but dose adjusted per heme/onc.     - as per Dr. Navarrete, will change lovenox to 1.5mg/kg daily indefinitely      - Lovenox education to be provided prior to discharge     - Per Onc concern for minimal improvement in thrombus given this is a tumor thrombus     2.  Gallbladder sludge/thickening: RUQ US showed gallbladder wall thickening/sludge. T-max 100.6 and WBC elevation. Abx ordered for possible infectious gallbladder etiology. In addition, abx will cover for SBP.     - Zosyn 7/21 - 7/25. Ertapenem 1 g q 24 hours 7/25 - 7/30.  Plan for 10 day total course of abx (last  dose 7/30 for 10 day course - stop abx and asses if still clinically indicated) Adjustment abx therapy due to concern for SBP and worsening clinical status while on Zosyn 7/25. Clinically improving.   - Completed IV Ertapenem on 7/30/2021     3.  Cough with sputum production; pulmonary congestion:   - Abx coverage as above. Pulmonary hygiene with IS and flutter valve. Supportive cares available but attempt to minimize and encourage clearance of oral secretions. Not on oxygen.      4.  Leucocytosis: Etiology multifactorial. Source of infection from abdomen (SBP/gallbladder sludge/thickening) On broad spectrum abx. Cultures remained negative. Afebrile. Suspect a component of stress induced with pain/hiccups complicated by metastatic cancer. Clinically improved from 7/25 with increased abx coverage. Resolved 7/27.     5.  HCC with refractory ascites and diffuse metastasis; SBP: This is likely secondary to Hep C. Patient follows with Dr. Navarrete, consulted on admission.  Dr. Navarrete who also spoke with Dr. Valles (IR at [facility]). Patient is s/p bland embolization in May 2021 with prolonged hospital course following. 7/22 MRI shows disease progression. Initial plan on admission was to transfer to [facility], however given the MRI findings there is no further treatment.  The transfer was canceled. MRI findings were discussed with family per Dr. Kashmir Kee. Dr. Navarrete, Dr. Bolaños, Dr. Kee, and myself spoke with family and patient extensively that at this time there are no further treatment options. They are fixated that Oncology informed them that 10-20% have functional recovery and are eligible for cancer treatments.This is not consistent with documentation provided from Oncology.    IR paracentesis 7/24 1.9 L removed - yellow fluid  and 60% neutrophils. 7/26 paracentesis 1400 ml removed.  55% neutrophils.    - Family declining changes in narcotic therapies for pain control associated with metastatic disease.  Prn medications available. Patient feels like he is getting adequate pain control.    - Palliative care consulted 7/26 - KAYLYN assisting with possible home palliative care services. Care conference 7/30 at 4:30 PM.    - Dronabinol 2.5 mg daily for appetitive   - IR consulted, repeat paracentesis 7/29 - therapeutic - low volume.  - Plan for paracentesis today prior to discharge followed by albumin  - Plan to restart low dose lasix at the request of the family     6.  Chronic hyponatremia: Sodium 124. Baseline 126-129. Associated with malignancy. Monitor/unchanged. Sodium returned to baseline for patient 7/28 with albumin and IVF.   - Improving  - Recommend hold spironolactone for now.  Plan for recheck with hepatologist vs PCP in a few days as an outpatient for possibly restarting one of these at a lower dose.  Start low dose lasix with plan for repeat lab work in 2-3 days     7.  Acute kidney injury: Cr. 0.95 on admission. 7/27 Cr. 1.4 due to 3rd spacing of fluids and minimal oral intake. In addition to paracentesis removal of fluid  - Monitor daily BMP. Renal function improving. Encourage oral hydration given worsening abdominal distension     8.  Hiccups: Associated with diaphragmatic irritation from fluid and malignancy. Thorazine prn (dose decreased and changed to oral given sedation) and Reglan prn.   - Improved     9.  Acute on chronic hepatitis in the setting of Hep C cirrhosis: Likely due to underlying HCC/treatment and now portal vein thrombosis. GI reviewed chart and given metastatic disease there is no new recommendations. Patient completed hepatitis C treatment and is seronegative.     - LFTs being trended - slowly improving but bilirubin remains elevated - follows with [facility]     10.  Normocytic anemia: Hemoglobin 8-9 baseline. Stable. No signs of bleeding. Monitor.      11.  Severe Malnutrition in the context of acute illness on chronic diseases  - Nutrition following     12.  Physical Deconditioning  -  Consult PT/OT  - Consult Social Work for discharge planning     13.  Social: Family is updated daily. Diagnosis with review of MRI findings with overall poor prognosis shared with family per Dr. Kee, Dr. Navarrete, and myself. The patient's sister has good insight into his disease, however his daughters and wife do not. The family has also chosen to keep his prognosis from him, as they do not want to affect his mental status. 7/25 updated family of ongoing concerns of worsening clinical status despite treatment. Encouraged consideration of goals of care and quality of life. Note provided for family to assist in getting a Visa for his daughter - Migdalia Mahoney and children. 7/26 reviewed overall poor prognosis and unlikely probability that he will improve functional status to tolerate cancer treatments moving forward. Family also expressed concern about providing cares for him at home given his symptoms. However, they do not want to consider hospice or TCU.  They seem to have a disconnect in wanting full aggressive treatments and his realistic ability to recover. They indicated that Oncology informed them that 10-20% of patients have functional recovery and have cancer treatments. They felt like no one can predict if he will be curative and they want to move forward with aggressive treatment. Discussed code status and concern for greater harm to patient if he were to require CPR/mechanical intubation. Agreeable to palliative care discussion.     DVT Prophylaxis: Enoxaparin (Lovenox) SQ  Code Status: Full Code  Diet: Regular Diet Adult  Snacks/Supplements Adult: Magic Cup; Between Meals  Snacks/Supplements Adult: Ensure Enlive; Between Meals    Zimmerman Catheter: Not present  Disposition: Expected discharge today to home with home care/PT/OT. Goals prior to discharge include vital signs stable.   Family updated today: Yes      Interval History   Pt seen and examined.  Pt denies pain.  Pt family at  bedside who assisted with translation.    -Data reviewed today: I personally reviewed all new labs and imaging results over the last 24 hours.     Physical Exam   Temp: 98.2  F (36.8  C) Temp src: Oral BP: 100/63 Pulse: 96   Resp: 18 SpO2: 97 % O2 Device: None (Room air)    Vitals:    07/21/21 0030 07/21/21 0737 07/26/21 0233   Weight: 75.6 kg (166 lb 11.2 oz) 72.2 kg (159 lb 1.6 oz) 79.2 kg (174 lb 8 oz)     Vital Signs with Ranges  Temp:  [97.7  F (36.5  C)-98.2  F (36.8  C)] 98.2  F (36.8  C)  Pulse:  [91-98] 96  Resp:  [16-18] 18  BP: ()/(59-63) 100/63  SpO2:  [94 %-97 %] 97 %  I/O last 3 completed shifts:  In: 500 [P.O.:500]  Out: -     GENERAL: No apparent distress. Awake, alert, and fully oriented.  HEENT: Normocephalic, atraumatic. Extraocular movements intact, scleral icterus.  CARDIOVASCULAR: Regular rate and rhythm without murmurs or rubs. No S3.  PULMONARY: Clear bilaterally.  GASTROINTESTINAL: Soft, non-tender, non-distended. Bowel sounds normoactive.   EXTREMITIES: No cyanosis or clubbing. No edema.  NEUROLOGICAL: CN 2-12 grossly intact, no focal neurological deficits.  DERMATOLOGICAL: No rash, ulcer, bruising, +jaundice.    Medications       albumin human  12.5 g Intravenous Once     dronabinol  5 mg Oral Daily     enoxaparin ANTICOAGULANT  1.5 mg/kg Subcutaneous Q24H     famotidine  20 mg Oral BID     lidocaine  1 patch Transdermal Q24H     lidocaine   Transdermal Q8H     polyethylene glycol  17 g Oral Daily     senna-docusate  2 tablet Oral BID     sodium chloride (PF)  3 mL Intracatheter Q8H     Data     Laboratory:  Recent Labs   Lab 08/02/21  0744 07/31/21  0628 07/30/21  0632   WBC 8.2 7.5 7.2   HGB 9.5* 9.3* 10.0*   HCT 27.7* 27.8* 29.2*   MCV 98 99 97    199 200     Recent Labs   Lab 08/02/21  0744 07/31/21  0628 07/30/21  0632   * 131* 131*   POTASSIUM 4.7 4.6 4.5   CHLORIDE 99 97 97   CO2 29 29 28   ANIONGAP 2* 5 6   GLC 94 86 108*   BUN 19 19 25   CR 0.95 0.95 0.95    GFRESTIMATED 83 83 83   REBECCA 8.2* 8.2* 8.4*     Recent Labs   Lab 08/02/21  0744 07/31/21  0628 07/30/21  0632   AST 97* 91* 114*   ALT 93* 108* 134*   ALKPHOS 173* 182* 197*   BILITOTAL 10.4* 7.9* 7.8*     No results for input(s): CULT in the last 168 hours.    Imaging:  No results found for this or any previous visit (from the past 24 hour(s)).      Iam Ring DO  [facility] Hospitalist  [address] [place] [address]  08/02/2021

## 2021-08-02 NOTE — PLAN OF CARE
End of Shift Summary  For vital signs and complete assessments, please see documentation flowsheets.      Pertinent Assessments: VSS. A&Ox4. SBA with walker, diarrhea x1, Patient is jaundiced, abdomen distended, denies pain, nausea.    Major Shift Events: None    Treatment Plan: Restorative cares, paracentesis prior to discharge. Possibly home today with home care, social work following.    Bedside Nurse: Hortencia Galindo RN

## 2021-08-02 NOTE — PLAN OF CARE
Patient hospitalized for portal vein thrombosis. Cleared for discharge to home today per MD. Discharge instructions, medications, and follow-ups reviewed with patient and sister in detail. Discharge medications, including Lovenox, Marinol, Pepcid, Senna S, Miralax, and Lasix were filled at [facility] and sent with pt. Patient and sister verbalized understanding of discharge instructions. Belongings were returned to patient at time of discharge including clothing. Sister providing transport home.

## 2021-08-02 NOTE — PROGRESS NOTES
Oncology Chart Check:    Reviewed labs.  Went to see the patient this morning but family was not around.      As previously mentioned, the patient's functional status needs to improve significantly to be considered for additional systemic therapy.  I am not optimistic he will get there.  Family still wants to pursue restorative care at this time.    Had a long care conference with patient's family last week.  If the patient is discharged today, will have him follow with Dr. Navarrete and palliative care in my clinic closely for ongoing discussion of goals of care.    Arden Bolaños M.D.  [facility]  [phone]

## 2021-08-02 NOTE — PROGRESS NOTES
Pt was in Radiology today for a paracentesis. Procedure performed by Dr Ngo Procedure was tolerated well, vitals remained stable.3000 cc's of  Bright yellow clear colored fluid removed. . Written and verbal instructions were reviewed with daughter there is no evidence of bleeding upon discharge.  Pt left department in stable satisfactory condition with US technologist.

## 2021-08-02 NOTE — PROVIDER NOTIFICATION
MD paged regarding tele tech report of 15 beats of PSVT. Pt sleeping comfortably, awakes easily. VSS. No new orders.

## 2021-08-02 NOTE — DISCHARGE INSTRUCTIONS
Your home care referral was sent to [facility] if you haven't heard from them within the next 24-48 hours,  Please call them at [phone]

## 2021-08-02 NOTE — PLAN OF CARE
Assessments: VSS. A&Ox4. SBA with walker, had BM, stool softerner held. Jaundiced. Good appetite.    Treatment Plan: Restorative cares, paracentesis prior to discharge to home tomorrow with home care consult.    Bedside Nurse: Jaime Kaur RN

## 2021-08-03 NOTE — DISCHARGE SUMMARY
Hospitalist Discharge Summary  [facility]    Alexandra Alfaro MRN# [id number]   YOB: 1955 Age: 66 year old     Date of Admission:  7/20/2021  Date of Discharge:  8/2/2021  3:15 PM  Admitting Physician:  Baron Martinez MD  Discharge Physician:  Iam Ring DO  Discharging Service:  Hospitalist     Primary Provider: Lindy Dodge          Discharge Diagnosis:     1.  Portal vein thrombosis: RUQ US shows portal vein thrombosis and gallbladder sludge/thickening. MRI 7/22 showed progression of HCC with metastasis. Results discussed with family by Dr. Kee. Improvement in abdominal pain with oral medications. Initially on Lovenox 1 mg/kg BID but dose adjusted per heme/onc.     - as per Dr. Navarrete, will change lovenox to 1.5mg/kg daily indefinitely      - Lovenox education to be provided prior to discharge     - Per Onc concern for minimal improvement in thrombus given this is a tumor thrombus     2.  Gallbladder sludge/thickening: RUQ US showed gallbladder wall thickening/sludge. T-max 100.6 and WBC elevation. Abx ordered for possible infectious gallbladder etiology. In addition, abx will cover for SBP.     - Zosyn 7/21 - 7/25. Ertapenem 1 g q 24 hours 7/25 - 7/30.  Plan for 10 day total course of abx (last dose 7/30 for 10 day course - stop abx and asses if still clinically indicated) Adjustment abx therapy due to concern for SBP and worsening clinical status while on Zosyn 7/25. Clinically improving.   - Completed IV Ertapenem on 7/30/2021     3.  Cough with sputum production; pulmonary congestion:   - Abx coverage as above. Pulmonary hygiene with IS and flutter valve. Supportive cares available but attempt to minimize and encourage clearance of oral secretions. Not on oxygen.      4.  Leucocytosis: Etiology multifactorial. Source of infection from abdomen (SBP/gallbladder sludge/thickening) On broad spectrum abx. Cultures remained negative. Afebrile. Suspect a  component of stress induced with pain/hiccups complicated by metastatic cancer. Clinically improved from 7/25 with increased abx coverage. Resolved 7/27.     5.  HCC with refractory ascites and diffuse metastasis; SBP: This is likely secondary to Hep C. Patient follows with Dr. Navarrete, consulted on admission.  Dr. Navarrete who also spoke with Dr. Valles (IR at [facility]). Patient is s/p bland embolization in May 2021 with prolonged hospital course following. 7/22 MRI shows disease progression. Initial plan on admission was to transfer to [facility], however given the MRI findings there is no further treatment.  The transfer was canceled. MRI findings were discussed with family per Dr. Kashmir Kee. Dr. Navarrete, Dr. Bolaños, Dr. Kee, and myself spoke with family and patient extensively that at this time there are no further treatment options. They are fixated that Oncology informed them that 10-20% have functional recovery and are eligible for cancer treatments.This is not consistent with documentation provided from Oncology.    IR paracentesis 7/24 1.9 L removed - yellow fluid  and 60% neutrophils. 7/26 paracentesis 1400 ml removed.  55% neutrophils.    - Family declining changes in narcotic therapies for pain control associated with metastatic disease. Prn medications available. Patient feels like he is getting adequate pain control.    - Palliative care consulted 7/26 - SW assisting with possible home palliative care services. Care conference 7/30 at 4:30 PM.    - Dronabinol 2.5 mg daily for appetitive   - IR consulted, repeat paracentesis 7/29 - therapeutic - low volume.  - Plan for paracentesis today prior to discharge followed by albumin  - Plan to restart low dose lasix at the request of the family     6.  Chronic hyponatremia: Sodium 124. Baseline 126-129. Associated with malignancy. Monitor/unchanged. Sodium returned to baseline for patient 7/28 with albumin and IVF.   - Improving  - Recommend hold  spironolactone for now.  Plan for recheck with hepatologist vs PCP in a few days as an outpatient for possibly restarting one of these at a lower dose.  Start low dose lasix with plan for repeat lab work in 2-3 days     7.  Acute kidney injury: Cr. 0.95 on admission. 7/27 Cr. 1.4 due to 3rd spacing of fluids and minimal oral intake. In addition to paracentesis removal of fluid  - Monitor daily BMP. Renal function improving. Encourage oral hydration given worsening abdominal distension     8.  Hiccups: Associated with diaphragmatic irritation from fluid and malignancy. Thorazine prn (dose decreased and changed to oral given sedation) and Reglan prn.   - Improved     9.  Acute on chronic hepatitis in the setting of Hep C cirrhosis: Likely due to underlying HCC/treatment and now portal vein thrombosis. GI reviewed chart and given metastatic disease there is no new recommendations. Patient completed hepatitis C treatment and is seronegative.     - LFTs being trended - slowly improving but bilirubin remains elevated - follows with [facility]     10.  Normocytic anemia: Hemoglobin 8-9 baseline. Stable. No signs of bleeding. Monitor.      11.  Severe Malnutrition in the context of acute illness on chronic diseases  - Nutrition following     12.  Physical Deconditioning  - Consult PT/OT  - Consult Social Work for discharge planning     13.  Social: Family is updated daily. Diagnosis with review of MRI findings with overall poor prognosis shared with family per Dr. Kee, Dr. Navarrete, and myself. The patient's sister has good insight into his disease, however his daughters and wife do not. The family has also chosen to keep his prognosis from him, as they do not want to affect his mental status. 7/25 updated family of ongoing concerns of worsening clinical status despite treatment. Encouraged consideration of goals of care and quality of life. Note provided for family to assist in getting a Visa for his daughter - Migdalia Prasad  Jada Deanna Mahoney and children. 7/26 reviewed overall poor prognosis and unlikely probability that he will improve functional status to tolerate cancer treatments moving forward. Family also expressed concern about providing cares for him at home given his symptoms. However, they do not want to consider hospice or TCU.  They seem to have a disconnect in wanting full aggressive treatments and his realistic ability to recover. They indicated that Oncology informed them that 10-20% of patients have functional recovery and have cancer treatments. They felt like no one can predict if he will be curative and they want to move forward with aggressive treatment. Discussed code status and concern for greater harm to patient if he were to require CPR/mechanical intubation. Agreeable to palliative care discussion.              Discharge Disposition:     Discharged to home with home care/PT/OT           Allergies:     No Known Allergies           Discharge Medications:     Discharge Medication List as of 8/2/2021  1:59 PM      START taking these medications    Details   dronabinol (MARINOL) 5 MG capsule Take 1 capsule (5 mg) by mouth daily, Disp-30 capsule, R-0, Local Print      enoxaparin ANTICOAGULANT (LOVENOX) 120 MG/0.8ML syringe Inject 0.8 mLs (120 mg) Subcutaneous every 24 hours, Disp-24 mL, R-0, E-Prescribe      famotidine (PEPCID) 20 MG tablet Take 1 tablet (20 mg) by mouth 2 times daily, Disp-60 tablet, R-0, E-Prescribe      polyethylene glycol (MIRALAX) 17 GM/Dose powder Take 17 g by mouth daily Do not give if having loose stools, Disp-510 g, R-0, E-Prescribe      senna-docusate (SENOKOT-S/PERICOLACE) 8.6-50 MG tablet Take 2 tablets by mouth 2 times daily Do not give if having loose stools, Disp-60 tablet, R-0, E-Prescribe         CONTINUE these medications which have CHANGED    Details   furosemide (LASIX) 20 MG tablet Take 1 tablet (20 mg) by mouth daily, Disp-30 tablet, R-0, E-Prescribe         CONTINUE these  "medications which have NOT CHANGED    Details   Acetaminophen (TYLENOL PO) Take by mouth daily as needed for mild pain or fever, Historical      omeprazole (PRILOSEC) 40 MG DR capsule Take 40 mg by mouth daily, Historical         STOP taking these medications       spironolactone (ALDACTONE) 100 MG tablet Comments:   Reason for Stopping:                      Condition on Discharge:     Discharge condition: Guarded   Discharge vitals: Blood pressure 99/59, pulse 97, temperature 98.2  F (36.8  C), temperature source Oral, resp. rate 18, height 1.753 m (5' 9\"), weight 79.2 kg (174 lb 8 oz), SpO2 97 %.   Code status on discharge: Full Code      BASIC PHYSICAL EXAMINATION:  GENERAL: No apparent distress.  CARDIOVASCULAR: Regular rate and rhythm without murmurs.  PULMONARY: Clear to auscultation bilaterally.   GASTROINTESTINAL: Abdomen soft, non-tender.  EXTREMITIES: No edema, pulses intact.  NEUROLOGIC: No focal deficits.            History of Illness:   See detailed admission note for full details.               Procedures excluding imaging which is summarized below:     Please see details in the electronic medical record.           Consultations:     GASTROENTEROLOGY IP CONSULT  HEMATOLOGY & ONCOLOGY IP CONSULT  CARE MANAGEMENT / SOCIAL WORK IP CONSULT  PALLIATIVE CARE ADULT IP CONSULT  SOCIAL WORK IP CONSULT  PHYSICAL THERAPY ADULT IP CONSULT  OCCUPATIONAL THERAPY ADULT IP CONSULT  CARE MANAGEMENT / SOCIAL WORK IP CONSULT  HEMATOLOGY & ONCOLOGY IP CONSULT          Significant Results:     Results for orders placed or performed during the hospital encounter of 07/20/21   CT Chest (PE) Abdomen Pelvis w Contrast    Narrative    EXAM: CT CHEST PE ABDOMEN PELVIS W CONTRAST  LOCATION: [facility]  DATE/TIME: 7/20/2021 6:27 PM    INDICATION: chest/ upper abdominal pain, known hepatic carcinoma  COMPARISON: 5/10/2021 and 5/11/2020  TECHNIQUE: CT chest pulmonary angiogram and routine CT abdomen pelvis with IV " contrast. Arterial phase through the chest and venous phase through the abdomen and pelvis. Multiplanar reformats and MIP reconstructions were performed. Dose reduction   techniques were used.   CONTRAST: 67mL Isovue-370    FINDINGS:  ANGIOGRAM CHEST: There is modest respiratory motion artifact resulting in blurring of pulmonary arteries. There is no convincing pulmonary embolism identified. Thoracic aorta is negative for dissection. No CT evidence of right heart strain.     LUNGS AND PLEURA: Motion artifact. No definite focal pneumonia or pleural effusion. Minimal dependent atelectasis improved compared to prior study.    MEDIASTINUM/AXILLAE: Small calcified nodes, no significant adenopathy.    CORONARY ARTERY CALCIFICATION: Moderate.    HEPATOBILIARY: Cirrhotic liver with innumerable low-attenuation foci throughout right lobe similar to previous exam. Very large presumed treated lesion occupying the inferior half of right lobe of liver similar to previous exam with a complex cystic   structure measuring approximately 13.5 x 12.5 x 10 cm. Previously there were numerous air bubbles within this collection which have resolved. There remains heterogeneous soft tissue density along the inferior aspect of this collection which could relate   to debris or blood products though enhancing tumor elements also possible. Mild amount of ascites surrounds liver.  Mild nonspecific gallbladder wall thickening similar to previous exam. Bile ducts unremarkable.    PANCREAS: Normal.    SPLEEN: Normal.    ADRENAL GLANDS: Normal.    KIDNEYS/BLADDER: Normal.    BOWEL: Mild wall thickening involving right hemicolon less pronounced than on the previous exam and may relate to third spacing. No definite new focal site of inflammation. No obstruction.    LYMPH NODES: Normal.    VASCULATURE: Prominent esophageal varices. There also appears to be a splenorenal shunt and likely pelvic varices through perirectal vasculature.    PELVIC ORGANS:  Mild ascites.    MUSCULOSKELETAL: Grade 1 spondylolisthesis and bilateral spondylolysis at L5-S1.      Impression    IMPRESSION:  1.  No pulmonary embolism identified. Modest motion artifact present and its possible tiny peripheral emboli could be overlooked.  2.  Cirrhotic liver with innumerable hepatic lesions and one large focus of presumed previous treatment occupying much of the right lobe, findings are similar to previous study with no definite acute change.  3.  Cirrhosis and portal venous hypertension and mild ascites similar to previous exam.  4.  Mild wall thickening of gallbladder and right hemicolon also similar to prior study, slightly improved.   US Abdomen Limited    Narrative    ULTRASOUND ABDOMEN LIMITED  7/21/2021 7:36 AM    CLINICAL HISTORY: Evaluate for biliary obstruction. History of HCC,  now worsened LFTs.  Obstruction?    TECHNIQUE: Limited abdominal ultrasound.    COMPARISON: CT chest, abdomen and pelvis 7/20/2021.    FINDINGS:  GALLBLADDER: Diffuse tenderness of the abdomen. Gallbladder sludge  identified. Gallbladder wall is 4 mm, mildly thickened.    BILE DUCTS: There is no biliary dilatation. The common duct measures 4  mm.    LIVER: Large heterogeneous hepatic mass again identified on the right  side that is approximately 10.4 x 11.1 x 10.3 cm. This is better  assessed on the recent comparison CT and other comparison imaging.  Color imaging in the area of the main portal vein shows a lack of  color signal, see images 41 and 42.    RIGHT KIDNEY: No hydronephrosis.    PANCREAS: The visualized portions of the pancreas are normal.    No ascites.      Impression    IMPRESSION:  1.  Lack of color signal at the main portal vein consistent with  portal venous thrombus. This is new as compared to older imaging from  an outside MRI dated 6/8/2021, and CT dated 5/11/2021.  2.  Heterogeneous hepatic mass consistent with malignancy. This is  better evaluated at CT and MRI comparison exams.  3.  No  biliary ductal dilatation with the common duct measuring 4 mm.  4.  Gallbladder sludge identified. There is also gallbladder wall  thickening, and the patient has diffuse abdominal pain during the  scan. Cholecystitis cannot be excluded by ultrasound.    LAN GOLDSTEIN MD         SYSTEM ID:  [id number]   MR Liver wo & w Contrast    Narrative    MR LIVER WITHOUT AND WITH CONTRAST   7/22/2021 10:13 AM     HISTORY: HCC, new PV thrombus.    TECHNIQUE: Multiplanar, multiecho MRI was performed using T1, T2, and  in- and out-of-phase imaging. Pre- and postcontrast T1 images were  acquired after the administration of 7 mL Gadavist IV.     COMPARISON: Ultrasound abdomen 7/21/2021, CT chest, abdomen and pelvis  7/20/2021. MRI abdomen 6/8/2021. CT abdomen 5/11/2021.    FINDINGS: Cirrhotic configuration of the liver. Dominant rounded mass  occupying the right mid to inferior liver is again identified. It is  similar in overall size measuring 13.2 x 10 x 12.3 cm, series 12 image  56, series 2 image 22. This is similar in size compared to the prior  MRI from 6/8/2021. There are irregular nodular and curvilinear areas  of internal enhancement occupying the posterior and peripheral aspect  of the mass extending inferiorly. The area of enhancement internally  is slightly larger compared to the prior MRI and is now 8.5 x 8.6 cm  measured on series 15 image 60, previously 8.0 x 6.3 cm. Further,  immediately adjacent to the posterior inferior aspect of this mass is  a new enhancing mass associated with destruction of the right  posterior eleventh rib that measures 2.7 x 1.3 cm, series 12 image 67.  This is new over time compared to older CT examinations, for example  progressed compared to 5/11/2021. New finding of prominent portal vein  thrombus involving the main portal vein and extending to the right  anterior portal vein and a portion of the central left portal vein,  for example series 12 images 40, 41, and 46. The subtraction  imaging  shows a degree of enhancement of this thrombus, series 13 image 45 and  image 39, worrisome for enhancing tumor thrombus.    There are innumerable additional hypoenhancing nodules occupying the  majority of the right liver and at multiple locations within the left  liver. Many of these were also present on prior imaging, but this  appears progressed and several are larger in size. For example, a  segment VIII complex nodular mass is 3.2 cm, series 12 image 26,  previously 1 cm. A hypodense nodule is 1.2 cm at the left liver  segment III, newly identified. There are numerous other examples of  progression.    Trace right pleural fluid. Stable nora hepatis region lymph node that  is 1 cm, series 12 image 50. There are other stable prominent upper  abdominal lymph nodes. Unremarkable left adrenal. Right adrenal is  difficult to visualize but may be occupied by 2.7 cm hypoenhancing  nodular mass, previously 1.5 cm, series 12 image 40. Splenomegaly is  14.4 cm. There are a few incidental renal cysts not requiring specific  follow-up. The pancreas shows no new abnormality. Several vascular  varices noted near the stomach, spleen, and at the upper abdomen  retroperitoneum.      Impression    IMPRESSION:  1. New finding of enhancing portal venous thrombus involving the main  portal vein and right greater than left-sided portal veins consistent  with tumor thrombus and progression of malignancy compared to prior  imaging.  2. Dominant mass occupying the right mid to inferior liver is again  noted and shows imaging evidence for prior treatment, though  internally there are irregular areas of tumor enhancement that is  larger in size indicating interval progression.  3. Innumerable new and/or larger nodules throughout the liver  consistent with other areas of hepatic malignancy progression. Though  these are mostly within the right and central liver, progression  within the left liver also demonstrated.  4. New mass  with bony destruction identified along the right posterior  eleventh rib consistent with bony metastatic disease progression.  5. The right adrenal is difficult to visualize and may be occupied by  a metastatic mass that is larger over time.  6. Enlarged but stable upper abdominal lymph nodes.  7. Trace right pleural fluid.    LAN GOLDSTEIN MD         SYSTEM ID:  [id number]   US Paracentesis    Narrative    US PARACENTESIS 7/24/2021 4:45 PM     HISTORY: HIGH VOLUME paracentesis with or without diagnostic fluid  analysis with labs to be drawn if ordered. Total paracentesis volume  as much as possible.    FINDINGS: Limited preprocedure ultrasound was performed, images show a  sufficient amount of ascites for paracentesis. An image was archived.  Written and oral informed consent was obtained. A pause for the cause  procedure to verify the correct patient and correct procedure. The  skin overlying the right lower quadrant was prepped and draped in the  usual sterile fashion. The subcutaneous tissues were anesthetized with  10 mL of 1% lidocaine. Under direct ultrasound guidance the catheter  was advanced into the peritoneal space and 1.9 L of  straw colored  fluid was drained. The catheter was removed and a sterile dressing was  applied. There were no immediate complications. Ultrasound images were  permanently stored.  Patient left the ultrasound suite in satisfactory  condition.      Impression    IMPRESSION: Technically successful paracentesis without immediate  complications.    JANIA PATEL DO         SYSTEM ID:  [id number]   US Paracentesis    Narrative    ULTRASOUND GUIDED PARACENTESIS   7/26/2021 12:35 PM     HISTORY:  HIGH VOLUME paracentesis with or without diagnostic fluid  analysis with labs to be drawn if ordered. Total paracentesis volume  as much as possible. Hepatocellular carcinoma. COVID 19. Portal vein  thrombosis.    PROCEDURE: Informed consent was obtained from the patient prior to the  procedure  with discussion including the possible risks of bleeding,  infection and organ injury.    Limited ultrasound of the abdomen demonstrated an accessible fluid  pocket within the medial right lower quadrant.    The site was prepped and draped in the usual sterile fashion. A total  of 10 mL of 1% lidocaine was anesthetized within the subcutaneous and  peritoneal tissues for anesthetic. Under sonographic guidance, a 8  [place] paracentesis catheter was advanced into the peritoneal fluid. A  permanent image was stored for documentation. Needle was removed and  catheter was used to aspirate 1400 mL of blood tinged serous fluid in  vacuum bottles. A sample was sent for laboratory studies. There were  no immediate complications.       Impression    IMPRESSION: Successful ultrasound-guided diagnostic and therapeutic  paracentesis. Total 1400 mL blood-tinged fluid.    RAKEL FINLEY MD         SYSTEM ID:  [id number]   US Paracentesis    Narrative    ULTRASOUND PARACENTESIS 7/29/2021 9:33 AM     HISTORY: Cancer. Current ascites.    FINDINGS: Limited preprocedure ultrasound was performed, images show a  sufficient amount of ascites for paracentesis. An image was archived.  Written and oral informed consent was obtained. A pause for the cause  procedure to verify the correct patient and correct procedure. The  skin overlying the right lower quadrant was prepped and draped in the  usual sterile fashion. The subcutaneous tissues were anesthetized with  10 mL 1% lidocaine. Under direct ultrasound guidance the catheter was  advanced into the peritoneal space and 50 mL of straw colored fluid  was drained. The catheter was removed and a sterile dressing was  applied. There were no immediate complications. Ultrasound images were  permanently stored.  Patient left the ultrasound suite in satisfactory  condition.      Impression    IMPRESSION: Technically successful paracentesis without immediate  complications. Per the order only 50 mL  of ascites was removed.    JANIA PATEL DO         SYSTEM ID:  [id number]   US Paracentesis    Narrative    US PARACENTESIS 8/2/2021 10:37 AM    CLINICAL HISTORY: Hepatitis C cirrhosis. HCC. Ascites.    PROCEDURE: Informed consent obtained. Time out performed. The abdomen  was prepped and draped in a sterile fashion. 10 mL of 1% lidocaine was  infused into local soft tissues. An 8 Syriac catheter system was  introduced into the abdominal ascites under ultrasound guidance.    3 liters of clear serous fluid were removed and sent to lab if  requested.    Patient tolerated procedure well.    Ultrasound imaging was obtained and placed in the patient's permanent  medical record.      Impression    IMPRESSION:  1.  Status post ultrasound-guided paracentesis.    CHLOE MOE MD         SYSTEM ID:  [id number]       Transthoracic Echocardiogram Results:  No results found for this or any previous visit (from the past 4320 hour(s)).             Pending Results:     Unresulted Labs Ordered in the Past 30 Days of this Admission     No orders found from 6/20/2021 to 7/21/2021.                      Discharge Instructions and Follow-Up:     Discharge instructions and follow-up:   Discharge Procedure Orders   Care Coordination Referral   Standing Status: Future   Referral Priority: Routine Referral Type: Care Coordination   Number of Visits Requested: 1     Home care nursing referral     Home Care PT Referral for Hospital Discharge   Referral Priority: Routine Referral Type: Home Health Therapies & Aides   Number of Visits Requested: 1     Home Care OT Referral for Hospital Discharge   Referral Priority: Routine Referral Type: Consultation   Number of Visits Requested: 1     Reason for your hospital stay   Order Comments: Portal Vein Thrombosis, Spontaneous Bacterial Peritonitis, Acute on Chronic Hyponatremia     Follow-up and recommended labs and tests    Order Comments: Follow up with primary care provider, Lindy Dodge,  within 2-4 days for hospital follow- up.  The following labs/tests are recommended: CBC, BMP, Hepatic Panel, INR.      Follow up with your Hepatologist this week for a hospital follow up regarding your lasix and spironolactone.  Do not take these until you have repeat blood work to check your sodium and vital signs with your hepatologist.     Activity   Order Comments: Your activity upon discharge: activity as tolerated     Order Specific Question Answer Comments   Is discharge order? Yes      MD face to face encounter   Order Comments: Documentation of Face to Face and Certification for Home Health Services    I certify that patient: Alexandra Alfaro is under my care and that I, or a nurse practitioner or physician's assistant working with me, had a face-to-face encounter that meets the physician face-to-face encounter requirements with this patient on: 8/1/2021.    This encounter with the patient was in whole, or in part, for the following medical condition, which is the primary reason for home health care: weakness.    I certify that, based on my findings, the following services are medically necessary home health services: Nursing, Occupational Therapy, and Physical Therapy.    My clinical findings support the need for the above services because: Nurse is needed: To provide assessment and oversight required in the home to assure adherence to the medical plan due to: weakness.., Occupational Therapy Services are needed to assess and treat cognitive ability and address ADL safety due to impairment in weakness., and Physical Therapy Services are needed to assess and treat the following functional impairments: weakness.    Further, I certify that my clinical findings support that this patient is homebound (i.e. absences from home require considerable and taxing effort and are for medical reasons or [employer] services or infrequently or of short duration when for other reasons) because: Requires assistance of another  person or specialized equipment to access medical services because patient: Requires supervision of another for safe transfer...    Based on the above findings. I certify that this patient is confined to the home and needs intermittent skilled nursing care, physical therapy and/or speech therapy.  The patient is under my care, and I have initiated the establishment of the plan of care.  This patient will be followed by a physician who will periodically review the plan of care.  Physician/Provider to provide follow up care: Lindy Dodge    Attending hospital physician (the Medicare certified [place] provider): Iam Ring DO  Physician Signature: See electronic signature associated with these discharge orders.  Date: 8/1/2021     Walker   Order Comments: DME Documentation:   Describe the reason for need to support medical necessity: generalized weakness.     I, the undersigned, certify that the above prescribed supplies are medically necessary for this patient and is both reasonable and necessary in reference to accepted standards of medical and necessary in reference to accepted standards of medical practice in the treatment of this patient's condition and is not prescribed as a convenience.     Order Specific Question Answer Comments   DME Provider: [place]    Walker Type: Standard (No Wheel)      Diet   Order Comments: Follow this diet upon discharge: Orders Placed This Encounter      Snacks/Supplements Adult: Magic Cup; Between Meals      Snacks/Supplements Adult: Ensure Enlive; Between Meals      Low sodium diet     Order Specific Question Answer Comments   Is discharge order? Yes              Hospital Course:     Alexandra Alfaro is a 66 year old male with a history of covid 19 in January 2020, hepatocellular carcinoma s/p embolization and Atezolizumab in April 2021 with repeat imaging showing disease progression, hepatitis C cirrhosis admitted on 7/20/2021 with abdominal pain and abnormal  LFTs.  Upon arrival the patient was noted to have a sodium of 127, worsening LFTs from his baseline with bilirubin of 6.3, alkaline phosphatase of 170, AST//281, lipase normal and troponin 0 0.021.  The patient did have a CT chest/abdomen/pelvis that showed no pulmonary embolism however did note innumerable hepatic mets similar to previous, mild gallbladder wall thickening.  Gastroenterology was consulted to see the patient.  The patient's home Lasix and spironolactone were held secondary to his hyponatremia.  The patient did have a right upper quadrant ultrasound that showed portal vein thrombosis with gallbladder wall sludge/thickening.  The patient was started on therapeutic Lovenox.  Given the patients history of hepatocellular carcinoma, Oncology was consulted to see the patient.  The patient was started on IV Zosyn for possible cholecystitis.  The patient had a paracentesis showing concern for SBP.  The patient was transitioned to IV Ertapenem.  Several discussions with the patient as well as his family were completed in regards to his cancer treatment failures as well as his poor performance status.  It was recommended several times to the patient as well as his family to pursue hospice/palliative care.  The patient did have a care conference with oncology as well as palliative care.  Patient and his family declined hospice and wished to pursue full restorative cares.  The patient finished his course of IV ertapenem.  Patient was seen by physical therapy who recommended TCU for the patient.  The patient and his family declined this and preferred the patient to return home.  The patient sodium gradually improved and liver enzymes gradually improved.  On 8/2/2021, the patient as well as his family were agreeable to home care/PT/OT.  His electrolytes and kidney function had improved.    The patient had paracentesis with 3 L of fluid removed on 8/2/2021.  The patient was restarted on low-dose Lasix prior  to discharge.  At that point the patient was medically stable for discharge home with family.     The patient was seen, examined, and counseled on this day. The hospitalization and plan of care was reviewed with the patient extensively. All questions were addressed and the patient agreed to follow-up as noted above.      Total time spent in face to face contact with the patient and coordinating discharge was:  39 Minutes    Iam Ring DO  [facility] Hospitalist  [address] [place] [address]  08/03/2021

## 2021-08-03 NOTE — NURSING NOTE
"[facility] 07/20/21-08/02/21  Vital signs:  Temp: 98  F (36.7  C) Temp src: Oral BP: 96/58 Pulse: 107   Resp: 16 SpO2: 93 %     Height: 185.4 cm (6' 1\") Weight: 78.4 kg (172 lb 12.8 oz)  Estimated body mass index is 22.8 kg/m  as calculated from the following:    Height as of this encounter: 1.854 m (6' 1\").    Weight as of this encounter: 78.4 kg (172 lb 12.8 oz).          "

## 2021-08-03 NOTE — PLAN OF CARE
Physical Therapy Discharge Summary    Reason for therapy discharge:    Discharged to home with home therapy.    Progress towards therapy goal(s). See goals on Care Plan in [facility] electronic health record for goal details.  Goals not met.  Barriers to achieving goals:   discharge from facility.    Therapy recommendation(s):    Continued therapy is recommended.  Rationale/Recommendations:  HHPT per prior PT recommendation.

## 2021-08-03 NOTE — PROGRESS NOTES
Clinic Care Coordination Contact  [facility]/Voicemail       Clinical Data: Care Coordinator Outreach  Outreach attempted x 1.  Left message on patient's voicemail with call back information and requested return call.  Plan: RN Care Coordinator will try to reach patient again in 1-2 business days.    Elmira Sethi RN Care Coordinator  [facility]  Email: [email or web address]  Phone: [phone]

## 2021-08-03 NOTE — PROGRESS NOTES
(C22.0) Hepatocellular carcinoma (H)  (primary encounter diagnosis)  Comment:   Sister indicates he needs weekly paracentesis.  Will need to review oncology recommendations.  Plan: US Paracentesis, CBC with platelets and         differential, Basic metabolic panel  (Ca, Cl,         CO2, Creat, Gluc, K, Na, BUN), Hepatic panel         (Albumin, ALT, AST, Bili, Alk Phos, TP), INR,         omeprazole (PRILOSEC) 40 MG DR capsule            (R18.8) Other ascites  Comment:   Plan: US Paracentesis        See above.        (K74.69) Other cirrhosis of liver (H)  Comment:   Plan: furosemide (LASIX) 20 MG tablet            (R10.11) Right upper quadrant abdominal pain  Comment:   Plan: famotidine (PEPCID) 20 MG tablet            (I81) Portal vein thrombosis  Comment:   Recent diagnosis.    Plan: enoxaparin ANTICOAGULANT (LOVENOX) 120 MG/0.8ML        syringe            (C22.0) Hepatic carcinoma (H)  Comment:   Plan: dronabinol (MARINOL) 5 MG capsule            He does have 2 additional primary care appointment set up, one early next week.        Subjective       Patient is here with his sister who speaks English.      [facility]       Hospital Follow-up Visit:    Hospital/Nursing Home/IP Rehab Facility: [facility]  Date of Admission: 07/20/21  Date of Discharge: 08/02/21  Reason(s) for Admission: liver cancer      Was your hospitalization related to COVID-19? No   Problems taking medications regularly:  None  Medication changes since discharge: None  Problems adhering to non-medication therapy:  None    Summary of hospitalization:  [facility] discharge summary reviewed  Diagnostic Tests/Treatments reviewed.  Follow up needed: paracentesis, Oncology  Other Healthcare Providers Involved in Patient s Care:         Oncology.  Update since discharge: stable.       Post Discharge Medication Reconciliation: discharge medications reconciled, continue medications without change.  Plan of care  "communicated with patient and sister                This is the first time I have seen him.  He is a 66-year-old man with known liver cancer.  He apparently is prone to a ascites.  He has a recent diagnosis of portal vein thrombosis.    Sister wished to clarify that he has a supply of medications on which she was discharged.  He is on Lovenox.    Weekly paracentesis.  Apparently recommended by oncology.        Review of Systems     No fever.  Generalized weakness.  Not complaining of pain.  No S OB.  No vomiting.  Swelling of lower extremities.      Objective    BP 96/58   Pulse 107   Temp 98  F (36.7  C) (Oral)   Resp 16   Ht 1.854 m (6' 1\")   Wt 78.4 kg (172 lb 12.8 oz)   SpO2 93%   BMI 22.80 kg/m    Body mass index is 22.8 kg/m .  Physical Exam     Thin man who looks a bit older than stated age.  HEENT no scleral icterus  Neck without masses  Chest nonlabored breathing  Abdomen not particularly distended or tender today.  Extremities 2+ edema right leg greater than left  No focal weakness.        "

## 2021-08-04 NOTE — TELEPHONE ENCOUNTER
Reason for Call: Request for an order or referral:    Order or referral being requested: verbal orders for skilled nursing 1x a week for 5 weeks  4 PRN visits  PT/OT eval and treat     Date needed: as soon as possible    Has the patient been seen by the PCP for this problem? YES    Additional comments: none     Phone number Patient can be reached at:  Home number on file [phone] (home)    Best Time:  Any     Can we leave a detailed message on this number?  YES    Call taken on 8/4/2021 at 4:12 PM by Zuleima Kyle

## 2021-08-04 NOTE — PROGRESS NOTES
Clinic Care Coordination Contact    Clinic Care Coordination Contact  OUTREACH    Referral Information:  Referral Source: ED Follow-Up    Primary Diagnosis: Chronic Pain    Chief Complaint   Patient presents with     Clinic Care Coordination - Initial        [place] Utilization: [place] 7/20 - 8/2/21 for Portal Vein Thrombosis, Spontaneous Bacterial Peritonitis,  Acute on Chronic Hyponatremia  Clinic Utilization  Difficulty keeping appointments:: Yes  Compliance Concerns: Yes  No-Show Concerns: Yes  No PCP office visit in Past Year: Yes  Utilization    Hospital Admissions  2             ED Visits  6             No Show Count (past year)  3                Current as of: 8/3/2021  7:58 PM              Clinical Concerns:  Current Medical Concerns:    Patient Active Problem List   Diagnosis     Hepatitis C, chronic (H)     Hepatocellular carcinoma (H)     Anemia, unspecified type     Other cirrhosis of liver (H)     Other ascites     Epigastric pain     Hepatic carcinoma (H)     Elevated LFTs     Chronic hepatitis C virus infection (H)     Abdominal pain         Current Behavioral Concerns: None noted    Education Provided to patient: RN CC role and reason for call   Pain  Pain (GOAL):: Yes  Type: Chronic (>3mo)  Location of chronic pain:: Neck and back  Radiating: No  Progression: Intermittent  Description of pain: Nagging, Sharp, Stabbing  Chronic pain severity:: 4  Limitation of routine activities due to chronic pain:: Yes  Description: Able to do moderate activities  Alleviating Factors: Rest, Ice  Aggravating Factors: Activity  Health Maintenance Reviewed:    Clinical Pathway: None    Medication Management:  Medication review status: Medications reviewed and no changes reported per patient.        Current Outpatient Medications   Medication Instructions     Acetaminophen (TYLENOL PO) Oral, DAILY PRN     dronabinol (MARINOL) 5 mg, Oral, DAILY     enoxaparin ANTICOAGULANT (LOVENOX) 1.5 mg/kg, Subcutaneous, EVERY  24 HOURS     famotidine (PEPCID) 20 mg, Oral, 2 TIMES DAILY     furosemide (LASIX) 20 mg, Oral, DAILY     omeprazole (PRILOSEC) 40 mg, Oral, DAILY     polyethylene glycol (MIRALAX) 17 g, Oral, DAILY, Do not give if having loose stools     senna-docusate (SENOKOT-S/PERICOLACE) 8.6-50 MG tablet 2 tablets, Oral, 2 TIMES DAILY, Do not give if having loose stools     Functional Status:  Dependent ADLs:: Independent  Dependent IADLs:: Transportation, Shopping, Laundry, Meal Preparation, Medication Management, Cleaning, Cooking  Bed or wheelchair confined:: No  Mobility Status: Independent    Living Situation:  Current living arrangement:: I live in a private home with family  Type of residence:: Apartment    Lifestyle & Psychosocial Needs:    Social Determinants of Health     Tobacco Use: Medium Risk     Smoking Tobacco Use: Former Smoker     Smokeless Tobacco Use: Never Used   Alcohol Use:      Frequency of Alcohol Consumption:      Average Number of Drinks:      Frequency of Binge Drinking:    Financial Resource Strain:      Difficulty of Paying Living Expenses:    Food Insecurity:      Worried About Running Out of Food in the Last Year:      Ran Out of Food in the Last Year:    Transportation Needs:      Lack of Transportation (Medical):      Lack of Transportation (Non-Medical):    Physical Activity:      Days of Exercise per Week:      Minutes of Exercise per Session:    Stress:      Feeling of Stress :    Social Connections:      Frequency of Communication with Friends and Family:      Frequency of Social Gatherings with Friends and Family:      Attends Protestant Services:      Active Member of Clubs or Organizations:      Attends Club or Organization Meetings:      Marital Status:    Intimate Partner Violence:      Fear of Current or Ex-Partner:      Emotionally Abused:      Physically Abused:      Sexually Abused:    Depression: Not at risk     PHQ-2 Score: 0   Housing Stability:      Unable to Pay for Housing in  the Last Year:      Number of Places Lived in the Last Year:      Unstable Housing in the Last Year:      Diet:: Regular  Tube Feeding: No  Inadequate activity/exercise (GOAL):: No  Significant changes in sleep pattern (GOAL): Yes  Transportation means:: Regular car     Mental health DX:: No  Mental health management concern (GOAL):: No  Informal Support system:: Family        Spoke with patient's daughter Radha, consent to communicate on file. Radha states that her father has all the support and resources he needs at this time. Patient's prognosis is not favorable. Oncology is leading and managing patient's treatments and care plan at this time.     Reviewed AVS, medication list, and upcoming appointments. Patient followed up with PCP yesterday 8/3/21.      Resources and Interventions:  Current Resources:      Community Resources: None  Supplies used at home:: None  Equipment Currently Used at Home: shower chair  Employment Status: employed part-time         Advance Care Plan/Directive  Advanced Care Plans/Directives on file:: No  Advanced Care Plan/Directive Status: Considering Options    Referrals Placed: None     Goals:   None noted       Future Appointments              Today Rn, [place] [facility]; PHONE,  [place]    In 5 days Angelina Johnson MD [place], [place]    In 2 weeks  IMAGING NURSE;  IR RAD; [facility] RID    In 1 month Jesus Plasencia MD [place], [place]          Plan: Patient was provided with RN CC's contact information and encouraged to call with questions, concerns, support needs. RN CC will remain available as needed. No further care coordination outreaches will be made at this time.     Elmira Sethi RN Care Coordinator  [facility]  Email: [email or web address]  Phone: [phone]

## 2021-08-05 NOTE — TELEPHONE ENCOUNTER
Home care ordered at hospital discharge, previously patient of Dr. Dodge. Routed to covering provider - Dr. Stern, to review.

## 2021-08-09 NOTE — PROGRESS NOTES
Patient tolerated paracentesis well. Skin, warm and dry, color. 4300 mls of maikel fluid removed from left side. Insertion site clean and dry, Band-Aid dressing applied no drainage or bleeding noted. Vital signs stable. Discharge instructions reviewed. States he understands and has no questions at this time. Discharged ambulatory per self in wheel chair with daughter in stable condition.

## 2021-08-09 NOTE — PROGRESS NOTES
Assessment & Plan     Hepatocellular carcinoma (H)  He has metastatic cancer, following with oncology with follow-up appointment scheduled.  Getting regular paracentesis.  Continue follow-up with oncology    Abdominal pain, epigastric  Has been having some epigastric pain, reports it is a little worse since his paracentesis done earlier today.  He is not having fever, chills or any symptoms that might suggest infection.  He is on high-dose PPI and famotidine so gastritis seems less likely.  It is likely used to be due to his metastatic cancer.  Pain is probably inhibiting his breathing, overall his lungs sound clear and his oxygenation is good.  We will give a prescription for oxycodone to help with the pain until he can see his oncologist, if acute worsening would need to return to the ED  - oxyCODONE (ROXICODONE) 5 MG tablet; Take 1 tablet (5 mg) by mouth every 8 hours as needed for pain or severe pain    Edema, unspecified type  Advised likely this is related to venous obstruction, discussed elevation of the legs, can continue using the Lasix, will order compression stockings  - Compression Sleeve/Stocking Order for DME - ONLY FOR DME      Return in about 2 months (around 10/9/2021).    Angelina Johnson MD  [facility] NIKOLE Morris is a 66 year old who presents for the following health issues  accompanied by his daughter:    HPI   Patient is here to establish care with me.  His previous provider left, he did see another provider in this clinic last week for hospital follow-up.  He has metastatic hepatocellular carcinoma.  He is getting regular ultrasound paracentesis.  He is currently using Tylenol for pain.    Current concerns:  1.  Epigastric pain: This is been about a week, its soreness across the top of his abdomen, fairly steady.  Tylenol helps a little bit.  He had a paracentesis done this morning and reports that the pain is a little bit worse since that time.  He has no  fever, chills, reports no nausea, has been eating okay, no change in his bowels.  He reports no lightheadedness or faint feeling.  He does report is a little bit difficult getting a deep breath with the pain, might be a little worse today    2.  Edema: This is been an ongoing issue, has been on and off Lasix.  He was told to continue taking the Lasix last week, had labs done.    Patient Active Problem List   Diagnosis     Hepatitis C, chronic (H)     Hepatocellular carcinoma (H)     Anemia, unspecified type     Other cirrhosis of liver (H)     Other ascites     Epigastric pain     Hepatic carcinoma (H)     Elevated LFTs     Chronic hepatitis C virus infection (H)     Abdominal pain     Current Outpatient Medications   Medication Sig Dispense Refill     dronabinol (MARINOL) 5 MG capsule Take 1 capsule (5 mg) by mouth daily 30 capsule 1     enoxaparin ANTICOAGULANT (LOVENOX) 120 MG/0.8ML syringe Inject 0.8 mLs (120 mg) Subcutaneous every 24 hours 24 mL 1     famotidine (PEPCID) 20 MG tablet Take 1 tablet (20 mg) by mouth 2 times daily 60 tablet 1     furosemide (LASIX) 20 MG tablet Take 1 tablet (20 mg) by mouth daily 30 tablet 1     omeprazole (PRILOSEC) 40 MG DR capsule Take 1 capsule (40 mg) by mouth daily (Patient not taking: Reported on 8/12/2021) 30 capsule 1     polyethylene glycol (MIRALAX) 17 GM/Dose powder Take 17 g by mouth daily Do not give if having loose stools (Patient taking differently: Take 17 g by mouth daily as needed Do not give if having loose stools) 510 g 0     senna-docusate (SENOKOT-S/PERICOLACE) 8.6-50 MG tablet Take 2 tablets by mouth 2 times daily Do not give if having loose stools (Patient taking differently: Take 2 tablets by mouth 2 times daily as needed Do not give if having loose stools) 60 tablet 0     ciprofloxacin (CIPRO) 500 MG tablet Take 1 tablet by mouth 2 times daily        Social History     Tobacco Use     Smoking status: Former Smoker     Types: Cigarettes     Quit date:  "4/6/2018     Years since quitting: 3.3     Smokeless tobacco: Never Used   Vaping Use     Vaping Use: Never used   Substance Use Topics     Alcohol use: No     Drug use: No        Review of Systems   No fever, chills, nausea, vomiting, diarrhea, blood in the stool      Objective    /67 (BP Location: Left arm, Patient Position: Sitting, Cuff Size: Adult Regular)   Pulse 112   Temp 98.5  F (36.9  C) (Oral)   Resp 18   Ht 1.753 m (5' 9\")   Wt 76.5 kg (168 lb 11.2 oz)   SpO2 98%   BMI 24.91 kg/m    Body mass index is 24.91 kg/m .  Physical Exam     Abdomen: Mildly distended, soft, some mild enlargement of the liver, mild to moderate tenderness across the upper abdomen, particularly midepigastrium without rebound or guarding  2+ edema              "

## 2021-08-09 NOTE — NURSING NOTE
"/67 (BP Location: Left arm, Patient Position: Sitting, Cuff Size: Adult Regular)   Pulse 112   Temp 98.5  F (36.9  C) (Oral)   Resp 18   Ht 1.753 m (5' 9\")   Wt 76.5 kg (168 lb 11.2 oz)   SpO2 98%   BMI 24.91 kg/m      "

## 2021-08-12 PROBLEM — K92.0 HEMATEMESIS WITH NAUSEA: Status: ACTIVE | Noted: 2021-01-01

## 2021-08-12 PROBLEM — E87.20 LACTIC ACIDEMIA: Status: ACTIVE | Noted: 2021-01-01

## 2021-08-12 PROBLEM — K65.2 SBP (SPONTANEOUS BACTERIAL PERITONITIS) (H): Status: ACTIVE | Noted: 2021-01-01

## 2021-08-12 NOTE — ED TRIAGE NOTES
Pt aox4, ABCs intact. Pt states that he started feeling sick yesterday. Last night pt started vomiting blood. Pt has liver cancer. Hypotensive upon EMS arrival 90/50. Pt given 8mg zofran via EMS

## 2021-08-12 NOTE — PLAN OF CARE
Up from ED approx 1645, oriented to room and staff. Pt U[place]-speaking,  services utilized via ipad in addition to family. Ox4. VSS on RA. C/O abd pain, PRN PO dilaudid given per pt request w/ some relief. Tele SR, denied CP. LS clear/dim, denied SOB. PIV x 3 intact, see MAR for continuous infusions. Side-lying on own in bed. Band-aid to right abd site CDI. Tolerating clears. Will continue POC.

## 2021-08-12 NOTE — ED PROVIDER NOTES
History   Chief Complaint:  Hematemesis     The history is provided by the patient and a relative (daughter). The history is limited by a language barrier. A  was used (daughter: Hungarian).      Alexandra Alfaro is a 66 year old male with history of hepatocellular carcinoma, currently anticoagulated via Lovenox injections, who presents with hematemesis. The patient's daughter reports that the patient started feeling sick yesterday morning with a fever of 100 degrees and hematemesis. Last night, she says the fever was lower. The patient has had three episodes of hematemesis since then with red blood clots present. He has never had hematemesis before. His last bowel movement was yesterday, which he describes as brown diarrhea. He also notes abdominal pain and abdominal distention since Tuesday. The daughter states that the liver cancer has spread to his kidneys and bones. She reports that the patient has been told he has between two weeks and two months left to live.    Review of Systems   Constitutional: Positive for fever.   Gastrointestinal: Positive for abdominal distention, abdominal pain, diarrhea, nausea and vomiting.   All other systems reviewed and are negative.      Allergies:  No Known Allergies    Medications:  Aldactone  Lasix  Prilosec  Lovenox  Marinol    Past Medical History:    Depression  Hepatitis C  Hepatocellular carcinoma  Liver cirrhosis  Anemia  Right inguinal hernia     Past Surgical History:    DaVinci inguinal herniorrhaphy  Diaphragm surgery  ERCP with FNA  Hepatic angiogram with radioembolization     Family History:    Thyroid cancer, brother    Social History:  PCP: Lindy Dodge  Presents to the ED with daughter.    Physical Exam     Patient Vitals for the past 24 hrs:   BP Temp Temp src Pulse Resp SpO2   08/12/21 1525 103/47 -- -- 93 28 98 %   08/12/21 1520 104/44 -- -- 93 21 99 %   08/12/21 1515 112/78 -- -- 95 -- --   08/12/21 1510 101/52 -- -- 93 11 99 %    08/12/21 1505 103/61 -- -- 95 23 99 %   08/12/21 1500 109/59 -- -- 92 17 98 %   08/12/21 1455 92/55 -- -- 93 30 99 %   08/12/21 1450 97/50 -- -- 90 19 99 %   08/12/21 1445 98/52 -- -- 91 20 99 %   08/12/21 1440 106/48 -- -- 93 21 99 %   08/12/21 1435 104/56 -- -- 92 -- --   08/12/21 1430 110/55 -- -- 90 21 99 %   08/12/21 1425 106/54 -- -- 91 16 98 %   08/12/21 1420 110/61 -- -- 92 24 99 %   08/12/21 1415 111/62 -- -- 91 19 99 %   08/12/21 1410 104/58 -- -- 91 21 99 %   08/12/21 1405 99/48 -- -- 93 26 99 %   08/12/21 1400 100/54 -- -- 96 19 99 %   08/12/21 1355 107/60 -- -- 93 24 99 %   08/12/21 1350 109/60 -- -- 92 21 99 %   08/12/21 1345 99/61 -- -- 91 16 98 %   08/12/21 1340 104/58 -- -- 92 21 98 %   08/12/21 1335 95/69 -- -- 90 17 100 %   08/12/21 1330 107/54 -- -- 90 21 99 %   08/12/21 1325 107/57 -- -- 90 22 99 %   08/12/21 1320 -- -- -- 90 21 99 %   08/12/21 1315 114/63 -- -- 91 21 99 %   08/12/21 1310 110/56 -- -- 93 16 99 %   08/12/21 1305 106/66 -- -- 92 20 99 %   08/12/21 1300 109/57 97.5  F (36.4  C) -- 91 21 99 %   08/12/21 1255 91/43 -- -- 95 22 99 %   08/12/21 1250 104/56 -- -- 91 20 99 %   08/12/21 1245 99/56 -- -- 91 20 99 %   08/12/21 1240 99/56 -- -- 92 9 99 %   08/12/21 1235 99/60 -- -- 92 14 98 %   08/12/21 1230 106/55 -- -- 91 22 99 %   08/12/21 1225 93/59 -- -- 92 17 99 %   08/12/21 1220 94/57 -- -- 90 20 99 %   08/12/21 1216 -- 97.6  F (36.4  C) Oral -- 16 --   08/12/21 1215 97/49 -- -- 94 19 99 %   08/12/21 1200 105/51 -- -- 96 22 99 %   08/12/21 1155 90/48 -- -- 90 23 97 %   08/12/21 1150 100/56 -- -- 92 21 98 %   08/12/21 1145 101/47 -- -- 91 -- 99 %   08/12/21 1140 93/48 -- -- 94 22 99 %   08/12/21 1135 (!) 88/45 -- -- 117 25 99 %   08/12/21 1130 108/63 97.4  F (36.3  C) Oral 88 21 98 %   08/12/21 1125 105/61 -- -- 92 21 98 %   08/12/21 1120 103/59 -- -- 103 19 98 %   08/12/21 1118 -- 97.5  F (36.4  C) Oral -- -- --   08/12/21 1115 101/59 -- -- 117 23 99 %   08/12/21 1110 110/61 -- --  98 20 98 %   08/12/21 1105 102/57 -- -- 98 22 99 %   08/12/21 1100 (!) 111/97 -- -- 112 18 99 %   08/12/21 1055 97/53 -- -- 97 17 99 %   08/12/21 1050 108/57 -- -- 96 23 99 %   08/12/21 1045 102/53 -- -- 101 19 99 %   08/12/21 1040 101/57 -- -- 93 21 99 %   08/12/21 1035 103/58 -- -- 91 23 99 %   08/12/21 1030 100/59 -- -- 92 -- 98 %   08/12/21 1025 (!) 88/61 -- -- 97 13 99 %   08/12/21 1020 (!) 88/59 -- -- 95 22 98 %   08/12/21 1015 -- -- -- 98 15 99 %   08/12/21 1010 97/56 -- -- 101 22 99 %   08/12/21 1005 99/53 -- -- 93 21 98 %   08/12/21 1000 100/62 -- -- 92 22 97 %   08/12/21 0955 90/53 -- -- 98 22 94 %   08/12/21 0950 98/59 -- -- 90 21 98 %   08/12/21 0945 90/59 -- -- 93 21 99 %   08/12/21 0940 106/53 -- -- 99 23 98 %   08/12/21 0935 92/72 -- -- 92 16 98 %   08/12/21 0930 98/51 -- -- 90 25 97 %   08/12/21 0925 97/54 -- -- 94 21 97 %   08/12/21 0920 102/53 -- -- 99 21 98 %   08/12/21 0915 104/53 -- -- 96 21 98 %   08/12/21 0910 95/50 -- -- 98 19 98 %   08/12/21 0905 104/57 -- -- 92 21 99 %   08/12/21 0900 96/61 -- -- 89 16 98 %   08/12/21 0855 97/52 -- -- 103 20 99 %   08/12/21 0850 99/55 -- -- 96 22 99 %   08/12/21 0845 98/57 -- -- 92 21 98 %   08/12/21 0840 102/52 -- -- 93 23 99 %   08/12/21 0835 92/59 -- -- 98 25 100 %   08/12/21 0830 91/50 -- -- 93 19 100 %   08/12/21 0825 (!) 88/50 -- -- 90 21 98 %   08/12/21 0820 102/47 -- -- 98 22 98 %   08/12/21 0817 95/51 -- -- 105 22 99 %   08/12/21 0815 (!) 84/43 -- -- 92 22 98 %   08/12/21 0810 (!) 80/47 -- -- 97 23 98 %   08/12/21 0805 (!) 82/44 -- -- 93 22 98 %   08/12/21 0800 (!) 87/50 -- -- 92 22 98 %   08/12/21 0755 (!) 82/45 -- -- 93 23 100 %   08/12/21 0750 (!) 85/46 -- -- 94 13 98 %   08/12/21 0745 (!) 80/44 -- -- 93 23 97 %   08/12/21 0740 (!) 87/46 -- -- 94 22 99 %   08/12/21 0735 (!) 84/50 -- -- 95 21 99 %   08/12/21 0730 (!) 82/44 -- -- 97 24 98 %   08/12/21 0725 (!) 89/46 -- -- 96 22 98 %   08/12/21 0720 (!) 86/48 -- -- 95 22 98 %   08/12/21 0715 (!)  89/49 -- -- 96 21 98 %   08/12/21 0710 (!) 89/50 -- -- 97 22 98 %   08/12/21 0705 (!) 84/53 -- -- 96 22 96 %   08/12/21 0704 (!) 88/49 -- -- 96 21 96 %   08/12/21 0645 (!) 86/52 -- -- 98 25 99 %   08/12/21 0640 (!) 83/49 -- -- 96 27 99 %   08/12/21 0635 (!) 86/55 -- -- 93 (!) 31 99 %   08/12/21 0633 (!) 88/56 -- -- 96 29 98 %   08/12/21 0630 -- -- -- 96 29 98 %   08/12/21 0625 100/57 -- -- 96 (!) 31 99 %   08/12/21 0622 94/76 -- -- 98 -- --   08/12/21 0620 (!) 78/54 -- -- 98 29 99 %   08/12/21 0615 96/59 -- -- 99 29 98 %   08/12/21 0610 (!) 88/54 -- -- 98 29 100 %   08/12/21 0605 (!) 87/52 -- -- 100 22 98 %   08/12/21 0600 (!) 82/55 -- -- 101 26 99 %   08/12/21 0557 -- 99.3  F (37.4  C) Tympanic -- -- --   08/12/21 0555 100/57 -- -- 104 20 98 %       Physical Exam    General: Chronically ill appearing.  Eyes: No discharge, symmetrical lids  ENT: Moist mucous membranes, no ear discharge  Neck: Full range of motion  Respiratory: CTAB, no wheezes  Cardiovascular: Tachycardic, no lower extremity edema  Chest: Equal rise  Gastrointestinal: Soft. Distended. Mild diffuse TTP. No rebound or guarding. No ascitic leakage. Mild ecchymoses at sites of   Musculoskeletal: No gross deformities.   Skin: Warm and well perfused. Pallorous; mild jaundice.  Neurologic: Moves all extremities, speech fluent without dysarthria  Psychiatric: Appropriate affect, alert and interactive  Rectal: Brown stool in vault    Emergency Department Course     ECG:  ECG taken at 0605, ECG read at 0610  Normal sinus rhythm  Normal ECG  Rate 100 bpm. IN interval 200 ms. QRS duration 90 ms. QT/QTc 372/479 ms. P-R-T axes 35 2 28.    Laboratory:  CBC: WBC 15.3(H), HGB 7.6(L),    CMP: Sodium 126(L), CO2 18(L), Urea Nitrogen 39(H), Calcium 7.8(L), Glucose 184(H), (H), (H), Protein Total 5.9(L), Albumin 1.2(L), Bilirubin 11.7(H), GFR 44(L) o/w WNL (Creatinine: 1.61(H))    Lactic acid (Resulted 0618): 7.4(H)  Lactic acid (Resulted 0933):  9.0(H)  Lactic acid (Resulted 1349): 12.6(H)  INR: 1.54(H)  Partial thromboplastin: 42(H)  ABO/Rh Type and Screen: ABO/RH: A Pos, Antibody: Negative  Ammonia: 12  VBG and oxyhgb: pH 7.32 / PCO2 34(L) / PO2 43 / Bicarb 17(L) / FlO2 0 / Oxyhemoglobin 65(L) / Base excess -8.1(L)  Occult Blood Stool: Negative  Cell Count Body Fluid: Color: Red(A), Clarity: Turbid(A), Total Nucleated Cells: 790  Differential Body Fluid: NRBCs per 100 WBC: 1(H), Neutrophils: 12.7(H), Absolute NRBCs: 0.2(H), [place]az Cells: Slight(A), Reactive Lymphocytes: Present(A), o/w WNL  Albumin Fluid: 0.2  Protein Fluid: 1.5  Asymptomatic COVID-19 PCR: Negative   Peritoneal Fluid Culture: Pending  Hemoglobin A1C: 6.0(L)      Procedures  PERFORMED BY: Anil Colin MD    INDICATION:  Ascites    CONSENT: Verbal and written consent was obtained from the patient prior to the procedure.      Risks and benefits of the procedure were explained and the patient is comfortable with this.        Guidance: Ultrasound was used to confirm a moderate sized fluid pocket  in the patient's LLQ.  This skin site was marked.  The overlying skin was cleaned using chloraprep/.      ANESTHESIA: Local anesthesia was obtained using 2 cc's of 1% lidocaine    NOTE:  Using sterile technique the site was prepped with above anesthetic.  Using a standard paracentesis catheter on trochar I was able to enter the peritoneal space with positive removal of frankly bloody, turbid fluid.  A total of 2 liters of fluid was removed from the patient's abdomen.  Following removal of the fluid the catheter was removed and the skin was covered with a Tegederm. Fluid was sent for laboratory testing.    COMPLICATIONS: Patient tolerated the procedure well with no immediate complications.       Emergency Department Course:  Reviewed:  I reviewed the patient's nursing notes, vitals, past medical records, Care Everywhere.     Assessments:  0607 I performed an assessment and examination of the patient as  "noted above.      0623 I performed a paracentesis as noted above.    0725 I spoke with the patient's daughters regarding care and treatment options for the patient.    0731 I rechecked the patient and updated the family.    1219 I spoke to Avani Bergeron CNP of palliative care, who agrees to see the patient and his family.    1248 I discussed the patient with Avani Bergeron CNP following her visit with the patient and family.    1530 The patient was admitted to Dr. Williamson    Interventions:  0601 NS 1L IV Bolus   0644 Zofran 4 mg, IV  0645 Protonix 80 mg, IV  0648 Sandostatin 50 mcg, IV  0656 Compazine 10 mg, IV  0658 Sandostatin 50 mcg/hr, IV  0658 LR 1L IV Bolus   0659 Vancomycin 1500 mg, IV  0701 Protonix 8 mg/hr, IV  0740 Zosyn 3.375 g, IV  0811 Morphine 2 mg, IV  1118 Transfuse RBCs 200 mg/hr, IV    Disposition:  Care of the patient was transferred to my colleague Dr. Bell pending admission.     Impression & Plan     CMS Diagnoses:   The patient has signs of Septic Shock  The patient has signs of septic shock as evidenced by:  1. Presence of Sepsis, AND  2. Lactic Acidosis with value greater than or equal to 4    Time septic shock diagnosis confirmed = 0630 08/12/21   as this was the time when Lactate was resulted and the level was greater than or equal to 4    3 Hour Septic Shock Bundle Completion:  1. Initial Lactic Acid Result:   Recent Labs   Lab Test 08/12/21  1336 08/12/21  0924 08/12/21  0559   LACT 12.6* 9.0* 7.4*     2. Blood Cultures before Antibiotics: Yes  3. Broad Spectrum Antibiotics Administered:  yes       Anti-infectives (From admission through now)    Start     Dose/Rate Route Frequency Ordered Stop    08/12/21 0650  piperacillin-tazobactam (ZOSYN) infusion 3.375 g     Note to Pharmacy: For [facility], [facility] and [facility]: For Zosyn-naive patients, use the \"Zosyn initial dose + extended infusion\" order panel.    3.375 g  100 mL/hr over 30 Minutes Intravenous ONCE 08/12/21 0645 08/12/21 " 0815    08/12/21 0650  vancomycin 1500 mg in 0.9% NaCl 250 ml intermittent infusion 1,500 mg      1,500 mg  over 90 Minutes Intravenous ONCE 08/12/21 0645 08/12/21 0846          4. IF patient is in septic shock within 6 hours of time zero, as defined by:  -Initial Hypotension:  2 low BP readings (SBP <90, MAP <65, or decrease > 40 from baseline due to infection) within 3 hrs of each other during the time period of 6hrs before and 6 hrs  after time zero  -Lactate > or = 4  THEN: Fluid volume administered in ED:  Full 30 mL/kg bolus given (see amount below).    BMI Readings from Last 1 Encounters:   08/09/21 24.91 kg/m      30 mL/kg fluids based on weight: 2,300 mL  30 mL/kg fluids based on IBW (must be >= 60 inches tall): 2,120 mL    Septic Shock reassessment:  1. Repeat Lactic Acid Level: 9  2. MAP>65 after initial IVF bolus, will continue to monitor fluid status and vital signs    I attest to having performed a repeat sepsis exam and assessment of perfusion at 0830 and the results demonstrate improved perfusion.         Medical Decision Making:  Alexandra Alfaro is a 66 year old male presenting with hematemesis.  Complex PMH -- history of HCV, with HCC and wide metastases, with ascites, recently diagnosed with SBP, was advised to present to the hospital yesterday however he did not want to at that time, went home, and had several episodes of steffi red hematemesis this morning.  Differential diagnosis includes but is not limited to SBP, sepsis, severe sepsis, septic shock, esophageal varices, acute blood loss anemia.  Here, he appears quite acute on chronically ill, with a distended abdomen, pallor, jaundice.  I performed a therapeutic paracentesis and removed approximately 2.5L of frankly bloody, turbid fluid.  I am concerned that he is bleeding into his peritoneum, either from his prior paracentesis site or spontaneously.  Testing of ascitic fluid confirms SBP.  He was started on zosyn, vancomycin.  He was given 3L  of IVF.  Repeat lactate returns at 9.0.  He was started on protonix and octreotide gtts for presumed UGIB.  Hgb trends from 7.6 -> 6.0.  Difficult to interpret in setting of 3L of IVF.  He was given 2 units of pRBCs.  Lactate rechecked after blood products.  It is uptrending to 12.6.  Quite grim prognosis.  Palliative care consulted.  Multiple lengthy discussions had with pt and family regarding code status; they are concerned and would certainly like us to pursue antibiotics, fluids, and similar, but are more hesitant regarding invasive procedures.  Ultimately, they and the pt do not wish to pursue DNR/DNI status at this time.  Due to issues with bed staffing and capacity, pt remained in the ED for ~10 hours at time of this note.  He has not required pressors or titratable drips.  Therefore, despite his lactate, I and Dr. Williamson agree that he could be managed on the floor; therefore, he was admitted to the floor.      Critical Care Note:    Organ systems at risk for life threatening failure: Constitutional, Cardiovascular and GI  Associated problems: Acidosis, Hypotension, Infection and Sepsis  Critical Interventions: IVF, IV antibiotics, IV PPI and octreotide, blood products    Total Time: 90 minutes (excluding separately billed procedures)     Includes: Bedside management, Case discussion related to critical care, Documentation, Multiple re-evaluations, Record review, and Test review.         Covid-19  Alexandra Alfaro was evaluated during a global COVID-19 pandemic, which necessitated consideration that the patient might be at risk for infection with the SARS-CoV-2 virus that causes COVID-19.   Applicable protocols for evaluation were followed during the patient's care.   COVID-19 was considered as part of the patient's evaluation. The plan for testing is:  a test was obtained during this visit.      Diagnosis:    ICD-10-CM    1. Hematemesis with nausea  K92.0    2. Lactic acidemia  E87.2    3. SBP (spontaneous  bacterial peritonitis) (H)  K65.2    4. Hepatocellular carcinoma (H)  C22.0        Discharge Medications:  New Prescriptions    No medications on file       Scribe Disclosure:  I, Kamila Gunn, am serving as a scribe at 6:06 AM on 8/12/2021 to document services personally performed by Anil Colin MD based on my observations and the provider's statements to me.       Anil Colin MD  08/12/21 [id number]

## 2021-08-12 NOTE — CONSULTS
[facility]  Palliative Care Consultation   Text Page    Assessment & Plan   Alexandra Alfaro is a 66 year old male who was admitted on 8/12/2021.   Consulted by Dr. Anil Colin  to assist with symptom management, goals of care, and development of plan of care in the setting of metastatic liver cancer, severe malnutrition, coagulopathies, hyponatremia     Recommendations:  1. Goals of Care- Prior  Hospitalization goals discussed  Discussed code status, family given time to discuss.  No change in code status. Full code. All measures to be done.  They then talked with ED provider Anil Colin MD, per discussion consented to No-CPR, DNI no escalation of cares  Decisional Capacity- Intact. Patient does not have an advance directive. Per  informed consent policy next of kin should be involved if patient becomes unable.  POLST  Complete prior to discharge.    2. Pain related to metastatic liver cancer to bone with poor prognosis  Patient's opioid use in past 24 hours: 0 = 0mg Daily Morphine Equivalent  [place] Board of Pharmacy Data Base Reviewed:    YES; As expected, no concern for misuse/abuse of controlled medications based on this report.  ORT  0  ( add dot phrase .FRHORT if warranted)  -dilaudid 2 mg every 2 hrs prn pain/ SOB  -dilaudid 0.2-0.4 mg every hour prn       3. Dyspnea none noted in the setting of ascites   -O2 prn comfort   -ativan po or IV every 4 hrs prn   -dilaudid as above     4. Spiritual Care  Oriented to Spiritual Health as part of Palliative Care team. Consultation placed for  to follow. and connect with Wendie  Spiritual Background:  Pashto     5. Care Planning  SW consultation placed for discharge planning .  Medications for discharge too be determined      Medical Decision Making and Goals of Care:  Discussed on August 12, 2021 with Avani Vergara-Ronnie SOLORZANO, CNP:  I met with the family in the presence of the patient.  The daughter Margaret Molina  "and spouse ( non English speaking) in the ED bed.  Patient  Appears very weak and restless.  Selina tells me he vomited and had fever two days ago, saw Dr. Navarrete yesterday. The patient did not want to come back to hospital so they tried to manage at home.  I am familiar with family and patient from last admission.  Selina states they did tell the patient he was dying but not everything.     Used Korean interpretor with family conference w/o patient present.  The family has mixed feeling, but the Aunt was most vocal about goals of care.  They realize the end may be near,but spiritually only \"he knows\" so all must be done until then.  Voiced my concerns about escalation of cares, increasing suffering, futility.  Stated the decisions are their's,  my concerns with suffering and reduced QoL were voiced They agreed all wanted his comfortable and symptoms managed. Some times the better place for him is in the hospital.   Spouse looking exhausted and daughters very sad. Spouse feel asleep and Aunt stated she is so depressed. This is hard on them and the daughters, being the primary caregivers.    I then met with the patient ,aunt and daughter present as well as interpretor.  He support being home over hospital, but also feels he still wants to come back to hospital, when measures not working at home.  I explained that his liver is not working well and blood not clotting well.  He would prefer to be at home, but again would seek out the hospital if better comfort is available.   The family is considering transition to a comfort plan, but want 24-48 hours of antibiotics IV. I circled back after giving them space to discuss care escalation.  When I returned they decided no change in code status. I again relayed concerns noted above with care escalation.  I offered to have ED provider come in to answer questions and have discussion on code status change and they agreed to this.   Much of their core beliefs are from culture and " spirituality of islamic alber.  Will offer support and guidance of [facility] for family and patient.       Thank you for involving us in the patient's care.     Avani Bergeron RN, PGMT-BC, APRN, CNP, ACHPN   Pain Management and Palliative Care  [facility]  Pgr: [phone]    Time Spent on this Encounter   Total unit/floor time 12:48- 14:10  minutes, time consisted of the following, examination of the patient, reviewing the record and completing documentation. >50% of time spent in counseling and coordination of care, Bedside Nurse Rhonda Moise, IRIS,  Ed provider Anil Colin MD, [facility] health intern Og  Time spend counseling with patient and family consisted of the following topics, goals of care, medical decision making regarding code status , care planning for discharge and symptom management.    Understanding of disease process:   This has been discussed with family poor prognosis .    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 The patient received his COVID 19 pfizer vaccine x 2 this past April. and is currently COVID-19 negative. He was diagnosed with liver cancer in January 2021 and under the care of Vinnie Navarrete MD. His treatment for cancer was limited due to past embolizations, portal thrombus. He is too weak since last admission to tolerate treatment.  Today, he presents with fever, nausea, vomiting.  In the ED vomitus was bloody. A paracentesis was done with bloody return.  The ED provider asks us to see the patient to discuss goals of care and readiness to transition to hospice.    The family is present with patient and state he did not want to come in.  They have told him he is dying, but not full details. They feel he is strong and  "needs to maintain hope.  This writer is familiar with the patient and family from the past admission on 7/28/21.  The patient is Korean speaking as well as his spouse.  The daughters are present and speak as well as understand english well. At last admission patient gave permission to use family as interpretor, and now unable due to critical illness to state this.  Awaiting \"Aunt to arrive\"    He appears weak with hyponatremia, profound anemia, severe malnutrition and coagulopathies. This is in the setting of recurrent admissions, further decline in functional status. He is afebrile, denies SOB, chest pain, mouth is dry.      Past Medical History   I have reviewed this patient's medical history and updated it with pertinent information if needed.   Past Medical History:   Diagnosis Date     2019 novel coronavirus disease (COVID-19) 12/25/2020     Depression      Hepatitis C      Hepatocellular carcinoma        Past Surgical History   I have reviewed this patient's surgical history and updated it with pertinent information if needed.  Past Surgical History:   Procedure Laterality Date     DAVINCI HERNIORRHAPHY INGUINAL Right 08/07/2018    Procedure: DAVINCI HERNIORRHAPHY INGUINAL;  Robotic Assisted Right Inguinal Hernia Repair with Mesh ;  Surgeon: Prabhu Allred MD;  Location: [facility] OR     DIAPHRAGM SURGERY  2018     ERCP with FNA  03/2021     Hepatic angiogram with radioembolization  03/2021       Prior to Admission Medications   Prior to Admission Medications   Prescriptions Last Dose Informant Patient Reported? Taking?   ciprofloxacin (CIPRO) 500 MG tablet 8/11/2021 at Unknown time  Yes Yes   Sig: Take 1 tablet by mouth 2 times daily   dronabinol (MARINOL) 5 MG capsule 8/11/2021 at AM  No Yes   Sig: Take 1 capsule (5 mg) by mouth daily   enoxaparin ANTICOAGULANT (LOVENOX) 120 MG/0.8ML syringe 8/11/2021 at AM  No Yes   Sig: Inject 0.8 mLs (120 mg) Subcutaneous every 24 hours   famotidine (PEPCID) 20 MG tablet " 8/11/2021 at AM  No Yes   Sig: Take 1 tablet (20 mg) by mouth 2 times daily   furosemide (LASIX) 20 MG tablet 8/11/2021 at AM  No Yes   Sig: Take 1 tablet (20 mg) by mouth daily   omeprazole (PRILOSEC) 40 MG DR capsule Not Taking at Unknown time  No No   Sig: Take 1 capsule (40 mg) by mouth daily   Patient not taking: Reported on 8/12/2021   oxyCODONE (ROXICODONE) 5 MG tablet 8/12/2021 at PRN  No Yes   Sig: Take 1 tablet (5 mg) by mouth every 8 hours as needed for pain or severe pain   polyethylene glycol (MIRALAX) 17 GM/Dose powder  at PRN  No Yes   Sig: Take 17 g by mouth daily Do not give if having loose stools   Patient taking differently: Take 17 g by mouth daily as needed Do not give if having loose stools   senna-docusate (SENOKOT-S/PERICOLACE) 8.6-50 MG tablet  at PRN  No Yes   Sig: Take 2 tablets by mouth 2 times daily Do not give if having loose stools   Patient taking differently: Take 2 tablets by mouth 2 times daily as needed Do not give if having loose stools      Facility-Administered Medications: None     Allergies   No Known Allergies    Social History   I have updated and reviewed the following Social History Narrative:   Social History     Social History Narrative     Not on file           Living situation:  With family        Support system:  Family        Actual/Potential Caregiver:  Family and [facility]       Functional status:  Marked decline        Financial concerns:  Possibly        Substance use disorder (past / present): none        Occupation:  Retired    Family History   I have reviewed this patient's family history and updated it with pertinent information if needed.   Family History   Problem Relation Age of Onset     No Known Problems Brother      No Known Problems Mother      No Known Problems Sister      No Known Problems Daughter        Review of Systems   The 10 point Review of Systems is negative other than noted in the HPI or here.     Palliative Symptom Review (0=no symptom/no  concern, 1=mild, 2=moderate, 3=severe):      Pain: 1-mild      Fatigue: 2-moderate      Nausea: 1-mild      Constipation: 0-none, last BM 8/11      Diarrhea: 0-none      Depressive Symptoms: 0-none      Anxiety: 0-none      Drowsiness: 1-mild      Poor Appetite: 3-severe      Shortness of Breath: 1-mild      Insomnia: 0-none      Other: ascites 2-moderate      Overall (0 good/no concerns, 3 very poor):  2+    Physical Exam   Temp:  [97.4  F (36.3  C)-99.3  F (37.4  C)] 97.6  F (36.4  C)  Pulse:  [] 91  Resp:  [13-31] 20  BP: ()/(43-97) 99/56  SpO2:  [94 %-100 %] 99 %  0 lbs 0 oz  Exam:  GEN:  Alert, oriented x 3, appears comfortable, NAD.  HEENT:  Normocephalic/atraumatic, no scleral icterus, no nasal discharge, mouth moist.  CV:  RRR, S1, S2; no murmurs or other irregularities noted.  +3 DP/PT pulses bilatererally; no edema BLE.  RESP:  Clear to auscultation bilaterally without rales/rhonchi/wheezing/retractions.  Symmetric chest rise on inhalation noted.  Reduced respiratory effort due to ascites.  ABD:  Rounded, soft, tender right upper quadrant / soft distended.  +BS  EXT:  Edema & pulses as noted above.  CMS intact x 4.     M/S:   Tender to palpation throughout , AHN X 4.    SKIN:  Dry to touch, no exanthems noted in the visualized areas.    NEURO: Symmetric strength +5/5.  Sensation to touch intact all extremities.   There is no area of allodynia or hyperesthesia.  PAIN BEHAVIOR: Cooperative  Psych:  Normal affect.  Calm, cooperative, conversant appropriately.    Delirium Screen/CAM:  Delirium = (#1 and #2 = YES) + (#3 and/or #4)   1) Acute onset and fluctuating course:   No   (acute change in mental status from baseline over last 24 hours)  2) Inattention:   No   (difficulty focusing, distractible, can't follow conversation)  3) Disorganized thinking:   No   (score only if #1 and #2 are YES)  (rambling/irrelevant conversation, unclear/illogical thoughts, inconsistency)  4) Altered level of  consciousness:   No   (score only if #1 and #2 are YES)  (other than alert, calm, cooperative)    Delirium/CAM score: 0/4  Interpretation:  1)  Delirium:  Absent  2)  Type:    3)  Severity:      Data   Results for orders placed or performed during the hospital encounter of 08/12/21 (from the past 24 hour(s))   CBC with platelets differential    Narrative    The following orders were created for panel order CBC with platelets differential.  Procedure                               Abnormality         Status                     ---------                               -----------         ------                     CBC with platelets and d...[[id number]]  Abnormal            Final result               Manual Differential[[id number]]          Abnormal            Final result                 Please view results for these tests on the individual orders.   INR   Result Value Ref Range    INR 1.54 (H) 0.85 - 1.15   Partial thromboplastin time   Result Value Ref Range    aPTT 42 (H) 22 - 38 Seconds   ABO/Rh type and screen    Narrative    The following orders were created for panel order ABO/Rh type and screen.  Procedure                               Abnormality         Status                     ---------                               -----------         ------                     Adult Type and Screen[[id number]]                            Final result                 Please view results for these tests on the individual orders.   Comprehensive metabolic panel   Result Value Ref Range    Sodium 126 (L) 133 - 144 mmol/L    Potassium 4.7 3.4 - 5.3 mmol/L    Chloride 95 94 - 109 mmol/L    Carbon Dioxide (CO2) 18 (L) 20 - 32 mmol/L    Anion Gap 13 3 - 14 mmol/L    Urea Nitrogen 39 (H) 7 - 30 mg/dL    Creatinine 1.61 (H) 0.66 - 1.25 mg/dL    Calcium 7.8 (L) 8.5 - 10.1 mg/dL    Glucose 184 (H) 70 - 99 mg/dL    Alkaline Phosphatase 142 40 - 150 U/L     (H) 0 - 45 U/L     (H) 0 - 70 U/L    Protein Total 5.9 (L) 6.8 - 8.8  g/dL    Albumin 1.2 (L) 3.4 - 5.0 g/dL    Bilirubin Total 11.7 (H) 0.2 - 1.3 mg/dL    GFR Estimate 44 (L) >60 mL/min/1.73m2   Lactic acid whole blood   Result Value Ref Range    Lactic Acid 7.4 (HH) 0.7 - 2.0 mmol/L   Ammonia (on ice)   Result Value Ref Range    Ammonia 12 10 - 50 umol/L   CBC with platelets and differential   Result Value Ref Range    WBC Count 15.3 (H) 4.0 - 11.0 10e3/uL    RBC Count 2.24 (L) 4.40 - 5.90 10e6/uL    Hemoglobin 7.6 (L) 13.3 - 17.7 g/dL    Hematocrit 23.1 (L) 40.0 - 53.0 %     (H) 78 - 100 fL    MCH 33.9 (H) 26.5 - 33.0 pg    MCHC 32.9 31.5 - 36.5 g/dL    RDW 19.1 (H) 10.0 - 15.0 %    Platelet Count 220 150 - 450 10e3/uL   Adult Type and Screen   Result Value Ref Range    ABO/RH(D) A POS     Antibody Screen Negative Negative    SPECIMEN EXPIRATION DATE [id number]    Manual Differential   Result Value Ref Range    % Neutrophils 83 %    % Lymphocytes 12 %    % Monocytes 5 %    % Eosinophils 0 %    % Basophils 0 %    NRBCs per 100 WBC 1 (H) <=0 %    Absolute Neutrophils 12.7 (H) 1.6 - 8.3 10e3/uL    Absolute Lymphocytes 1.8 0.8 - 5.3 10e3/uL    Absolute Monocytes 0.8 0.0 - 1.3 10e3/uL    Absolute Eosinophils 0.0 0.0 - 0.7 10e3/uL    Absolute Basophils 0.0 0.0 - 0.2 10e3/uL    Absolute NRBCs 0.2 (H) <=0.0 10e3/uL    RBC Morphology Confirmed RBC Indices     Platelet Assessment  Automated Count Confirmed. Platelet morphology is normal.     Automated Count Confirmed. Platelet morphology is normal.    [place] Cells Slight (A) None Seen    Reactive Lymphocytes Present (A) None Seen   Extra Tube ([place] Draw)    Narrative    The following orders were created for panel order Extra Tube ([place] Draw).  Procedure                               Abnormality         Status                     ---------                               -----------         ------                     Extra Red Top Tube[[id number]]                               Final result                 Please view results for  these tests on the individual orders.   Extra Red Top Tube   Result Value Ref Range    Hold Specimen [facility]    EKG 12 lead   Result Value Ref Range    Systolic Blood Pressure  mmHg    Diastolic Blood Pressure  mmHg    Ventricular Rate 100 BPM    Atrial Rate 100 BPM    FL Interval 200 ms    QRS Duration 90 ms     ms    QTc 479 ms    P Axis 35 degrees    R AXIS 2 degrees    T Axis 28 degrees    Interpretation ECG       Sinus rhythm  Normal ECG  When compared with ECG of 23-JUL-2021 18:55,  Premature atrial complexes are no longer Present  Confirmed by - EMERGENCY ROOM, PHYSICIAN ([id number]),  HIRAM FOSTER ([id number]) on 8/12/2021 8:10:05 AM     Cell count with differential fluid    Narrative    The following orders were created for panel order Cell count with differential fluid.  Procedure                               Abnormality         Status                     ---------                               -----------         ------                     Cell Count Body Fluid[[id number]]        Abnormal            Final result               Differential Body Fluid[[id number]]                          Final result                 Please view results for these tests on the individual orders.   Cell Count Body Fluid   Result Value Ref Range    Color Red (A) Colorless    Clarity Turbid (A) Clear    Total Nucleated Cells 790 /uL    Narrative    No reference ranges have been established.  This result  should be interpreted in the context of the patient's clinical condition and   compared to simultaneous measurement in the patient's blood.         Differential Body Fluid   Result Value Ref Range    % Neutrophils 87 %    % Lymphocytes 12 %    % Monocyte/Macrophages 1 %    Narrative    No reference ranges have been established.  This result   should be interpreted in the context of the patient's clinical condition and   compared to simultaneous measurement in the patient's blood.   Albumin fluid   Result Value Ref Range    Albumin  fluid 0.2 g/dL    Narrative    No reference ranges have been established.  This result should be interpreted in the context of the patient's clinical condition and compared to simultaneous measurement in the patient's blood.   Protein fluid   Result Value Ref Range    Protein Total Fluid 1.5 g/dL    Narrative    No reference ranges have been established.  This result should be interpreted in the context of the patient's clinical condition and compared to simultaneous measurement in the patient's blood.   Asymptomatic COVID-19 Virus (Coronavirus) by PCR Nasopharyngeal    Specimen: Nasopharyngeal; Swab    Narrative    The following orders were created for panel order Asymptomatic COVID-19 Virus (Coronavirus) by PCR Nasopharyngeal.  Procedure                               Abnormality         Status                     ---------                               -----------         ------                     SARS-COV2 (COVID-19) Vir...[[id number]]  Normal              Final result                 Please view results for these tests on the individual orders.   Blood gas venous and oxyhgb   Result Value Ref Range    pH Venous 7.32 7.32 - 7.43    pCO2 Venous 34 (L) 40 - 50 mm Hg    pO2 Venous 43 25 - 47 mm Hg    Bicarbonate Venous 17 (L) 21 - 28 mmol/L    FIO2 0     Oxyhemoglobin Venous 65 (L) 70 - 75 %    Base Excess/Deficit (+/-) -8.1 (L) -7.7 - 1.9 mmol/L   SARS-COV2 (COVID-19) Virus RT-PCR    Specimen: Nasopharyngeal; Swab   Result Value Ref Range    SARS CoV2 PCR Negative Negative    Narrative    Testing was performed using the [employer]  SARS-CoV-2 & Influenza A/B Assay on the [employer]  [employer]  System.  This test should be ordered for the detection of SARS-COV-2 in individuals who meet SARS-CoV-2 clinical and/or epidemiological criteria. Test performance is unknown in asymptomatic patients.  This test is for in vitro diagnostic use under the FDA EUA for laboratories certified under CLIA to perform moderate and/or high complexity  testing. This test has not been FDA cleared or approved.  A negative test does not rule out the presence of PCR inhibitors in the specimen or target RNA in concentration below the limit of detection for the assay. The possibility of a false negative should be considered if the patient's recent exposure or clinical presentation suggests COVID-19.  [facility] [employer] are certified under the Clinical Laboratory Improvement Amendments of 1988 (CLIA-88) as qualified to perform moderate and/or high complexity laboratory testing.   Stool: occult blood   Result Value Ref Range    Occult Blood Negative Negative   Lactic acid whole blood   Result Value Ref Range    Lactic Acid 9.0 (HH) 0.7 - 2.0 mmol/L   Hemoglobin   Result Value Ref Range    Hemoglobin 6.0 (LL) 13.3 - 17.7 g/dL   Prepare red blood cells (unit)   Result Value Ref Range    CROSSMATCH Compatible     UNIT ABO/RH A Pos     Unit Number [id number]     UNIT STATUS Issued     Blood Component Type Red Blood Cells     Product Code [id number]     CODING SYSTEM [id number]     UNIT TYPE ISBT 6200     ISSUE DATE AND TIME [id number]

## 2021-08-12 NOTE — H&P
[facility]    History and Physical - Hospitalist Service       Date of Admission:  8/12/2021    Assessment & Plan      Alexandra Alfaro is a 66 year old male admitted on 8/12/2021. He has a complex medical history including hepatitis C, hepatocellular carcinoma with wide metastases, cirrhosis, who presented to the ER this morning with hematemesis and abdominal pain.  He was admitted to the inpatient service with distributive shock secondary to sepsis from SBP.    1.  Distributive (septic) shock, secondary to Severe Sepsis from SBP.  2.  Severe lactic acidosis. Secondary to sepsis; however, cancer/liver disease likely contributing here at least some extent as well.  3.  Multisystem organ failure. Secondary to sepsis.  -Patient's outpatient physician attempted to treat this with oral antibiotics after recommending admission to the hospital, unfortunately he did not want to go to the hospital at that time.  -Patient had abdominal pain in the ER, emergency room physician did a bedside paracentesis yielding 2.5 L of bloody fluid.  Fluid was positive for SBP. Impression of ER doc was that blood was present prior to procedure and there was no injury.  -Blood cultures have been drawn, follow daily  -Patient has been started on vancomycin, Zosyn we will continue these when he gets to the hospital floor  -Patient has had 4 liters of fluid since he was brought into the ER; if his next lactic acid is rising will give him another liter.  -We will do every 6 hour labs, including hemoglobin and lactic acid    4.  Acute on chronic anemia. Probably acute blood loss anemia, likely in the setting of his liver disease.  When the ER physician did a paracentesis he states it was bloody, but did not feel that he hit any vasculature, but rather he was bleeding prior to to the procedure.  -We will do every 6 hour hemoglobin, transfuse for hemoglobin less than 7.  -Received 1 unit of pRBC in the ER prior to admission to  hospital which brought Hb from 6.0 to 7.2.  -Transfuse FFP for INR > 1.8  -GI consult, recs appreciated  -Pantoprazole, and octreotide drips  -We will withhold Lovenox that he is currently getting for portal vein thrombosis    5.  Acute kidney injury.  Secondary to above.  Patient had significant fluid resuscitation, but anticipate he will need more of.  We will hold off on giving albumin challenge, but rather focus on treating his sepsis.  -Patient making urine now that he was fluid resuscitated    6.  Acute on chronic liver injury.  Likely secondary to sepsis, and the state of cirrhosis.    7.  Electrolyte derangement, Calcium.  Sodium.  Secondary to above    8.  Severe protein calorie deficient malnutrition, with an albumin of 1.2    9.  Goals of care.  Patient has a concerning clinical picture, with evidence of multisystem organ function and worsening shock as his lactate is rising in the presence of resuscitation.  Additionally he is chronically very ill with metastatic cancer, and cirrhosis.  It seemed until today he had a strong will to stay out of the hospital, but as he started vomiting blood this morning he was concerned he was not getting better and his family brought him in.      Emergency room consulted palliative care who had multiple discussions with him and the family (please see palliative care consultation note), and for now we will not escalate cares including invasive lines, or pressors but rather attempt to treat patient with IV antibiotics/solutions and blood products.  Family seems to know that the end is near, and after long discussion whether we should make him DNR/DNI, the family elected to keep him a full code.  Of note, they initially agreed to make him DNR/DNI but changed their mind at the last minute.    -Per palliative care note, patient is still his own decision maker.  He does not have an advanced directive, and if he becomes unstable his wife is his decision-maker.  Anticipate she  will utilize the entire family, but she is the final word.    -Unfortunately patient recently had an admission where there is extensive discussion with the family around patient's poor medical prognosis with all the chronic issues he is currently battling including metastatic cancer and cirrhosis.  At that hospitalization there is concern about keeping patient's prognosis from him in addition to varying levels of understanding throughout his whole family.  In addition there is discrepancy between the family's understanding of his chronic conditions and the actual medical facts presented in notes by his outpatient physicians.  At bedside I discussed with patient the fact that he is very sick and there is a possibility that this condition may result in his death.  I believe he understood this.    Chronic Issues  1. Hepatitis C Induced Cirrhosis. Held diuretics in setting of shock  2. Portal Venous Clot. Hold lovenox in setting of bleed  3. Chronic Hyponatremia  4. HCC with refractory ascites and diffuse metastasis  5. GERD. Held home famotidine in setting of pantoprazole drip     Diet:  General diet  DVT Prophylaxis: Pneumatic Compression Devices  Zimmerman Catheter: Not present  Central Lines: None  Code Status:  Full code, please see palliative care note with the exception of the fact that the family changed their mind at the last moment prior to signing DNR/DNI order.  They do not want invasive procedures.    Clinically Significant Risk Factors Present on Admission         # Hyponatremia: Na = 126 mmol/L (Ref range: 133 - 144 mmol/L) on admission, will monitor as appropriate     # Coagulation Defect: home medication list includes an anticoagulant medication       Disposition Plan   Expected discharge: Patient is currently admitted to the hospital with multisystem organ failure secondary to distributive shock from severe sepsis.  Anticipate he will need at least 5 days of treatment.  He is incredibly sick, and has a  very high chance of mortality.  This has been communicated to the family by me, the emergency room physician, palliative care, in addition to the bedside nurse.     The patient's care was discussed with the Bedside Nurse, Patient and Patient's Family.    Cristian Williamson MD PhD  [facility]  Securely message with the Vocera Web Console (learn more here)  Text page via [facility] Paging/Directory      ______________________________________________________________________    Chief Complaint   Hematemesis    History is obtained from the patient    History of Present Illness   Alexandra Alfaro is a 66 year old male who has a complex medical history including hepatitis C, hepatocellular carcinoma with wide metastases, cirrhosis, who presented to the ER this morning with hematemesis and abdominal pain.  He was admitted to the inpatient service with distributive shock secondary to sepsis from SBP.    Patient has been feeling ill for several days, per discussion with his family he presented to an outpatient physician who advised coming into the ER. Patient declined and instead was placed on oral antibiotics. He continued to worsen and his family brought him into the ER today with new hematemesis. In the ER, he was found to be in shock and multi system organ failure with ascites. He had a paracentesis that was diagnostic of SBP and was started on antibiotics and fluids. Paracentesis was bloody but it was not thought that this was due to injury from procedure but rather due to blood in the abdomen.  All along this time patient had rising lactate in spite of receiving fluids and blood products.    Due to concerns about patients chronic conditions portending a high mortality, several conferences were held in the ER about goals of care. Palliative care met with them emergently and initially the family was going to switch him to DNR/DNI (comfort cares), but right before they were to sign the DNR/DNI paperwork they  changed their minds instead requesting 48 hours of medical therapy with no escalation of therapies.     On ROS, patient notes abdominal pain and distension. He complains of no other symptoms.    Review of Systems    The 10 point Review of Systems is negative other than noted in the HPI or here.    Past Medical History    I have reviewed this patient's medical history and updated it with pertinent information if needed.   Past Medical History:   Diagnosis Date     2019 novel coronavirus disease (COVID-19) 12/25/2020     Depression      Hepatitis C      Hepatocellular carcinoma        Past Surgical History   I have reviewed this patient's surgical history and updated it with pertinent information if needed.  Past Surgical History:   Procedure Laterality Date     DAVINCI HERNIORRHAPHY INGUINAL Right 08/07/2018    Procedure: DAVINCI HERNIORRHAPHY INGUINAL;  Robotic Assisted Right Inguinal Hernia Repair with Mesh ;  Surgeon: Prabhu Allred MD;  Location: [facility] OR     DIAPHRAGM SURGERY  2018     ERCP with FNA  03/2021     Hepatic angiogram with radioembolization  03/2021       Social History   I have reviewed this patient's social history and updated it with pertinent information if needed.  Social History     Tobacco Use     Smoking status: Former Smoker     Types: Cigarettes     Quit date: 4/6/2018     Years since quitting: 3.3     Smokeless tobacco: Never Used   Vaping Use     Vaping Use: Never used   Substance Use Topics     Alcohol use: No     Drug use: No       Family History   I have reviewed this patient's family history and updated it with pertinent information if needed.  Family History   Problem Relation Age of Onset     No Known Problems Brother      No Known Problems Mother      No Known Problems Sister      No Known Problems Daughter        Prior to Admission Medications   Prior to Admission Medications   Prescriptions Last Dose Informant Patient Reported? Taking?   ciprofloxacin (CIPRO) 500 MG tablet  8/11/2021 at Unknown time  Yes Yes   Sig: Take 1 tablet by mouth 2 times daily   dronabinol (MARINOL) 5 MG capsule 8/11/2021 at AM  No Yes   Sig: Take 1 capsule (5 mg) by mouth daily   enoxaparin ANTICOAGULANT (LOVENOX) 120 MG/0.8ML syringe 8/11/2021 at AM  No Yes   Sig: Inject 0.8 mLs (120 mg) Subcutaneous every 24 hours   famotidine (PEPCID) 20 MG tablet 8/11/2021 at AM  No Yes   Sig: Take 1 tablet (20 mg) by mouth 2 times daily   furosemide (LASIX) 20 MG tablet 8/11/2021 at AM  No Yes   Sig: Take 1 tablet (20 mg) by mouth daily   omeprazole (PRILOSEC) 40 MG DR capsule Not Taking at Unknown time  No No   Sig: Take 1 capsule (40 mg) by mouth daily   Patient not taking: Reported on 8/12/2021   oxyCODONE (ROXICODONE) 5 MG tablet 8/12/2021 at PRN  No Yes   Sig: Take 1 tablet (5 mg) by mouth every 8 hours as needed for pain or severe pain   polyethylene glycol (MIRALAX) 17 GM/Dose powder  at PRN  No Yes   Sig: Take 17 g by mouth daily Do not give if having loose stools   Patient taking differently: Take 17 g by mouth daily as needed Do not give if having loose stools   senna-docusate (SENOKOT-S/PERICOLACE) 8.6-50 MG tablet  at PRN  No Yes   Sig: Take 2 tablets by mouth 2 times daily Do not give if having loose stools   Patient taking differently: Take 2 tablets by mouth 2 times daily as needed Do not give if having loose stools      Facility-Administered Medications: None     Allergies   No Known Allergies    Physical Exam   Vital Signs: Temp: 97.5  F (36.4  C) Temp src: Oral BP: 103/47 Pulse: 93   Resp: 28 SpO2: 98 %      Weight: 0 lbs 0 oz    General Appearance: Ill appearing  Eyes: MARINO, EOMI  HEENT: NC, AT. MMM.   Respiratory: CTAB, normal work of breathing  Cardiovascular: Regular Rate and Rhythm, normal S1, S2. No murmurs, rubs, gallops  GI: Diffuse tenderness, mild swelling (post paracentesis).  Lymph/Hematologic: No asymetric swelling, edema. No bruising.  Genitourinary: Deferred  Skin: No rashes, lesions,  wounds.  Musculoskeletal: Warm, well perfused  Neurologic: Grossly intact  Psychiatric: Euthymic. Mood appropriate.     Data   Data reviewed today: I reviewed all medications, new labs and imaging results over the last 24 hours. I personally reviewed the ECG showing sinus rhythm.    Recent Labs   Lab 08/12/21  1752 08/12/21  1618 08/12/21  0924 08/12/21  0559   WBC  --   --   --  15.3*   HGB 7.2*  --  6.0* 7.6*   MCV  --   --   --  103*   PLT  --   --   --  220   INR  --  1.79*  --  1.54*   NA  --  131*  --  126*   POTASSIUM  --  4.8  --  4.7   CHLORIDE  --  101  --  95   CO2  --  12*  --  18*   BUN  --  40*  --  39*   CR  --  1.69*  --  1.61*   ANIONGAP  --  18*  --  13   REBECCA  --  7.2*  --  7.8*   GLC  --  178*  --  184*   ALBUMIN  --  1.0*  --  1.2*   PROTTOTAL  --  5.0*  --  5.9*   BILITOTAL  --  10.9*  --  11.7*   ALKPHOS  --  122  --  142   ALT  --  308*  --  277*   AST  --  396*  --  381*

## 2021-08-12 NOTE — ED NOTES
Daughter states that patient had a paracentesis on Monday and today he started having a fever. Pt was seen by oncology yesterday and they started him on antibiotics for infected fluid from the paracentesis. Patient was told to come into the ED but refused for family because he didn't want to. Once patient started vomiting blood family called [phone]

## 2021-08-12 NOTE — PROVIDER NOTIFICATION
"DATE:  8/12/2021   TIME OF RECEIPT FROM LAB:  1805  LAB TEST:  Lactic   LAB VALUE:  13.3  TIME LAB VALUE REPORTED TO PROVIDER:   [id number]    MD paged: \"Lab just called w/ critical, lactic is 13.3. Please advise as needed, thanks\"    Addendum 1838: MD came to floor to speak w/ this writer (Dr. Williamson says he is available to be paged until approx midnight). Dr. Williamson says to monitor VS and if pt becomes tachycardic \"above 100\" to give one unit PRBCs. Will continue POC.     "

## 2021-08-12 NOTE — ED NOTES
[facility]  ED Nurse Handoff Report    Alexandra Alfaro is a 66 year old male   ED Chief complaint: Hematemesis  . ED Diagnosis:   Final diagnoses:   Hematemesis with nausea   Lactic acidemia   SBP (spontaneous bacterial peritonitis) (H)   Hepatocellular carcinoma (H)     Allergies: No Known Allergies    Code Status: Pt is a full code at present. Palliative care and ER MD have been discussing with family about options.   Activity level - Baseline/Home:  Independent. Activity Level - Current:   Total Care. Lift room needed: No. Bariatric: No   Needed: Yes   Isolation: No. Infection: Not Applicable.     Vital Signs:   Vitals:    08/12/21 1420 08/12/21 1425 08/12/21 1430 08/12/21 1440   BP: 110/61 106/54 110/55    Pulse: 92 91 90 93   Resp: 24 16 21 21   Temp:       TempSrc:       SpO2: 99% 98% 99% 99%       Cardiac Rhythm:  ,   Cardiac  Cardiac Rhythm: Sinus tachycardia;Normal sinus rhythm  Pain level:    Patient confused: No. Patient Falls Risk: Yes.   Elimination Status:     Patient Report - Initial Complaint: Hematemesis. Focused Assessment: Pt hx liver cancer. Pt had paracentesis this past Tuesday and was told it was infected. Pt was told to go to ER.  Pt vomited bright red blood an hr pta.   Tests Performed: labs. Abnormal Results: abnormal labs. Lactic has gone from 7.4 to 9 to 12. Hbg from 7.6 to 6.0.  Treatments provided: IVF, IV abx, Protonix and Sandostatin gtt, 1 unit PRBCs, Morphine for pain control  Family Comments: at bedside  OBS brochure/video discussed/provided to patient:  N/A  ED Medications:   Medications   lidocaine 1 % 0.1-1 mL (has no administration in time range)   lidocaine (LMX4) cream (has no administration in time range)   sodium chloride (PF) 0.9% PF flush 3 mL (3 mLs Intracatheter Not Given 8/12/21 0702)   sodium chloride (PF) 0.9% PF flush 3 mL (has no administration in time range)   octreotide (sandoSTATIN) 1,250 mcg in sodium chloride 0.9 % 250 mL (50 mcg/hr  Intravenous New Bag 8/12/21 0658)   pantoprazole (PROTONIX) 80 mg in sodium chloride 0.9 % 100 mL infusion (8 mg/hr Intravenous New Bag 8/12/21 1436)   0.9% sodium chloride BOLUS (0 mLs Intravenous Stopped 8/12/21 0658)   ondansetron (ZOFRAN) injection 4 mg (4 mg Intravenous Given 8/12/21 0644)   pantoprazole (PROTONIX) IV push injection 80 mg (80 mg Intravenous Given 8/12/21 0645)   octreotide (sandoSTATIN) injection 50 mcg (50 mcg Intravenous Given 8/12/21 0648)   piperacillin-tazobactam (ZOSYN) infusion 3.375 g (0 g Intravenous Stopped 8/12/21 0815)   vancomycin 1500 mg in 0.9% NaCl 250 ml intermittent infusion 1,500 mg (0 mg Intravenous Stopped 8/12/21 0846)   lactated ringers BOLUS 1,000 mL (0 mLs Intravenous Stopped 8/12/21 0758)   prochlorperazine (COMPAZINE) injection 10 mg (10 mg Intravenous Given 8/12/21 0656)   lactated ringers BOLUS 1,000 mL (0 mLs Intravenous Stopped 8/12/21 0910)   morphine (PF) injection 2 mg (2 mg Intravenous Given 8/12/21 0811)     Drips infusing:  Yes  For the majority of the shift, the patient's behavior Yellow. Interventions performed were .    Sepsis treatment initiated:   Yes    Per the ED Provider, Time Zero for severe sepsis or septic shock is:  0553    3 Hour Severe Sepsis Bundle Completion:  1. Initial Lactic Acid Result: Recent Labs   Lab Test 08/12/21  1336 08/12/21  0924 08/12/21  0559   LACT 12.6* 9.0* 7.4*     2. Blood Cultures before Antibiotics: Yes  3. Broad Spectrum Antibiotics Administered:     Anti-infectives (From now, onward)    None        4. 3000 ml of IV fluids have been given so far      6 Hour Severe Sepsis Bundle Completion:    1. Repeat Lactic Acid Level:   Last result   Lab Results   Component Value Date    LACT 12.6 (HH) 08/12/2021     2. Patient currently on Vasopressors =  No     Patient tested for COVID 19 prior to admission: YES    ED Nurse Name/Phone Number: Rhonda Arnold RN,   2:41 PM    RECEIVING UNIT ED HANDOFF REVIEW    Above ED Nurse  Handoff Report was reviewed: Yes  Reviewed by: Emilia Coyne RN on August 12, 2021 at 4:07 PM

## 2021-08-12 NOTE — PHARMACY-ADMISSION MEDICATION HISTORY
Admission medication history interview status for this patient is complete. See [facility] admission navigator for allergy information, prior to admission medications and immunization status.     Medication history interview done, indicate source(s): Family (daughters in room, Tracy and Radha)  Medication history resources (including written lists, pill bottles, clinic record):[facility], recent discharge summary  Pharmacy: [facility]/pharmacy #[id number] [place], [place] - [address]    Changes made to PTA medication list:  Added: Cipro  Changed:   - Miralax daily -> daily PRN  - Senokot-S BID -> BID PRN  Reported as Not Taking: omeprazole  Removed: none    Actions taken by pharmacist (provider contacted, etc): left sticky note for MD    Additional medication history information:  - Patient was recently discharged from [facility] on 8/2 and was given a 30-day supply of Lovenox for portal vein thrombosis.    Medication reconciliation/reorder completed by provider prior to medication history?  N    Prior to Admission medications    Medication Sig Last Dose Taking? Auth Provider   ciprofloxacin (CIPRO) 500 MG tablet Take 1 tablet by mouth 2 times daily 8/11/2021 at Unknown time Yes Unknown, Entered By History   dronabinol (MARINOL) 5 MG capsule Take 1 capsule (5 mg) by mouth daily 8/11/2021 at AM Yes Toby Sanchez MD   enoxaparin ANTICOAGULANT (LOVENOX) 120 MG/0.8ML syringe Inject 0.8 mLs (120 mg) Subcutaneous every 24 hours 8/11/2021 at AM Yes Toby Sanchez MD   famotidine (PEPCID) 20 MG tablet Take 1 tablet (20 mg) by mouth 2 times daily 8/11/2021 at AM Yes Toby Sanchez MD   furosemide (LASIX) 20 MG tablet Take 1 tablet (20 mg) by mouth daily 8/11/2021 at AM Yes Toby Sanchez MD   oxyCODONE (ROXICODONE) 5 MG tablet Take 1 tablet (5 mg) by mouth every 8 hours as needed for pain or severe pain 8/12/2021 at PRN Yes Angelina Johnson MD   polyethylene glycol (MIRALAX) 17 GM/Dose powder Take 17 g by mouth  daily Do not give if having loose stools  Patient taking differently: Take 17 g by mouth daily as needed Do not give if having loose stools  at PRN Yes Iam Ring, DO   senna-docusate (SENOKOT-S/PERICOLACE) 8.6-50 MG tablet Take 2 tablets by mouth 2 times daily Do not give if having loose stools  Patient taking differently: Take 2 tablets by mouth 2 times daily as needed Do not give if having loose stools  at PRN Yes Iam Ring, DO   omeprazole (PRILOSEC) 40 MG DR capsule Take 1 capsule (40 mg) by mouth daily  Patient not taking: Reported on 8/12/2021 Not Taking at Unknown time  Toby Sanchez MD

## 2021-08-12 NOTE — PROGRESS NOTES
SPIRITUAL HEALTH SERVICES Progress Note  [facility] ED    Visited family of Alexandra per spiritual health consult order. Oriented to Spiritual Health Services. Remained present with family as doctor walked them through end of life process. Assisted family with questions about body transport and helped facilitate Social Work consult order. Offered to connect with on call Hindu , Harika, family declined at this time. Family expressed no other needs at this time, informed them [facility] remain available at their request.    Og Qureshi   Intern  Pager [phone]

## 2021-08-12 NOTE — PROVIDER NOTIFICATION
"DATE:  8/12/2021   TIME OF RECEIPT FROM LAB:  4120  LAB TEST:  Lactic  LAB VALUE:  10.6  TIME LAB VALUE REPORTED TO PROVIDER:   Macy ALBA paged: \"Lab called w/ critical, lactic 10.6. Please advise as needed, thanks\"    Addendum: MD called this writer @ 4061, gave this writer pt update on POC and is ordering 1L bolus now.   "

## 2021-08-13 NOTE — PROGRESS NOTES
[facility]  Hospitalist Progress Note    Grant Jacobo MD 08/13/2021  Text Page      Reason for Stay (Diagnosis): sepsis         Assessment and Plan:      Summary of Stay: Alexandra Alfaro is a 66 year old male admitted on 8/12/2021. He has a complex medical history including hepatitis C, hepatocellular carcinoma with wide metastases, cirrhosis, who presented to the ER this morning with hematemesis and abdominal pain.  He was admitted to the inpatient service with distributive shock secondary to sepsis from SBP.     1.  Distributive (septic) shock, secondary to Severe Sepsis from SBP.  --Lactate peaked at 13.3 and now down to 3.4  --Peritoneal fluid was bloody with 790 WBC  --WBC 15.3  --SBP in 80s in ED and afebrile.  Temp 100 at home  -- ON Vanco, Zosyn started 8/12  --peritoneal fluid cx pending, blood cx NA  --He has received several L of IVF and lactate improved.     2.  Acute kidney injury and hyperkalemia due to sepsis:  --Creatinine is 2.2 up from his baseline of 1.0.  --He is making some urine.  --Continue with IV hydration and avoid nephrotoxins.  --Follow-up potassium.  Given a dose of Kayexalate.    3.    Metastatic hepatocellular carcinoma:  --He is cared for by Dr. Navarrete at [facility].  --They have exhausted chemotherapy treatments although there is one other oral treatment that could be considered.  --Dr. Navarrete discussed with the family that CPR would be futile given his advanced disease.  --I discussed with family and his daughters today and they agree to change his CODE STATUS to DNR/DNI.     4.  Acute on chronic anemia. Probably acute blood loss anemia, likely in the setting of his liver disease.  When the ER physician did a paracentesis he states it was bloody, but did not feel that he hit any vasculature.  --Also had hematemesis.  --Discussed with GI by phone.  Will pursue medical management for now.    -We will do every 6 hour hemoglobin, transfuse for hemoglobin less  than 7.  -Received 1 unit of pRBC in the ER prior to admission to hospital which brought Hb from 6.0 to 7.2.  -Transfuse FFP for INR > 1.8  -Continue octreotide for now.  Stop tomorrow if hemoglobin remains stable.  --Change pantoprazole to oral.  -We will withhold Lovenox that he is currently getting for portal vein thrombosis       DVT Prophylaxis: Pneumatic Compression Devices  Lines: PIV  Zimmerman: none  Code Status: DNR / DNI  Disposition Plan   Expected discharge in 3-4 days to Home or care facility once sepsis resolved.     Entered: Grant Shaunshireen Jacobo 08/13/2021, 2:42 PM       Discussed with the patient's sister, daughters and Dr. Navarrete.          Interval History (Subjective):      Seen with his sister interpreting per his request.  He said he feels much better than yesterday.  He no longer has abdominal pain and feels stronger.                  Physical Exam:      Last Vital Signs:  BP (!) 89/44 (BP Location: Left arm)   Pulse 96   Temp 97.7  F (36.5  C) (Oral)   Resp 16   SpO2 100%       Vital signs reviewed  General:  Alert, calm, NAD  CV: regular rate and rhythm, no murmurs or rubs  Lungs:  Clear to ascultation bilaterally, normal respiratory effort  HEENT:  Pupil round, equal, conjuctivae, sclerae and lids normal, neck is supple  Abdomen:  Soft, nontender, nondistended, no masses, normal bowel sounds  Extremities:  No edema  Neuro: normal strength and sensation in all 4 extremities, cranial nerves grossly intact  Psychiatric:  Mood and affect within normal limits  Skin:  No rashes or wounds evident             Medications:      All current medications were reviewed with changes reflected in problem list.         Data:      All new lab and imaging data was reviewed.   Labs:  Lactic acid 3.4 from 13  Hemoglobin 7.7 from 8.4 after transfusion of 1 unit packed red blood cells  Potassium 5.9  creatinine 2.2 from 1.69 from baseline of 1.0  WBC 13.7 from 13.4

## 2021-08-13 NOTE — PROVIDER NOTIFICATION
"MD paged: \"Pt's BP is 75/43. Denies dizziness laying flat. Had bolus earlier. Please advise, thanks\"    Addendum: MD placed orders for 1000 ml NS bolus.   "

## 2021-08-13 NOTE — PROVIDER NOTIFICATION
"MD paged: \"Pt's BP is 75/41. Please advise, thanks!\"    Addendum: MD placed order for 500 ml bolus.   "

## 2021-08-13 NOTE — PROVIDER NOTIFICATION
DATE:  8/13/2021   TIME OF RECEIPT FROM LAB:  183   LAB TEST:  K+  LAB VALUE:  6.2 sl hemolyzed  RESULTS GIVEN WITH READ-BACK TO (PROVIDER):  Admitting pager  TIME LAB VALUE REPORTED TO PROVIDER:   7796

## 2021-08-13 NOTE — PROGRESS NOTES
Progress note    I stop by patient's bedside for reevaluation.  When I saw patient at the bedside, he was sitting there talking with 2 of his daughters.  His mentation appeared to be appropriate and his vital signs were stable.  He had been placed on 2 L of oxygen for comfort by nursing staff, although he has not been hypoxic.  He complains to me of vague abdominal pain similar to when I admitted him.    On exam his abdomen is mildly tender and mildly distended similar to when I admitted him several hours ago.  He is getting his fifth 1 L bolus for elevated lactate.  He has received 1 unit of packed red blood cells so far.  I believe part of his abdominal pain is secondary to urinary retention, nursing did a bedside bladder scan revealing 600 cc of urine.  I placed straight cath orders.    Patient appears to be in shock still from severe sepsis.  He has received significant fluid resuscitation in addition to 1 unit of packed red blood cells.  I believe the elevated lactate is a combination of hypoperfusion from his sepsis, and ability to clear lactate from his cirrhosis, and potentially some lactate production from his metastatic cancer.  I do not believe at this point his acute blood loss anemia is playing a significant role, although we are still trending every 6 hours hemoglobins to monitor this.    I am still concerned with patient's overall prognosis and I shared this with both of his daughters.  To reiterate, discussions with palliative care earlier we were headed towards DNR/DNI and full comfort cares, but prior to initiating this approach family decided they wanted to attempt treatment with IV medications, antibiotics, and blood products.  We will not escalate cares to invasive lines, rather family asked for a trial with these medications.  With him still being full code, and not escalating cares, this needs to be readdressed with the family in the morning.    I have ordered a venous blood gas with his next  set of labs mainly to assess the degree of acidosis, and determine if he needs bicarbonate supplementation.  Otherwise we will continue fluid and blood product resuscitation, unless his next set of labs show dramatic improvement.    Addendum: 11PM lactate improved, will give 1 unit of pRBCs for Hb of 6.8; and continue to monitor patient clinically. VBG pH OK; no need for bicarbonate at this point.

## 2021-08-13 NOTE — PLAN OF CARE
BP 91/52 (BP Location: Right arm)   Pulse 95   Temp (!) 96.5  F (35.8  C) (Axillary)   Resp 18   SpO2 100%       BP soft, otherwise VSS. Non english speaking-  ipad in room, daughters were also at bedside interpreting during early part of shift. 1 unit PRB given- VSS, no reaction noted. IV zosyn given x1. PRN dilaudid given x1. Protonix and octreotide drip infusing. 3rd PIV placed this shift due to various meds being incompatible. Unable to void this shift, straight cathed x1. Reg diet. Tele: SR. 2L o2 via NC for comfort/SOB. Victor Hugo N/V.  Will continue POC.

## 2021-08-13 NOTE — PLAN OF CARE
Awake on and off talking with family members who visited. Code status changed by family to DNR initially and then to DNR/DNI later in shift. K+ at 5.9 received Kayexalate without change in lab. Hemoglobin slowly trend down. B/P in 80s systolic and IVF ordered. Bladder scan attempted but likely ascities fluid as cath returned only about 100 ml. Declines food and only sips of fluid taken. Moves about in bed on own at times. Spoke with family regarding comfort care and meaning-declined at this time.

## 2021-08-13 NOTE — CONSULTS
[facility]    Hematology / Oncology Consultation     Date of Admission:  8/12/2021    Assessment & Plan   Alexandra Alfaro is a 66 year old male who was admitted on 8/12/2021. I was asked to see the patient for history of hepatocellular carcinoma.    Assessment:  Plan:  1.  Hepatocellular carcinoma  -Previous treatments included atezolizumab and bevacizumab.  Also patient had liver directed therapies including yttrium-90 and bland embolization.  -Recent scans from July 20th, evidence of progressive disease, portal vein thrombosis.     Plan   -Had a long discussion with patient and his sister.   -With his declining performance status, worsening disease, and liver decompensation, my recommendation was to transition to comfort care only with hospice consultation.   -It is unlikely that further treatment would be helpful or patient given even tolerate.  -We also discussed, CODE STATUS and I recommended that patient transition to DNR/DNI, as resuscitative measures would be futile.   Following discussion patient and his sister agreed to switch to DNR/DNI however they do not want to pursue hospice at this point and still wanted continue with active treatment.  -Patient's sister mentions that they do not want to stop treatment until the end.   -Patient does not wish to go home, even if he is dying he would prefer to die in hospital or nursing facility, as he does not want his children or wife to see him in this way.   -They want to move to treatment with lenvatinib, I advised them that it would not be advisable to start it at present however if he improves we could pursue lenvatinib at that time.     2.  Portal vein thrombosis  -Patient was on anticoagulation with Lovenox    3.  Spot on bacterial peritonitis  -Patient is on IV antibiotics had paracentesis  -We will keep him on chronic antibiotics on discharge to prevent further relapses of spontaneous bacterial peritonitis    4 Hepatitis C  -Patient has completed a  series of treatment and is seronegative now    5.  Cirrhosis of liver    6.  GI bleeding  -Would recommend a GI consult    Vinnie Navarrete MD   [facility]  Office:  [phone]    Code Status    No CPR- Pre-arrest intubation OK    Reason for Consult   Reason for consult: History of hepatocellular carcinoma.     Primary Care Physician   Lindy Dodge    Chief Complaint   Fever abdominal pain      History of Present Illness   Alexandra Alfaro is a 66 year old male who presents with fever and abdominal pain.    Patient is well-known to me, is been undergoing treatment for hepatocellular carcinoma under my care.  Please see oncologic chronology below.  Patient was recently hospitalized with similar symptoms of abdominal pain abdominal distention new ascites and jaundice was found to have portal vein thrombosis and spontaneous bacterial peritonitis.   Patient responded to conservative management with paracentesis IV antibiotics and was discharged home.   He presented to my clinic day before yesterday with complaints of nausea vomiting abdominal pain fever.  I advised him to go to the hospital as I was concerned about spontaneous bacterial peritonitis however patient did not wanted to go to hospital thus he was started on oral ciprofloxacin.  Overnight yesterday patient had high fever and vomited blood and family brought him to the ER.     This morning he feels better continues to remain very fatigued has no appetite continues to have pain in abdomen however better abdominal distention is better.    Oncologic chronology  66-year-old German man, was recently hospitalized for COVID-19 pneumonia.   Hospital work-up was significant for a 12.7 x 12.6 x 10.5 cm heterogeneous right hepatic lobe mass.  Occupying much of the right hepatic lobe, the findings on MRI were consistent with hepatocellular carcinoma.  There was hepatic cirrhosis.  Further work-up showed patient was positive for hepatitis C.  Labs done in the hospital  showed  AFP 26 CA was 19.957 bilirubin 0.8 ALT 75 , however these were done at the time when patient had COVID-19 infection and may not be a true representation of his baseline liver function.  Patient denies any history of blood transfusions or IV drug use.  Was not aware of hepatitis C infection prior to this recent hospital admission.   2/15/2020: Patient had a repeat MRI, and a follow-up with Dr. Hansen in hepatobiliary service at [facility].  The MRI showed liver mass measuring 13.3 x 11.9 x 13.1 cm.  Unfortunately there were additional foci of enhancement.  These findings were compatible with multifocal HCC Dustin RADS category LR 5.  Dr. Hansen recommended against surgical resection.  Patient is now awaiting interventional radiology consult to see if he would be candidate for liver directed therapy.   2/24/2020: Patient had a follow-up in medical oncology clinic we have discussed the results from new MRI and systemic therapy with atezolizumab and bevacizumab.   3/8/2021 Patient had a CT scan of chest to complete his staging There was a single borderline enlarged lymph node at station 2R.   3/8/2021: Patient had a consultation with interventional radiology interventional radiology recommended thousand arterial liver directed therapy with yttrium 90 radioembolization.  3/15/2020 when patient had SPECT scan there was inhomogeneous uptake of activity in right lobe of liver consistent with known liver tumor there was no focal area of extrahepatic uptake  3/15/2021 patient underwent a planning session with hepatic arteriogram there was variant hepatic arterial anatomy there was small foci of disease noted throughout both lobes of liver the dominant lesion and majority of daughter lesions were consistent with hepatocellular carcinoma in posterior right hepatic lobe perfused by accessory right hepatic artery.   A test dose of MAA was delivered to accessory right hepatic artery which was delivered  a high lung shunt of 15.3% Dr. Lopez felt that due to the shunt he would like to do the procedure over 2 sessions 1 month apart. A liver biopsy was also completed which confirmed presence ofWell-differentiated hepatocellular carcinoma.   3/19/2021 patient started on systemic therapy with atezolizumab alone,  bevacizumab was hold as patient is going through yetrium 90 treatment.   3/25/2021 Patient has the first procedure of yttrium 90 microsphere administration.  Patient did not receive the full dose due to presence of a hepatopulmonary shunt. Patient tolerated procedure well.  5/6/2021: Patient has persistent hepatopulmonary shunt and was unable to receive additional Y 90 radioembolization.  He received hepatic angiogram and planned embolization of hepatocellular carcinoma right hepatic lobe.  Procedure was complicated with significant pain and development of decompensation of liver with ascites.   7/20/2021 2 8/2/2021: Patient was hospitalized with sudden onset of abdominal pain and liver decompensation.  Work-up during the hospital was consistent with portal vein thrombosis, worsening hepatocellular carcinoma in the liver, with extension beyond the liver capsule invading chest wall.  Due to decompensation we recommended against further treatment, patient will receive treatment for spontaneous bacterial peritonitis and portal vein thrombosis with anticoagulation was able to be discharged home.    Past Medical History   I have reviewed this patient's medical history and updated it with pertinent information if needed.   Past Medical History:   Diagnosis Date     2019 novel coronavirus disease (COVID-19) 12/25/2020     Depression      Hepatitis C      Hepatocellular carcinoma        Past Surgical History   I have reviewed this patient's surgical history and updated it with pertinent information if needed.  Past Surgical History:   Procedure Laterality Date     DAVINCI HERNIORRHAPHY INGUINAL Right 08/07/2018     Procedure: DAVINCI HERNIORRHAPHY INGUINAL;  Robotic Assisted Right Inguinal Hernia Repair with Mesh ;  Surgeon: Prabhu Allred MD;  Location: [facility] OR     DIAPHRAGM SURGERY  2018     ERCP with FNA  03/2021     Hepatic angiogram with radioembolization  03/2021       Prior to Admission Medications   Prior to Admission Medications   Prescriptions Last Dose Informant Patient Reported? Taking?   ciprofloxacin (CIPRO) 500 MG tablet 8/11/2021 at Unknown time  Yes Yes   Sig: Take 1 tablet by mouth 2 times daily   dronabinol (MARINOL) 5 MG capsule 8/11/2021 at AM  No Yes   Sig: Take 1 capsule (5 mg) by mouth daily   enoxaparin ANTICOAGULANT (LOVENOX) 120 MG/0.8ML syringe 8/11/2021 at AM  No Yes   Sig: Inject 0.8 mLs (120 mg) Subcutaneous every 24 hours   famotidine (PEPCID) 20 MG tablet 8/11/2021 at AM  No Yes   Sig: Take 1 tablet (20 mg) by mouth 2 times daily   furosemide (LASIX) 20 MG tablet 8/11/2021 at AM  No Yes   Sig: Take 1 tablet (20 mg) by mouth daily   omeprazole (PRILOSEC) 40 MG DR capsule Not Taking at Unknown time  No No   Sig: Take 1 capsule (40 mg) by mouth daily   Patient not taking: Reported on 8/12/2021   oxyCODONE (ROXICODONE) 5 MG tablet 8/12/2021 at PRN  No Yes   Sig: Take 1 tablet (5 mg) by mouth every 8 hours as needed for pain or severe pain   polyethylene glycol (MIRALAX) 17 GM/Dose powder  at PRN  No Yes   Sig: Take 17 g by mouth daily Do not give if having loose stools   Patient taking differently: Take 17 g by mouth daily as needed Do not give if having loose stools   senna-docusate (SENOKOT-S/PERICOLACE) 8.6-50 MG tablet  at PRN  No Yes   Sig: Take 2 tablets by mouth 2 times daily Do not give if having loose stools   Patient taking differently: Take 2 tablets by mouth 2 times daily as needed Do not give if having loose stools      Facility-Administered Medications: None     Allergies   No Known Allergies    Social History   I have reviewed this patient's social history and updated it with  pertinent information if needed. Alexandra Alfaro  reports that he quit smoking about 3 years ago. His smoking use included cigarettes. He has never used smokeless tobacco. He reports that he does not drink alcohol and does not use drugs.    Family History   I have reviewed this patient's family history and updated it with pertinent information if needed.   Family History   Problem Relation Age of Onset     No Known Problems Brother      No Known Problems Mother      No Known Problems Sister      No Known Problems Daughter        Review of Systems       Physical Exam   Temp: 97.7  F (36.5  C) Temp src: Oral BP: (!) 80/44 Pulse: 96   Resp: 16 SpO2: 100 % O2 Device: Nasal cannula Oxygen Delivery: 2 LPM  Vital Signs with Ranges  Temp:  [96.1  F (35.6  C)-97.8  F (36.6  C)] 97.7  F (36.5  C)  Pulse:  [85-98] 96  Resp:  [11-30] 16  BP: ()/(44-78) 80/44  SpO2:  [98 %-100 %] 100 %  0 lbs 0 oz    Constitutional: Awake, alert, cooperative, no apparent distress. ECOG PS 3  Eyes: Conjunctiva and pupils examined there is pallor and severe icterus  GI: Soft, abdomen is distended there is ascites hepatomegaly and bruising on the chest wall   Neurologic: Cranial nerves 2-12 intact, normal strength and sensation.  Psychiatric: Alert, oriented to person, place and time, no obvious anxiety or depression.    Data   Laboratory studies reviewed CT scans reviewed discussed with Dr. Jacobo

## 2021-08-13 NOTE — PROVIDER NOTIFICATION
"MD paged: \"Sounds good, thank you! One more q, pt's BP is hanging out 80's / 40's, bolus almost done. Ok to give dilaudid w/ soft BP's? Pt c/o abd pain/fullness. Thanks\"    MD responded: \"yes\"  "

## 2021-08-13 NOTE — PLAN OF CARE
Maori-speaking,  services utilized via ipad. Disoriented to time. VSS on RA except symptomatic soft BP's , 2 boluses given per orders w/ minimal improvement. K+ 6.2, MD paged, recheck 6.1, MD rodriguez, Kayexalate given w/ recheck 8/14 @ 0200. C/O abd pain/fullness, PRN PO dilaudid given x 3 per pt request w/ some relief. Tele SR w/ 1* AVB, denied CP. LS dim, OWUSU. PIV x 3 intact, one infusing w/ NS @ 125 ml/hr, octreotide @ 10 ml/hr, and 1 unit PRBC (started approx 2241). Up Ax2 GB W, had 2 bright red, bloody BMs, pt reported urinating. Will continue POC.

## 2021-08-13 NOTE — CONSULTS
"Brief sw note:    José Antonio is aware of consult ordered on 8/12/21 for discharge planning/end of life \"Discharge planning, liver cancer, poor prognosis, if patient  dies here request assistance in getting body back to [place].\"    José Antonio spoke with Avani from the Pain and Palliative team who said that the pt's family was wanting to discuss options and ways in which the pt's body can bet back to [place] after he passes.  Avani said that the person of contact for this is the pt's sister René.    José Antonio spoke with Hemat who said that they already have everything taken care of and lined up.  She said that the family is hoping that the pt will die in the hospital because it will be easier on the family.  She said that although they want the pt to die in the hospital, they want to make sure the pt continues to be treated.      Hemat had no additional questions or concerns for sw at this time.    Pt admitted with distributive (septic) shock - secondary to severe sepsis from SBP, noted to have unplanned readmission risk of 40%.  At this time, it is unclear on what the pt's discharge plan will be.  Per the physician's note today, the pt will likely be in the hospital for another 3-4 days.  It is TBD if the pt will discharge from the hospital and if he does, what the disposition will be.     Handoff will be given to PCP clinic at discharge.      Sw will remain available as needed.    ANDREW Malloy, [facility]  Inpatient Care Coordination  [facility]  [phone]  "

## 2021-08-13 NOTE — PROGRESS NOTES
(7p-11p)  Ipad utilize to communicate with patient for initial assessment.  Patient is Syriac speaking.  Alert/oriented.  VSS but BP soft.  Patient was on RA; 2 L of O2 NC applied later in the night for comfort and family request.  LS clear/dim.  Dyspneic. Tele shows SR; heart rate mainly in the 90s.   Protonix gtt infusing.  Octreotide gtt infusing.  Patient is getting  Zosyn and Vancomycin.  At about 2200 Dr. Williamson came up and reassess the patient.  1 L of NS bolus given.  Bladder scan patient and showed > 700.  Patient not able to use the urinal but did wet his pants.  Re-scanned bladder showed > 600.  Paged MD for straight cath orders.

## 2021-08-13 NOTE — PHARMACY-VANCOMYCIN DOSING SERVICE
"Pharmacy Vancomycin Initial Note  Date of Service 2021  Patient's  1955  66 year old, male    Indication: Sepsis    Current estimated CrCl = Estimated Creatinine Clearance: 46.5 mL/min (A) (based on SCr of 1.69 mg/dL (H)).    Creatinine for last 3 days  2021:  5:59 AM Creatinine 1.61 mg/dL;  4:18 PM Creatinine 1.69 mg/dL    Recent Vancomycin Level(s) for last 3 days  No results found for requested labs within last 72 hours.      Vancomycin IV Administrations (past 72 hours)                   vancomycin 1500 mg in 0.9% NaCl 250 ml intermittent infusion 1,500 mg (mg) 1,500 mg New Bag 21 0659                Nephrotoxins and other renal medications (From now, onward)    Start     Dose/Rate Route Frequency Ordered Stop    21 0700  vancomycin (VANCOCIN) 1000 mg in dextrose 5% 200 mL PREMIX      1,000 mg  200 mL/hr over 1 Hours Intravenous EVERY 24 HOURS 21 1910      21 1700  piperacillin-tazobactam (ZOSYN) infusion 3.375 g     Note to Pharmacy: For [facility] and [facility]: For Zosyn-naive patients, use the \"Zosyn initial dose + extended infusion\" order panel.    3.375 g  100 mL/hr over 30 Minutes Intravenous EVERY 6 HOURS 21 1658            Contrast Orders - past 72 hours (72h ago, onward)    None          Loading dose: 1500 mg at 07:00 2021.  Regimen: 1000 mg IV every 24 hours.  Start time: 18:13 on 2021  Exposure target: AUC24 (range)400-600 mg/L.hr   AUC24,ss: 458 mg/L.hr  Probability of AUC24 > 400: 66 %  Ctrough,ss: 14.3 mg/L  Probability of Ctrough,ss > 20: 20 %  Probability of nephrotoxicity (Lodise ANGIE ): 9 %  kes        Plan:  1. Start vancomycin  1000 mg IV q24h.   2. Vancomycin monitoring method: AUC  3. Vancomycin therapeutic monitoring goal: 400-600 mg*h/L  4. Pharmacy will check vancomycin levels as appropriate in 1-3 Days.    5. Serum creatinine levels will be ordered daily for the first week of therapy and at least twice weekly for subsequent " weeks.      Kermit Severson, [facility]

## 2021-08-13 NOTE — PROVIDER NOTIFICATION
"MD paged: \"Pt just had small bloody stool, bright red. Please advise as needed, thanks\"    Addendum: MD paged saying Hgb will be checked tonight and tomorrow AM.   "

## 2021-08-14 NOTE — PLAN OF CARE
Patient remained unresponsive through shift. Appears comfortable. Family at bedside. B/P decreasing and heart rate dropped into 30's and then asystolic at 1101. Post cares given, family contacted the [facility] office who contracts with local company who picked patient up from room. Family plans to get transported back to [place].

## 2021-08-14 NOTE — PROGRESS NOTES
Death Note:    On exam: no heart tones, respirations or corneal reflex.  No cardiac activity on monitor.    Time of Death: 11:01 AM    Cause of death: metastatic hepatocellular carcinoma.      Family is present.

## 2021-08-14 NOTE — PROVIDER NOTIFICATION
"DATE:  8/13/2021   TIME OF RECEIPT FROM LAB:  2014  LAB TEST:  K+  LAB VALUE:  6.1  TIME LAB VALUE REPORTED TO PROVIDER:   Layla ALBA paged: \"Lab called w/ critical, K+ is 6.1. Please advise as needed, thanks.\"    "

## 2021-08-14 NOTE — PHARMACY-VANCOMYCIN DOSING SERVICE
"Pharmacy Vancomycin Note  Date of Service 2021  Patient's  1955   66 year old, male    Indication: Sepsis  Day of Therapy: 3  Current vancomycin regimen:  1000 mg IV q24h  Current vancomycin monitoring method: AUC  Current vancomycin therapeutic monitoring goal: 400-600 mg*h/L    Current estimated CrCl = Estimated Creatinine Clearance: 23.6 mL/min (A) (based on SCr of 3.33 mg/dL (H)).    Creatinine for last 3 days  2021:  5:59 AM Creatinine 1.61 mg/dL;  4:18 PM Creatinine 1.69 mg/dL  2021:  6:26 AM Creatinine 2.21 mg/dL  2021:  6:15 AM Creatinine 3.33 mg/dL    Recent Vancomycin Levels (past 3 days)  2021:  6:15 AM Vancomycin 17.2 mg/L    Vancomycin IV Administrations (past 72 hours)                   vancomycin (VANCOCIN) 1000 mg in dextrose 5% 200 mL PREMIX (mg) 1,000 mg New Bag 21 0640     1,000 mg New Bag 21 0647    vancomycin 1500 mg in 0.9% NaCl 250 ml intermittent infusion 1,500 mg (mg) 1,500 mg New Bag 21 0659                Nephrotoxins and other renal medications (From now, onward)    Start     Dose/Rate Route Frequency Ordered Stop    08/15/21 0700  vancomycin (VANCOCIN) 500 mg in sodium chloride 0.9 % 100 mL intermittent infusion      500 mg  over 1 Hours Intravenous EVERY 24 HOURS 21 0851      21 1700  piperacillin-tazobactam (ZOSYN) infusion 3.375 g     Note to Pharmacy: For [facility], [facility] and [facility]: For Zosyn-naive patients, use the \"Zosyn initial dose + extended infusion\" order panel.    3.375 g  100 mL/hr over 30 Minutes Intravenous EVERY 6 HOURS 21 1658               Contrast Orders - past 72 hours (72h ago, onward)    None          Interpretation of levels and current regimen:  Vancomycin level is reflective of AUC greater than 600    Has serum creatinine changed greater than 50% in last 72 hours: Yes    Renal Function: Worsening    Exposure target: AUC24 (range)400-600 mg/L.hr   AUC24,ss: 932 mg/L.hr  Probability of AUC24 > 400: " 100 %  Ctrough,ss: 33 mg/L  Probability of Ctrough,ss > 20: 98 %  Probability of nephrotoxicity (Lodise ANGIE 2009): 51 %    Plan:  1. Decrease Dose to 500mg Q24H  2. Vancomycin monitoring method: AUC  3. Vancomycin therapeutic monitoring goal: 400-600 mg*h/L  4. Pharmacy will check vancomycin levels as appropriate in 1-3 Days.  5. Serum creatinine levels will be ordered daily for the first week of therapy and at least twice weekly for subsequent weeks.    Elliot Diaz, [facility]

## 2021-08-14 NOTE — PROVIDER NOTIFICATION
"MD paged: \"Update: After 500 cc of 1000 cc bolus pt BP 71/38 (worsening). Bolus still infusing. No callback needed unless necessary, thanks\"  "

## 2021-08-14 NOTE — PROGRESS NOTES
I have been talking with patient's nurse tonight regarding hypotension and hyperkalemia.  I have reviewed the chart.  Patient is a very ill male with history of hepatitis C, cirrhosis, progressing liver failure, acute renal failure, hyperkalemia, and now sepsis probably due to spontaneous bacterial peritonitis.  The patient's family has been talking with his care providers (Stuart Williamson, Landon, and Odalis as well as palliative care).  It looks like they have been walking the line between doing things that are reasonable to give Alexandra a chance to get better but not doing things that would prolong the dying process if that seemed inevitable.  Family had previously decided not to pursue aggressive cares like line placement and vasopressors.  Today, they made Alexandra DNR/DNI.  With his persistent hypotension in spite of fluid boluses and progressive hyperkalemia I called and talked with the patient's daughter (Radha) and sister (René).  I told them that Alexandra is very sick.  I discussed his persistent hypotension and hyperkalemia.  I expressed my concern that he is not responding to our usual treatments for these problems.  I told them that I think there is a fairly high chance that he will continue to deteriorate and even pass away overnight.  I confirmed that the plan was to not pursue aggressive measures such as line placement, ICU transfer, and vasopressors.  I confirmed DNR/DNI.  I also expressed that I supported these decisions.  We agreed to try a unit of blood to see if that helps his hypotension.  We agreed to try another dose of Kayexalate.  I did express my doubt that these things would be effective.  I did let his family know that there is a fairly high chance that he will pass away tonight.  The family expressed to me that they have found a [place]  home that we will prepare the body in hopes that it can be returned to [place].  I see that social work was consulted earlier today with these concerns  among others.

## 2021-08-14 NOTE — PROVIDER NOTIFICATION
DATE:  8/14/2021   TIME OF RECEIPT FROM LAB:  0839   LAB TEST:K Glucose AST ALT CO2  LAB VALUE:  K 6.5 glucose 50 CO2 8 AST 3675 ALT 2042  RESULTS GIVEN WITH READ-BACK TO (PROVIDER): paged to MD  TIME LAB VALUE REPORTED TO PROVIDER:   0839

## 2021-08-14 NOTE — PLAN OF CARE
Occupational Therapy and Physical Therapy: Orders received. Chart reviewed and discussed with RN, patients BP continues to be hypotensive RN has request therapy orders to be completed at this time. Will complete orders. Please reorder therapy if patient become appropriate

## 2021-08-14 NOTE — PROGRESS NOTES
Hem-Onc chart check    Chart reviewed    Please see Dr Navarrete's note dated 8/13. No new recommendations  Prognosis remains guarded  Please call with any new questions/concerns    Tosha Pérez MD  [facility]

## 2021-08-14 NOTE — PROGRESS NOTES
"This writer saw MD note to recheck K+ but saw order was discontinued by \"lab, background user\" reason noted \"duplicate.\" This writer called lab now to inquire, lab staff says he is unsure what happened but will send lab personnel now for redraw.   "

## 2021-08-14 NOTE — PLAN OF CARE
BP (!) 70/40 (BP Location: Right arm)   Pulse 99   Temp 97.6  F (36.4  C) (Temporal)   Resp 16   SpO2 96%     Amharic speaking-  ipad in room. Low BP- MD aware. Lethargic and restless this shift. PRN dilaudid given throughout the night for pain/dyspnea- see MAR. 2L O2 NC for comfort. /hr. Octreotide 10ml/hr. IV Zosyn given. Tele: SR/ST 1* AVB. Family at bedside. PIV x3 intact. 1 unit PRB finished infusing this shift. Will continue to monitor.

## 2021-08-15 NOTE — DISCHARGE SUMMARY
[facility]    Death Summary - Hospitalist Service     Date of Admission:  8/12/2021  Date of Death: 8/14/2021 12:58 PM  Discharging Provider: Grant Jacobo MD    Discharge Diagnoses   Shock secondary to sepsis, spontaneous bacterial peritonitis and acute bleeding  Metastatic hepatocellular carcinoma  Acute kidney injury with hyperkalemia  Acute blood loss anemia    Cause of death:   Metastatic hepatocellular carcinoma  Shock secondary to sepsis and acute blood loss anemia          Grant Jacobo MD  [facility]  ______________________________________________________________________      Significant Results and Procedures   Alexandra Alfaro is a 66 year old male admitted on 8/12/2021. He has a complex medical history including hepatitis C, hepatocellular carcinoma with wide metastases, cirrhosis, who presented to the ER with hematemesis and abdominal pain.  He was admitted to the inpatient service with distributive shock secondary to sepsis from SBP. Despite treatment with IV antibiotics, IV fluids and transfusions his condition continued to worsen. Developed worsening renal failure with hyperkalemia, hypotension and somnolence. It was discussed with the family that further aggressive interventions would be futile given his severe underlying metastatic cancer. We continued with the therapies but did not escalate. His condition continued to decline and he passed away at 11:01 AM. I discussed with family who are present.     1.  Distributive (septic) shock, secondary to Severe Sepsis from SBP.       2.  Acute kidney injury and hyperkalemia due to sepsis:    3.    Metastatic hepatocellular carcinoma:       4.  Acute blood loss on chronic anemia.     Consultations This Hospital Stay   PALLIATIVE CARE ADULT IP CONSULT  SPIRITUAL HEALTH SERVICES IP CONSULT  CARE MANAGEMENT / SOCIAL WORK IP CONSULT  PHARMACY TO DOSE VANCO  GASTROENTEROLOGY IP CONSULT  PHYSICAL  THERAPY ADULT IP CONSULT  OCCUPATIONAL THERAPY ADULT IP CONSULT  PALLIATIVE CARE ADULT IP CONSULT  HEMATOLOGY & ONCOLOGY IP CONSULT    Primary Care Physician   Lindy Dodge    Time Spent on this Encounter   I, Grant Jacobo MD, personally saw the patient today and spent greater than 30 minutes discharging this patient.

## 2021-08-16 ENCOUNTER — TELEPHONE (OUTPATIENT)
Dept: INTERNAL MEDICINE | Facility: CLINIC | Age: 66
End: 2021-08-16

## 2021-08-16 NOTE — TELEPHONE ENCOUNTER
Fax received from [facility] for review and signature.  Put in Dr. Johnson's in basket.

## 2021-08-17 ENCOUNTER — TELEPHONE (OUTPATIENT)
Dept: INTERNAL MEDICINE | Facility: CLINIC | Age: 66
End: 2021-08-17

## 2021-08-17 ENCOUNTER — MEDICAL CORRESPONDENCE (OUTPATIENT)
Dept: HEALTH INFORMATION MANAGEMENT | Facility: CLINIC | Age: 66
End: 2021-08-17

## 2021-08-17 LAB — BACTERIA PRT CULT: NO GROWTH

## 2021-09-17 ENCOUNTER — TELEPHONE (OUTPATIENT)
Dept: INTERNAL MEDICINE | Facility: CLINIC | Age: 66
End: 2021-09-17

## 2021-09-29 ENCOUNTER — TELEPHONE (OUTPATIENT)
Dept: INTERNAL MEDICINE | Facility: CLINIC | Age: 66
End: 2021-09-29

## 2021-09-29 NOTE — TELEPHONE ENCOUNTER
PHYSICAL THERAPY / OCCUPATIONAL THERAPY order received via fax. Form in your mailbox to be signed.

## 2021-09-30 ENCOUNTER — MEDICAL CORRESPONDENCE (OUTPATIENT)
Dept: HEALTH INFORMATION MANAGEMENT | Facility: CLINIC | Age: 66
End: 2021-09-30
Payer: COMMERCIAL

## 2022-02-24 NOTE — PROGRESS NOTES
[facility]  Hospitalist Progress Note  Anthony Wilkinson MD 12/26/2020    Reason for Stay/active problem list      Acute COVID-19 infection with pneumonitis    Liver cirrhosis    Thrombocytopenia    Splenomegaly    Elevated troponin         Assessment and Plan:        Summary of Stay: Alexandra Alfaro is a 65 year old male no significant PMH presents with symptoms of cough fevers chills and chest pain for the past week.  He is found to have Covid test positive.  Labs notable for troponin of 0.91, ECG no ST or T wave changes noted. CT angio no pulmonary embolism, but does show bilateral infiltrates. He has been admitted for management of his Covid pneumonia.    Patient progress during stay:    Patient was admitted and was closely monitored.  He remained stable.  He has been feeling pleuritic chest pain and elevated troponin but his oxygen saturation remained stable.        Problem List with Assessment and Plan      1. Acute COVID-19 infection with pneumonitis    Currently afebrile.  Had fever this morning T-max of 100.7.    Inflammatory markers are trending up with , ferritin 1241.    Will initiate Decadron, monitor oxygen saturation.        2. Pleuritic chest pain      Doubt acute coronary syndrome also troponin is slightly elevated.    Continue pain medication, trend troponin, telemetry monitoring    3. Elevated troponin:      Serum troponin 0.198 at 1537 December 25.    Will check troponin and trend times    Continue to monitor on telemetry.  Hold echocardiogram for now    4.  Liver cirrhosis on CT scan    New diagnosis, etiology unclear    Hepatitis panel pending    Related ultrasound of the abdomen for further evaluation    Patient was counseled regarding abnormal diagnosis        Plan for today:    Trend troponin    Continue telemetry    Initiate Decadron    Ultrasound of the abdomen    Updated daughter Wolf - she is worried about the family and I told her to get tested and we will  provide instructions on discharge           LDA     Access : Peripheral   Zimmerman Catheter: not present        FEN :    Orders Placed This Encounter      Combination Diet Regular Diet Adult           Intake/Output Summary (Last 24 hours) at 12/26/2020 1317  Last data filed at 12/26/2020 0740  Gross per 24 hour   Intake 150 ml   Output 600 ml   Net -450 ml          DVT Prophylaxis: Enoxaparin (Lovenox) SQ    Code Status:  Full Code    Estimated discharge  disposition and plan:  May discharge  to home in 1 day(s) if patient remains stable and no requirement for proximal and troponins trend down.          Interval History (Subjective):        Patient is seen and examined by me today and medical record reviewed.Overnight events noted and care discussed with nursing staff.  Patient is Spanish only speaking and is from [place].  Was able to find  at bedside.  He denies any significant shortness of breath but he has some pleuritic chest pain.  No significant cough.  No abdominal pain.            Physical Exam:        Last Vital Signs:  /68 (BP Location: Right arm)   Pulse 79   Temp 98.6  F (37  C) (Axillary)   Resp 20   SpO2 98%     Wt Readings from Last 1 Encounters:   12/16/20 78 kg (172 lb)       Wt Readings from Last 5 Encounters:   12/16/20 78 kg (172 lb)   08/07/18 84.8 kg (187 lb)   08/06/18 86 kg (189 lb 9.5 oz)        Constitutional: Awake, alert, cooperative, no apparent distress     Respiratory: Clear to auscultation bilaterally, no crackles or wheezing   Cardiovascular: Regular rate and rhythm, normal S1 and S2, and no murmur noted   Abdomen: Normal bowel sounds, soft, non-distended, non-tender   Skin: No rashes, no cyanosis, dry to touch   Neuro: Alert with  no weakness, numbness, memory loss   Extremities: No edema, normal range of motion   Other(s):        All other systems: Negative          Medications:        All current medications were reviewed with changes reflected in problem list.          Data:      All new lab and imaging data was reviewed.      Data reviewed today: I reviewed all new labs and imaging results over the last 24 hours. I personally reviewed no images or EKG's today      Recent Labs   Lab 12/26/20 0639 12/25/20  1537   WBC 5.3 4.4   HGB 13.3 13.2*   HCT 38.8* 40.0   MCV 96 98   * 124*     No results for input(s): CULT in the last 168 hours.  Recent Labs   Lab 12/25/20 2110 12/25/20  1537   NA  --  136   POTASSIUM  --  4.7   CHLORIDE  --  105   CO2  --  29   ANIONGAP  --  2*   GLC  --  185*   BUN  --  10   CR  --  0.93   GFRESTIMATED  --  86   GFRESTBLACK  --  >90   REBECCA  --  7.9*   PROTTOTAL 7.2  --    ALBUMIN 2.1*  --    BILITOTAL 0.9  --    ALKPHOS 118  --    *  --    ALT 64  --        Recent Labs   Lab 12/25/20  1537   *       Recent Labs   Lab 12/26/20 0639 12/25/20 2110   INR 1.12 1.14         Recent Labs   Lab 12/25/20  1537   TROPI 0.198*       Recent Results (from the past 48 hour(s))   XR Chest Port 1 View    Narrative    CHEST ONE VIEW  12/25/2020 4:32 PM     HISTORY: Chest pain.      COMPARISON: None.      Impression    IMPRESSION: Question some lateral right base infiltrate vs. overlying  soft tissue. Similar finding on the left.    DAWNA LOMAX MD   CT Chest Pulmonary Embolism w Contrast    Narrative    EXAM: CT CHEST PULMONARY EMBOLISM W CONTRAST  LOCATION: [facility]  DATE/TIME: 12/25/2020 5:52 PM    INDICATION: Chest pain and fevers.  COMPARISON: None.  TECHNIQUE: CT chest pulmonary angiogram during arterial phase injection of IV contrast. Multiplanar reformats and MIP reconstructions were performed. Dose reduction techniques were used.   CONTRAST: 60 mL Isovue-370    FINDINGS:  ANGIOGRAM CHEST: Pulmonary arteries are normal caliber and negative for pulmonary emboli. Thoracic aorta is negative for dissection. No CT evidence of right heart strain.    LUNGS AND PLEURA: Normal. Subpleural geographic regions of ground-glass  infiltrates with interlobular thickening within the dependent portion of both lower lobes, as well as scattered smaller regions within the upper lobes, right middle lobe and lingula   compatible with COVID-19 pneumonia.    MEDIASTINUM/AXILLAE: Calcified nodes.    UPPER ABDOMEN: Cirrhosis with splenomegaly.    MUSCULOSKELETAL: Normal.      Impression    IMPRESSION:  1.  No pulmonary embolism.    2.  Regions of peripheral geographic ground-glass infiltrates with interlobular thickening in both lungs compatible with COVID-19 pneumonia.    3.  Cirrhosis with splenomegaly.       COVID Status:  COVID-19 PCR Results    COVID-19 PCR Results 9/26/20 9/26/20 12/25/20    1509 1509    COVID-19 Virus PCR to [facility] - Result Test received-See reflex to IDDL test SARS CoV2 (COVID-19) Virus RT-PCR     COVID-19 Virus PCR to [facility] - Source Nasopharyngeal     SARS-CoV-2 Virus Specimen Source  Nasopharyngeal    Flu A/B & SARS-COV-2 PCR Source   Nasopharyngeal   SARS-CoV-2 PCR Result  NEGATIVE POSITIVE (A)   (A) Abnormal value       Comments are available for some flowsheets but are not being displayed.         COVID-19 Antibody Results, Testing for Immunity    COVID-19 Antibody Results, Testing for Immunity   No data to display.              Disclaimer: This note consists of symbols derived from keyboarding, dictation and/or voice recognition software. As a result, there may be errors in the script that have gone undetected. Please consider this when interpreting information found in this chart.     No

## 2023-05-13 NOTE — PROGRESS NOTES
Clinic Care Coordination Contact    Follow Up Progress Note      Assessment: Spoke with patient's daughter Wolf, patient gives verbal consent. Patient is being closely followed by Oncology, and his next appointment is 3/8/21. Patient will have liver biopsy on 3/15/21, and then decide on course of treatment.     Wolf has scheduled [place] for a second opinion at [facility] on 3/22/21. Patient feels that his health is being well managed, however the family wants to ensure the patient receives all his treatment options and feels that a second opinion will give them more understanding.      Goals addressed this encounter:   Goals Addressed                 This Visit's Progress      1. Pain Management (pt-stated)   20%     Goal Statement: I will verbalized a decrease in my pain level to a 3/10.  Date Goal set: 12/15/2020  Barriers: Language barrier  Strengths: Patient is motivated to better manage his health and pain.   Date to Achieve By: 6/15/2021  Patient expressed understanding of goal: Yes.   Action steps to achieve this goal:  1. I will meet with PCP 12/16/20 to address my chronic pain, and follow their recommendations.   2. I will meet with PT 12/16/20 to address my chronic pain, and follow their recommendations.   3. I will continue to work with care coordination for any additional concerns               Intervention/Education provided during outreach: Chronic disease management.      Outreach Frequency: 3 weeks    Plan: Patient will follow up with Oncology on 3/8, will have biopsy on 3/15, and second opinion oncology at [facility] on 3/22/21. RN Care Coordinator will follow up in 3 weeks.     Elmira Sethi RN Care Coordinator  [facility]  Email: [email or web address]  Phone: [phone]      
 used

## 2023-12-18 NOTE — PROGRESS NOTES
Clinic Care Coordination Contact  [facility]/Voicemail       Clinical Data: Care Coordinator Outreach  Outreach attempted x 2.  Left message on patient's voicemail with call back information and requested return call.  Plan:. Care Coordinator will try to reach patient again in 10 business days.    Elmira Sethi RN Care Coordinator  [facility]  Email: [email or web address]  Phone: [phone]         Spoke with patient's daughter who says she has an appointment on 12/22/23 at 4:20 p.m. and if she is not feeling better by 12/19/23 morning, daughter will take patient to Urgent Care to be seen.

## 2025-03-18 NOTE — PLAN OF CARE
Left VM for patient to call & schedule.    Pertinent assessments: A&O per family. Sami speaking--- pt walked to bathroom x4 and sat in chair became increasingly weak as day progressed -----  co of not having bm meds given early --- family helping pt in room instructed to  call for help due to increased weakness and lethergy      Major Shift Events:  up in chair -- jaundice increasing ---  fair appetite     Treatment Plan: para tomorrow  Care conference  Friday with ONC     Bedside Nurse---Laurence Barone RN

## 2025-07-28 NOTE — PLAN OF CARE
Assessments: AxOx4. VSS. Up SBA with walker to bathroom. Denies pain, n/v. No SoB. Jaundiced. Fair appetite.     Treatment Plan: Restorative cares. SW following for discharge plan.     Bedside Nurse: Jaime Kaur RN          Patient/Caregiver provided printed discharge information.

## (undated) DEVICE — SU WND CLOSURE VLOC 90 ABS 3-0 VIOLET 6" CV-23 VLOCM0804

## (undated) DEVICE — LIGHT HANDLE X2

## (undated) DEVICE — SU VICRYL 0 CT-2 CR 8X18" J727D

## (undated) DEVICE — LUBRICANT INST ELECTROLUBE EL101

## (undated) DEVICE — GOWN IMPERVIOUS SPECIALTY XLG/XLONG 32474

## (undated) DEVICE — SYSTEM CLEARIFY VISUALIZATION 21-345

## (undated) DEVICE — LINEN ORTHO ACL PACK 5447

## (undated) DEVICE — Device

## (undated) DEVICE — DAVINCI HOT SHEARS TIP COVER  400180

## (undated) DEVICE — PACK SET-UP STD 9102

## (undated) DEVICE — BLADE CLIPPER 3M 9670

## (undated) DEVICE — ESU CORD MONOPOLAR 10'  E0510

## (undated) DEVICE — GLOVE PROTEXIS BLUE W/NEU-THERA 8.0  2D73EB80

## (undated) DEVICE — GLOVE PROTEXIS POWDER FREE SMT 7.5  2D72PT75X

## (undated) DEVICE — SU VICRYL 4-0 PS-2 18" UND J496H

## (undated) DEVICE — DAVINCI S CANNULA SEAL 8.5-13MM 420206

## (undated) DEVICE — DAVINCI SI DRAPE ACCESSORY KIT 3-ARM 420290

## (undated) DEVICE — PROTECTOR ARM ONE-STEP TRENDELENBURG 40418

## (undated) DEVICE — ESU ELEC BLADE 2.75" COATED/INSULATED E1455

## (undated) DEVICE — GLOVE PROTEXIS POWDER FREE 8.0 ORTHOPEDIC 2D73ET80

## (undated) DEVICE — ESU PENCIL W/HOLSTER E2350H

## (undated) DEVICE — DAVINCI OBTURATOR 8MM BLADELESS 420023

## (undated) DEVICE — SOL WATER IRRIG 1000ML BOTTLE 2F7114

## (undated) DEVICE — ESU GROUND PAD ADULT W/CORD E7507

## (undated) DEVICE — SU VICRYL 3-0 SH 27" J316H

## (undated) RX ORDER — DEXAMETHASONE SODIUM PHOSPHATE 4 MG/ML
INJECTION, SOLUTION INTRA-ARTICULAR; INTRALESIONAL; INTRAMUSCULAR; INTRAVENOUS; SOFT TISSUE
Status: DISPENSED
Start: 2018-08-07

## (undated) RX ORDER — LIDOCAINE HYDROCHLORIDE 10 MG/ML
INJECTION, SOLUTION EPIDURAL; INFILTRATION; INTRACAUDAL; PERINEURAL
Status: DISPENSED
Start: 2018-08-07

## (undated) RX ORDER — FENTANYL CITRATE 50 UG/ML
INJECTION, SOLUTION INTRAMUSCULAR; INTRAVENOUS
Status: DISPENSED
Start: 2018-08-07

## (undated) RX ORDER — ONDANSETRON 2 MG/ML
INJECTION INTRAMUSCULAR; INTRAVENOUS
Status: DISPENSED
Start: 2018-08-07

## (undated) RX ORDER — CEFAZOLIN SODIUM 2 G/100ML
INJECTION, SOLUTION INTRAVENOUS
Status: DISPENSED
Start: 2018-08-07

## (undated) RX ORDER — PROPOFOL 10 MG/ML
INJECTION, EMULSION INTRAVENOUS
Status: DISPENSED
Start: 2018-08-07

## (undated) RX ORDER — GLYCOPYRROLATE 0.2 MG/ML
INJECTION INTRAMUSCULAR; INTRAVENOUS
Status: DISPENSED
Start: 2018-08-07

## (undated) RX ORDER — OXYCODONE HYDROCHLORIDE 5 MG/1
TABLET ORAL
Status: DISPENSED
Start: 2018-08-07

## (undated) RX ORDER — HYDROMORPHONE HYDROCHLORIDE 1 MG/ML
INJECTION, SOLUTION INTRAMUSCULAR; INTRAVENOUS; SUBCUTANEOUS
Status: DISPENSED
Start: 2018-08-07

## (undated) RX ORDER — NEOSTIGMINE METHYLSULFATE 1 MG/ML
VIAL (ML) INJECTION
Status: DISPENSED
Start: 2018-08-07

## (undated) RX ORDER — BUPIVACAINE HYDROCHLORIDE AND EPINEPHRINE 5; 5 MG/ML; UG/ML
INJECTION, SOLUTION EPIDURAL; INTRACAUDAL; PERINEURAL
Status: DISPENSED
Start: 2018-08-07